# Patient Record
Sex: MALE | Race: WHITE | NOT HISPANIC OR LATINO | Employment: OTHER | URBAN - METROPOLITAN AREA
[De-identification: names, ages, dates, MRNs, and addresses within clinical notes are randomized per-mention and may not be internally consistent; named-entity substitution may affect disease eponyms.]

---

## 2017-01-11 ENCOUNTER — OFFICE VISIT (OUTPATIENT)
Dept: INTERNAL MEDICINE | Facility: IMAGING CENTER | Age: 77
End: 2017-01-11
Payer: MEDICARE

## 2017-01-11 ENCOUNTER — HOSPITAL ENCOUNTER (OUTPATIENT)
Facility: MEDICAL CENTER | Age: 77
End: 2017-01-11
Attending: INTERNAL MEDICINE
Payer: MEDICARE

## 2017-01-11 VITALS
RESPIRATION RATE: 12 BRPM | TEMPERATURE: 96.6 F | HEIGHT: 67 IN | WEIGHT: 168 LBS | DIASTOLIC BLOOD PRESSURE: 74 MMHG | SYSTOLIC BLOOD PRESSURE: 124 MMHG | OXYGEN SATURATION: 99 % | BODY MASS INDEX: 26.37 KG/M2 | HEART RATE: 63 BPM

## 2017-01-11 DIAGNOSIS — D69.6 THROMBOCYTOPENIA (HCC): ICD-10-CM

## 2017-01-11 DIAGNOSIS — N40.0 BENIGN NON-NODULAR PROSTATIC HYPERPLASIA WITHOUT LOWER URINARY TRACT SYMPTOMS: ICD-10-CM

## 2017-01-11 LAB
BASOPHILS # BLD AUTO: 0.03 K/UL (ref 0–0.12)
BASOPHILS NFR BLD AUTO: 0.4 % (ref 0–1.8)
EOSINOPHIL # BLD: 0.09 K/UL (ref 0–0.51)
EOSINOPHIL NFR BLD AUTO: 1.3 % (ref 0–6.9)
ERYTHROCYTE [DISTWIDTH] IN BLOOD BY AUTOMATED COUNT: 49.4 FL (ref 35.9–50)
HCT VFR BLD AUTO: 41.9 % (ref 42–52)
HGB BLD-MCNC: 14.3 G/DL (ref 14–18)
IMM GRANULOCYTES # BLD AUTO: 0.02 K/UL (ref 0–0.11)
IMM GRANULOCYTES NFR BLD AUTO: 0.3 % (ref 0–0.9)
LYMPHOCYTES # BLD: 2.45 K/UL (ref 1–4.8)
LYMPHOCYTES NFR BLD AUTO: 36.1 % (ref 22–41)
MCH RBC QN AUTO: 33.6 PG (ref 27–33)
MCHC RBC AUTO-ENTMCNC: 34.1 G/DL (ref 33.7–35.3)
MCV RBC AUTO: 98.4 FL (ref 81.4–97.8)
MONOCYTES # BLD: 0.59 K/UL (ref 0–0.85)
MONOCYTES NFR BLD AUTO: 8.7 % (ref 0–13.4)
NEUTROPHILS # BLD: 3.6 K/UL (ref 1.82–7.42)
NEUTROPHILS NFR BLD AUTO: 53.2 % (ref 44–72)
NRBC # BLD AUTO: 0 K/UL
NRBC BLD-RTO: 0 /100 WBC
PLATELET # BLD AUTO: 130 K/UL (ref 164–446)
PMV BLD AUTO: 12.8 FL (ref 9–12.9)
RBC # BLD AUTO: 4.26 M/UL (ref 4.7–6.1)
WBC # BLD AUTO: 6.8 K/UL (ref 4.8–10.8)

## 2017-01-11 PROCEDURE — 99213 OFFICE O/P EST LOW 20 MIN: CPT | Performed by: INTERNAL MEDICINE

## 2017-01-11 PROCEDURE — 85025 COMPLETE CBC W/AUTO DIFF WBC: CPT

## 2017-01-11 NOTE — MR AVS SNAPSHOT
"        Andre Martinez   2017 11:00 AM   Office Visit   MRN: [id number]    [facility]  [facility]   Dept Phone:  [phone]    Description:  Male : 1940   Provider:  Azar Cavazos M.D.           Reason for Visit     Follow-Up ear wax and low platelet      Allergies as of 2017     Allergen Noted Reactions    Avelox [Moxifloxacin Hcl In Nacl] 05/15/2013       Oxycodone 05/15/2013       Scopolamine 05/15/2013         You were diagnosed with     Thrombocytopenia (HCC)   [291202]       Benign non-nodular prostatic hyperplasia without lower urinary tract symptoms   [1450745]         Vital Signs     Blood Pressure Pulse Temperature Respirations Height Weight    124/74 mmHg 63 35.9 °C (96.6 °F) 12 1.689 m (5' 6.5\") 76.204 kg (168 lb)    Body Mass Index Oxygen Saturation Smoking Status             26.71 kg/m2 99% Never Smoker          Basic Information     Date Of Birth Sex Race Ethnicity Preferred Language    1940 Male White Non- English      Problem List              ICD-10-CM Priority Class Noted - Resolved    Coronary atherosclerosis of native coronary artery I25.10   5/15/2013 - Present    Meniscus tear S83.209A   Unknown - Present    Spinal stenosis, lumbar M48.06   Unknown - Present    Colon polyps K63.5   Unknown - Present    Kidney cysts N28.1   Unknown - Present    Pancreas cyst K86.2   Unknown - Present    BPH (benign prostatic hyperplasia) N40.0   Unknown - Present    Pure hypercholesterolemia E78.00   2016 - Present      Health Maintenance        Date Due Completion Dates    COLONOSCOPY 2017 (Done)    Override on 2012: Done    IMM DTaP/Tdap/Td Vaccine (2 - Td) 2024            Current Immunizations     13-VALENT PCV PREVNAR 2015    Hepatitis A Vaccine, Ped/Adol 1997, 1996    Hepatitis B Vaccine Non-Recombivax (Ped/Adol) 2000, 2000, 1999    Influenza Vaccine Adult HD 10/22/2016, 2015   " Meningococcal Conjugate Vaccine MCV4 (Menactra) 8/6/2014    Pneumococcal polysaccharide vaccine (PPSV-23) 1/17/2013, 7/25/2006    SHINGLES VACCINE 11/14/2005    Tdap Vaccine 8/6/2014    Typhoid Vaccine 6/2/2010    Yellow Fever Vaccine 8/6/2014      Below and/or attached are the medications your provider expects you to take. Review all of your home medications and newly ordered medications with your provider and/or pharmacist. Follow medication instructions as directed by your provider and/or pharmacist. Please keep your medication list with you and share with your provider. Update the information when medications are discontinued, doses are changed, or new medications (including over-the-counter products) are added; and carry medication information at all times in the event of emergency situations     Allergies:  AVELOX - (reactions not documented)     OXYCODONE - (reactions not documented)     SCOPOLAMINE - (reactions not documented)               Medications  Valid as of: January 11, 2017 -  2:11 PM    Generic Name Brand Name Tablet Size Instructions for use    Aspirin (Tablet Delayed Response) ECOTRIN 81 MG Take 81 mg by mouth every day.        Atorvastatin Calcium (Tab) LIPITOR 20 MG Take 1 Tab by mouth every bedtime.        Cefdinir (Cap) OMNICEF 300 MG Take 1 Cap by mouth 2 times a day.        Celecoxib (Cap) CELEBREX 200 MG Take 1 Cap by mouth every day.        Cholecalciferol (Tab) cholecalciferol 1000 UNIT Take 2 Tabs by mouth every day.        Coenzyme Q10 (Cap) coenzyme Q-10 30 MG Take 60 mg by mouth every day.        Cyanocobalamin   Take 1 Tab by mouth every day.        Dutasteride (Cap) AVODART 0.5 MG Take 1 Cap by mouth every day.        Dutasteride-Tamsulosin HCl (Cap) Dutasteride-Tamsulosin HCl 0.5-0.4 MG Take 1 Cap by mouth every day.        Finasteride (Tab) PROSCAR 5 MG Take 1 Tab by mouth every day.        Glucosa-Chondr-Na Chondr-MSM   Take 1 Tab by mouth every day.        Mometasone Furoate  (Suspension) NASONEX 50 MCG/ACT Spray 2 Sprays in nose every day.        Multiple Vitamin (Cap) Multiple Vitamin  Take 1 Cap by mouth every day.        Omega-3 Fatty Acids   Take 2 Caps by mouth every day.        Tamsulosin HCl (Cap) FLOMAX 0.4 MG Take 1 Cap by mouth ONE-HALF HOUR AFTER BREAKFAST.        Tamsulosin HCl (Cap) FLOMAX 0.4 MG Take 1 Cap by mouth ONE-HALF HOUR AFTER BREAKFAST.        .                 Medicines prescribed today were sent to:     [facility]ARMACY #[id number] - [place], [place] - [address]    [address] [place] [address] [place] [address]    Phone: [phone] Fax: [phone]    Open 24 Hours?: No    [facility] - [address] [facility], [place] - [address] AT [facility] AND [address] [facility] [address]    Phone: [phone] Fax: [phone]    Open 24 Hours?: Yes      Medication refill instructions:       If your prescription bottle indicates you have medication refills left, it is not necessary to call your provider’s office. Please contact your pharmacy and they will refill your medication.    If your prescription bottle indicates you do not have any refills left, you may request refills at any time through one of the following ways: The online ePantry system (except Urgent Care), by calling your provider’s office, or by asking your pharmacy to contact your provider’s office with a refill request. Medication refills are processed only during regular business hours and may not be available until the next business day. Your provider may request additional information or to have a follow-up visit with you prior to refilling your medication.   *Please Note: Medication refills are assigned a new Rx number when refilled electronically. Your pharmacy may indicate that no refills were authorized even though a new prescription for the same medication is available at the pharmacy. Please request the medicine by name with the pharmacy before contacting your provider for a  refill.        Your To Do List     Future Labs/Procedures Complete By Expires    CBC WITH DIFFERENTIAL  As directed 7/14/2017      Other Notes About Your Plan     Colonoscopy 5/8/2012 in [place] (Dr Domingo) repeat in 5 yrs Dexa 6/3/16 osteopenia PSA 11/15/16  1.42  [facility]            MyChart Access Code: Activation code not generated  Current MyChart Status: Active

## 2017-01-11 NOTE — PROGRESS NOTES
Chief Complaint   Patient presents with   • Follow-Up     ear wax and low platelet       HISTORY OF THE PRESENT ILLNESS: Patient is a 76 y.o. male. Patient comes in for reevaluation of his left ear. He was recently diagnosed with cerumen impaction by his ENT and [place]. He underwent removal. He still has a sensation of fullness in the ear. No hearing loss. No discharge.    Also discussed thrombocytopenia. This was seen on last laboratory. No unusual bruising, bleeding.    Lastly, we discussed his BPH. He is now on finasteride and tamsulosin. He has noticed some improvement.       Allergies: Avelox; Oxycodone; and Scopolamine    Current Outpatient Prescriptions Ordered in [facility]   Medication Sig Dispense Refill   • finasteride (PROSCAR) 5 MG Tab Take 1 Tab by mouth every day. 90 Tab 3   • tamsulosin (FLOMAX) 0.4 MG capsule Take 1 Cap by mouth ONE-HALF HOUR AFTER BREAKFAST. 90 Cap 3   • Dutasteride-Tamsulosin HCl (MARIANA) 0.5-0.4 MG Cap Take 1 Cap by mouth every day. 30 Cap 11   • cefdinir (OMNICEF) 300 MG Cap Take 1 Cap by mouth 2 times a day. 20 Cap 0   • dutasteride (AVODART) 0.5 MG capsule Take 1 Cap by mouth every day. 90 Cap 3   • atorvastatin (LIPITOR) 20 MG Tab Take 1 Tab by mouth every bedtime. 90 Tab 3   • mometasone (NASONEX) 50 MCG/ACT nasal spray Spray 2 Sprays in nose every day. 1 Inhaler 11   • tamsulosin (FLOMAX) 0.4 MG capsule Take 1 Cap by mouth ONE-HALF HOUR AFTER BREAKFAST. 90 Cap 3   • celecoxib (CELEBREX) 200 MG Cap Take 1 Cap by mouth every day. 30 Cap 1   • vitamin D (CHOLECALCIFEROL) 1000 UNIT TABS Take 2 Tabs by mouth every day. 30 Tab 6   • aspirin EC (ECOTRIN) 81 MG TBEC Take 81 mg by mouth every day.     • Multiple Vitamin CAPS Take 1 Cap by mouth every day.     • Omega-3 Fatty Acids (SALMON OIL PO) Take 2 Caps by mouth every day.     • Cyanocobalamin (VITAMIN B 12 PO) Take 1 Tab by mouth every day.     • coenzyme Q-10 30 MG capsule Take 60 mg by mouth every day.     • GLUCOSA-CHONDR-NA  "CHONDR-MSM PO Take 1 Tab by mouth every day.       No current [facility]-ordered facility-administered medications on file.       Past medical history, social history and family history were reviewed from chart today    Review of systems: Per HPI.All others negative.     Exam: Blood pressure 124/74, pulse 63, temperature 35.9 °C (96.6 °F), resp. rate 12, height 1.689 m (5' 6.5\"), weight 76.204 kg (168 lb), SpO2 99 %.  General: Well-nourished, well-developed. No change in appearance. No distress.  HEENT: Normocephalic. Ears, canals and tympanic membrane are normal. Pulmonary: Clear.. Normal effort.  Cardiovascular: Regular   Abdomen: Normal appearing. Soft, nontender, nondistended.   Neurologic: Cranial nerves II through XII are grossly intact, alert and oriented x3      Assessment/Plan  1. Thrombocytopenia (HCC)  CBC WITH DIFFERENTIAL   2. Benign non-nodular prostatic hyperplasia without lower urinary tract symptoms         Ear exam was unremarkable.  Repeat CBC to reassess Cecil osteopenia  BPH is stable. No change in treatment  "

## 2017-02-03 ENCOUNTER — HOSPITAL ENCOUNTER (OUTPATIENT)
Dept: LAB | Facility: MEDICAL CENTER | Age: 77
End: 2017-02-03
Attending: NURSE PRACTITIONER
Payer: MEDICARE

## 2017-02-03 DIAGNOSIS — N40.1 BENIGN NON-NODULAR PROSTATIC HYPERPLASIA WITH LOWER URINARY TRACT SYMPTOMS: ICD-10-CM

## 2017-02-03 LAB
BUN SERPL-MCNC: 23 MG/DL (ref 8–22)
CREAT SERPL-MCNC: 1.07 MG/DL (ref 0.5–1.4)

## 2017-02-03 PROCEDURE — 82565 ASSAY OF CREATININE: CPT

## 2017-02-03 PROCEDURE — 84520 ASSAY OF UREA NITROGEN: CPT

## 2017-02-03 PROCEDURE — 36415 COLL VENOUS BLD VENIPUNCTURE: CPT

## 2017-02-03 RX ORDER — FINASTERIDE 5 MG/1
5 TABLET, FILM COATED ORAL DAILY
Qty: 90 TAB | Refills: 3 | Status: SHIPPED | OUTPATIENT
Start: 2017-02-03 | End: 2017-11-25 | Stop reason: SDUPTHER

## 2017-02-07 ENCOUNTER — APPOINTMENT (OUTPATIENT)
Dept: RADIOLOGY | Facility: MEDICAL CENTER | Age: 77
End: 2017-02-07
Attending: NURSE PRACTITIONER
Payer: MEDICARE

## 2017-02-07 DIAGNOSIS — M79.671 RIGHT FOOT PAIN: ICD-10-CM

## 2017-02-07 DIAGNOSIS — D36.10 NEUROMA: ICD-10-CM

## 2017-02-07 PROCEDURE — A9579 GAD-BASE MR CONTRAST NOS,1ML: HCPCS | Performed by: NURSE PRACTITIONER

## 2017-02-07 PROCEDURE — 700117 HCHG RX CONTRAST REV CODE 255: Performed by: NURSE PRACTITIONER

## 2017-02-07 PROCEDURE — 73720 MRI LWR EXTREMITY W/O&W/DYE: CPT | Mod: RT

## 2017-02-07 RX ADMIN — GADODIAMIDE 17 ML: 287 INJECTION INTRAVENOUS at 16:17

## 2017-02-14 DIAGNOSIS — J01.00 ACUTE NON-RECURRENT MAXILLARY SINUSITIS: ICD-10-CM

## 2017-02-14 DIAGNOSIS — R19.7 DIARRHEA, UNSPECIFIED TYPE: ICD-10-CM

## 2017-02-14 RX ORDER — METRONIDAZOLE 500 MG/1
500 TABLET ORAL 3 TIMES DAILY
Qty: 30 TAB | Refills: 0 | Status: SHIPPED | OUTPATIENT
Start: 2017-02-14 | End: 2017-07-24

## 2017-02-14 RX ORDER — CEFDINIR 300 MG/1
300 CAPSULE ORAL 2 TIMES DAILY
Qty: 20 CAP | Refills: 0 | Status: SHIPPED | OUTPATIENT
Start: 2017-02-14 | End: 2017-04-25 | Stop reason: SDUPTHER

## 2017-03-06 DIAGNOSIS — J30.2 OTHER SEASONAL ALLERGIC RHINITIS: ICD-10-CM

## 2017-03-07 RX ORDER — MOMETASONE FUROATE 50 UG/1
2 SPRAY, METERED NASAL DAILY
Qty: 1 INHALER | Refills: 11 | Status: SHIPPED | OUTPATIENT
Start: 2017-03-07 | End: 2017-03-31

## 2017-03-31 DIAGNOSIS — Z91.09 ENVIRONMENTAL ALLERGIES: ICD-10-CM

## 2017-03-31 RX ORDER — FLUTICASONE PROPIONATE 50 MCG
1 SPRAY, SUSPENSION (ML) NASAL DAILY
Qty: 16 G | Refills: 3 | Status: SHIPPED | OUTPATIENT
Start: 2017-03-31 | End: 2019-04-17

## 2017-04-25 DIAGNOSIS — J01.00 ACUTE NON-RECURRENT MAXILLARY SINUSITIS: ICD-10-CM

## 2017-04-25 RX ORDER — CEFDINIR 300 MG/1
300 CAPSULE ORAL 2 TIMES DAILY
Qty: 20 CAP | Refills: 0 | Status: SHIPPED | OUTPATIENT
Start: 2017-04-25 | End: 2017-07-24

## 2017-04-26 DIAGNOSIS — E78.00 PURE HYPERCHOLESTEROLEMIA: ICD-10-CM

## 2017-04-26 RX ORDER — ATORVASTATIN CALCIUM 20 MG/1
20 TABLET, FILM COATED ORAL
Qty: 90 TAB | Refills: 3 | Status: SHIPPED | OUTPATIENT
Start: 2017-04-26 | End: 2018-10-25 | Stop reason: SDUPTHER

## 2017-05-09 DIAGNOSIS — N40.0 BENIGN NON-NODULAR PROSTATIC HYPERPLASIA WITHOUT LOWER URINARY TRACT SYMPTOMS: ICD-10-CM

## 2017-05-09 DIAGNOSIS — E78.00 PURE HYPERCHOLESTEROLEMIA: ICD-10-CM

## 2017-05-09 DIAGNOSIS — D69.6 THROMBOCYTOPENIA (HCC): ICD-10-CM

## 2017-05-30 RX ORDER — TAMSULOSIN HYDROCHLORIDE 0.4 MG/1
CAPSULE ORAL
Qty: 90 CAP | Refills: 3 | Status: SHIPPED | OUTPATIENT
Start: 2017-05-30 | End: 2018-12-06

## 2017-06-01 DIAGNOSIS — N40.1 BENIGN NON-NODULAR PROSTATIC HYPERPLASIA WITH LOWER URINARY TRACT SYMPTOMS: ICD-10-CM

## 2017-06-05 ENCOUNTER — OFFICE VISIT (OUTPATIENT)
Dept: CARDIOLOGY | Facility: MEDICAL CENTER | Age: 77
End: 2017-06-05
Payer: MEDICARE

## 2017-06-05 ENCOUNTER — NON-PROVIDER VISIT (OUTPATIENT)
Dept: CARDIOLOGY | Facility: MEDICAL CENTER | Age: 77
End: 2017-06-05
Payer: MEDICARE

## 2017-06-05 VITALS
BODY MASS INDEX: 22.9 KG/M2 | OXYGEN SATURATION: 95 % | HEART RATE: 66 BPM | HEIGHT: 70 IN | DIASTOLIC BLOOD PRESSURE: 60 MMHG | SYSTOLIC BLOOD PRESSURE: 108 MMHG | WEIGHT: 160 LBS

## 2017-06-05 DIAGNOSIS — R42 LIGHT HEADEDNESS: ICD-10-CM

## 2017-06-05 DIAGNOSIS — R42 POSTURAL DIZZINESS WITH PRESYNCOPE: ICD-10-CM

## 2017-06-05 DIAGNOSIS — I49.3 PVC (PREMATURE VENTRICULAR CONTRACTION): ICD-10-CM

## 2017-06-05 DIAGNOSIS — R55 POSTURAL DIZZINESS WITH PRESYNCOPE: ICD-10-CM

## 2017-06-05 DIAGNOSIS — I49.1 PREMATURE ATRIAL CONTRACTION: ICD-10-CM

## 2017-06-05 DIAGNOSIS — R55 PRE-SYNCOPE: ICD-10-CM

## 2017-06-05 DIAGNOSIS — R00.0 SINUS TACHYCARDIA: ICD-10-CM

## 2017-06-05 DIAGNOSIS — R00.1 SINUS BRADYCARDIA: ICD-10-CM

## 2017-06-05 PROCEDURE — 1101F PT FALLS ASSESS-DOCD LE1/YR: CPT | Performed by: INTERNAL MEDICINE

## 2017-06-05 PROCEDURE — 4040F PNEUMOC VAC/ADMIN/RCVD: CPT | Performed by: INTERNAL MEDICINE

## 2017-06-05 PROCEDURE — G8432 DEP SCR NOT DOC, RNG: HCPCS | Performed by: INTERNAL MEDICINE

## 2017-06-05 PROCEDURE — 99214 OFFICE O/P EST MOD 30 MIN: CPT | Performed by: INTERNAL MEDICINE

## 2017-06-05 PROCEDURE — 3017F COLORECTAL CA SCREEN DOC REV: CPT | Performed by: INTERNAL MEDICINE

## 2017-06-05 PROCEDURE — G8598 ASA/ANTIPLAT THER USED: HCPCS | Performed by: INTERNAL MEDICINE

## 2017-06-05 PROCEDURE — G8420 CALC BMI NORM PARAMETERS: HCPCS | Performed by: INTERNAL MEDICINE

## 2017-06-05 PROCEDURE — 1036F TOBACCO NON-USER: CPT | Performed by: INTERNAL MEDICINE

## 2017-06-05 RX ORDER — GUAIFENESIN 400 MG/1
TABLET ORAL
COMMUNITY
End: 2020-06-23

## 2017-06-05 ASSESSMENT — ENCOUNTER SYMPTOMS
SHORTNESS OF BREATH: 0
PND: 0
EYE DISCHARGE: 0
NERVOUS/ANXIOUS: 0
DEPRESSION: 0
FEVER: 0
NAUSEA: 0
HEADACHES: 0
CHILLS: 0
MYALGIAS: 0
BRUISES/BLEEDS EASILY: 0
BLURRED VISION: 0
COUGH: 0
DIZZINESS: 1
HEARTBURN: 0
PALPITATIONS: 0

## 2017-06-05 NOTE — MR AVS SNAPSHOT
"        Andre Martinez   2017 3:30 PM   Office Visit   MRN: [id number]    Department:  [facility]   Dept Phone:  [phone]    Description:  Male : 1940   Provider:  Joseph Calderon M.D.           Allergies as of 2017     Allergen Noted Reactions    Avelox [Moxifloxacin Hcl In Nacl] 05/15/2013       Oxycodone 05/15/2013       Scopolamine 05/15/2013         You were diagnosed with     Light headedness   [846872]       Pre-syncope   [132332]         Vital Signs     Blood Pressure Pulse Height Weight Body Mass Index Oxygen Saturation    108/60 mmHg 66 1.778 m (5' 10\") 72.576 kg (160 lb) 22.96 kg/m2 95%    Smoking Status                   Never Smoker            Basic Information     Date Of Birth Sex Race Ethnicity Preferred Language    1940 Male White Non- English      Your appointments     2017  9:30 AM   Established Patient with Azar Cavazos M.D.   [facility] (--)    [address] [place] [address]   [phone]           You will be receiving a confirmation call a few days before your appointment from our automated call confirmation system.              Problem List              ICD-10-CM Priority Class Noted - Resolved    Coronary atherosclerosis of native coronary artery I25.10   5/15/2013 - Present    Meniscus tear S83.209A   Unknown - Present    Spinal stenosis, lumbar M48.06   Unknown - Present    Colon polyps K63.5   Unknown - Present    Kidney cysts N28.1   Unknown - Present    Pancreas cyst K86.2   Unknown - Present    BPH (benign prostatic hyperplasia) N40.0   Unknown - Present    Pure hypercholesterolemia E78.00   2016 - Present      Health Maintenance        Date Due Completion Dates    COLONOSCOPY 3/21/2022 3/21/2017, 3/21/2017 (Done), 2012 (Done)    Override on 3/21/2017: Done    Override on 2012: Done    IMM DTaP/Tdap/Td Vaccine (2 - Td) 2024            Current Immunizations    " 13-VALENT PCV PREVNAR 2/4/2015    Hepatitis A Vaccine, Ped/Adol 2/28/1997, 7/26/1996    Hepatitis B Vaccine Non-Recombivax (Ped/Adol) 7/18/2000, 1/13/2000, 12/16/1999    Influenza Vaccine Adult HD 10/22/2016, 11/18/2015    Meningococcal Conjugate Vaccine MCV4 (Menactra) 8/6/2014    Pneumococcal polysaccharide vaccine (PPSV-23) 1/17/2013, 7/25/2006    SHINGLES VACCINE 11/14/2005    Tdap Vaccine 8/6/2014    Typhoid Vaccine 6/2/2010    Yellow Fever Vaccine 8/6/2014      Below and/or attached are the medications your provider expects you to take. Review all of your home medications and newly ordered medications with your provider and/or pharmacist. Follow medication instructions as directed by your provider and/or pharmacist. Please keep your medication list with you and share with your provider. Update the information when medications are discontinued, doses are changed, or new medications (including over-the-counter products) are added; and carry medication information at all times in the event of emergency situations     Allergies:  AVELOX - (reactions not documented)     OXYCODONE - (reactions not documented)     SCOPOLAMINE - (reactions not documented)               Medications  Valid as of: June 05, 2017 -  4:52 PM    Generic Name Brand Name Tablet Size Instructions for use    Aspirin (Tablet Delayed Response) ECOTRIN 81 MG Take 81 mg by mouth every day.        Atorvastatin Calcium (Tab) LIPITOR 20 MG Take 1 Tab by mouth every bedtime.        Calcium Carb-Cholecalciferol   Take  by mouth.        Cefdinir (Cap) OMNICEF 300 MG Take 1 Cap by mouth 2 times a day.        Celecoxib (Cap) CELEBREX 200 MG Take 1 Cap by mouth every day.        Cholecalciferol (Tab) cholecalciferol 1000 UNIT Take 2 Tabs by mouth every day.        Coenzyme Q10 (Cap) coenzyme Q-10 30 MG Take 60 mg by mouth every day.        Cyanocobalamin   Take 1 Tab by mouth every day.        Dutasteride (Cap) AVODART 0.5 MG Take 1 Cap by mouth every day.           Dutasteride-Tamsulosin HCl (Cap) Dutasteride-Tamsulosin HCl 0.5-0.4 MG Take 1 Cap by mouth every day.        Finasteride (Tab) PROSCAR 5 MG Take 1 Tab by mouth every day.        Fluticasone Propionate (Suspension) FLONASE 50 MCG/ACT Spray 1 Spray in nose every day.        Glucosa-Chondr-Na Chondr-MSM   Take 1 Tab by mouth every day.        GuaiFENesin (Tab) Guaifenesin 400 MG Take  by mouth.        MetroNIDAZOLE (Tab) FLAGYL 500 MG Take 1 Tab by mouth 3 times a day. Indication: Traveler's diarrhea        Multiple Vitamin (Cap) Multiple Vitamin  Take 1 Cap by mouth every day.        Omega-3 Fatty Acids   Take 2 Caps by mouth every day.        Tamsulosin HCl (Cap) FLOMAX 0.4 MG Take 1 Cap by mouth ONE-HALF HOUR AFTER BREAKFAST.        Tamsulosin HCl (Cap) FLOMAX 0.4 MG TAKE 1 CAP BY MOUTH ONE-HALF HOUR AFTER BREAKFAST.        .                 Medicines prescribed today were sent to:     [facility]/PHARMACY #[id number] - [place] - [address] [facility]    [address] [place] [address] [place] [address]    Phone: [phone] Fax: [phone]    Open 24 Hours?: No    [facility] [address] - [facility] [place] [address] [facility] On license of [facility] [address]    Phone: [phone] Fax: [phone]    Open 24 Hours?: Yes      Medication refill instructions:       If your prescription bottle indicates you have medication refills left, it is not necessary to call your provider’s office. Please contact your pharmacy and they will refill your medication.    If your prescription bottle indicates you do not have any refills left, you may request refills at any time through one of the following ways: The online [email or web address] system (except Urgent Care), by calling your provider’s office, or by asking your pharmacy to contact your provider’s office with a refill request. Medication refills are processed only during regular business hours and may not be available until the next business day. Your provider may  request additional information or to have a follow-up visit with you prior to refilling your medication.   *Please Note: Medication refills are assigned a new Rx number when refilled electronically. Your pharmacy may indicate that no refills were authorized even though a new prescription for the same medication is available at the pharmacy. Please request the medicine by name with the pharmacy before contacting your provider for a refill.        Other Notes About Your Plan     Colonoscopy 3/21/17in [place] (Dr Domingo) repeat in 5 yrs Dexa 6/3/16 osteopenia PSA 11/15/16  1.42  [facility]            MyChart Access Code: Activation code not generated  Current MyChart Status: Active

## 2017-06-05 NOTE — Clinical Note
[facility]   [address]   [address]  [place] [address]  Phone: [phone]  Fax: [phone]              Andre Martinez  1940    Encounter Date: 2017    Joseph Calderon M.D.          PROGRESS NOTE:  Subjective:   Andre Martinez is a 76 y.o. male who presents today concerned about an episode that occurred 3 weeks ago. He was sitting at a restaurant with his wife and loss consciousness for no more than 2 seconds. His wife was unaware of it. This is unlike previous episodes of lightheadedness. This episode concerns him.  There've been no further episodes before or thereafter.  EKG today is unchanged raising the question of an anterior scar and left axis deviation. This is unchanged from EKG of 2016.    I would did bilateral sequential carotid sinus massage with no changes in monitor or symptoms    Past Medical History   Diagnosis Date   • Meniscus tear    • Spinal stenosis, lumbar    • Colon polyps    • Kidney cysts    • Pancreas cyst    • BPH (benign prostatic hyperplasia)    • Coronary atherosclerosis of native coronary artery 5/15/2013   • HLD (hyperlipidemia) 5/15/2013     Past Surgical History   Procedure Laterality Date   • Knee reconstruction       left, partial    • Rotator cuff repair     • Sinusotomies       Dr. Guerrier, total of 4 surgeries   • Hemorrhoidectomy       Family History   Problem Relation Age of Onset   • Heart Disease Father       at age 38   • Psychiatry Mother      Alzheimers   • Hypertension Brother      History   Smoking status   • Never Smoker    Smokeless tobacco   • Not on file     Allergies   Allergen Reactions   • Avelox [Moxifloxacin Hcl In Nacl]    • Oxycodone    • Scopolamine      Outpatient Encounter Prescriptions as of 2017   Medication Sig Dispense Refill   • Calcium Carb-Cholecalciferol (CALCIUM 600 + D PO) Take  by mouth.     • Guaifenesin 400 MG Tab Take  by mouth.      • tamsulosin (FLOMAX) 0.4 MG capsule TAKE 1 CAP BY MOUTH ONE-HALF HOUR AFTER BREAKFAST. 90 Cap 3   • atorvastatin (LIPITOR) 20 MG Tab Take 1 Tab by mouth every bedtime. 90 Tab 3   • fluticasone (FLONASE) 50 MCG/ACT nasal spray Spray 1 Spray in nose every day. 16 g 3   • finasteride (PROSCAR) 5 MG Tab Take 1 Tab by mouth every day. 90 Tab 3   • tamsulosin (FLOMAX) 0.4 MG capsule Take 1 Cap by mouth ONE-HALF HOUR AFTER BREAKFAST. 90 Cap 3   • celecoxib (CELEBREX) 200 MG Cap Take 1 Cap by mouth every day. 30 Cap 1   • vitamin D (CHOLECALCIFEROL) 1000 UNIT TABS Take 2 Tabs by mouth every day. 30 Tab 6   • aspirin EC (ECOTRIN) 81 MG TBEC Take 81 mg by mouth every day.     • Multiple Vitamin CAPS Take 1 Cap by mouth every day.     • Omega-3 Fatty Acids (SALMON OIL PO) Take 2 Caps by mouth every day.     • Cyanocobalamin (VITAMIN B 12 PO) Take 1 Tab by mouth every day.     • coenzyme Q-10 30 MG capsule Take 60 mg by mouth every day.     • GLUCOSA-CHONDR-NA CHONDR-MSM PO Take 1 Tab by mouth every day.     • cefdinir (OMNICEF) 300 MG Cap Take 1 Cap by mouth 2 times a day. 20 Cap 0   • metronidazole (FLAGYL) 500 MG Tab Take 1 Tab by mouth 3 times a day. Indication: Traveler's diarrhea 30 Tab 0   • Dutasteride-Tamsulosin HCl (MARIANA) 0.5-0.4 MG Cap Take 1 Cap by mouth every day. 30 Cap 11   • dutasteride (AVODART) 0.5 MG capsule Take 1 Cap by mouth every day. 90 Cap 3     No facility-administered encounter medications on file as of 6/5/2017.     Review of Systems   Constitutional: Negative for fever, chills and malaise/fatigue.   Eyes: Negative for blurred vision and discharge.   Respiratory: Negative for cough and shortness of breath.    Cardiovascular: Negative for chest pain, palpitations, leg swelling and PND.   Gastrointestinal: Negative for heartburn and nausea.   Genitourinary: Negative for dysuria and urgency.   Musculoskeletal: Negative for myalgias.   Skin: Negative for itching and rash.   Neurological: Positive for  "dizziness. Negative for headaches.   Endo/Heme/Allergies: Negative for environmental allergies. Does not bruise/bleed easily.   Psychiatric/Behavioral: Negative for depression. The patient is not nervous/anxious.         Objective:   /60 mmHg  Pulse 66  Ht 1.778 m (5' 10\")  Wt 72.576 kg (160 lb)  BMI 22.96 kg/m2  SpO2 95%    Physical Exam   Constitutional: He is oriented to person, place, and time. He appears well-developed and well-nourished.   HENT:   Head: Normocephalic and atraumatic.   Eyes: Conjunctivae and EOM are normal. No scleral icterus.   Neck: Neck supple. No JVD present. No thyromegaly present.   Cardiovascular: Normal rate, regular rhythm and normal heart sounds.  Exam reveals no gallop and no friction rub.    No murmur heard.  Pulmonary/Chest: Effort normal and breath sounds normal. No respiratory distress. He has no wheezes. He has no rales. He exhibits no tenderness.   Abdominal: Soft. Bowel sounds are normal. He exhibits no distension and no mass. There is no tenderness.   Neurological: He is alert and oriented to person, place, and time. Coordination normal.   Skin: Skin is warm and dry. No rash noted. No pallor.   Psychiatric: He has a normal mood and affect. His behavior is normal. Judgment and thought content normal.       Assessment:     1. Light headedness  [facility] EPIPHANY EKG (Clinic Performed)   2. Pre-syncope  HOLTER MONITOR STUDY       Medical Decision Making:  Today's Assessment / Status / Plan:   Because of the single episode of presyncope 3 weeks ago and prior history of lightheadedness will schedule for 48 hour Holter monitor looking for evidence of sick sinus syndrome.        Azar Cavazos M.D.  [address] [facility]  [address]  [facility] [address]  VIA In Basket                   "

## 2017-06-05 NOTE — PROGRESS NOTES
Subjective:   Andre Martinez is a 76 y.o. male who presents today concerned about an episode that occurred 3 weeks ago. He was sitting at a restaurant with his wife and loss consciousness for no more than 2 seconds. His wife was unaware of it. This is unlike previous episodes of lightheadedness. This episode concerns him.  There've been no further episodes before or thereafter.  EKG today is unchanged raising the question of an anterior scar and left axis deviation. This is unchanged from EKG of 2016.    I would did bilateral sequential carotid sinus massage with no changes in monitor or symptoms    Past Medical History   Diagnosis Date   • Meniscus tear    • Spinal stenosis, lumbar    • Colon polyps    • Kidney cysts    • Pancreas cyst    • BPH (benign prostatic hyperplasia)    • Coronary atherosclerosis of native coronary artery 5/15/2013   • HLD (hyperlipidemia) 5/15/2013     Past Surgical History   Procedure Laterality Date   • Knee reconstruction       left, partial    • Rotator cuff repair     • Sinusotomies       Dr. Guerrier, total of 4 surgeries   • Hemorrhoidectomy       Family History   Problem Relation Age of Onset   • Heart Disease Father       at age 38   • Psychiatry Mother      Alzheimers   • Hypertension Brother      History   Smoking status   • Never Smoker    Smokeless tobacco   • Not on file     Allergies   Allergen Reactions   • Avelox [Moxifloxacin Hcl In Nacl]    • Oxycodone    • Scopolamine      Outpatient Encounter Prescriptions as of 2017   Medication Sig Dispense Refill   • Calcium Carb-Cholecalciferol (CALCIUM 600 + D PO) Take  by mouth.     • Guaifenesin 400 MG Tab Take  by mouth.     • tamsulosin (FLOMAX) 0.4 MG capsule TAKE 1 CAP BY MOUTH ONE-HALF HOUR AFTER BREAKFAST. 90 Cap 3   • atorvastatin (LIPITOR) 20 MG Tab Take 1 Tab by mouth every bedtime. 90 Tab 3   • fluticasone (FLONASE) 50 MCG/ACT nasal spray Spray 1 Spray in nose every day. 16 g 3   • finasteride  "(PROSCAR) 5 MG Tab Take 1 Tab by mouth every day. 90 Tab 3   • tamsulosin (FLOMAX) 0.4 MG capsule Take 1 Cap by mouth ONE-HALF HOUR AFTER BREAKFAST. 90 Cap 3   • celecoxib (CELEBREX) 200 MG Cap Take 1 Cap by mouth every day. 30 Cap 1   • vitamin D (CHOLECALCIFEROL) 1000 UNIT TABS Take 2 Tabs by mouth every day. 30 Tab 6   • aspirin EC (ECOTRIN) 81 MG TBEC Take 81 mg by mouth every day.     • Multiple Vitamin CAPS Take 1 Cap by mouth every day.     • Omega-3 Fatty Acids (SALMON OIL PO) Take 2 Caps by mouth every day.     • Cyanocobalamin (VITAMIN B 12 PO) Take 1 Tab by mouth every day.     • coenzyme Q-10 30 MG capsule Take 60 mg by mouth every day.     • GLUCOSA-CHONDR-NA CHONDR-MSM PO Take 1 Tab by mouth every day.     • cefdinir (OMNICEF) 300 MG Cap Take 1 Cap by mouth 2 times a day. 20 Cap 0   • metronidazole (FLAGYL) 500 MG Tab Take 1 Tab by mouth 3 times a day. Indication: Traveler's diarrhea 30 Tab 0   • Dutasteride-Tamsulosin HCl (MARIANA) 0.5-0.4 MG Cap Take 1 Cap by mouth every day. 30 Cap 11   • dutasteride (AVODART) 0.5 MG capsule Take 1 Cap by mouth every day. 90 Cap 3     No facility-administered encounter medications on file as of 6/5/2017.     Review of Systems   Constitutional: Negative for fever, chills and malaise/fatigue.   Eyes: Negative for blurred vision and discharge.   Respiratory: Negative for cough and shortness of breath.    Cardiovascular: Negative for chest pain, palpitations, leg swelling and PND.   Gastrointestinal: Negative for heartburn and nausea.   Genitourinary: Negative for dysuria and urgency.   Musculoskeletal: Negative for myalgias.   Skin: Negative for itching and rash.   Neurological: Positive for dizziness. Negative for headaches.   Endo/Heme/Allergies: Negative for environmental allergies. Does not bruise/bleed easily.   Psychiatric/Behavioral: Negative for depression. The patient is not nervous/anxious.         Objective:   /60 mmHg  Pulse 66  Ht 1.778 m (5' 10\")  " Wt 72.576 kg (160 lb)  BMI 22.96 kg/m2  SpO2 95%    Physical Exam   Constitutional: He is oriented to person, place, and time. He appears well-developed and well-nourished.   HENT:   Head: Normocephalic and atraumatic.   Eyes: Conjunctivae and EOM are normal. No scleral icterus.   Neck: Neck supple. No JVD present. No thyromegaly present.   Cardiovascular: Normal rate, regular rhythm and normal heart sounds.  Exam reveals no gallop and no friction rub.    No murmur heard.  Pulmonary/Chest: Effort normal and breath sounds normal. No respiratory distress. He has no wheezes. He has no rales. He exhibits no tenderness.   Abdominal: Soft. Bowel sounds are normal. He exhibits no distension and no mass. There is no tenderness.   Neurological: He is alert and oriented to person, place, and time. Coordination normal.   Skin: Skin is warm and dry. No rash noted. No pallor.   Psychiatric: He has a normal mood and affect. His behavior is normal. Judgment and thought content normal.       Assessment:     1. Light headedness  [facility] EPIPHANY EKG (Clinic Performed)   2. Pre-syncope  HOLTER MONITOR STUDY       Medical Decision Making:  Today's Assessment / Status / Plan:   Because of the single episode of presyncope 3 weeks ago and prior history of lightheadedness will schedule for 48 hour Holter monitor looking for evidence of sick sinus syndrome.

## 2017-06-14 DIAGNOSIS — R55 POSTURAL DIZZINESS WITH PRESYNCOPE: ICD-10-CM

## 2017-06-14 DIAGNOSIS — R42 POSTURAL DIZZINESS WITH PRESYNCOPE: ICD-10-CM

## 2017-06-16 ENCOUNTER — TELEPHONE (OUTPATIENT)
Dept: CARDIOLOGY | Facility: MEDICAL CENTER | Age: 77
End: 2017-06-16

## 2017-06-16 NOTE — TELEPHONE ENCOUNTER
----- Message from Natalia Fagan sent at 6/16/2017  3:11 PM PDT -----  Regarding: Pt waiting for call back  Contact: [phone]  NUSRAT/Cheri    Pt states he has been waiting for call back since yesterday about his Holter Monitor results? He would please like a call back today, can be reached at [phone]

## 2017-06-19 LAB — EKG IMPRESSION: NORMAL

## 2017-06-19 PROCEDURE — 93224 XTRNL ECG REC UP TO 48 HRS: CPT | Performed by: INTERNAL MEDICINE

## 2017-07-21 ENCOUNTER — NON-PROVIDER VISIT (OUTPATIENT)
Dept: INTERNAL MEDICINE | Facility: IMAGING CENTER | Age: 77
End: 2017-07-21
Payer: MEDICARE

## 2017-07-21 ENCOUNTER — HOSPITAL ENCOUNTER (OUTPATIENT)
Facility: MEDICAL CENTER | Age: 77
End: 2017-07-21
Attending: INTERNAL MEDICINE
Payer: MEDICARE

## 2017-07-21 DIAGNOSIS — I25.10 ATHEROSCLEROSIS OF NATIVE CORONARY ARTERY OF NATIVE HEART WITHOUT ANGINA PECTORIS: ICD-10-CM

## 2017-07-21 DIAGNOSIS — E78.00 PURE HYPERCHOLESTEROLEMIA: ICD-10-CM

## 2017-07-21 DIAGNOSIS — N40.0 BENIGN NON-NODULAR PROSTATIC HYPERPLASIA WITHOUT LOWER URINARY TRACT SYMPTOMS: ICD-10-CM

## 2017-07-21 DIAGNOSIS — D69.6 THROMBOCYTOPENIA (HCC): ICD-10-CM

## 2017-07-21 LAB
ALBUMIN SERPL BCP-MCNC: 4.1 G/DL (ref 3.2–4.9)
ALBUMIN/GLOB SERPL: 1.5 G/DL
ALP SERPL-CCNC: 73 U/L (ref 30–99)
ALT SERPL-CCNC: 40 U/L (ref 2–50)
ANION GAP SERPL CALC-SCNC: 6 MMOL/L (ref 0–11.9)
AST SERPL-CCNC: 30 U/L (ref 12–45)
BASOPHILS # BLD AUTO: 0.7 % (ref 0–1.8)
BASOPHILS # BLD: 0.05 K/UL (ref 0–0.12)
BILIRUB SERPL-MCNC: 1 MG/DL (ref 0.1–1.5)
BUN SERPL-MCNC: 22 MG/DL (ref 8–22)
CALCIUM SERPL-MCNC: 10.1 MG/DL (ref 8.5–10.5)
CHLORIDE SERPL-SCNC: 103 MMOL/L (ref 96–112)
CHOLEST SERPL-MCNC: 160 MG/DL (ref 100–199)
CO2 SERPL-SCNC: 28 MMOL/L (ref 20–33)
CREAT SERPL-MCNC: 0.95 MG/DL (ref 0.5–1.4)
EOSINOPHIL # BLD AUTO: 0.17 K/UL (ref 0–0.51)
EOSINOPHIL NFR BLD: 2.5 % (ref 0–6.9)
ERYTHROCYTE [DISTWIDTH] IN BLOOD BY AUTOMATED COUNT: 49.4 FL (ref 35.9–50)
GFR SERPL CREATININE-BSD FRML MDRD: >60 ML/MIN/1.73 M 2
GLOBULIN SER CALC-MCNC: 2.7 G/DL (ref 1.9–3.5)
GLUCOSE SERPL-MCNC: 78 MG/DL (ref 65–99)
HCT VFR BLD AUTO: 43.9 % (ref 42–52)
HDLC SERPL-MCNC: 75 MG/DL
HGB BLD-MCNC: 15 G/DL (ref 14–18)
IMM GRANULOCYTES # BLD AUTO: 0.01 K/UL (ref 0–0.11)
IMM GRANULOCYTES NFR BLD AUTO: 0.1 % (ref 0–0.9)
LDLC SERPL CALC-MCNC: 74 MG/DL
LYMPHOCYTES # BLD AUTO: 3.01 K/UL (ref 1–4.8)
LYMPHOCYTES NFR BLD: 44.4 % (ref 22–41)
MCH RBC QN AUTO: 33.4 PG (ref 27–33)
MCHC RBC AUTO-ENTMCNC: 34.2 G/DL (ref 33.7–35.3)
MCV RBC AUTO: 97.8 FL (ref 81.4–97.8)
MONOCYTES # BLD AUTO: 0.49 K/UL (ref 0–0.85)
MONOCYTES NFR BLD AUTO: 7.2 % (ref 0–13.4)
NEUTROPHILS # BLD AUTO: 3.05 K/UL (ref 1.82–7.42)
NEUTROPHILS NFR BLD: 45.1 % (ref 44–72)
NRBC # BLD AUTO: 0 K/UL
NRBC BLD AUTO-RTO: 0 /100 WBC
PLATELET # BLD AUTO: 148 K/UL (ref 164–446)
PMV BLD AUTO: 12.5 FL (ref 9–12.9)
POTASSIUM SERPL-SCNC: 4.2 MMOL/L (ref 3.6–5.5)
PROT SERPL-MCNC: 6.8 G/DL (ref 6–8.2)
PSA SERPL-MCNC: 0.61 NG/ML (ref 0–4)
RBC # BLD AUTO: 4.49 M/UL (ref 4.7–6.1)
SODIUM SERPL-SCNC: 137 MMOL/L (ref 135–145)
TRIGL SERPL-MCNC: 53 MG/DL (ref 0–149)
WBC # BLD AUTO: 6.8 K/UL (ref 4.8–10.8)

## 2017-07-21 PROCEDURE — 36415 COLL VENOUS BLD VENIPUNCTURE: CPT | Performed by: INTERNAL MEDICINE

## 2017-07-21 PROCEDURE — 80061 LIPID PANEL: CPT

## 2017-07-21 PROCEDURE — 85025 COMPLETE CBC W/AUTO DIFF WBC: CPT

## 2017-07-21 PROCEDURE — 84153 ASSAY OF PSA TOTAL: CPT

## 2017-07-21 PROCEDURE — 80053 COMPREHEN METABOLIC PANEL: CPT

## 2017-07-24 ENCOUNTER — OFFICE VISIT (OUTPATIENT)
Dept: INTERNAL MEDICINE | Facility: IMAGING CENTER | Age: 77
End: 2017-07-24
Payer: MEDICARE

## 2017-07-24 VITALS
WEIGHT: 159 LBS | SYSTOLIC BLOOD PRESSURE: 112 MMHG | BODY MASS INDEX: 22.76 KG/M2 | HEART RATE: 67 BPM | TEMPERATURE: 97.2 F | RESPIRATION RATE: 12 BRPM | DIASTOLIC BLOOD PRESSURE: 70 MMHG | HEIGHT: 70 IN | OXYGEN SATURATION: 96 %

## 2017-07-24 DIAGNOSIS — S96.911A ANKLE STRAIN, RIGHT, INITIAL ENCOUNTER: ICD-10-CM

## 2017-07-24 DIAGNOSIS — I25.10 ATHEROSCLEROSIS OF NATIVE CORONARY ARTERY OF NATIVE HEART WITHOUT ANGINA PECTORIS: ICD-10-CM

## 2017-07-24 DIAGNOSIS — D69.6 THROMBOCYTOPENIA (HCC): ICD-10-CM

## 2017-07-24 DIAGNOSIS — R42 DIZZINESS: ICD-10-CM

## 2017-07-24 DIAGNOSIS — N40.0 BENIGN NON-NODULAR PROSTATIC HYPERPLASIA WITHOUT LOWER URINARY TRACT SYMPTOMS: ICD-10-CM

## 2017-07-24 DIAGNOSIS — E78.00 PURE HYPERCHOLESTEROLEMIA: ICD-10-CM

## 2017-07-24 DIAGNOSIS — R40.4 ALTERED LEVEL OF CONSCIOUSNESS: ICD-10-CM

## 2017-07-24 PROCEDURE — 99214 OFFICE O/P EST MOD 30 MIN: CPT | Performed by: INTERNAL MEDICINE

## 2017-07-24 NOTE — PROGRESS NOTES
Chief Complaint   Patient presents with   • Follow-Up     chronic issues       HISTORY OF THE PRESENT ILLNESS: Patient is a 77 y.o. male. Patient comes in for routine follow-up and review of recent laboratory.    1. Benign non-nodular prostatic hyperplasia without lower urinary tract symptoms  Patient has history of BPH. He is currently on finasteride and tamsulosin. He is to experience moderate nocturnal symptoms. He is pursuing additional intervention. He would like to avoid TURP because of potential side effects.    2. Atherosclerosis of native coronary artery of native heart without angina pectoris  Coronary disease is stable. No chest pain, angina or equivalent. He was recently doing extensive hiking in [place]. No cardiac symptoms. He remains on statin, aspirin, fish oil and coenzyme Q10    3. Altered level of consciousness  *Patient recent episode of feeling disconnected. He is uncertain if there was an episode of syncope he does not remember the specifics but was seen by cardiology. He had a Holter monitor which showed ectopy but no significant arrhythmia. He has a history of bradycardia but this is typically nocturnal.    4. Dizziness  As above    5. Thrombocytopenia (CMS-HCC)  Stable/improved. No unusual bruising, bleeding or other. His hemoglobin is normal. His white blood cell count is normal.         Allergies: Avelox; Oxycodone; and Scopolamine    Current Outpatient [facility]   Medication Sig Dispense Refill   • tamsulosin (FLOMAX) 0.4 MG capsule TAKE 1 CAP BY MOUTH ONE-HALF HOUR AFTER BREAKFAST. 90 Cap 3   • atorvastatin (LIPITOR) 20 MG Tab Take 1 Tab by mouth every bedtime. 90 Tab 3   • fluticasone (FLONASE) 50 MCG/ACT nasal spray Spray 1 Spray in nose every day. 16 g 3   • finasteride (PROSCAR) 5 MG Tab Take 1 Tab by mouth every day. 90 Tab 3   • celecoxib (CELEBREX) 200 MG Cap Take 1 Cap by mouth every day. 30 Cap 1   • vitamin D (CHOLECALCIFEROL) 1000 UNIT TABS Take 2 Tabs by  "mouth every day. 30 Tab 6   • aspirin EC (ECOTRIN) 81 MG TBEC Take 81 mg by mouth every day.     • Multiple Vitamin CAPS Take 1 Cap by mouth every day.     • Omega-3 Fatty Acids (SALMON OIL PO) Take 2 Caps by mouth every day.     • Cyanocobalamin (VITAMIN B 12 PO) Take 1 Tab by mouth every day.     • coenzyme Q-10 30 MG capsule Take 60 mg by mouth every day.     • GLUCOSA-CHONDR-NA CHONDR-MSM PO Take 1 Tab by mouth every day.     • Calcium Carb-Cholecalciferol (CALCIUM 600 + D PO) Take  by mouth.     • Guaifenesin 400 MG Tab Take  by mouth.       No current Epic-ordered facility-administered medications on file.       Past medical history, social history and family history were reviewed from chart today    Review of systems: Per HPI. No headache, fever, chills, dyspnea, dyspepsia. Positive for right ankle pain. Pain is on the medial aspect. Symptoms while he was hiking in [place] recently. Symptoms were acutely worse recently why standing. He is currently wearing a brace. All others negative.     Exam: Blood pressure 112/70, pulse 67, temperature 36.2 °C (97.2 °F), resp. rate 12, height 1.778 m (5' 10\"), weight 72.122 kg (159 lb), SpO2 96 %.  General: Well-appearing. Well-developed. No signs of distress.  HEENT: Grossly normal. Oral cavity is pink and moist.  Neck: Supple without JVD or bruit.  Pulmonary: Clear with good breath sounds. Normal effort.  Cardiovascular: Regular. Carotid and radial pulses are intact.  Abdomen: Soft, nontender, nondistended. Spleen and liver are not enlarged.  Neurologic: Cranial nerves II through XII are grossly normal, alert and oriented x3  MSK: The ankles are normal in appearance. He is wearing a brace on the right ankle. It is nontender to palpate.    Assessment/Plan  1. Benign non-nodular prostatic hyperplasia without lower urinary tract symptoms     2. Atherosclerosis of native coronary artery of native heart without angina pectoris     3. Altered level of consciousness     4. " Dizziness     5. Thrombocytopenia (CMS-HCC)     6. Ankle strain, right, initial encounter     7. Pure hypercholesterolemia           Overall patient is in good health.    His primary complaint is the ongoing BPH and side effects. I discussed with him that I had a patient who recently had a procedure in [place] [facility] ([place] remember specifically) who was happy with the results. I will look into the urologist who performed the procedure and see if he would like to pursue a consult there. He is planning to see a urologist at [facility] in the next few days.    Cardiac issues are stable. No change in treatment.    His altered level of consciousness/dizziness issue is unclear. Possibly vagal? Possibly orthostatic from tamsulosin? No recurrence of symptoms since May. Recent Holter discussed above.    Hyperlipidemia is stable on statin.    We discussed his ankle issues. I recommend icing and that he continue with brace as well as activity as tolerated. Follow-up is symptoms persist.

## 2017-07-24 NOTE — MR AVS SNAPSHOT
"        Andre Martinez   2017 9:30 AM   Office Visit   MRN: [id number]    [facility]   Dept Phone:  [phone]    Description:  Male : 1940   Provider:  Azar Cavazos M.D.           Reason for Visit     Follow-Up chronic issues      Allergies as of 2017     Allergen Noted Reactions    Avelox [Moxifloxacin Hcl In Nacl] 05/15/2013       Oxycodone 05/15/2013       Scopolamine 05/15/2013         You were diagnosed with     Benign non-nodular prostatic hyperplasia without lower urinary tract symptoms   [2962503]       Atherosclerosis of native coronary artery of native heart without angina pectoris   [1390647]       Altered level of consciousness   [407638]       Dizziness   [395929]       Thrombocytopenia (CMS-HCC)   [803546]       Ankle strain, right, initial encounter   [649376]       Pure hypercholesterolemia   [272.0.ICD-9-CM]         Vital Signs     Blood Pressure Pulse Temperature Respirations Height Weight    112/70 mmHg 67 36.2 °C (97.2 °F) 12 1.778 m (5' 10\") 72.122 kg (159 lb)    Body Mass Index Oxygen Saturation Smoking Status             22.81 kg/m2 96% Never Smoker          Basic Information     Date Of Birth Sex Race Ethnicity Preferred Language    1940 Male White Non- English      Your appointments     Aug 01, 2017  1:15 PM   MR ABDOMEN WITHOUT with 76 [facility] MRI 2   [facility] - MRI - [address]    [address]  [place] [address] [phone]            Aug 01, 2017  2:15 PM   MR ADBOMEN WITH/WO with 76 [facility] MRI 2   [facility] - MRI - [address]    [address]  [place] [address]   [phone]              Problem List              ICD-10-CM Priority Class Noted - Resolved    Coronary atherosclerosis of native coronary artery I25.10   5/15/2013 - Present    Meniscus tear S83.209A   Unknown - Present    Spinal stenosis, lumbar M48.06   Unknown - Present    Colon polyps K63.5   Unknown - Present   " Kidney cysts N28.1   Unknown - Present    Pancreas cyst K86.2   Unknown - Present    BPH (benign prostatic hyperplasia) N40.0   Unknown - Present    Pure hypercholesterolemia E78.00   8/23/2016 - Present    Postural dizziness with presyncope R42, R55   6/14/2017 - Present    Thrombocytopenia (CMS-HCC) D69.6   7/24/2017 - Present      Health Maintenance        Date Due Completion Dates    IMM INFLUENZA (1) 9/1/2017 10/22/2016, 11/18/2015    COLONOSCOPY 3/21/2022 3/21/2017, 3/21/2017 (Done), 5/8/2012 (Done)    Override on 3/21/2017: Done    Override on 5/8/2012: Done    IMM DTaP/Tdap/Td Vaccine (2 - Td) 8/6/2024 8/6/2014            Current Immunizations     13-VALENT PCV PREVNAR 2/4/2015    Hepatitis A Vaccine, Ped/Adol 2/28/1997, 7/26/1996    Hepatitis B Vaccine Non-Recombivax (Ped/Adol) 7/18/2000, 1/13/2000, 12/16/1999    Influenza Vaccine Adult HD 10/22/2016, 11/18/2015    Meningococcal Conjugate Vaccine MCV4 (Menactra) 8/6/2014    Pneumococcal polysaccharide vaccine (PPSV-23) 1/17/2013, 7/25/2006    SHINGLES VACCINE 11/14/2005    Tdap Vaccine 8/6/2014    Typhoid Vaccine 6/2/2010    Yellow Fever Vaccine 8/6/2014      Below and/or attached are the medications your provider expects you to take. Review all of your home medications and newly ordered medications with your provider and/or pharmacist. Follow medication instructions as directed by your provider and/or pharmacist. Please keep your medication list with you and share with your provider. Update the information when medications are discontinued, doses are changed, or new medications (including over-the-counter products) are added; and carry medication information at all times in the event of emergency situations     Allergies:  AVELOX - (reactions not documented)     OXYCODONE - (reactions not documented)     SCOPOLAMINE - (reactions not documented)               Medications  Valid as of: July 24, 2017 - 12:08 PM    Generic Name Brand Name Tablet Size  Instructions for use    Aspirin (Tablet Delayed Response) ECOTRIN 81 MG Take 81 mg by mouth every day.        Atorvastatin Calcium (Tab) LIPITOR 20 MG Take 1 Tab by mouth every bedtime.        Calcium Carb-Cholecalciferol   Take  by mouth.        Celecoxib (Cap) CELEBREX 200 MG Take 1 Cap by mouth every day.        Cholecalciferol (Tab) cholecalciferol 1000 UNIT Take 2 Tabs by mouth every day.        Coenzyme Q10 (Cap) coenzyme Q-10 30 MG Take 60 mg by mouth every day.        Cyanocobalamin   Take 1 Tab by mouth every day.        Finasteride (Tab) PROSCAR 5 MG Take 1 Tab by mouth every day.        Fluticasone Propionate (Suspension) FLONASE 50 MCG/ACT Spray 1 Spray in nose every day.        Glucosa-Chondr-Na Chondr-MSM   Take 1 Tab by mouth every day.        GuaiFENesin (Tab) Guaifenesin 400 MG Take  by mouth.        Multiple Vitamin (Cap) Multiple Vitamin  Take 1 Cap by mouth every day.        Omega-3 Fatty Acids   Take 2 Caps by mouth every day.        Tamsulosin HCl (Cap) FLOMAX 0.4 MG TAKE 1 CAP BY MOUTH ONE-HALF HOUR AFTER BREAKFAST.        .                 Medicines prescribed today were sent to:     [facility][id number] - [place], [place] - [address]    [address] [place] [address] [place] [address]    Phone: [phone] Fax: [phone]    Open 24 Hours?: No    [facility] [place] [address] [place], [place] - [address] [facility] [address]     [address] [facility] [address]    Phone: [phone] Fax: [phone]    Open 24 Hours?: Yes      Medication refill instructions:       If your prescription bottle indicates you have medication refills left, it is not necessary to call your provider’s office. Please contact your pharmacy and they will refill your medication.    If your prescription bottle indicates you do not have any refills left, you may request refills at any time through one of the following ways: The online [employer] system (except Urgent Care), by calling your provider’s office, or  by asking your pharmacy to contact your provider’s office with a refill request. Medication refills are processed only during regular business hours and may not be available until the next business day. Your provider may request additional information or to have a follow-up visit with you prior to refilling your medication.   *Please Note: Medication refills are assigned a new Rx number when refilled electronically. Your pharmacy may indicate that no refills were authorized even though a new prescription for the same medication is available at the pharmacy. Please request the medicine by name with the pharmacy before contacting your provider for a refill.        Other Notes About Your Plan     Colonoscopy 3/21/17in [place] (Dr Domingo) repeat in 5 yrs Dexa 6/3/16 osteopenia PSA 11/15/16  1.42  [facility]            MyChart Access Code: Activation code not generated  Current MyChart Status: Active

## 2017-08-01 ENCOUNTER — HOSPITAL ENCOUNTER (OUTPATIENT)
Dept: RADIOLOGY | Facility: MEDICAL CENTER | Age: 77
End: 2017-08-01
Attending: INTERNAL MEDICINE
Payer: MEDICARE

## 2017-08-01 DIAGNOSIS — K86.89 PANCREATIC MASS: ICD-10-CM

## 2017-08-01 PROCEDURE — 700117 HCHG RX CONTRAST REV CODE 255: Performed by: INTERNAL MEDICINE

## 2017-08-01 PROCEDURE — A9579 GAD-BASE MR CONTRAST NOS,1ML: HCPCS | Performed by: INTERNAL MEDICINE

## 2017-08-01 PROCEDURE — 74181 MRI ABDOMEN W/O CONTRAST: CPT

## 2017-08-01 PROCEDURE — 74183 MRI ABD W/O CNTR FLWD CNTR: CPT

## 2017-08-01 RX ADMIN — GADODIAMIDE 15 ML: 287 INJECTION INTRAVENOUS at 15:22

## 2017-08-29 ENCOUNTER — OFFICE VISIT (OUTPATIENT)
Dept: INTERNAL MEDICINE | Facility: IMAGING CENTER | Age: 77
End: 2017-08-29
Payer: MEDICARE

## 2017-08-29 VITALS
TEMPERATURE: 97.6 F | HEIGHT: 70 IN | DIASTOLIC BLOOD PRESSURE: 68 MMHG | HEART RATE: 66 BPM | RESPIRATION RATE: 16 BRPM | OXYGEN SATURATION: 97 % | BODY MASS INDEX: 23.19 KG/M2 | WEIGHT: 162 LBS | SYSTOLIC BLOOD PRESSURE: 126 MMHG

## 2017-08-29 DIAGNOSIS — N41.0 ACUTE PROSTATITIS: ICD-10-CM

## 2017-08-29 LAB
APPEARANCE UR: CLEAR
BILIRUB UR STRIP-MCNC: NORMAL MG/DL
COLOR UR AUTO: YELLOW
GLUCOSE UR STRIP.AUTO-MCNC: NORMAL MG/DL
KETONES UR STRIP.AUTO-MCNC: NORMAL MG/DL
LEUKOCYTE ESTERASE UR QL STRIP.AUTO: NORMAL
NITRITE UR QL STRIP.AUTO: NORMAL
PH UR STRIP.AUTO: 7.5 [PH] (ref 5–8)
PROT UR QL STRIP: NORMAL MG/DL
RBC UR QL AUTO: NORMAL
SP GR UR STRIP.AUTO: 1.01
UROBILINOGEN UR STRIP-MCNC: NORMAL MG/DL

## 2017-08-29 PROCEDURE — 99213 OFFICE O/P EST LOW 20 MIN: CPT | Performed by: INTERNAL MEDICINE

## 2017-08-29 PROCEDURE — 81002 URINALYSIS NONAUTO W/O SCOPE: CPT | Performed by: INTERNAL MEDICINE

## 2017-08-30 NOTE — PROGRESS NOTES
Chief Complaint   Patient presents with   • Other     pain in the prostate area       HISTORY OF THE PRESENT ILLNESS: Patient is a 77 y.o. male. Patient comes in with complaint of “pain in the prostate. Symptoms for three weeks. He has a history BPH is currently on finasteride and tamsulosin. He recently increase the tamsulosin from once daily to twice-daily and recommendations from urology at the [facility]. He was also recommended he start Myrbetriq but he has not done so.    The pain began approximately one week after the change in medication. He is noticed a significant decrease in semen with intercourse. The pain began after correction. It became more constant. Symptoms improved after he reports a large expel of  semen. Pain returned within a few days. Pain is again constant. It is moderate. No dysuria or frequency or urgency. His urine analysis in the office today was unremarkable.           Allergies: Avelox [moxifloxacin hcl in nacl]; Oxycodone; and Scopolamine    Current Outpatient Prescriptions Ordered in [facility]   Medication Sig Dispense Refill   • Calcium Carb-Cholecalciferol (CALCIUM 600 + D PO) Take  by mouth.     • Guaifenesin 400 MG Tab Take  by mouth.     • tamsulosin (FLOMAX) 0.4 MG capsule TAKE 1 CAP BY MOUTH ONE-HALF HOUR AFTER BREAKFAST. 90 Cap 3   • atorvastatin (LIPITOR) 20 MG Tab Take 1 Tab by mouth every bedtime. 90 Tab 3   • fluticasone (FLONASE) 50 MCG/ACT nasal spray Spray 1 Spray in nose every day. 16 g 3   • finasteride (PROSCAR) 5 MG Tab Take 1 Tab by mouth every day. 90 Tab 3   • celecoxib (CELEBREX) 200 MG Cap Take 1 Cap by mouth every day. 30 Cap 1   • vitamin D (CHOLECALCIFEROL) 1000 UNIT TABS Take 2 Tabs by mouth every day. 30 Tab 6   • aspirin EC (ECOTRIN) 81 MG TBEC Take 81 mg by mouth every day.     • Multiple Vitamin CAPS Take 1 Cap by mouth every day.     • Omega-3 Fatty Acids (SALMON OIL PO) Take 2 Caps by mouth every day.     • Cyanocobalamin (VITAMIN B 12 PO) Take 1 Tab  "by mouth every day.     • coenzyme Q-10 30 MG capsule Take 60 mg by mouth every day.     • GLUCOSA-CHONDR-NA CHONDR-MSM PO Take 1 Tab by mouth every day.       No current [facility]-ordered facility-administered medications on file.        Past medical history, social history and family history were reviewed from chart today    Review of systems: Per HPI.No fever, chills, headache, chest pain, dyspnea or dyspepsia. All others negative.     Exam: Blood pressure 126/68, pulse 66, temperature 36.4 °C (97.6 °F), resp. rate 16, height 1.778 m (5' 10\"), weight 73.5 kg (162 lb), SpO2 97 %.  General: Well-appearing. Well-developed. No signs of distress.  HEENT: Grossly normal. Oral cavity is pink and moist.  Neck: Supple without JVD or bruit.  Pulmonary: Clear with good breath sounds. Normal effort.  Cardiovascular: Regular. Carotid and radial pulses are intact.  Abdomen: Soft, nontender, nondistended. Spleen and liver are not enlarged.  Neurologic: Cranial nerves II through XII are grossly normal, alert and oriented x3  Genitourinary penis, scrotum and testicles are grossly unremarkable. The prostate is enlarged but is nontender.    Assessment/Plan  1. Acute prostatitis  POCT Urinalysis       The cause of his symptoms sound most consistent with prostatitis.  His exam was unremarkable of the penis, testicles and scrotum. His prostate exam revealed a mildly enlarged gland that was nontender    Recommended conservative treatment including ibuprofen 600 mg three times daily.  I think is reasonable to hold off antibiotics at this time.  Continue tamsulosin once daily and finasteride. Continue to hold second dose of tamsulosin on Myrbetriq.  If he is not improve within the next 48-72 hours then consider adding back from. Cipro is not an option because of allergy to Avelox.  "

## 2017-09-28 ENCOUNTER — APPOINTMENT (RX ONLY)
Dept: URBAN - METROPOLITAN AREA CLINIC 4 | Facility: CLINIC | Age: 77
Setting detail: DERMATOLOGY
End: 2017-09-28

## 2017-09-28 ENCOUNTER — OFFICE VISIT (OUTPATIENT)
Dept: CARDIOLOGY | Facility: MEDICAL CENTER | Age: 77
End: 2017-09-28
Payer: MEDICARE

## 2017-09-28 VITALS
WEIGHT: 164 LBS | HEART RATE: 62 BPM | OXYGEN SATURATION: 96 % | BODY MASS INDEX: 23.48 KG/M2 | SYSTOLIC BLOOD PRESSURE: 114 MMHG | DIASTOLIC BLOOD PRESSURE: 60 MMHG | HEIGHT: 70 IN

## 2017-09-28 DIAGNOSIS — D18.0 HEMANGIOMA: ICD-10-CM

## 2017-09-28 DIAGNOSIS — R42 EPISODIC LIGHTHEADEDNESS: ICD-10-CM

## 2017-09-28 DIAGNOSIS — L81.4 OTHER MELANIN HYPERPIGMENTATION: ICD-10-CM

## 2017-09-28 DIAGNOSIS — L82.1 OTHER SEBORRHEIC KERATOSIS: ICD-10-CM

## 2017-09-28 DIAGNOSIS — M79.602 LEFT ARM PAIN: ICD-10-CM

## 2017-09-28 PROBLEM — D18.01 HEMANGIOMA OF SKIN AND SUBCUTANEOUS TISSUE: Status: ACTIVE | Noted: 2017-09-28

## 2017-09-28 PROCEDURE — 99214 OFFICE O/P EST MOD 30 MIN: CPT | Performed by: INTERNAL MEDICINE

## 2017-09-28 PROCEDURE — ? COUNSELING

## 2017-09-28 PROCEDURE — 99213 OFFICE O/P EST LOW 20 MIN: CPT

## 2017-09-28 ASSESSMENT — LOCATION ZONE DERM
LOCATION ZONE: ARM
LOCATION ZONE: LEG
LOCATION ZONE: NOSE
LOCATION ZONE: FACE
LOCATION ZONE: TRUNK

## 2017-09-28 ASSESSMENT — LOCATION DETAILED DESCRIPTION DERM
LOCATION DETAILED: RIGHT PROXIMAL DORSAL FOREARM
LOCATION DETAILED: RIGHT ELBOW
LOCATION DETAILED: RIGHT ANTERIOR DISTAL UPPER ARM
LOCATION DETAILED: LEFT MEDIAL UPPER BACK
LOCATION DETAILED: LEFT DISTAL POSTERIOR UPPER ARM
LOCATION DETAILED: RIGHT POPLITEAL SKIN
LOCATION DETAILED: RIGHT MID-UPPER BACK
LOCATION DETAILED: LEFT PROXIMAL LATERAL POSTERIOR UPPER ARM
LOCATION DETAILED: RIGHT PROXIMAL POSTERIOR THIGH
LOCATION DETAILED: LEFT ANTERIOR PROXIMAL UPPER ARM
LOCATION DETAILED: NASAL INFRATIP
LOCATION DETAILED: LEFT DISTAL POSTERIOR THIGH
LOCATION DETAILED: LEFT SUPERIOR CENTRAL MALAR CHEEK
LOCATION DETAILED: EPIGASTRIC SKIN

## 2017-09-28 ASSESSMENT — ENCOUNTER SYMPTOMS
PALPITATIONS: 0
MYALGIAS: 0
BLURRED VISION: 0
BRUISES/BLEEDS EASILY: 0
DEPRESSION: 0
SHORTNESS OF BREATH: 0
NERVOUS/ANXIOUS: 0
HEADACHES: 0
NAUSEA: 0
CHILLS: 0
EYE DISCHARGE: 0
DIZZINESS: 1
COUGH: 0
HEARTBURN: 0
PND: 0
FEVER: 0

## 2017-09-28 ASSESSMENT — LOCATION SIMPLE DESCRIPTION DERM
LOCATION SIMPLE: LEFT UPPER ARM
LOCATION SIMPLE: RIGHT UPPER ARM
LOCATION SIMPLE: RIGHT ELBOW
LOCATION SIMPLE: RIGHT POPLITEAL SKIN
LOCATION SIMPLE: LEFT CHEEK
LOCATION SIMPLE: RIGHT POSTERIOR THIGH
LOCATION SIMPLE: LEFT UPPER BACK
LOCATION SIMPLE: ABDOMEN
LOCATION SIMPLE: RIGHT UPPER BACK
LOCATION SIMPLE: RIGHT FOREARM
LOCATION SIMPLE: LEFT POSTERIOR THIGH
LOCATION SIMPLE: NOSE

## 2017-09-28 NOTE — LETTER
[facility]   [address]   [address]  [place] [address]  Phone: [phone]  Fax: [phone]              Andre Martinez  1940    Encounter Date: 2017    Joseph Calderon M.D.          PROGRESS NOTE:  Subjective:   Andre Martinez is a 77 y.o. male who presents todayConcerned about 2 symptoms.    While [place] he developed several seconds of significant lightheadedness somewhat similar to episode that occurred 3 months ago.    He's also concerned about a superficial left arm discomfort that goes from left shoulder to left wrist when he 1st starts exercising which then disappears. No radiation anywhere else.  No history of his cervical disease.    His last stress echo was over 4 years ago.  48-hour monitor several months ago demonstrated only some PVCs and an 8 beat run of PSVT which was asymptomatic  Past Medical History:   Diagnosis Date   • Thrombocytopenia (CMS-HCC) 2017   • Pure hypercholesterolemia 2016   • Coronary atherosclerosis of native coronary artery 5/15/2013   • HLD (hyperlipidemia) 5/15/2013   • BPH (benign prostatic hyperplasia)    • Colon polyps    • Kidney cysts    • Meniscus tear    • Pancreas cyst    • Spinal stenosis, lumbar      Past Surgical History:   Procedure Laterality Date   • ROTATOR CUFF REPAIR     • HEMORRHOIDECTOMY     • KNEE RECONSTRUCTION      left, partial    • SINUSOTOMIES      Dr. Guerrier, total of 4 surgeries     Family History   Problem Relation Age of Onset   • Heart Disease Father       at age 38   • Psychiatry Mother      Alzheimers   • Hypertension Brother      History   Smoking Status   • Never Smoker   Smokeless Tobacco   • Not on file     Allergies   Allergen Reactions   • Avelox [Moxifloxacin Hcl In Nacl]    • Oxycodone    • Scopolamine      Outpatient Encounter Prescriptions as of 2017   Medication Sig Dispense  Refill   • Mirabegron ER (MYRBETRIQ) 25 MG TABLET SR 24 HR Take  by mouth.     • Calcium Carb-Cholecalciferol (CALCIUM 600 + D PO) Take  by mouth.     • Guaifenesin 400 MG Tab Take  by mouth.     • tamsulosin (FLOMAX) 0.4 MG capsule TAKE 1 CAP BY MOUTH ONE-HALF HOUR AFTER BREAKFAST. 90 Cap 3   • atorvastatin (LIPITOR) 20 MG Tab Take 1 Tab by mouth every bedtime. 90 Tab 3   • fluticasone (FLONASE) 50 MCG/ACT nasal spray Spray 1 Spray in nose every day. 16 g 3   • finasteride (PROSCAR) 5 MG Tab Take 1 Tab by mouth every day. 90 Tab 3   • celecoxib (CELEBREX) 200 MG Cap Take 1 Cap by mouth every day. 30 Cap 1   • vitamin D (CHOLECALCIFEROL) 1000 UNIT TABS Take 2 Tabs by mouth every day. 30 Tab 6   • aspirin EC (ECOTRIN) 81 MG TBEC Take 81 mg by mouth every day.     • Multiple Vitamin CAPS Take 1 Cap by mouth every day.     • Omega-3 Fatty Acids (SALMON OIL PO) Take 2 Caps by mouth every day.     • Cyanocobalamin (VITAMIN B 12 PO) Take 1 Tab by mouth every day.     • coenzyme Q-10 30 MG capsule Take 60 mg by mouth every day.     • GLUCOSA-CHONDR-NA CHONDR-MSM PO Take 1 Tab by mouth every day.       No facility-administered encounter medications on file as of 9/28/2017.      Review of Systems   Constitutional: Negative for chills, fever and malaise/fatigue.   Eyes: Negative for blurred vision and discharge.   Respiratory: Negative for cough and shortness of breath.    Cardiovascular: Negative for chest pain, palpitations, leg swelling and PND.   Gastrointestinal: Negative for heartburn and nausea.   Genitourinary: Negative for dysuria and urgency.   Musculoskeletal: Negative for myalgias.   Skin: Negative for itching and rash.   Neurological: Positive for dizziness. Negative for headaches.   Endo/Heme/Allergies: Negative for environmental allergies. Does not bruise/bleed easily.   Psychiatric/Behavioral: Negative for depression. The patient is not nervous/anxious.         Objective:   /60   Pulse 62   Ht 1.778 m  "(5' 10\")   Wt 74.4 kg (164 lb)   SpO2 96%   BMI 23.53 kg/m²      Physical Exam   Constitutional: He is oriented to person, place, and time. He appears well-developed and well-nourished.   HENT:   Head: Normocephalic and atraumatic.   Eyes: Conjunctivae and EOM are normal. No scleral icterus.   Neck: Neck supple. No JVD present. No thyromegaly present.   Cardiovascular: Normal rate, regular rhythm and normal heart sounds.  Exam reveals no gallop and no friction rub.    No murmur heard.  Pulmonary/Chest: Effort normal and breath sounds normal. No respiratory distress. He has no wheezes. He has no rales. He exhibits no tenderness.   Abdominal: Soft. Bowel sounds are normal. He exhibits no distension and no mass. There is no tenderness.   Neurological: He is alert and oriented to person, place, and time. Coordination normal.   Skin: Skin is warm and dry. No rash noted. No pallor.   Psychiatric: He has a normal mood and affect. His behavior is normal. Judgment and thought content normal.       Assessment:     1. Left arm pain  ZIO PATCH MONITOR   2. Episodic lightheadedness  ZIO PATCH MONITOR       Medical Decision Making:  Today's Assessment / Status / Plan:   For the onset of left arm pain early in exertion, scheduled for stress echo  Further repeated episodes of brief lightheadedness, scheduled event recorder  Follow-up thereafter      Azar Cavazos M.D.  [address]  [place] [address]  VIA In Basket                 "

## 2017-09-28 NOTE — PROGRESS NOTES
Subjective:   Andre Martinez is a 77 y.o. male who presents todayConcerned about 2 symptoms.    While he was hiking [place] the mountains a few weeks ago he developed several seconds of significant lightheadedness somewhat similar to episode that occurred 3 months ago.    He's also concerned about a superficial left arm discomfort that goes from left shoulder to left wrist when he 1st starts exercising which then disappears. No radiation anywhere else.  No history of his cervical disease.    His last stress echo was over 4 years ago.  48-hour monitor several months ago demonstrated only some PVCs and an 8 beat run of PSVT which was asymptomatic  Past Medical History:   Diagnosis Date   • Thrombocytopenia (CMS-HCC) 2017   • Pure hypercholesterolemia 2016   • Coronary atherosclerosis of native coronary artery 5/15/2013   • HLD (hyperlipidemia) 5/15/2013   • BPH (benign prostatic hyperplasia)    • Colon polyps    • Kidney cysts    • Meniscus tear    • Pancreas cyst    • Spinal stenosis, lumbar      Past Surgical History:   Procedure Laterality Date   • ROTATOR CUFF REPAIR     • HEMORRHOIDECTOMY     • KNEE RECONSTRUCTION      left, partial    • SINUSOTOMIES      Dr. Guerrier, total of 4 surgeries     Family History   Problem Relation Age of Onset   • Heart Disease Father       at age 38   • Psychiatry Mother      Alzheimers   • Hypertension Brother      History   Smoking Status   • Never Smoker   Smokeless Tobacco   • Not on file     Allergies   Allergen Reactions   • Avelox [Moxifloxacin Hcl In Nacl]    • Oxycodone    • Scopolamine      Outpatient Encounter Prescriptions as of 2017   Medication Sig Dispense Refill   • Mirabegron ER (MYRBETRIQ) 25 MG TABLET SR 24 HR Take  by mouth.     • Calcium Carb-Cholecalciferol (CALCIUM 600 + D PO) Take  by mouth.     • Guaifenesin 400 MG Tab Take  by mouth.     • tamsulosin (FLOMAX) 0.4 MG capsule TAKE 1 CAP BY MOUTH ONE-HALF HOUR AFTER BREAKFAST. 90 Cap 3   •  "atorvastatin (LIPITOR) 20 MG Tab Take 1 Tab by mouth every bedtime. 90 Tab 3   • fluticasone (FLONASE) 50 MCG/ACT nasal spray Spray 1 Spray in nose every day. 16 g 3   • finasteride (PROSCAR) 5 MG Tab Take 1 Tab by mouth every day. 90 Tab 3   • celecoxib (CELEBREX) 200 MG Cap Take 1 Cap by mouth every day. 30 Cap 1   • vitamin D (CHOLECALCIFEROL) 1000 UNIT TABS Take 2 Tabs by mouth every day. 30 Tab 6   • aspirin EC (ECOTRIN) 81 MG TBEC Take 81 mg by mouth every day.     • Multiple Vitamin CAPS Take 1 Cap by mouth every day.     • Omega-3 Fatty Acids (SALMON OIL PO) Take 2 Caps by mouth every day.     • Cyanocobalamin (VITAMIN B 12 PO) Take 1 Tab by mouth every day.     • coenzyme Q-10 30 MG capsule Take 60 mg by mouth every day.     • GLUCOSA-CHONDR-NA CHONDR-MSM PO Take 1 Tab by mouth every day.       No facility-administered encounter medications on file as of 9/28/2017.      Review of Systems   Constitutional: Negative for chills, fever and malaise/fatigue.   Eyes: Negative for blurred vision and discharge.   Respiratory: Negative for cough and shortness of breath.    Cardiovascular: Negative for chest pain, palpitations, leg swelling and PND.   Gastrointestinal: Negative for heartburn and nausea.   Genitourinary: Negative for dysuria and urgency.   Musculoskeletal: Negative for myalgias.   Skin: Negative for itching and rash.   Neurological: Positive for dizziness. Negative for headaches.   Endo/Heme/Allergies: Negative for environmental allergies. Does not bruise/bleed easily.   Psychiatric/Behavioral: Negative for depression. The patient is not nervous/anxious.         Objective:   /60   Pulse 62   Ht 1.778 m (5' 10\")   Wt 74.4 kg (164 lb)   SpO2 96%   BMI 23.53 kg/m²     Physical Exam   Constitutional: He is oriented to person, place, and time. He appears well-developed and well-nourished.   HENT:   Head: Normocephalic and atraumatic.   Eyes: Conjunctivae and EOM are normal. No scleral icterus. "   Neck: Neck supple. No JVD present. No thyromegaly present.   Cardiovascular: Normal rate, regular rhythm and normal heart sounds.  Exam reveals no gallop and no friction rub.    No murmur heard.  Pulmonary/Chest: Effort normal and breath sounds normal. No respiratory distress. He has no wheezes. He has no rales. He exhibits no tenderness.   Abdominal: Soft. Bowel sounds are normal. He exhibits no distension and no mass. There is no tenderness.   Neurological: He is alert and oriented to person, place, and time. Coordination normal.   Skin: Skin is warm and dry. No rash noted. No pallor.   Psychiatric: He has a normal mood and affect. His behavior is normal. Judgment and thought content normal.       Assessment:     1. Left arm pain  ZIO PATCH MONITOR   2. Episodic lightheadedness  ZIO PATCH MONITOR       Medical Decision Making:  Today's Assessment / Status / Plan:   For the onset of left arm pain early in exertion, scheduled for stress echo  Further repeated episodes of brief lightheadedness, scheduled event recorder  Follow-up thereafter

## 2017-09-29 DIAGNOSIS — N40.0 BENIGN NON-NODULAR PROSTATIC HYPERPLASIA WITHOUT LOWER URINARY TRACT SYMPTOMS: ICD-10-CM

## 2017-10-10 ENCOUNTER — DOCUMENTATION (OUTPATIENT)
Dept: CARDIOLOGY | Facility: MEDICAL CENTER | Age: 77
End: 2017-10-10

## 2017-10-10 ENCOUNTER — NON-PROVIDER VISIT (OUTPATIENT)
Dept: CARDIOLOGY | Facility: MEDICAL CENTER | Age: 77
End: 2017-10-10
Payer: MEDICARE

## 2017-10-10 DIAGNOSIS — R42 EPISODIC LIGHTHEADEDNESS: ICD-10-CM

## 2017-10-10 DIAGNOSIS — M79.602 LEFT ARM PAIN: ICD-10-CM

## 2017-10-10 DIAGNOSIS — R42 DIZZINESS AND GIDDINESS: ICD-10-CM

## 2017-10-10 DIAGNOSIS — I47.10 SVT (SUPRAVENTRICULAR TACHYCARDIA): ICD-10-CM

## 2017-10-11 ENCOUNTER — NON-PROVIDER VISIT (OUTPATIENT)
Dept: OCCUPATIONAL MEDICINE | Facility: CLINIC | Age: 77
End: 2017-10-11

## 2017-10-11 VITALS
DIASTOLIC BLOOD PRESSURE: 56 MMHG | OXYGEN SATURATION: 97 % | WEIGHT: 167.6 LBS | SYSTOLIC BLOOD PRESSURE: 104 MMHG | TEMPERATURE: 98.3 F | BODY MASS INDEX: 24.05 KG/M2 | HEART RATE: 64 BPM

## 2017-10-11 DIAGNOSIS — Z71.84 TRAVEL ADVICE ENCOUNTER: ICD-10-CM

## 2017-10-11 PROCEDURE — 90707 MMR VACCINE SC: CPT | Performed by: PREVENTIVE MEDICINE

## 2017-10-11 PROCEDURE — 90471 IMMUNIZATION ADMIN: CPT | Performed by: PREVENTIVE MEDICINE

## 2017-10-11 NOTE — PROGRESS NOTES
Travel dates:11/8/17-12/10/17  Countries to be visited: [place], [place]  Reason for travel: teaching english, and touring    Rural travel: y  High altitude travel: n    Accommodations:  Hotel:y   Hostel:  Camping:  Cruise:y  With family:  Other:    Vaccines in past 30 days? No  Sick today? No  Allergies: Floroquinolones    The screening intake form was reviewed with the traveler. Health risks associated with their travel plans have been reviewed and discussed with the traveler. The traveler has been provided with vaccine information statements for the vaccines that are recommended and given the opportunity to discuss risks and benefits of vaccination and or medications. The traveler has received education on the travel itinerary provided and on the following topics.    Personal safety precautions: Yes  Food and water precautions: Yes  Management of traveler's diarrhea: Yes  Mosquito/insect bite prevention:Yes  Animal bites/Rabies prevention: No  High altitude precautions: No      RN comments:     MMR vaccine administered, per standing orders.    Malaria prophylaxis recommended. Patient aware of risks and benefits but declines medication.  Pt states he will see his PCP for Rx.   Travelers diarrhea self tx recommended. Patient aware of risks and benefits but declines medication.           Physician consultation required: no

## 2017-10-16 DIAGNOSIS — M25.50 ARTHRALGIA, UNSPECIFIED JOINT: ICD-10-CM

## 2017-10-16 DIAGNOSIS — Z71.84 TRAVEL ADVICE ENCOUNTER: ICD-10-CM

## 2017-10-16 RX ORDER — ATOVAQUONE AND PROGUANIL HYDROCHLORIDE 250; 100 MG/1; MG/1
TABLET, FILM COATED ORAL
Qty: 42 TAB | Refills: 0 | Status: SHIPPED | OUTPATIENT
Start: 2017-10-16 | End: 2017-12-14

## 2017-10-16 RX ORDER — TOBRAMYCIN 3 MG/ML
1 SOLUTION/ DROPS OPHTHALMIC EVERY 4 HOURS
Qty: 1 BOTTLE | Refills: 1 | Status: SHIPPED | OUTPATIENT
Start: 2017-10-16 | End: 2018-10-25

## 2017-10-16 RX ORDER — CELECOXIB 200 MG/1
200 CAPSULE ORAL DAILY
Qty: 30 CAP | Refills: 1 | Status: SHIPPED | OUTPATIENT
Start: 2017-10-16 | End: 2017-12-14

## 2017-10-16 RX ORDER — AZITHROMYCIN 250 MG/1
TABLET, FILM COATED ORAL
Qty: 6 TAB | Refills: 0 | Status: SHIPPED | OUTPATIENT
Start: 2017-10-16 | End: 2017-12-14

## 2017-10-24 ENCOUNTER — HOSPITAL ENCOUNTER (OUTPATIENT)
Dept: CARDIOLOGY | Facility: MEDICAL CENTER | Age: 77
End: 2017-10-24
Attending: INTERNAL MEDICINE
Payer: MEDICARE

## 2017-10-24 LAB — LV EJECT FRACT  99904: 65

## 2017-10-24 PROCEDURE — 93350 STRESS TTE ONLY: CPT | Mod: 26 | Performed by: INTERNAL MEDICINE

## 2017-10-24 PROCEDURE — 93017 CV STRESS TEST TRACING ONLY: CPT

## 2017-10-24 PROCEDURE — 93018 CV STRESS TEST I&R ONLY: CPT | Performed by: INTERNAL MEDICINE

## 2017-10-24 PROCEDURE — 93350 STRESS TTE ONLY: CPT

## 2017-10-31 ENCOUNTER — TELEPHONE (OUTPATIENT)
Dept: CARDIOLOGY | Facility: MEDICAL CENTER | Age: 77
End: 2017-10-31

## 2017-10-31 ENCOUNTER — OFFICE VISIT (OUTPATIENT)
Dept: CARDIOLOGY | Facility: MEDICAL CENTER | Age: 77
End: 2017-10-31
Payer: MEDICARE

## 2017-10-31 VITALS
WEIGHT: 167 LBS | HEART RATE: 56 BPM | SYSTOLIC BLOOD PRESSURE: 120 MMHG | DIASTOLIC BLOOD PRESSURE: 68 MMHG | BODY MASS INDEX: 23.91 KG/M2 | OXYGEN SATURATION: 95 % | HEIGHT: 70 IN

## 2017-10-31 DIAGNOSIS — I47.10 PAROXYSMAL SVT (SUPRAVENTRICULAR TACHYCARDIA): ICD-10-CM

## 2017-10-31 PROCEDURE — 0296T PR EXT ECG > 48HR TO 21 DAY RCRD W/CONECT INTL RCRD: CPT | Performed by: INTERNAL MEDICINE

## 2017-10-31 PROCEDURE — 99213 OFFICE O/P EST LOW 20 MIN: CPT | Performed by: INTERNAL MEDICINE

## 2017-10-31 PROCEDURE — 0298T PR EXT ECG > 48HR TO 21 DAY REVIEW AND INTERPRETATN: CPT | Performed by: INTERNAL MEDICINE

## 2017-10-31 ASSESSMENT — ENCOUNTER SYMPTOMS
BRUISES/BLEEDS EASILY: 0
DEPRESSION: 0
SHORTNESS OF BREATH: 0
CHILLS: 0
NAUSEA: 0
EYE DISCHARGE: 0
DIZZINESS: 0
HEADACHES: 0
FEVER: 0
PND: 0
COUGH: 0
MYALGIAS: 0
NERVOUS/ANXIOUS: 0
HEARTBURN: 0
PALPITATIONS: 0
BLURRED VISION: 0

## 2017-10-31 NOTE — PROGRESS NOTES
Subjective:   Andre Martinez is a 77 y.o. male who presents todayIn follow-up for episodes of lightheadedness.   No further episodes.  Stress echo was normal.  Event recorder demonstrated 6-11 runs of SVT all of which were asymptomatic.  I run of a wide QRS complex tachycardia while asleep he could be either VT or SVT with aberrancy.  Past Medical History:   Diagnosis Date   • BPH (benign prostatic hyperplasia)    • Colon polyps    • Coronary atherosclerosis of native coronary artery 5/15/2013   • HLD (hyperlipidemia) 5/15/2013   • Kidney cysts    • Meniscus tear    • Pancreas cyst    • Pure hypercholesterolemia 2016   • Spinal stenosis, lumbar    • Thrombocytopenia (CMS-HCC) 2017     Past Surgical History:   Procedure Laterality Date   • ROTATOR CUFF REPAIR     • HEMORRHOIDECTOMY     • KNEE RECONSTRUCTION      left, partial    • SINUSOTOMIES      Dr. Guerrier, total of 4 surgeries     Family History   Problem Relation Age of Onset   • Heart Disease Father       at age 38   • Psychiatry Mother      Alzheimers   • Hypertension Brother      History   Smoking Status   • Never Smoker   Smokeless Tobacco   • Never Used     Allergies   Allergen Reactions   • Avelox [Moxifloxacin Hcl In Nacl]    • Oxycodone    • Scopolamine      Outpatient Encounter Prescriptions as of 10/31/2017   Medication Sig Dispense Refill   • atovaquone-proguanil (MALARONE) 250-100 MG per tablet Take one tab daily with food. Start 2 days prior to travel and take for 5 days after leaving malaria area. 42 Tab 0   • azithromycin (ZITHROMAX) 250 MG Tab Take 2 tabs on day one and 1 tabs on days 2-5 6 Tab 0   • celecoxib (CELEBREX) 200 MG Cap Take 1 Cap by mouth every day. 30 Cap 1   • tobramycin (TOBREX) 0.3 % Solution ophthalmic solution Place 1 Drop in both eyes every 4 hours. 1 Bottle 1   • Mirabegron ER (MYRBETRIQ) 25 MG TABLET SR 24 HR Take 50 mg by mouth every day. 1 Tab 1   • Calcium Carb-Cholecalciferol (CALCIUM 600 + D PO)  "Take  by mouth.     • Guaifenesin 400 MG Tab Take  by mouth.     • tamsulosin (FLOMAX) 0.4 MG capsule TAKE 1 CAP BY MOUTH ONE-HALF HOUR AFTER BREAKFAST. 90 Cap 3   • atorvastatin (LIPITOR) 20 MG Tab Take 1 Tab by mouth every bedtime. 90 Tab 3   • fluticasone (FLONASE) 50 MCG/ACT nasal spray Spray 1 Spray in nose every day. 16 g 3   • finasteride (PROSCAR) 5 MG Tab Take 1 Tab by mouth every day. 90 Tab 3   • celecoxib (CELEBREX) 200 MG Cap Take 1 Cap by mouth every day. 30 Cap 1   • vitamin D (CHOLECALCIFEROL) 1000 UNIT TABS Take 2 Tabs by mouth every day. 30 Tab 6   • aspirin EC (ECOTRIN) 81 MG TBEC Take 81 mg by mouth every day.     • Multiple Vitamin CAPS Take 1 Cap by mouth every day.     • Omega-3 Fatty Acids (SALMON OIL PO) Take 2 Caps by mouth every day.     • Cyanocobalamin (VITAMIN B 12 PO) Take 1 Tab by mouth every day.     • coenzyme Q-10 30 MG capsule Take 60 mg by mouth every day.     • GLUCOSA-CHONDR-NA CHONDR-MSM PO Take 1 Tab by mouth every day.       No facility-administered encounter medications on file as of 10/31/2017.      Review of Systems   Constitutional: Negative for chills, fever and malaise/fatigue.   Eyes: Negative for blurred vision and discharge.   Respiratory: Negative for cough and shortness of breath.    Cardiovascular: Negative for chest pain, palpitations, leg swelling and PND.   Gastrointestinal: Negative for heartburn and nausea.   Genitourinary: Negative for dysuria and urgency.   Musculoskeletal: Negative for myalgias.   Skin: Negative for itching and rash.   Neurological: Negative for dizziness and headaches.   Endo/Heme/Allergies: Negative for environmental allergies. Does not bruise/bleed easily.   Psychiatric/Behavioral: Negative for depression. The patient is not nervous/anxious.         Objective:   /68   Pulse (!) 56   Ht 1.778 m (5' 10\")   Wt 75.8 kg (167 lb)   SpO2 95%   BMI 23.96 kg/m²     Physical Exam   Constitutional: He is oriented to person, place, and " time. He appears well-developed and well-nourished.   HENT:   Head: Normocephalic and atraumatic.   Eyes: Conjunctivae and EOM are normal. No scleral icterus.   Neck: Neck supple. No JVD present. No thyromegaly present.   Cardiovascular: Normal rate, regular rhythm and normal heart sounds.  Exam reveals no gallop and no friction rub.    No murmur heard.  Pulmonary/Chest: Effort normal and breath sounds normal. No respiratory distress. He has no wheezes. He has no rales. He exhibits no tenderness.   Abdominal: Soft. Bowel sounds are normal. He exhibits no distension and no mass. There is no tenderness.   Neurological: He is alert and oriented to person, place, and time. Coordination normal.   Skin: Skin is warm and dry. No rash noted. No pallor.   Psychiatric: He has a normal mood and affect. His behavior is normal. Judgment and thought content normal.       Assessment:     1. Paroxysmal SVT (supraventricular tachycardia) (CMS-[facility])         Medical Decision Making:  Today's Assessment / Status / Plan:   Recurrent SVT rather short-lived appears to be asymptomatic.  A single run of possible VT or SVT with aberrancy while asleep.  We will ask for a electrophysiology opinion

## 2017-10-31 NOTE — LETTER
[facility]   [address]   [address]  [place] [address]  Phone: [phone]  Fax: [phone]              Andre Martinez  1940    Encounter Date: 10/31/2017    Joseph Calderon M.D.          PROGRESS NOTE:  Subjective:   Andre Martinez is a 77 y.o. male who presents todayIn follow-up for episodes of lightheadedness.   No further episodes.  Stress echo was normal.  Event recorder demonstrated 6-11 runs of SVT all of which were asymptomatic.  I run of a wide QRS complex tachycardia while asleep he could be either VT or SVT with aberrancy.  Past Medical History:   Diagnosis Date   • BPH (benign prostatic hyperplasia)    • Colon polyps    • Coronary atherosclerosis of native coronary artery 5/15/2013   • HLD (hyperlipidemia) 5/15/2013   • Kidney cysts    • Meniscus tear    • Pancreas cyst    • Pure hypercholesterolemia 2016   • Spinal stenosis, lumbar    • Thrombocytopenia (CMS-HCC) 2017     Past Surgical History:   Procedure Laterality Date   • ROTATOR CUFF REPAIR     • HEMORRHOIDECTOMY     • KNEE RECONSTRUCTION      left, partial    • SINUSOTOMIES      Dr. Guerrier, total of 4 surgeries     Family History   Problem Relation Age of Onset   • Heart Disease Father       at age 38   • Psychiatry Mother      Alzheimers   • Hypertension Brother      History   Smoking Status   • Never Smoker   Smokeless Tobacco   • Never Used     Allergies   Allergen Reactions   • Avelox [Moxifloxacin Hcl In Nacl]    • Oxycodone    • Scopolamine      Outpatient Encounter Prescriptions as of 10/31/2017   Medication Sig Dispense Refill   • atovaquone-proguanil (MALARONE) 250-100 MG per tablet Take one tab daily with food. Start 2 days prior to travel and take for 5 days after leaving malaria area. 42 Tab 0   • azithromycin (ZITHROMAX) 250 MG Tab Take 2 tabs on day one and 1 tabs on days 2-5 6 Tab 0   • celecoxib (CELEBREX) 200 MG Cap Take  1 Cap by mouth every day. 30 Cap 1   • tobramycin (TOBREX) 0.3 % Solution ophthalmic solution Place 1 Drop in both eyes every 4 hours. 1 Bottle 1   • Mirabegron ER (MYRBETRIQ) 25 MG TABLET SR 24 HR Take 50 mg by mouth every day. 1 Tab 1   • Calcium Carb-Cholecalciferol (CALCIUM 600 + D PO) Take  by mouth.     • Guaifenesin 400 MG Tab Take  by mouth.     • tamsulosin (FLOMAX) 0.4 MG capsule TAKE 1 CAP BY MOUTH ONE-HALF HOUR AFTER BREAKFAST. 90 Cap 3   • atorvastatin (LIPITOR) 20 MG Tab Take 1 Tab by mouth every bedtime. 90 Tab 3   • fluticasone (FLONASE) 50 MCG/ACT nasal spray Spray 1 Spray in nose every day. 16 g 3   • finasteride (PROSCAR) 5 MG Tab Take 1 Tab by mouth every day. 90 Tab 3   • celecoxib (CELEBREX) 200 MG Cap Take 1 Cap by mouth every day. 30 Cap 1   • vitamin D (CHOLECALCIFEROL) 1000 UNIT TABS Take 2 Tabs by mouth every day. 30 Tab 6   • aspirin EC (ECOTRIN) 81 MG TBEC Take 81 mg by mouth every day.     • Multiple Vitamin CAPS Take 1 Cap by mouth every day.     • Omega-3 Fatty Acids (SALMON OIL PO) Take 2 Caps by mouth every day.     • Cyanocobalamin (VITAMIN B 12 PO) Take 1 Tab by mouth every day.     • coenzyme Q-10 30 MG capsule Take 60 mg by mouth every day.     • GLUCOSA-CHONDR-NA CHONDR-MSM PO Take 1 Tab by mouth every day.       No facility-administered encounter medications on file as of 10/31/2017.      Review of Systems   Constitutional: Negative for chills, fever and malaise/fatigue.   Eyes: Negative for blurred vision and discharge.   Respiratory: Negative for cough and shortness of breath.    Cardiovascular: Negative for chest pain, palpitations, leg swelling and PND.   Gastrointestinal: Negative for heartburn and nausea.   Genitourinary: Negative for dysuria and urgency.   Musculoskeletal: Negative for myalgias.   Skin: Negative for itching and rash.   Neurological: Negative for dizziness and headaches.   Endo/Heme/Allergies: Negative for environmental allergies. Does not bruise/bleed  "easily.   Psychiatric/Behavioral: Negative for depression. The patient is not nervous/anxious.         Objective:   /68   Pulse (!) 56   Ht 1.778 m (5' 10\")   Wt 75.8 kg (167 lb)   SpO2 95%   BMI 23.96 kg/m²      Physical Exam   Constitutional: He is oriented to person, place, and time. He appears well-developed and well-nourished.   HENT:   Head: Normocephalic and atraumatic.   Eyes: Conjunctivae and EOM are normal. No scleral icterus.   Neck: Neck supple. No JVD present. No thyromegaly present.   Cardiovascular: Normal rate, regular rhythm and normal heart sounds.  Exam reveals no gallop and no friction rub.    No murmur heard.  Pulmonary/Chest: Effort normal and breath sounds normal. No respiratory distress. He has no wheezes. He has no rales. He exhibits no tenderness.   Abdominal: Soft. Bowel sounds are normal. He exhibits no distension and no mass. There is no tenderness.   Neurological: He is alert and oriented to person, place, and time. Coordination normal.   Skin: Skin is warm and dry. No rash noted. No pallor.   Psychiatric: He has a normal mood and affect. His behavior is normal. Judgment and thought content normal.       Assessment:     1. Paroxysmal SVT (supraventricular tachycardia) (CMS-HCC)         Medical Decision Making:  Today's Assessment / Status / Plan:   Recurrent SVT rather short-lived appears to be asymptomatic.  A single run of possible VT or SVT with aberrancy while asleep.  We will ask for a electrophysiology opinion      Azar Cavazos M.D.  [address] [facility]  [address]  [place] [address]  VIA In Basket                 "

## 2017-10-31 NOTE — TELEPHONE ENCOUNTER
----- Message from Joseph Calderon M.D. sent at 10/30/2017  1:40 PM PDT -----  Stress echo entirely normal

## 2017-11-25 DIAGNOSIS — N40.1 BENIGN NON-NODULAR PROSTATIC HYPERPLASIA WITH LOWER URINARY TRACT SYMPTOMS: ICD-10-CM

## 2017-11-27 RX ORDER — FINASTERIDE 5 MG/1
TABLET, FILM COATED ORAL
Qty: 90 TAB | Refills: 3 | Status: SHIPPED | OUTPATIENT
Start: 2017-11-27 | End: 2018-11-26 | Stop reason: SDUPTHER

## 2017-12-11 ENCOUNTER — HOSPITAL ENCOUNTER (OUTPATIENT)
Facility: MEDICAL CENTER | Age: 77
End: 2017-12-11
Attending: INTERNAL MEDICINE
Payer: MEDICARE

## 2017-12-11 ENCOUNTER — NON-PROVIDER VISIT (OUTPATIENT)
Dept: INTERNAL MEDICINE | Facility: IMAGING CENTER | Age: 77
End: 2017-12-11
Payer: MEDICARE

## 2017-12-11 DIAGNOSIS — R39.12 BENIGN PROSTATIC HYPERPLASIA WITH WEAK URINARY STREAM: ICD-10-CM

## 2017-12-11 DIAGNOSIS — N40.1 BENIGN PROSTATIC HYPERPLASIA WITH WEAK URINARY STREAM: ICD-10-CM

## 2017-12-11 DIAGNOSIS — N40.0 BENIGN PROSTATIC HYPERPLASIA WITHOUT LOWER URINARY TRACT SYMPTOMS: ICD-10-CM

## 2017-12-11 DIAGNOSIS — R53.83 FATIGUE, UNSPECIFIED TYPE: ICD-10-CM

## 2017-12-11 DIAGNOSIS — E78.00 PURE HYPERCHOLESTEROLEMIA: ICD-10-CM

## 2017-12-11 DIAGNOSIS — D69.6 THROMBOCYTOPENIA (HCC): ICD-10-CM

## 2017-12-11 DIAGNOSIS — I25.10 ATHEROSCLEROSIS OF NATIVE CORONARY ARTERY OF NATIVE HEART WITHOUT ANGINA PECTORIS: ICD-10-CM

## 2017-12-11 PROCEDURE — 84443 ASSAY THYROID STIM HORMONE: CPT

## 2017-12-11 PROCEDURE — 85025 COMPLETE CBC W/AUTO DIFF WBC: CPT

## 2017-12-11 PROCEDURE — 81003 URINALYSIS AUTO W/O SCOPE: CPT

## 2017-12-11 PROCEDURE — 80061 LIPID PANEL: CPT

## 2017-12-11 PROCEDURE — 80053 COMPREHEN METABOLIC PANEL: CPT

## 2017-12-11 PROCEDURE — 84153 ASSAY OF PSA TOTAL: CPT

## 2017-12-12 LAB
ALBUMIN SERPL BCP-MCNC: 4.1 G/DL (ref 3.2–4.9)
ALBUMIN/GLOB SERPL: 1.5 G/DL
ALP SERPL-CCNC: 67 U/L (ref 30–99)
ALT SERPL-CCNC: 19 U/L (ref 2–50)
ANION GAP SERPL CALC-SCNC: 3 MMOL/L (ref 0–11.9)
APPEARANCE UR: CLEAR
AST SERPL-CCNC: 27 U/L (ref 12–45)
BASOPHILS # BLD AUTO: 0.4 % (ref 0–1.8)
BASOPHILS # BLD: 0.03 K/UL (ref 0–0.12)
BILIRUB SERPL-MCNC: 0.9 MG/DL (ref 0.1–1.5)
BILIRUB UR QL STRIP.AUTO: NEGATIVE
BUN SERPL-MCNC: 20 MG/DL (ref 8–22)
CALCIUM SERPL-MCNC: 10 MG/DL (ref 8.5–10.5)
CHLORIDE SERPL-SCNC: 103 MMOL/L (ref 96–112)
CHOLEST SERPL-MCNC: 149 MG/DL (ref 100–199)
CO2 SERPL-SCNC: 30 MMOL/L (ref 20–33)
COLOR UR: YELLOW
CREAT SERPL-MCNC: 0.95 MG/DL (ref 0.5–1.4)
CULTURE IF INDICATED INDCX: NO UA CULTURE
EOSINOPHIL # BLD AUTO: 0.25 K/UL (ref 0–0.51)
EOSINOPHIL NFR BLD: 3.4 % (ref 0–6.9)
ERYTHROCYTE [DISTWIDTH] IN BLOOD BY AUTOMATED COUNT: 46.1 FL (ref 35.9–50)
GFR SERPL CREATININE-BSD FRML MDRD: >60 ML/MIN/1.73 M 2
GLOBULIN SER CALC-MCNC: 2.7 G/DL (ref 1.9–3.5)
GLUCOSE SERPL-MCNC: 78 MG/DL (ref 65–99)
GLUCOSE UR STRIP.AUTO-MCNC: NEGATIVE MG/DL
HCT VFR BLD AUTO: 41.3 % (ref 42–52)
HDLC SERPL-MCNC: 71 MG/DL
HGB BLD-MCNC: 13.9 G/DL (ref 14–18)
IMM GRANULOCYTES # BLD AUTO: 0.03 K/UL (ref 0–0.11)
IMM GRANULOCYTES NFR BLD AUTO: 0.4 % (ref 0–0.9)
KETONES UR STRIP.AUTO-MCNC: NEGATIVE MG/DL
LDLC SERPL CALC-MCNC: 67 MG/DL
LEUKOCYTE ESTERASE UR QL STRIP.AUTO: NEGATIVE
LYMPHOCYTES # BLD AUTO: 3.18 K/UL (ref 1–4.8)
LYMPHOCYTES NFR BLD: 42.7 % (ref 22–41)
MCH RBC QN AUTO: 32.6 PG (ref 27–33)
MCHC RBC AUTO-ENTMCNC: 33.7 G/DL (ref 33.7–35.3)
MCV RBC AUTO: 96.9 FL (ref 81.4–97.8)
MICRO URNS: NORMAL
MONOCYTES # BLD AUTO: 0.59 K/UL (ref 0–0.85)
MONOCYTES NFR BLD AUTO: 7.9 % (ref 0–13.4)
NEUTROPHILS # BLD AUTO: 3.36 K/UL (ref 1.82–7.42)
NEUTROPHILS NFR BLD: 45.2 % (ref 44–72)
NITRITE UR QL STRIP.AUTO: NEGATIVE
NRBC # BLD AUTO: 0 K/UL
NRBC BLD AUTO-RTO: 0 /100 WBC
PH UR STRIP.AUTO: 6.5 [PH]
PLATELET # BLD AUTO: 97 K/UL (ref 164–446)
PMV BLD AUTO: 13 FL (ref 9–12.9)
POTASSIUM SERPL-SCNC: 4 MMOL/L (ref 3.6–5.5)
PROT SERPL-MCNC: 6.8 G/DL (ref 6–8.2)
PROT UR QL STRIP: NEGATIVE MG/DL
PSA SERPL-MCNC: 0.78 NG/ML (ref 0–4)
RBC # BLD AUTO: 4.26 M/UL (ref 4.7–6.1)
RBC UR QL AUTO: NEGATIVE
SODIUM SERPL-SCNC: 136 MMOL/L (ref 135–145)
SP GR UR STRIP.AUTO: 1.01
TRIGL SERPL-MCNC: 53 MG/DL (ref 0–149)
TSH SERPL DL<=0.005 MIU/L-ACNC: 3.46 UIU/ML (ref 0.3–3.7)
UROBILINOGEN UR STRIP.AUTO-MCNC: 1 MG/DL
WBC # BLD AUTO: 7.4 K/UL (ref 4.8–10.8)

## 2017-12-13 ENCOUNTER — NON-PROVIDER VISIT (OUTPATIENT)
Dept: OCCUPATIONAL MEDICINE | Facility: CLINIC | Age: 77
End: 2017-12-13

## 2017-12-13 DIAGNOSIS — Z23 ENCOUNTER FOR IMMUNIZATION: ICD-10-CM

## 2017-12-13 PROCEDURE — 90707 MMR VACCINE SC: CPT | Performed by: PREVENTIVE MEDICINE

## 2017-12-13 PROCEDURE — 90471 IMMUNIZATION ADMIN: CPT | Performed by: PREVENTIVE MEDICINE

## 2017-12-13 NOTE — PROGRESS NOTES
"Pt was seen for vaccines only.    The patient completed a \"Screening checklist for contraindications to vaccines for adults\" form before receiving vaccinations.    1. Are you sick today?  no  2. Do you have allergies to medications, food, a vaccine component, or latex?  no  3. Have you ever had a serious reaction after receiving a vaccination?  no  4. Do you have a long-term health problem with heart disease, lung disease, asthma, kidney disease, metabolic disease (e.g., diabetes), anemia, or other blood disorder?  no  5. Do you have cancer, leukemia, HIV/AIDS, or any other immune system problem?  no  6. In the past 3 months, have you taken medications that affect your immune system,  such as prednisone, other steroids, or anticancer drugs; drugs for the treatment  of rheumatoid arthritis, Crohn’s disease, or psoriasis; or have you had radiation treatments?   no  7. Have you had a seizure or a brain or other nervous system problem?  no  8. During the past year, have you received a transfusion of blood or blood products, or been given immune (gamma) globulin or an antiviral drug? no  9. For women: Are you pregnant or is there a chance you could become pregnant during the next month? no  10. Have you received any vaccinations in the past 4 weeks? No              MMR vaccine administered.VIS given to pt.      Physician consultation not needed today.            "

## 2017-12-14 ENCOUNTER — OFFICE VISIT (OUTPATIENT)
Dept: CARDIOLOGY | Facility: MEDICAL CENTER | Age: 77
End: 2017-12-14
Payer: MEDICARE

## 2017-12-14 ENCOUNTER — OFFICE VISIT (OUTPATIENT)
Dept: INTERNAL MEDICINE | Facility: IMAGING CENTER | Age: 77
End: 2017-12-14
Payer: MEDICARE

## 2017-12-14 VITALS
SYSTOLIC BLOOD PRESSURE: 120 MMHG | DIASTOLIC BLOOD PRESSURE: 80 MMHG | OXYGEN SATURATION: 97 % | HEART RATE: 68 BPM | HEIGHT: 70 IN | BODY MASS INDEX: 23.19 KG/M2 | WEIGHT: 162 LBS

## 2017-12-14 VITALS
WEIGHT: 164 LBS | BODY MASS INDEX: 23.48 KG/M2 | HEIGHT: 70 IN | TEMPERATURE: 97.2 F | RESPIRATION RATE: 12 BRPM | HEART RATE: 64 BPM | SYSTOLIC BLOOD PRESSURE: 118 MMHG | DIASTOLIC BLOOD PRESSURE: 70 MMHG | OXYGEN SATURATION: 98 %

## 2017-12-14 DIAGNOSIS — R55 POSTURAL DIZZINESS WITH PRESYNCOPE: ICD-10-CM

## 2017-12-14 DIAGNOSIS — K86.2 PANCREAS CYST: ICD-10-CM

## 2017-12-14 DIAGNOSIS — D69.6 THROMBOCYTOPENIA (HCC): ICD-10-CM

## 2017-12-14 DIAGNOSIS — I25.10 ATHEROSCLEROSIS OF NATIVE CORONARY ARTERY OF NATIVE HEART WITHOUT ANGINA PECTORIS: ICD-10-CM

## 2017-12-14 DIAGNOSIS — K63.5 POLYP OF COLON, UNSPECIFIED PART OF COLON, UNSPECIFIED TYPE: ICD-10-CM

## 2017-12-14 DIAGNOSIS — Z00.00 MEDICARE ANNUAL WELLNESS VISIT, SUBSEQUENT: ICD-10-CM

## 2017-12-14 DIAGNOSIS — N40.0 BENIGN PROSTATIC HYPERPLASIA WITHOUT LOWER URINARY TRACT SYMPTOMS: ICD-10-CM

## 2017-12-14 DIAGNOSIS — R42 POSTURAL DIZZINESS WITH PRESYNCOPE: ICD-10-CM

## 2017-12-14 DIAGNOSIS — I47.10 SVT (SUPRAVENTRICULAR TACHYCARDIA): ICD-10-CM

## 2017-12-14 PROCEDURE — 99204 OFFICE O/P NEW MOD 45 MIN: CPT | Performed by: INTERNAL MEDICINE

## 2017-12-14 PROCEDURE — G0439 PPPS, SUBSEQ VISIT: HCPCS | Performed by: INTERNAL MEDICINE

## 2017-12-14 PROCEDURE — 93000 ELECTROCARDIOGRAM COMPLETE: CPT | Performed by: INTERNAL MEDICINE

## 2017-12-14 ASSESSMENT — PATIENT HEALTH QUESTIONNAIRE - PHQ9: CLINICAL INTERPRETATION OF PHQ2 SCORE: 0

## 2017-12-14 NOTE — PROGRESS NOTES
77 y.o. male presents for the followin. Medicare annual wellness visit, subsequent  Patient has been in good health. He continues to travel extensively. He recently returned from [place] in [place]. No depression. No cognitive issues. No increased risk of fall. He is up-to-date on immunizations and screening tests.    2. Thrombocytopenia (CMS-[facility])  Platelet count is lower. Most recent is 97 however this was after being sick and treated with antibiotics. No bruising, bleeding or other issues.    Assessment/plan:  Repeat CBC in one month. Hematology consultation if indicated.    3. Polyp of colon, unspecified part of colon, unspecified type  Post colonoscopy in 2017. 2 sessile polyps in the transverse distal colon and proximal descending colon. They were 6 and 7 mm respectively. There was a 4 mm polyp found in the proximal ascending colon. They were all hyperplastic.    Assessment/plan:  5 year surveillance.    4. Pancreas cyst  Imaging in 2017 showed the cyst was decreasing in size.    Assessment/plan:  Stable/improved. No further workup.    5. Benign prostatic hyperplasia without lower urinary tract symptoms  Moderate symptoms. He still has nocturia every 2-3 hours. Minimal daytime symptoms. He is now on tamsulosin, finasteride and Myrbetriq. He was seen at [facility]. His most recent PSA is slightly higher but overall stable at 0.78.    Assessment/plan:  Moderate urinary symptoms. His exam is mostly unremarkable with gland that is only mildly enlarged. He had complete workup at [facility]. They recommended one year of therapy and if he does not improve then consider surgical versus nonsurgical intervention. I recommended that he consider [place] for a second opinion. He previously been given the name of a urologist there.    6. Atherosclerosis of native coronary artery of native heart without angina pectoris  Stable. History of high cardiac calcium test in the past with Dr. Almonte. He  remains on statin and aspirin. No chest pain, angina or equivalent. No exercise-induced symptoms.    Assessment/plan:  Stable. No sign of significant disease or symptoms. Continue current treatment.      Annual Wellness Visit/Health Risk Assessment:    Past medical:  Past Medical History:   Diagnosis Date   • BPH (benign prostatic hyperplasia)    • Colon polyps    • Coronary atherosclerosis of native coronary artery 5/15/2013   • HLD (hyperlipidemia) 5/15/2013   • Kidney cysts    • Meniscus tear    • Pancreas cyst    • Pure hypercholesterolemia 2016   • Spinal stenosis, lumbar    • Thrombocytopenia (CMS-HCC) 2017       Past surgical:  Past Surgical History:   Procedure Laterality Date   • ROTATOR CUFF REPAIR     • HEMORRHOIDECTOMY     • KNEE RECONSTRUCTION      left, partial    • SINUSOTOMIES      Dr. Guerrier, total of 4 surgeries       Family history: relating to possible risk factors for your patient  Family History   Problem Relation Age of Onset   • Heart Disease Father       at age 38   • Psychiatry Mother      Alzheimers   • Hypertension Brother    • Stroke Brother    • Seizures Brother        Current Providers (including home care/DME’s):   Colonoscopy 3/21/17 [place] (Dr Domingo) repeat in 5 yrs Dexa 6/3/16 osteopenia PSA 17  0.78  [facility]       Patient Care Team:  Azar Cavazos M.D. as PCP - General (Internal Medicine)      Medications:   Current Outpatient Prescriptions Ordered in [facility]   Medication Sig Dispense Refill   • finasteride (PROSCAR) 5 MG Tab TAKE 1 TABLET BY MOUTH EVERY DAY 90 Tab 3   • Mirabegron ER (MYRBETRIQ) 25 MG TABLET SR 24 HR Take 50 mg by mouth every day. 1 Tab 1   • Calcium Carb-Cholecalciferol (CALCIUM 600 + D PO) Take  by mouth.     • Guaifenesin 400 MG Tab Take  by mouth.     • tamsulosin (FLOMAX) 0.4 MG capsule TAKE 1 CAP BY MOUTH ONE-HALF HOUR AFTER BREAKFAST. 90 Cap 3   • atorvastatin (LIPITOR) 20 MG Tab Take 1 Tab by mouth  every bedtime. 90 Tab 3   • fluticasone (FLONASE) 50 MCG/ACT nasal spray Spray 1 Spray in nose every day. 16 g 3   • vitamin D (CHOLECALCIFEROL) 1000 UNIT TABS Take 2 Tabs by mouth every day. 30 Tab 6   • aspirin EC (ECOTRIN) 81 MG TBEC Take 81 mg by mouth every day.     • Multiple Vitamin CAPS Take 1 Cap by mouth every day.     • Omega-3 Fatty Acids (SALMON OIL PO) Take 2 Caps by mouth every day.     • Cyanocobalamin (VITAMIN B 12 PO) Take 1 Tab by mouth every day.     • coenzyme Q-10 30 MG capsule Take 60 mg by mouth every day.     • GLUCOSA-CHONDR-NA CHONDR-MSM PO Take 1 Tab by mouth every day.     • tobramycin (TOBREX) 0.3 % Solution ophthalmic solution Place 1 Drop in both eyes every 4 hours. 1 Bottle 1   • celecoxib (CELEBREX) 200 MG Cap Take 1 Cap by mouth every day. 30 Cap 1     No current Epic-ordered facility-administered medications on file.        Supplements (calcium/vitamins): if not lisited in medications    Depression Screening    Little interest or pleasure in doing things?  0 - not at all  Feeling down, depressed , or hopeless? 0 - not at all  Patient Health Questionnaire Score: 0     If depressive symptoms identified deferred to follow up visit unless specifically addressed in assessment and plan.    Interpretation of PHQ-9 Total Score   Score Severity   1-4 No Depression   5-9 Mild Depression   10-14 Moderate Depression   15-19 Moderately Severe Depression   20-27 Severe Depression    Screening for Cognitive Impairment    Three Minute Recall (apple, watch, jarvis)  2/3    Draw clock face with all 12 numbers set to the hand to show 10 minutes past 11 o'clock  1    Cognitive concerns identified deferred for follow up unless specifically addressed in assessment and plan.    Fall Risk Assessment    Has the patient had two or more falls in the last year or any fall with injury in the last year?  No    Safety Assessment    Throw rugs on floor.  No  Handrails on all stairs.  No  Good lighting in all  hallways.  Yes  Difficulty hearing.  No  Patient counseled about all safety risks that were identified.    Functional Assessment ADLs    Are there any barriers preventing you from cooking for yourself or meeting nutritional needs?  No.    Are there any barriers preventing you from driving safely or obtaining transportation?  No.    Are there any barriers preventing you from using a telephone or calling for help?  No.    Are there any barriers preventing you from shopping?  No.    Are there any barriers preventing you from taking care of your own finances?  No.    Are there any barriers preventing you from managing your medications?  No.    Are currently engaging any exercise or physical activity?  Yes.  [facility] Summary                Annual Wellness Visit Overdue 11/23/2017      Done 11/22/2016 Visit Dx: Medicare annual wellness visit, subsequent     Patient has more history with this topic...    COLONOSCOPY Next Due 3/21/2022      Done 3/21/2017      Patient has more history with this topic...    IMM DTaP/Tdap/Td Vaccine Next Due 8/6/2024      Done 8/6/2014 Imm Admin: Tdap Vaccine          Patient Care Team:  Azar Cavazos M.D. as PCP - General (Internal Medicine)      Risk Factors:  Depression screening: using standardized screening tools: No depression  Depression Screening    Little interest or pleasure in doing things?  0 - not at all  Feeling down, depressed , or hopeless? 0 - not at all  Patient Health Questionnaire Score: 0    If depressive symptoms identified deferred to follow up visit unless specifically addressed in assesment and plan.      Interpretation of PHQ-9 Total Score   Score Severity   1-4 Minimal Depression   5-9 Mild Depression   10-14 Moderate Depression   15-19 Moderately Severe Depression   20-27 Severe Depression        Ability to perform ADL’s: Able to perform all activities of daily living.  Hearing impairment: No hearing impairment.  Fall risks: No increased risk  "of falls.  Safety Management: Normal safety precautions including seatbelts and smoke detectors.  Cognitive function: using standardized screening tools: Normal cognitive function.  Urinary and bowel function. No incontinence, nocturia or frequency. Regular bowels. No diarrhea or rectal bleeding/melena.      Written screening schedule 5-10 years out:  Colonoscopy 3/21/17 [place] (Dr Domingo) repeat in 5 yrs Dexa 6/3/16 osteopenia PSA 12/11/17  0.78  [facility]       Vital measurements:  Blood pressure 118/70, pulse 64, temperature 36.2 °C (97.2 °F), resp. rate 12, height 1.772 m (5' 9.75\"), weight 74.4 kg (164 lb), SpO2 98 %.  Body mass index is 23.7 kg/m². (Goal BM I>18 <25)      Exam: *Note disease specific examination*  General: Appears younger than stated age.  No distress.  Normal appearing.  HEENT: Pupils are equal.  Conjunctiva is normal.  Head is normal appearing.  Ears, canals and tympanic membranes are normal.  Oral cavity is pink and moist without lesion.  Neck: Supple without JVD or bruit.  Thyroid is not enlarged.  Pulmonary: Clear with good breath sounds.  Cardiovascular regular rate and rhythm.  No murmur auscultated.  Carotid, radial and pedal pulses are intact.  Abdomen: Soft, nontender, nondistended.  Normal bowel sounds.  Organs are not enlarged.  Neurologic: Cranial nerves intact.  Strength and sensation are normal.  Normal patellar reflex.  Skin: Diffuse seborrheic keratosis and nevi  Lymph: No cervical, supraclavicular, axillary, abdominal or inguinal adenopathy noted.  Genitourinary: Penis, scrotum and testicles are unremarkable. Rectal tone is normal.  Prostate is 1+ enlarged, soft and without nodule.        Assessment/Plan: *Note for HRA please use the highest specificity diagnosis codes to describe patient’s chronic conditions*    1. Medicare annual wellness visit, subsequent  Patient has been in good health. He continues to travel extensively. He recently returned " from [place] in [facility]. No depression. No cognitive issues. No increased risk of fall. He is up-to-date on immunizations and screening tests.    2. Thrombocytopenia (CMS-HCC)  Platelet count is lower. Most recent is 97 however this was after being sick and treated with antibiotics. No bruising, bleeding or other issues.    Assessment/plan:  Repeat CBC in one month. Hematology consultation if indicated.    3. Polyp of colon, unspecified part of colon, unspecified type  Post colonoscopy in March 2017. 2 sessile polyps in the transverse distal colon and proximal descending colon. They were 6 and 7 mm respectively. There was a 4 mm polyp found in the proximal ascending colon. They were all hyperplastic.    Assessment/plan:  5 year surveillance.    4. Pancreas cyst  Imaging in August 2017 showed the cyst was decreasing in size.    Assessment/plan:  Stable/improved. No further workup.    5. Benign prostatic hyperplasia without lower urinary tract symptoms  Moderate symptoms. He still has nocturia every 2-3 hours. Minimal daytime symptoms. He is now on tamsulosin, finasteride and Myrbetriq. He was seen at [facility]. His most recent PSA is slightly higher but overall stable at 0.78.    Assessment/plan:  Moderate urinary symptoms. His exam is mostly unremarkable with gland that is only mildly enlarged. He had complete workup at [facility]. They recommended one year of therapy and if he does not improve then consider surgical versus nonsurgical intervention. I recommended that he consider [place] for a second opinion. He previously been given the name of a urologist there.    6. Atherosclerosis of native coronary artery of native heart without angina pectoris  Stable. History of high cardiac calcium test in the past with Dr. Almonte. He remains on statin and aspirin. No chest pain, angina or equivalent. No exercise-induced symptoms.    Assessment/plan:  Stable. No sign of significant disease or symptoms. Continue  current treatment.    Tx options for risk factors:    Referrals out: as appropriate   Weight loss: Body mass index is 23.7 kg/m².    Physical activity: Good physical activity level.   Smoking cessation: Nonsmoker   Fall prevention: No increased his fall   Nutrition: Good overall nutrition. Balanced lean meat, fruits, vegetables and complex carbohydrates.      Annual Wellness Visit/HRA  (initial, one time only)                                                   (subsequent)

## 2017-12-15 NOTE — PROGRESS NOTES
"Arrhythmia Clinic Note (New patient)     DOS: 12/14/2017    Referring physician: Joseph Calderon    Chief complaint/Reason for consult: abnormal zio    HPI:  Patient is a 78 yo gentleman with history of CAD, HL, BPH who presents for abnormal ambulatory monitoring. Holter showed a few runs of atrial and ventricular ectopy which was similar on zio. He underwent monitoring for \"blanking out episodes\" which he feels like the world stands still he says. He denied any syncope but says if they lasted longer he may lose consciousness. He has not had these episodes in > 4 months he says. He did not have any episodes during ambulatory monitoring.    ROS (+ highlighted in red):  Constitutional: Fevers/chills/fatigue/weightloss  HEENT: Blurry vision/eye pain/sore throat/hearing loss  Respiratory: Shortness of breath/cough  Cardiovascular: Chest pain/palpitations/edema/orthopnea/syncope  GI: Nausea/vomitting/diarrhea  MSK: Arthralgias/myagias/muscle weakness  Skin: Rash/sores  Neurological: Numbness/tremors/vertigo  Endocrine: Excessive thirst/polyuria/cold intolerance/heat intolerance  Psych: Depression/anxiety    Past Medical History:   Diagnosis Date   • BPH (benign prostatic hyperplasia)    • Colon polyps    • Coronary atherosclerosis of native coronary artery 5/15/2013   • HLD (hyperlipidemia) 5/15/2013   • Kidney cysts    • Meniscus tear    • Pancreas cyst    • Pure hypercholesterolemia 8/23/2016   • Spinal stenosis, lumbar    • Thrombocytopenia (CMS-HCC) 7/24/2017       Past Surgical History:   Procedure Laterality Date   • ROTATOR CUFF REPAIR  2010   • HEMORRHOIDECTOMY     • KNEE RECONSTRUCTION      left, partial    • SINUSOTOMIES      Dr. Guerrier, total of 4 surgeries       Social History     Social History   • Marital status:      Spouse name: N/A   • Number of children: N/A   • Years of education: N/A     Occupational History   • Not on file.     Social History Main Topics   • Smoking status: Never Smoker   • " Smokeless tobacco: Never Used   • Alcohol use Yes      Comment: Social    • Drug use: Unknown   • Sexual activity: Not on file     Other Topics Concern   • Not on file     Social History Narrative   • No narrative on file       Family History   Problem Relation Age of Onset   • Heart Disease Father       at age 38   • Psychiatry Mother      Alzheimers   • Hypertension Brother    • Stroke Brother    • Seizures Brother        Allergies   Allergen Reactions   • Avelox [Moxifloxacin Hcl In Nacl]    • Oxycodone    • Scopolamine        Current Outpatient Prescriptions   Medication Sig Dispense Refill   • finasteride (PROSCAR) 5 MG Tab TAKE 1 TABLET BY MOUTH EVERY DAY 90 Tab 3   • tobramycin (TOBREX) 0.3 % Solution ophthalmic solution Place 1 Drop in both eyes every 4 hours. 1 Bottle 1   • Mirabegron ER (MYRBETRIQ) 25 MG TABLET SR 24 HR Take 50 mg by mouth every day. 1 Tab 1   • Calcium Carb-Cholecalciferol (CALCIUM 600 + D PO) Take  by mouth.     • Guaifenesin 400 MG Tab Take  by mouth.     • tamsulosin (FLOMAX) 0.4 MG capsule TAKE 1 CAP BY MOUTH ONE-HALF HOUR AFTER BREAKFAST. 90 Cap 3   • atorvastatin (LIPITOR) 20 MG Tab Take 1 Tab by mouth every bedtime. 90 Tab 3   • fluticasone (FLONASE) 50 MCG/ACT nasal spray Spray 1 Spray in nose every day. 16 g 3   • celecoxib (CELEBREX) 200 MG Cap Take 1 Cap by mouth every day. 30 Cap 1   • vitamin D (CHOLECALCIFEROL) 1000 UNIT TABS Take 2 Tabs by mouth every day. 30 Tab 6   • aspirin EC (ECOTRIN) 81 MG TBEC Take 81 mg by mouth every day.     • Multiple Vitamin CAPS Take 1 Cap by mouth every day.     • Omega-3 Fatty Acids (SALMON OIL PO) Take 2 Caps by mouth every day.     • Cyanocobalamin (VITAMIN B 12 PO) Take 1 Tab by mouth every day.     • coenzyme Q-10 30 MG capsule Take 60 mg by mouth every day.     • GLUCOSA-CHONDR-NA CHONDR-MSM PO Take 1 Tab by mouth every day.       No current facility-administered medications for this visit.        Physical Exam:  Vitals:    17  "1525   BP: 120/80   Pulse: 68   SpO2: 97%   Weight: 73.5 kg (162 lb)   Height: 1.772 m (5' 9.76\")     General appearance: NAD, conversant   Eyes: anicteric sclerae, moist conjunctivae; no lid-lag; PERRLA  HENT: Atraumatic; oropharynx clear with moist mucous membranes and no mucosal ulcerations; normal hard and soft palate  Neck: Trachea midline; FROM, supple, no thyromegaly or lymphadenopathy  Lungs: CTA, with normal respiratory effort and no intercostal retractions  CV: RRR, no MRGs, no JVD   Abdomen: Soft, non-tender; no masses or HSM  Extremities: No peripheral edema or extremity lymphadenopathy  Skin: Normal temperature, turgor and texture; no rash, ulcers or subcutaneous nodules  Psych: Appropriate affect, alert and oriented to person, place and time    Data:  Labs reviewed    Prior echo/stress results reviewed:  Normal stress echo    EKG interpreted by me:  NSR    Impression/Plan:  1. SVT (supraventricular tachycardia) (CMS-[facility])  EKG   2. Postural dizziness with presyncope     3. Atherosclerosis of native coronary artery of native heart without angina pectoris       -Holter and zio reviewed  -Occasional ectopy with small run of NSVT that was asymptomatic, little longer runs of SVT with aberrancy  -I see nothing here concerning and I agree with Dr. Calderon   -That being said he did not experience his syncopal symptoms during the time he was being monitored  -I offered him longer term monitoring in the form of loop recorder if he is interested  -Otherwise I reassured him and he can f/u PRN    Alisha Richardson MD    "

## 2017-12-16 LAB — EKG IMPRESSION: NORMAL

## 2017-12-22 ENCOUNTER — TELEPHONE (OUTPATIENT)
Dept: CARDIOLOGY | Facility: MEDICAL CENTER | Age: 77
End: 2017-12-22

## 2017-12-22 NOTE — TELEPHONE ENCOUNTER
----- Message from Natalia Fagan sent at 12/22/2017  8:46 AM PST -----  Regarding: Discuss test results  Contact: [phone]  DIONE/Mary Guzman is calling to discuss results of Zio Patch. He can be reached at [phone].

## 2017-12-22 NOTE — TELEPHONE ENCOUNTER
"Pt. Actually called because he was concerned about comments from EKG done 12-14-17 \"Conside anteroseptal infarct\" \"Myocardial infarct finding now present\".   Reassurance give to pt. That these comments are computer-generated.  To Dr. Richardson to review on Tues. When office reopens.  "

## 2018-01-10 DIAGNOSIS — N40.1 BENIGN PROSTATIC HYPERPLASIA WITH URINARY FREQUENCY: ICD-10-CM

## 2018-01-10 DIAGNOSIS — R35.0 BENIGN PROSTATIC HYPERPLASIA WITH URINARY FREQUENCY: ICD-10-CM

## 2018-01-19 ENCOUNTER — HOSPITAL ENCOUNTER (OUTPATIENT)
Facility: MEDICAL CENTER | Age: 78
End: 2018-01-19
Attending: INTERNAL MEDICINE
Payer: MEDICARE

## 2018-01-19 ENCOUNTER — NON-PROVIDER VISIT (OUTPATIENT)
Dept: INTERNAL MEDICINE | Facility: IMAGING CENTER | Age: 78
End: 2018-01-19
Payer: MEDICARE

## 2018-01-19 DIAGNOSIS — D69.6 THROMBOCYTOPENIA (HCC): ICD-10-CM

## 2018-01-19 LAB
BASOPHILS # BLD AUTO: 0.4 % (ref 0–1.8)
BASOPHILS # BLD: 0.03 K/UL (ref 0–0.12)
EOSINOPHIL # BLD AUTO: 0.13 K/UL (ref 0–0.51)
EOSINOPHIL NFR BLD: 1.9 % (ref 0–6.9)
ERYTHROCYTE [DISTWIDTH] IN BLOOD BY AUTOMATED COUNT: 53.1 FL (ref 35.9–50)
HCT VFR BLD AUTO: 38.2 % (ref 42–52)
HGB BLD-MCNC: 13 G/DL (ref 14–18)
IMM GRANULOCYTES # BLD AUTO: 0.01 K/UL (ref 0–0.11)
IMM GRANULOCYTES NFR BLD AUTO: 0.1 % (ref 0–0.9)
LYMPHOCYTES # BLD AUTO: 3.3 K/UL (ref 1–4.8)
LYMPHOCYTES NFR BLD: 47.8 % (ref 22–41)
MCH RBC QN AUTO: 33.6 PG (ref 27–33)
MCHC RBC AUTO-ENTMCNC: 34 G/DL (ref 33.7–35.3)
MCV RBC AUTO: 98.7 FL (ref 81.4–97.8)
MONOCYTES # BLD AUTO: 0.65 K/UL (ref 0–0.85)
MONOCYTES NFR BLD AUTO: 9.4 % (ref 0–13.4)
NEUTROPHILS # BLD AUTO: 2.78 K/UL (ref 1.82–7.42)
NEUTROPHILS NFR BLD: 40.4 % (ref 44–72)
NRBC # BLD AUTO: 0 K/UL
NRBC BLD-RTO: 0 /100 WBC
PLATELET # BLD AUTO: 105 K/UL (ref 164–446)
PMV BLD AUTO: 13.1 FL (ref 9–12.9)
RBC # BLD AUTO: 3.87 M/UL (ref 4.7–6.1)
WBC # BLD AUTO: 6.9 K/UL (ref 4.8–10.8)

## 2018-01-19 PROCEDURE — 85025 COMPLETE CBC W/AUTO DIFF WBC: CPT

## 2018-02-21 ENCOUNTER — NON-PROVIDER VISIT (OUTPATIENT)
Dept: INTERNAL MEDICINE | Facility: IMAGING CENTER | Age: 78
End: 2018-02-21
Payer: MEDICARE

## 2018-02-21 ENCOUNTER — HOSPITAL ENCOUNTER (OUTPATIENT)
Facility: MEDICAL CENTER | Age: 78
End: 2018-02-21
Attending: INTERNAL MEDICINE
Payer: MEDICARE

## 2018-02-21 DIAGNOSIS — D69.6 THROMBOCYTOPENIA (HCC): ICD-10-CM

## 2018-02-21 LAB
BASOPHILS # BLD AUTO: 0.8 % (ref 0–1.8)
BASOPHILS # BLD: 0.07 K/UL (ref 0–0.12)
EOSINOPHIL # BLD AUTO: 0.39 K/UL (ref 0–0.51)
EOSINOPHIL NFR BLD: 4.4 % (ref 0–6.9)
ERYTHROCYTE [DISTWIDTH] IN BLOOD BY AUTOMATED COUNT: 51.8 FL (ref 35.9–50)
HCT VFR BLD AUTO: 44.7 % (ref 42–52)
HGB BLD-MCNC: 15.2 G/DL (ref 14–18)
IMM GRANULOCYTES # BLD AUTO: 0.03 K/UL (ref 0–0.11)
IMM GRANULOCYTES NFR BLD AUTO: 0.3 % (ref 0–0.9)
LYMPHOCYTES # BLD AUTO: 4.3 K/UL (ref 1–4.8)
LYMPHOCYTES NFR BLD: 48.2 % (ref 22–41)
MCH RBC QN AUTO: 33.4 PG (ref 27–33)
MCHC RBC AUTO-ENTMCNC: 34 G/DL (ref 33.7–35.3)
MCV RBC AUTO: 98.2 FL (ref 81.4–97.8)
MONOCYTES # BLD AUTO: 0.55 K/UL (ref 0–0.85)
MONOCYTES NFR BLD AUTO: 6.2 % (ref 0–13.4)
NEUTROPHILS # BLD AUTO: 3.58 K/UL (ref 1.82–7.42)
NEUTROPHILS NFR BLD: 40.1 % (ref 44–72)
NRBC # BLD AUTO: 0 K/UL
NRBC BLD-RTO: 0 /100 WBC
PLATELET # BLD AUTO: 116 K/UL (ref 164–446)
PMV BLD AUTO: 12.4 FL (ref 9–12.9)
RBC # BLD AUTO: 4.55 M/UL (ref 4.7–6.1)
WBC # BLD AUTO: 8.9 K/UL (ref 4.8–10.8)

## 2018-02-21 PROCEDURE — 85025 COMPLETE CBC W/AUTO DIFF WBC: CPT

## 2018-03-12 ENCOUNTER — HOSPITAL ENCOUNTER (OUTPATIENT)
Dept: LAB | Facility: MEDICAL CENTER | Age: 78
End: 2018-03-12
Attending: PHYSICIAN ASSISTANT
Payer: MEDICARE

## 2018-03-12 LAB
BUN SERPL-MCNC: 24 MG/DL (ref 8–22)
CREAT SERPL-MCNC: 0.91 MG/DL (ref 0.5–1.4)

## 2018-03-12 PROCEDURE — 82565 ASSAY OF CREATININE: CPT

## 2018-03-12 PROCEDURE — 36415 COLL VENOUS BLD VENIPUNCTURE: CPT

## 2018-03-12 PROCEDURE — 84520 ASSAY OF UREA NITROGEN: CPT

## 2018-03-14 ENCOUNTER — HOSPITAL ENCOUNTER (OUTPATIENT)
Dept: RADIOLOGY | Facility: MEDICAL CENTER | Age: 78
End: 2018-03-14
Attending: PHYSICIAN ASSISTANT
Payer: MEDICARE

## 2018-03-14 DIAGNOSIS — S14.159A: ICD-10-CM

## 2018-03-14 PROCEDURE — A9579 GAD-BASE MR CONTRAST NOS,1ML: HCPCS | Performed by: PHYSICIAN ASSISTANT

## 2018-03-14 PROCEDURE — 700117 HCHG RX CONTRAST REV CODE 255: Performed by: PHYSICIAN ASSISTANT

## 2018-03-14 PROCEDURE — 72156 MRI NECK SPINE W/O & W/DYE: CPT

## 2018-03-14 RX ADMIN — GADODIAMIDE 15 ML: 287 INJECTION INTRAVENOUS at 18:08

## 2018-03-16 DIAGNOSIS — M54.2 NECK PAIN: ICD-10-CM

## 2018-04-02 ENCOUNTER — HOSPITAL ENCOUNTER (OUTPATIENT)
Facility: MEDICAL CENTER | Age: 78
End: 2018-04-02
Attending: INTERNAL MEDICINE
Payer: MEDICARE

## 2018-04-02 ENCOUNTER — NON-PROVIDER VISIT (OUTPATIENT)
Dept: INTERNAL MEDICINE | Facility: IMAGING CENTER | Age: 78
End: 2018-04-02
Payer: MEDICARE

## 2018-04-02 DIAGNOSIS — D69.6 THROMBOCYTOPENIA (HCC): ICD-10-CM

## 2018-04-02 LAB
BASOPHILS # BLD AUTO: 0.6 % (ref 0–1.8)
BASOPHILS # BLD: 0.06 K/UL (ref 0–0.12)
EOSINOPHIL # BLD AUTO: 0.22 K/UL (ref 0–0.51)
EOSINOPHIL NFR BLD: 2.3 % (ref 0–6.9)
ERYTHROCYTE [DISTWIDTH] IN BLOOD BY AUTOMATED COUNT: 46.8 FL (ref 35.9–50)
HCT VFR BLD AUTO: 46.3 % (ref 42–52)
HGB BLD-MCNC: 15.6 G/DL (ref 14–18)
IMM GRANULOCYTES # BLD AUTO: 0.03 K/UL (ref 0–0.11)
IMM GRANULOCYTES NFR BLD AUTO: 0.3 % (ref 0–0.9)
LYMPHOCYTES # BLD AUTO: 3.74 K/UL (ref 1–4.8)
LYMPHOCYTES NFR BLD: 38.6 % (ref 22–41)
MCH RBC QN AUTO: 33.2 PG (ref 27–33)
MCHC RBC AUTO-ENTMCNC: 33.7 G/DL (ref 33.7–35.3)
MCV RBC AUTO: 98.5 FL (ref 81.4–97.8)
MONOCYTES # BLD AUTO: 0.73 K/UL (ref 0–0.85)
MONOCYTES NFR BLD AUTO: 7.5 % (ref 0–13.4)
NEUTROPHILS # BLD AUTO: 4.92 K/UL (ref 1.82–7.42)
NEUTROPHILS NFR BLD: 50.7 % (ref 44–72)
NRBC # BLD AUTO: 0 K/UL
NRBC BLD-RTO: 0 /100 WBC
PLATELET # BLD AUTO: 119 K/UL (ref 164–446)
PMV BLD AUTO: 12.3 FL (ref 9–12.9)
RBC # BLD AUTO: 4.7 M/UL (ref 4.7–6.1)
WBC # BLD AUTO: 9.7 K/UL (ref 4.8–10.8)

## 2018-04-02 PROCEDURE — 85025 COMPLETE CBC W/AUTO DIFF WBC: CPT

## 2018-04-04 ENCOUNTER — APPOINTMENT (RX ONLY)
Dept: URBAN - METROPOLITAN AREA CLINIC 4 | Facility: CLINIC | Age: 78
Setting detail: DERMATOLOGY
End: 2018-04-04

## 2018-04-04 DIAGNOSIS — D18.0 HEMANGIOMA: ICD-10-CM

## 2018-04-04 DIAGNOSIS — L82.1 OTHER SEBORRHEIC KERATOSIS: ICD-10-CM

## 2018-04-04 DIAGNOSIS — L57.0 ACTINIC KERATOSIS: ICD-10-CM

## 2018-04-04 DIAGNOSIS — L44.8 OTHER SPECIFIED PAPULOSQUAMOUS DISORDERS: ICD-10-CM

## 2018-04-04 DIAGNOSIS — L81.4 OTHER MELANIN HYPERPIGMENTATION: ICD-10-CM

## 2018-04-04 PROBLEM — D18.01 HEMANGIOMA OF SKIN AND SUBCUTANEOUS TISSUE: Status: ACTIVE | Noted: 2018-04-04

## 2018-04-04 PROCEDURE — ? LIQUID NITROGEN

## 2018-04-04 PROCEDURE — ? COUNSELING

## 2018-04-04 PROCEDURE — 99213 OFFICE O/P EST LOW 20 MIN: CPT | Mod: 25

## 2018-04-04 PROCEDURE — 17000 DESTRUCT PREMALG LESION: CPT | Mod: 59

## 2018-04-04 ASSESSMENT — LOCATION ZONE DERM
LOCATION ZONE: TRUNK
LOCATION ZONE: FACE

## 2018-04-04 ASSESSMENT — LOCATION DETAILED DESCRIPTION DERM
LOCATION DETAILED: RIGHT MEDIAL FOREHEAD
LOCATION DETAILED: INFERIOR THORACIC SPINE
LOCATION DETAILED: LEFT MEDIAL SUPERIOR CHEST
LOCATION DETAILED: RIGHT SUPERIOR FOREHEAD
LOCATION DETAILED: LEFT MEDIAL UPPER BACK
LOCATION DETAILED: LEFT SUPERIOR LATERAL UPPER BACK

## 2018-04-04 ASSESSMENT — LOCATION SIMPLE DESCRIPTION DERM
LOCATION SIMPLE: CHEST
LOCATION SIMPLE: UPPER BACK
LOCATION SIMPLE: RIGHT FOREHEAD
LOCATION SIMPLE: LEFT UPPER BACK

## 2018-04-04 NOTE — PROCEDURE: LIQUID NITROGEN
Consent: The patient's consent was obtained including but not limited to risks of crusting, scabbing, blistering, scarring, darker or lighter pigmentary change, recurrence, incomplete removal and infection.
Include Z78.9 (Other Specified Conditions Influencing Health Status) As An Associated Diagnosis?: No
Medical Necessity Clause: This procedure was medically necessary because the lesions that were treated were:
Medical Necessity Information: It is in your best interest to select a reason for this procedure from the list below. All of these items fulfill various CMS LCD requirements except the new and changing color options.
Detail Level: Detailed
Post-Care Instructions: I reviewed with the patient in detail post-care instructions. Patient is to wear sunprotection, and avoid picking at any of the treated lesions. Pt may apply Vaseline to crusted or scabbing areas.
Number Of Freeze-Thaw Cycles: 1 freeze-thaw cycle
Duration Of Freeze Thaw-Cycle (Seconds): 5

## 2018-05-31 DIAGNOSIS — Z71.84 TRAVEL ADVICE ENCOUNTER: ICD-10-CM

## 2018-05-31 RX ORDER — AZITHROMYCIN 250 MG/1
TABLET, FILM COATED ORAL
Qty: 6 TAB | Refills: 0 | Status: SHIPPED | OUTPATIENT
Start: 2018-05-31 | End: 2018-09-01 | Stop reason: SDUPTHER

## 2018-06-21 ENCOUNTER — NON-PROVIDER VISIT (OUTPATIENT)
Dept: INTERNAL MEDICINE | Facility: IMAGING CENTER | Age: 78
End: 2018-06-21
Payer: MEDICARE

## 2018-06-21 NOTE — NON-PROVIDER
Patient states that cystoscopy is scheduled now for Nov 2018.  No change in symptoms.  No UA needed at this time.

## 2018-06-25 ENCOUNTER — NON-PROVIDER VISIT (OUTPATIENT)
Dept: INTERNAL MEDICINE | Facility: IMAGING CENTER | Age: 78
End: 2018-06-25
Payer: MEDICARE

## 2018-06-25 DIAGNOSIS — R31.0 GROSS HEMATURIA: ICD-10-CM

## 2018-06-25 LAB
APPEARANCE UR: CLEAR
BILIRUB UR STRIP-MCNC: NEGATIVE MG/DL
COLOR UR AUTO: YELLOW
GLUCOSE UR STRIP.AUTO-MCNC: NEGATIVE MG/DL
KETONES UR STRIP.AUTO-MCNC: NEGATIVE MG/DL
LEUKOCYTE ESTERASE UR QL STRIP.AUTO: NEGATIVE
NITRITE UR QL STRIP.AUTO: NEGATIVE
PH UR STRIP.AUTO: 6.5 [PH] (ref 5–8)
PROT UR QL STRIP: NEGATIVE MG/DL
RBC UR QL AUTO: NEGATIVE
SP GR UR STRIP.AUTO: 1.01
UROBILINOGEN UR STRIP-MCNC: 0.2 MG/DL

## 2018-06-25 PROCEDURE — 81002 URINALYSIS NONAUTO W/O SCOPE: CPT | Performed by: INTERNAL MEDICINE

## 2018-07-25 DIAGNOSIS — R19.7 DIARRHEA, UNSPECIFIED TYPE: ICD-10-CM

## 2018-07-25 RX ORDER — METRONIDAZOLE 500 MG/1
500 TABLET ORAL 3 TIMES DAILY
Qty: 30 TAB | Refills: 0 | Status: SHIPPED | OUTPATIENT
Start: 2018-07-25 | End: 2018-10-25

## 2018-07-30 ENCOUNTER — NON-PROVIDER VISIT (OUTPATIENT)
Dept: INTERNAL MEDICINE | Facility: IMAGING CENTER | Age: 78
End: 2018-07-30
Payer: MEDICARE

## 2018-07-30 ENCOUNTER — HOSPITAL ENCOUNTER (OUTPATIENT)
Facility: MEDICAL CENTER | Age: 78
End: 2018-07-30
Attending: INTERNAL MEDICINE
Payer: MEDICARE

## 2018-07-30 DIAGNOSIS — N40.1 BENIGN PROSTATIC HYPERPLASIA WITH URINARY FREQUENCY: ICD-10-CM

## 2018-07-30 DIAGNOSIS — R36.1 HEMATOSPERMIA: ICD-10-CM

## 2018-07-30 DIAGNOSIS — R35.0 BENIGN PROSTATIC HYPERPLASIA WITH URINARY FREQUENCY: ICD-10-CM

## 2018-07-30 LAB — PSA SERPL-MCNC: 0.7 NG/ML (ref 0–4)

## 2018-07-30 PROCEDURE — 84153 ASSAY OF PSA TOTAL: CPT

## 2018-07-31 ENCOUNTER — NON-PROVIDER VISIT (OUTPATIENT)
Dept: INTERNAL MEDICINE | Facility: IMAGING CENTER | Age: 78
End: 2018-07-31
Payer: MEDICARE

## 2018-07-31 ENCOUNTER — HOSPITAL ENCOUNTER (OUTPATIENT)
Facility: MEDICAL CENTER | Age: 78
End: 2018-07-31
Attending: INTERNAL MEDICINE
Payer: MEDICARE

## 2018-07-31 DIAGNOSIS — R36.1 HEMATOSPERMIA: ICD-10-CM

## 2018-07-31 PROCEDURE — 87086 URINE CULTURE/COLONY COUNT: CPT

## 2018-08-01 ENCOUNTER — NON-PROVIDER VISIT (OUTPATIENT)
Dept: OCCUPATIONAL MEDICINE | Facility: CLINIC | Age: 78
End: 2018-08-01

## 2018-08-02 LAB
BACTERIA UR CULT: NORMAL
SIGNIFICANT IND 70042: NORMAL
SITE SITE: NORMAL
SOURCE SOURCE: NORMAL

## 2018-08-02 NOTE — PROGRESS NOTES
Travel dates: 8/2/18-2/24/18  Countries to be visited: [facility]   Reason for travel: pleasure    Rural travel: yes  High altitude travel: yes    Accommodations:  Hotel: yes   Hostel:  Camping: yes  Cruise:  With family:  Other:    Vaccines in past 30 days? Yes, Justina, 7/31/18  Sick today? No  Allergies: Avelox    The screening intake form was reviewed with the traveler. Health risks associated with their travel plans have been reviewed and discussed with the traveler. The traveler has been provided with vaccine information statements for the vaccines that are recommended and given the opportunity to discuss risks and benefits of vaccination and or medications. The traveler has received education on the travel itinerary provided and on the following topics.    Personal safety precautions: Yes  Food and water precautions: Yes  Management of traveler's diarrhea: Yes  Mosquito/insect bite prevention:Yes  Animal bites/Rabies prevention: No  High altitude precautions: No      RN comments:     Consult only. Pt up to date on all travel vaccines         Physician consultation required: no

## 2018-08-17 PROBLEM — Z00.00 ENCOUNTER FOR PREVENTIVE HEALTH EXAMINATION: Status: ACTIVE | Noted: 2018-08-17

## 2018-08-31 DIAGNOSIS — Z71.84 TRAVEL ADVICE ENCOUNTER: ICD-10-CM

## 2018-09-01 RX ORDER — AZITHROMYCIN 250 MG/1
TABLET, FILM COATED ORAL
Qty: 6 TAB | Refills: 0 | Status: SHIPPED | OUTPATIENT
Start: 2018-09-01 | End: 2018-10-25

## 2018-09-03 DIAGNOSIS — Z71.84 TRAVEL ADVICE ENCOUNTER: ICD-10-CM

## 2018-09-03 RX ORDER — DOXYCYCLINE HYCLATE 100 MG
100 TABLET ORAL 2 TIMES DAILY
Qty: 20 TAB | Refills: 0 | Status: SHIPPED | OUTPATIENT
Start: 2018-09-03 | End: 2018-10-25

## 2018-09-10 ENCOUNTER — APPOINTMENT (OUTPATIENT)
Dept: ORTHOPEDIC SURGERY | Facility: CLINIC | Age: 78
End: 2018-09-10

## 2018-10-05 DIAGNOSIS — I25.10 ATHEROSCLEROSIS OF NATIVE CORONARY ARTERY OF NATIVE HEART WITHOUT ANGINA PECTORIS: ICD-10-CM

## 2018-10-05 DIAGNOSIS — N40.0 BENIGN PROSTATIC HYPERPLASIA WITHOUT LOWER URINARY TRACT SYMPTOMS: ICD-10-CM

## 2018-10-05 DIAGNOSIS — D69.6 THROMBOCYTOPENIA (HCC): ICD-10-CM

## 2018-10-05 DIAGNOSIS — E78.00 PURE HYPERCHOLESTEROLEMIA: ICD-10-CM

## 2018-10-19 ENCOUNTER — HOSPITAL ENCOUNTER (OUTPATIENT)
Facility: MEDICAL CENTER | Age: 78
End: 2018-10-19
Attending: INTERNAL MEDICINE
Payer: MEDICARE

## 2018-10-19 ENCOUNTER — NON-PROVIDER VISIT (OUTPATIENT)
Dept: INTERNAL MEDICINE | Facility: IMAGING CENTER | Age: 78
End: 2018-10-19
Payer: MEDICARE

## 2018-10-19 DIAGNOSIS — E78.00 PURE HYPERCHOLESTEROLEMIA: ICD-10-CM

## 2018-10-19 DIAGNOSIS — N40.0 BENIGN PROSTATIC HYPERPLASIA WITHOUT LOWER URINARY TRACT SYMPTOMS: ICD-10-CM

## 2018-10-19 DIAGNOSIS — I25.10 ATHEROSCLEROSIS OF NATIVE CORONARY ARTERY OF NATIVE HEART WITHOUT ANGINA PECTORIS: ICD-10-CM

## 2018-10-19 DIAGNOSIS — D69.6 THROMBOCYTOPENIA (HCC): ICD-10-CM

## 2018-10-19 LAB
ALBUMIN SERPL BCP-MCNC: 4.3 G/DL (ref 3.2–4.9)
ALBUMIN/GLOB SERPL: 1.3 G/DL
ALP SERPL-CCNC: 75 U/L (ref 30–99)
ALT SERPL-CCNC: 29 U/L (ref 2–50)
ANION GAP SERPL CALC-SCNC: 5 MMOL/L (ref 0–11.9)
AST SERPL-CCNC: 35 U/L (ref 12–45)
BASOPHILS # BLD AUTO: 0.4 % (ref 0–1.8)
BASOPHILS # BLD: 0.03 K/UL (ref 0–0.12)
BILIRUB SERPL-MCNC: 1 MG/DL (ref 0.1–1.5)
BUN SERPL-MCNC: 25 MG/DL (ref 8–22)
CALCIUM SERPL-MCNC: 10.3 MG/DL (ref 8.5–10.5)
CHLORIDE SERPL-SCNC: 104 MMOL/L (ref 96–112)
CO2 SERPL-SCNC: 31 MMOL/L (ref 20–33)
CREAT SERPL-MCNC: 1.11 MG/DL (ref 0.5–1.4)
CRP SERPL HS-MCNC: 1.1 MG/L (ref 0–7.5)
EOSINOPHIL # BLD AUTO: 0.15 K/UL (ref 0–0.51)
EOSINOPHIL NFR BLD: 1.8 % (ref 0–6.9)
ERYTHROCYTE [DISTWIDTH] IN BLOOD BY AUTOMATED COUNT: 50.5 FL (ref 35.9–50)
GLOBULIN SER CALC-MCNC: 3.3 G/DL (ref 1.9–3.5)
GLUCOSE SERPL-MCNC: 85 MG/DL (ref 65–99)
HCT VFR BLD AUTO: 44.2 % (ref 42–52)
HGB BLD-MCNC: 14.7 G/DL (ref 14–18)
IMM GRANULOCYTES # BLD AUTO: 0.01 K/UL (ref 0–0.11)
IMM GRANULOCYTES NFR BLD AUTO: 0.1 % (ref 0–0.9)
LYMPHOCYTES # BLD AUTO: 3.77 K/UL (ref 1–4.8)
LYMPHOCYTES NFR BLD: 45.6 % (ref 22–41)
MCH RBC QN AUTO: 33.3 PG (ref 27–33)
MCHC RBC AUTO-ENTMCNC: 33.3 G/DL (ref 33.7–35.3)
MCV RBC AUTO: 100 FL (ref 81.4–97.8)
MONOCYTES # BLD AUTO: 0.65 K/UL (ref 0–0.85)
MONOCYTES NFR BLD AUTO: 7.9 % (ref 0–13.4)
NEUTROPHILS # BLD AUTO: 3.65 K/UL (ref 1.82–7.42)
NEUTROPHILS NFR BLD: 44.2 % (ref 44–72)
NRBC # BLD AUTO: 0 K/UL
NRBC BLD-RTO: 0 /100 WBC
PLATELET # BLD AUTO: 116 K/UL (ref 164–446)
PMV BLD AUTO: 13.2 FL (ref 9–12.9)
POTASSIUM SERPL-SCNC: 4.8 MMOL/L (ref 3.6–5.5)
PROT SERPL-MCNC: 7.6 G/DL (ref 6–8.2)
PSA SERPL-MCNC: 0.67 NG/ML (ref 0–4)
RBC # BLD AUTO: 4.42 M/UL (ref 4.7–6.1)
SODIUM SERPL-SCNC: 140 MMOL/L (ref 135–145)
WBC # BLD AUTO: 8.3 K/UL (ref 4.8–10.8)

## 2018-10-19 PROCEDURE — 80061 LIPID PANEL: CPT | Mod: XU

## 2018-10-19 PROCEDURE — 86141 C-REACTIVE PROTEIN HS: CPT

## 2018-10-19 PROCEDURE — 80053 COMPREHEN METABOLIC PANEL: CPT

## 2018-10-19 PROCEDURE — 83704 LIPOPROTEIN BLD QUAN PART: CPT

## 2018-10-19 PROCEDURE — 84153 ASSAY OF PSA TOTAL: CPT

## 2018-10-19 PROCEDURE — 85025 COMPLETE CBC W/AUTO DIFF WBC: CPT

## 2018-10-22 LAB
CHOLEST SERPL-MCNC: 155 MG/DL
HDL PARTICAL NO Q4363: 31.4 UMOL/L
HDL SIZE Q4361: 10.7 NM
HDLC SERPL-MCNC: 88 MG/DL (ref 40–59)
HLD.LARGE SERPL-SCNC: >17 UMOL/L
L VLDL PART NO Q4357: <1.5 NMOL/L
LDL SERPL QN: 21.2 NM
LDL SERPL-SCNC: 591 NMOL/L
LDL SMALL SERPL-SCNC: ABNORMAL NMOL/L
LDLC SERPL CALC-MCNC: 59 MG/DL
PATHOLOGY STUDY: ABNORMAL
TRIGL SERPL-MCNC: 40 MG/DL (ref 30–149)
VLDL SIZE Q4362: 49 NM

## 2018-10-23 ENCOUNTER — HOSPITAL ENCOUNTER (OUTPATIENT)
Facility: MEDICAL CENTER | Age: 78
End: 2018-10-23
Attending: INTERNAL MEDICINE
Payer: MEDICARE

## 2018-10-23 ENCOUNTER — OFFICE VISIT (OUTPATIENT)
Dept: INTERNAL MEDICINE | Facility: IMAGING CENTER | Age: 78
End: 2018-10-23
Payer: MEDICARE

## 2018-10-23 ENCOUNTER — APPOINTMENT (RX ONLY)
Dept: URBAN - METROPOLITAN AREA CLINIC 4 | Facility: CLINIC | Age: 78
Setting detail: DERMATOLOGY
End: 2018-10-23

## 2018-10-23 VITALS
OXYGEN SATURATION: 98 % | DIASTOLIC BLOOD PRESSURE: 68 MMHG | WEIGHT: 161 LBS | HEART RATE: 72 BPM | SYSTOLIC BLOOD PRESSURE: 128 MMHG | BODY MASS INDEX: 23.05 KG/M2 | RESPIRATION RATE: 12 BRPM | HEIGHT: 70 IN | TEMPERATURE: 97.4 F

## 2018-10-23 DIAGNOSIS — N40.1 BENIGN PROSTATIC HYPERPLASIA WITH NOCTURIA: ICD-10-CM

## 2018-10-23 DIAGNOSIS — L57.0 ACTINIC KERATOSIS: ICD-10-CM

## 2018-10-23 DIAGNOSIS — E78.00 PURE HYPERCHOLESTEROLEMIA: ICD-10-CM

## 2018-10-23 DIAGNOSIS — N28.1 KIDNEY CYSTS: ICD-10-CM

## 2018-10-23 DIAGNOSIS — L82.1 OTHER SEBORRHEIC KERATOSIS: ICD-10-CM

## 2018-10-23 DIAGNOSIS — K63.5 POLYP OF COLON, UNSPECIFIED PART OF COLON, UNSPECIFIED TYPE: ICD-10-CM

## 2018-10-23 DIAGNOSIS — M85.852 OSTEOPENIA OF NECK OF LEFT FEMUR: ICD-10-CM

## 2018-10-23 DIAGNOSIS — G89.29 CHRONIC PAIN OF LEFT ANKLE: ICD-10-CM

## 2018-10-23 DIAGNOSIS — Z00.00 MEDICARE ANNUAL WELLNESS VISIT, SUBSEQUENT: ICD-10-CM

## 2018-10-23 DIAGNOSIS — K86.2 PANCREAS CYST: ICD-10-CM

## 2018-10-23 DIAGNOSIS — M25.572 CHRONIC PAIN OF LEFT ANKLE: ICD-10-CM

## 2018-10-23 DIAGNOSIS — I47.10 SVT (SUPRAVENTRICULAR TACHYCARDIA): ICD-10-CM

## 2018-10-23 DIAGNOSIS — L81.4 OTHER MELANIN HYPERPIGMENTATION: ICD-10-CM

## 2018-10-23 DIAGNOSIS — R35.1 BENIGN PROSTATIC HYPERPLASIA WITH NOCTURIA: ICD-10-CM

## 2018-10-23 DIAGNOSIS — M48.061 SPINAL STENOSIS OF LUMBAR REGION, UNSPECIFIED WHETHER NEUROGENIC CLAUDICATION PRESENT: ICD-10-CM

## 2018-10-23 DIAGNOSIS — M25.9 DISEASE OF BONE AND JOINT: ICD-10-CM

## 2018-10-23 DIAGNOSIS — D69.6 THROMBOCYTOPENIA (HCC): ICD-10-CM

## 2018-10-23 DIAGNOSIS — D18.0 HEMANGIOMA: ICD-10-CM

## 2018-10-23 DIAGNOSIS — M89.9 DISEASE OF BONE AND JOINT: ICD-10-CM

## 2018-10-23 DIAGNOSIS — I25.10 ATHEROSCLEROSIS OF NATIVE CORONARY ARTERY OF NATIVE HEART WITHOUT ANGINA PECTORIS: ICD-10-CM

## 2018-10-23 PROBLEM — D18.01 HEMANGIOMA OF SKIN AND SUBCUTANEOUS TISSUE: Status: ACTIVE | Noted: 2018-10-23

## 2018-10-23 PROBLEM — D48.5 NEOPLASM OF UNCERTAIN BEHAVIOR OF SKIN: Status: ACTIVE | Noted: 2018-10-23

## 2018-10-23 LAB
APPEARANCE UR: CLEAR
BILIRUB UR QL STRIP.AUTO: NEGATIVE
COLOR UR: YELLOW
GLUCOSE UR STRIP.AUTO-MCNC: NEGATIVE MG/DL
KETONES UR STRIP.AUTO-MCNC: NEGATIVE MG/DL
LEUKOCYTE ESTERASE UR QL STRIP.AUTO: NEGATIVE
MICRO URNS: NORMAL
NITRITE UR QL STRIP.AUTO: NEGATIVE
PH UR STRIP.AUTO: 6.5 [PH]
PROT UR QL STRIP: NEGATIVE MG/DL
RBC UR QL AUTO: NEGATIVE
SP GR UR STRIP.AUTO: 1.02
UROBILINOGEN UR STRIP.AUTO-MCNC: 0.2 MG/DL

## 2018-10-23 PROCEDURE — 17000 DESTRUCT PREMALG LESION: CPT

## 2018-10-23 PROCEDURE — 87086 URINE CULTURE/COLONY COUNT: CPT

## 2018-10-23 PROCEDURE — 81003 URINALYSIS AUTO W/O SCOPE: CPT

## 2018-10-23 PROCEDURE — ? ADDITIONAL NOTES

## 2018-10-23 PROCEDURE — ? LIQUID NITROGEN

## 2018-10-23 PROCEDURE — 99213 OFFICE O/P EST LOW 20 MIN: CPT | Mod: 25

## 2018-10-23 PROCEDURE — 99214 OFFICE O/P EST MOD 30 MIN: CPT | Performed by: INTERNAL MEDICINE

## 2018-10-23 ASSESSMENT — ENCOUNTER SYMPTOMS: GENERAL WELL-BEING: EXCELLENT

## 2018-10-23 ASSESSMENT — PATIENT HEALTH QUESTIONNAIRE - PHQ9: CLINICAL INTERPRETATION OF PHQ2 SCORE: 0

## 2018-10-23 ASSESSMENT — LOCATION DETAILED DESCRIPTION DERM
LOCATION DETAILED: MID-OCCIPITAL SCALP
LOCATION DETAILED: LEFT SUPERIOR MEDIAL MIDBACK
LOCATION DETAILED: RIGHT MID-UPPER BACK
LOCATION DETAILED: RIGHT SUPERIOR UPPER BACK

## 2018-10-23 ASSESSMENT — LOCATION ZONE DERM
LOCATION ZONE: SCALP
LOCATION ZONE: TRUNK

## 2018-10-23 ASSESSMENT — LOCATION SIMPLE DESCRIPTION DERM
LOCATION SIMPLE: POSTERIOR SCALP
LOCATION SIMPLE: LEFT LOWER BACK
LOCATION SIMPLE: RIGHT UPPER BACK

## 2018-10-23 ASSESSMENT — ACTIVITIES OF DAILY LIVING (ADL): BATHING_REQUIRES_ASSISTANCE: 0

## 2018-10-23 NOTE — PROGRESS NOTES
78 y.o. male presents for the following:    Andre Martinez  Patient comes in for annual health risk assessment.  He considers his health good.  He is active.  He exercises 4 days a week.  His diet is good.  No tobacco and minimal alcohol use.  He travels extensively with his wife.  No cognitive issues.  No balance issues.  No depression.    Eye exam  4/18  He will bring in advanced directive    1. Benign prostatic hyperplasia with nocturia  Patient has ongoing follow-up at [facility].  He is scheduled for greenlight laser therapy.  He continues to complain of nocturia.  Symptoms can occur up to hourly.  His most recent PSA was unchanged at 0.67.      2. Polyp of colon, unspecified part of colon, unspecified type  Up-to-date on colonoscopy.  His last was inUp-to-date on colonoscopy.  His last was in 2017. March he is scheduled for 5-year repeat.  He has the colonoscopy performed by a physician in [facility].    3. Atherosclerosis of native coronary artery of native heart without angina pectoris  Stable.  No chest pain, angina or equivalent.  He remains on aspirin, Lipitor, fish oil and co-Q10.    4. Kidney cysts  Simple cyst seen on previous imaging. 3/16    Impression     There are bilateral low-density nonenhancing renal lesions which have CT density measurements suggestive of cysts.    Impression on the bladder base which may be related to enlargement of the prostate gland.    Less than 1 cm hypodensity at the junction of the body and tail of the pancreas is not significantly changed. The pancreatic duct is visible.    Calcification in the distribution of the distal rectum, of uncertain significance.    Atherosclerotic vascular disease.     5. Pancreas cyst  Stable as above.    6. Pure hypercholesterolemia  Cholesterol is at goal on lipid therapy.  Tolerating medication without side effect.    7. Spinal stenosis of lumbar region, unspecified whether neurogenic claudication present  Recent flare.  He has resumed  home exercises.  If symptoms persist he is going to ask for referral back to [facility].     8. Thrombocytopenia (HCC)  Overall stable since .      9 SVT  Asymptomatic for over 6 months.  Dr. Debra coombs for episodes of “blanking out”. Previously saw Dr. Almonte. SVT with aberrancy seen on [facility]. He was offered loop recorder.     10. Osteopenia   DEXA  osteopenia in left femur.     Colonoscopy 3/21/17 [place] (Dr Domingo) repeat in 5 yrs Dexa 6/3/16 osteopenia PSA 17  0.78  [facility]     Laboratory:  CBC-hemoglobin 14.7, hematocrit 44.2, chronically elevated MCV at 100. Platelet count 116,000.  Comprehensive metabolic-creatinine 1.11, typically .9. BUN 25. CRP 1.1.  Lipids-total cholesterol 155, triglycerides 40, HDL 88, LDL 59.  PSA-0.67, unchanged.    Review of systems:  Chronic left ankle pain after strain last year.      Annual Wellness Visit/Health Risk Assessment:    Past medical:  Past Medical History:   Diagnosis Date   • BPH (benign prostatic hyperplasia)    • Colon polyps    • Coronary atherosclerosis of native coronary artery 5/15/2013   • HLD (hyperlipidemia) 5/15/2013   • Kidney cysts    • Meniscus tear    • Pancreas cyst    • Pure hypercholesterolemia 2016   • Spinal stenosis, lumbar    • SVT (supraventricular tachycardia) (HCC) 10/23/2018   • Thrombocytopenia (HCC) 2017       Past surgical:  Past Surgical History:   Procedure Laterality Date   • ROTATOR CUFF REPAIR     • HEMORRHOIDECTOMY     • KNEE RECONSTRUCTION      left, partial    • SINUSOTOMIES      Dr. Guerrier, total of 4 surgeries       Family history: relating to possible risk factors for your patient  Family History   Problem Relation Age of Onset   • Heart Disease Father          at age 38   • Psychiatry Mother         Alzheimers   • Hypertension Brother    • Stroke Brother    • Seizures Brother        Current Providers (including home care/DME’s):   Colonoscopy 3/21/17 [facility]  [place] (Dr Domingo) repeat in 5 yrs Dexa 6/3/16 osteopenia PSA 12/11/17  0.78  [facility]       Patient Care Team:  Azar Cavazos M.D. as PCP - General (Internal Medicine)      Medications:   Current Outpatient Prescriptions Ordered in [facility]   Medication Sig Dispense Refill   • Mirabegron ER 50 MG TABLET SR 24 HR Take 50 mg by mouth every day. 90 Tab 3   • doxycycline (VIBRAMYCIN) 100 MG Tab Take 1 Tab by mouth 2 times a day. 20 Tab 0   • azithromycin (ZITHROMAX) 250 MG Tab Take 2 tabs on day one and 1 tabs on days 2-5 6 Tab 0   • metroNIDAZOLE (FLAGYL) 500 MG Tab Take 1 Tab by mouth 3 times a day. Indication: Traveler's diarrhea 30 Tab 0   • finasteride (PROSCAR) 5 MG Tab TAKE 1 TABLET BY MOUTH EVERY DAY 90 Tab 3   • tobramycin (TOBREX) 0.3 % Solution ophthalmic solution Place 1 Drop in both eyes every 4 hours. 1 Bottle 1   • Calcium Carb-Cholecalciferol (CALCIUM 600 + D PO) Take  by mouth.     • Guaifenesin 400 MG Tab Take  by mouth.     • tamsulosin (FLOMAX) 0.4 MG capsule TAKE 1 CAP BY MOUTH ONE-HALF HOUR AFTER BREAKFAST. 90 Cap 3   • atorvastatin (LIPITOR) 20 MG Tab Take 1 Tab by mouth every bedtime. 90 Tab 3   • fluticasone (FLONASE) 50 MCG/ACT nasal spray Spray 1 Spray in nose every day. 16 g 3   • celecoxib (CELEBREX) 200 MG Cap Take 1 Cap by mouth every day. 30 Cap 1   • vitamin D (CHOLECALCIFEROL) 1000 UNIT TABS Take 2 Tabs by mouth every day. 30 Tab 6   • aspirin EC (ECOTRIN) 81 MG TBEC Take 81 mg by mouth every day.     • Multiple Vitamin CAPS Take 1 Cap by mouth every day.     • Omega-3 Fatty Acids (SALMON OIL PO) Take 2 Caps by mouth every day.     • Cyanocobalamin (VITAMIN B 12 PO) Take 1 Tab by mouth every day.     • coenzyme Q-10 30 MG capsule Take 60 mg by mouth every day.     • GLUCOSA-CHONDR-NA CHONDR-MSM PO Take 1 Tab by mouth every day.       No current [facility]-ordered facility-administered medications on file.        Supplements (calcium/vitamins): if not lisited in  medications    Chief Complaint   Patient presents with   • Annual Exam         HPI:  Andre Martinez is a 78 y.o. here for Medicare Annual Wellness Visit     Patient Active Problem List    Diagnosis Date Noted   • SVT (supraventricular tachycardia) ([facility]) 10/23/2018   • Thrombocytopenia (HCC) 07/24/2017   • Postural dizziness with presyncope 06/14/2017   • Pure hypercholesterolemia 08/23/2016   • Meniscus tear    • Spinal stenosis, lumbar    • Colon polyps    • Kidney cysts    • Pancreas cyst    • BPH (benign prostatic hyperplasia)    • Coronary atherosclerosis of native coronary artery 05/15/2013       Current Outpatient Prescriptions   Medication Sig Dispense Refill   • Mirabegron ER 50 MG TABLET SR 24 HR Take 50 mg by mouth every day. 90 Tab 3   • doxycycline (VIBRAMYCIN) 100 MG Tab Take 1 Tab by mouth 2 times a day. 20 Tab 0   • azithromycin (ZITHROMAX) 250 MG Tab Take 2 tabs on day one and 1 tabs on days 2-5 6 Tab 0   • metroNIDAZOLE (FLAGYL) 500 MG Tab Take 1 Tab by mouth 3 times a day. Indication: Traveler's diarrhea 30 Tab 0   • finasteride (PROSCAR) 5 MG Tab TAKE 1 TABLET BY MOUTH EVERY DAY 90 Tab 3   • tobramycin (TOBREX) 0.3 % Solution ophthalmic solution Place 1 Drop in both eyes every 4 hours. 1 Bottle 1   • Calcium Carb-Cholecalciferol (CALCIUM 600 + D PO) Take  by mouth.     • Guaifenesin 400 MG Tab Take  by mouth.     • tamsulosin (FLOMAX) 0.4 MG capsule TAKE 1 CAP BY MOUTH ONE-HALF HOUR AFTER BREAKFAST. 90 Cap 3   • atorvastatin (LIPITOR) 20 MG Tab Take 1 Tab by mouth every bedtime. 90 Tab 3   • fluticasone (FLONASE) 50 MCG/ACT nasal spray Spray 1 Spray in nose every day. 16 g 3   • celecoxib (CELEBREX) 200 MG Cap Take 1 Cap by mouth every day. 30 Cap 1   • vitamin D (CHOLECALCIFEROL) 1000 UNIT TABS Take 2 Tabs by mouth every day. 30 Tab 6   • aspirin EC (ECOTRIN) 81 MG TBEC Take 81 mg by mouth every day.     • Multiple Vitamin CAPS Take 1 Cap by mouth every day.     • Omega-3 Fatty Acids (SALMON  OIL PO) Take 2 Caps by mouth every day.     • Cyanocobalamin (VITAMIN B 12 PO) Take 1 Tab by mouth every day.     • coenzyme Q-10 30 MG capsule Take 60 mg by mouth every day.     • GLUCOSA-CHONDR-NA CHONDR-MSM PO Take 1 Tab by mouth every day.       No current facility-administered medications for this visit.             Current supplements as per medication list.       Allergies: Avelox [moxifloxacin hcl in nacl]; Cefdinir; Oxycodone; and Scopolamine    Current social contact/activities:  Social with friends and family.   He  reports that he has never smoked. He has never used smokeless tobacco. He reports that he drinks alcohol. He reports that he does not use drugs.  Counseling given: No        DPA/Advanced Directive: He will bring in a copy of his DURABLE POWER OF .        ROS:    Gait: Uses :  None  Ostomy: No  Other tubes: no   Amputations: no   Chronic oxygen use: no   Last eye exam: April 2018  : Denies any urinary leakage during the last 6 months incontinence.       Screening:  Colonoscopy 3/21/17 [place] (Dr Domingo) repeat in 5 yrs Dexa 6/3/16 osteopenia PSA 12/11/17  0.78  [facility]       Depression Screening    Little interest or pleasure in doing things?     Feeling down, depressed , or hopeless?    Trouble falling or staying asleep, or sleeping too much?     Feeling tired or having little energy?     Poor appetite or overeating?     Feeling bad about yourself - or that you are a failure or have let yourself or your family down?    Trouble concentrating on things, such as reading the newspaper or watching television?    Moving or speaking so slowly that other people could have noticed.  Or the opposite - being so fidgety or restless that you have been moving around a lot more than usual?     Thoughts that you would be better off dead, or of hurting yourself?     Patient Health Questionnaire Score:      If depressive symptoms identified deferred to follow up visit  unless specifically addressed in assessment and plan.    Interpretation of PHQ-9 Total Score   Score Severity   1-4 No Depression   5-9 Mild Depression   10-14 Moderate Depression   15-19 Moderately Severe Depression   20-27 Severe Depression      Screening for Cognitive Impairment    Three Minute Recall (leader, season, table)  /3    Marcelo clock face with all 12 numbers and set the hands to show 10 past 11.       Cognitive concerns identified deferred for follow up unless specifically addressed in assessment and plan.    Fall Risk Assessment    Has the patient had two or more falls in the last year or any fall with injury in the last year?       Safety Assessment    Throw rugs on floor.     Handrails on all stairs.     Good lighting in all hallways.     Difficulty hearing.     Patient counseled about all safety risks that were identified.    Functional Assessment ADLs    Are there any barriers preventing you from cooking for yourself or meeting nutritional needs?   .    Are there any barriers preventing you from driving safely or obtaining transportation?   .    Are there any barriers preventing you from using a telephone or calling for help?   .    Are there any barriers preventing you from shopping?   .    Are there any barriers preventing you from taking care of your own finances?   .    Are there any barriers preventing you from managing your medications?   .    Are there any barriers preventing you from showering, bathing or dressing yourself?  .    Are you currently engaging in any exercise or physical activity?   .     What is your perception of your health?   .      Health Maintenance Summary                Annual Wellness Visit Next Due 12/15/2018      Done 12/14/2017 Visit Dx: Medicare annual wellness visit, subsequent     Patient has more history with this topic...    COLONOSCOPY Next Due 3/21/2022      Done 3/21/2017      Patient has more history with this topic...    IMM DTaP/Tdap/Td Vaccine Next Due  "2024      Done 2014 Imm Admin: Tdap Vaccine          Patient Care Team:  Azar Cavazos M.D. as PCP - General (Internal Medicine)      Social History   Substance Use Topics   • Smoking status: Never Smoker   • Smokeless tobacco: Never Used   • Alcohol use Yes      Comment: Social      Family History   Problem Relation Age of Onset   • Heart Disease Father          at age 38   • Psychiatry Mother         Alzheimers   • Hypertension Brother    • Stroke Brother    • Seizures Brother      He  has a past medical history of BPH (benign prostatic hyperplasia); Colon polyps; Coronary atherosclerosis of native coronary artery (5/15/2013); HLD (hyperlipidemia) (5/15/2013); Kidney cysts; Meniscus tear; Pancreas cyst; Pure hypercholesterolemia (2016); Spinal stenosis, lumbar; SVT (supraventricular tachycardia) (HCC) (10/23/2018); and Thrombocytopenia (HCC) (2017).   Past Surgical History:   Procedure Laterality Date   • ROTATOR CUFF REPAIR     • HEMORRHOIDECTOMY     • KNEE RECONSTRUCTION      left, partial    • SINUSOTOMIES      Dr. Guerrier, total of 4 surgeries       Exam:     Blood pressure 128/68, pulse 72, temperature 36.3 °C (97.4 °F), temperature source Temporal, resp. rate 12, height 1.772 m (5' 9.75\"), weight 73 kg (161 lb), SpO2 98 %. Body mass index is 23.27 kg/m².    Hearing good.    Dentition good  Alert, oriented in no acute distress.  Eye contact is good, speech goal directed, affect calm  General: Mildly overweight.  No distress.  Normal appearing.  HEENT: Pupils are equal.  Conjunctiva is normal.  Head is normal appearing.  Ears, canals and tympanic membranes are normal.  Oral cavity is pink and moist without lesion.  Neck: Supple without JVD or bruit.  Thyroid is not enlarged.  Pulmonary: Clear with good breath sounds.  Cardiovascular regular rate and rhythm.  No murmur auscultated.  Carotid, radial and pedal pulses are intact.  Abdomen: Soft, nontender, nondistended.  Normal bowel " sounds.  Organs are not enlarged.  Neurologic: Cranial nerves intact.  Strength and sensation are normal.  Normal patellar reflex.  Skin: No obvious lesions  Lymph: No cervical, supraclavicular, axillary, abdominal or inguinal adenopathy noted.  Genitourinary: Penis, scrotum and testicles are unremarkable.  No hernia.  Rectal tone is normal.  Stool is heme-negative.  Prostate is 1+ enlarged, soft and without nodule.    Assessment and Plan. The following treatment and monitoring plan is recommended:    1. Medicare annual wellness visit, subsequent     2. Benign prostatic hyperplasia with nocturia     3. Polyp of colon, unspecified part of colon, unspecified type     4. Atherosclerosis of native coronary artery of native heart without angina pectoris     5. Kidney cysts     6. Pancreas cyst     7. Pure hypercholesterolemia     8. Spinal stenosis of lumbar region, unspecified whether neurogenic claudication present     9. Thrombocytopenia (HCC)     10. Chronic pain of left ankle  REFERRAL TO ORTHOPEDICS   11. Osteopenia of neck of left femur  DS-BONE DENSITY STUDY (DEXA)   12. Disease of bone and joint  DS-BONE DENSITY STUDY (DEXA)   13. SVT (supraventricular tachycardia) (HCC)         Patient is in good health.  Follow-up at [facility] for prostate issues.  Up-to-date on screening and immunizations.  No change in medication.  Continue to follow CBC.  We discussed referral to hematology.  Defer to orthopedics for left ankle pain.  Repeat bone density    Services suggested: No services required at this time  Health Care Screening: Age-appropriate preventive services Medicare covers discussed today and ordered if indicated.  Referrals offered: Community-based lifestyle interventions to reduce health risks and promote self-management and wellness, fall prevention, nutrition, physical activity, tobacco-use cessation, weight loss, and mental health services as per orders if indicated.    Discussion today about general wellness  and lifestyle habits:    · Prevent falls and reduce trip hazards; Cautioned about securing or removing rugs.  · Have a working fire alarm and carbon monoxide detector;   · Engage in regular physical activity and social activities       Follow-up: 6 months

## 2018-10-23 NOTE — PROCEDURE: ADDITIONAL NOTES
Additional Notes: Lesion has only been bleeding for about 2 weeks and may just be irritated due to combing hair and showering. Will freeze today if doesn’t resolve pt is to return sooner than 6 mos.
Detail Level: Detailed

## 2018-10-25 ENCOUNTER — OFFICE VISIT (OUTPATIENT)
Dept: CARDIOLOGY | Facility: MEDICAL CENTER | Age: 78
End: 2018-10-25
Payer: MEDICARE

## 2018-10-25 VITALS
DIASTOLIC BLOOD PRESSURE: 80 MMHG | HEART RATE: 60 BPM | OXYGEN SATURATION: 97 % | BODY MASS INDEX: 23.19 KG/M2 | SYSTOLIC BLOOD PRESSURE: 110 MMHG | HEIGHT: 70 IN | WEIGHT: 162 LBS

## 2018-10-25 DIAGNOSIS — E78.00 PURE HYPERCHOLESTEROLEMIA: ICD-10-CM

## 2018-10-25 DIAGNOSIS — E78.5 DYSLIPIDEMIA: Primary | ICD-10-CM

## 2018-10-25 DIAGNOSIS — I47.10 SVT (SUPRAVENTRICULAR TACHYCARDIA): ICD-10-CM

## 2018-10-25 DIAGNOSIS — I25.10 ATHEROSCLEROSIS OF NATIVE CORONARY ARTERY OF NATIVE HEART WITHOUT ANGINA PECTORIS: ICD-10-CM

## 2018-10-25 PROCEDURE — 99214 OFFICE O/P EST MOD 30 MIN: CPT | Performed by: INTERNAL MEDICINE

## 2018-10-25 RX ORDER — ATORVASTATIN CALCIUM 20 MG
TABLET ORAL
Qty: 90 TAB | Refills: 1 | Status: SHIPPED | OUTPATIENT
Start: 2018-10-25 | End: 2019-02-27

## 2018-10-25 RX ORDER — BIOTIN 1 MG
TABLET ORAL
COMMUNITY
End: 2020-06-23

## 2018-10-25 RX ORDER — FOLIC ACID 1 MG/1
1 TABLET ORAL DAILY
COMMUNITY
End: 2020-06-23

## 2018-10-25 RX ORDER — THIAMINE MONONITRATE (VIT B1) 100 MG
100 TABLET ORAL DAILY
COMMUNITY
End: 2020-06-23

## 2018-10-25 RX ORDER — SULFAMETHOXAZOLE AND TRIMETHOPRIM 800; 160 MG/1; MG/1
1 TABLET ORAL 2 TIMES DAILY
Qty: 10 TAB | Refills: 0 | Status: SHIPPED | OUTPATIENT
Start: 2018-10-25 | End: 2018-12-06

## 2018-10-25 RX ORDER — SULFAMETHOXAZOLE AND TRIMETHOPRIM 800; 160 MG/1; MG/1
1 TABLET ORAL 2 TIMES DAILY
Qty: 10 TAB | Refills: 0 | Status: SHIPPED | OUTPATIENT
Start: 2018-10-25 | End: 2018-10-25

## 2018-10-25 NOTE — LETTER
"      Name:          Andre Martinez   YOB: 1940  Date:     10/25/2018      Azar Cavazos M.D.  [address]  [place] [facility] [address]     Jose Holman MD  [address]  [place] [address]  Phone: [phone]  Back Line: [phone]  Fax: [phone]  E-mail: [email or web address]   Dear Dr. Cavazos,    We had the pleasure of seeing your patient, Andre Martinez, in Cardiology Clinic at [facility] today.    As you know, he is a 78-year-old man with a history of presumed coronary atherosclerosis related to heavily calcified coronary arteries in the past though with negative serial stress echocardiograms, and asymptomatic short runs of SVT and wide-complex tachycardia.    I discussed with him today the growing body of literature around an anatomy based strategy to evaluate for obstructive atherosclerosis recommending a repeat coronary calcium scan.  I discussed with him that for sure his calcium score will be elevated though he has had serial previous stress echocardiograms that did not demonstrate obstructive coronary disease.    He denies any palpitations today associated with his short runs of supraventricular tachycardia and describes what he calls \"pauses\" that I rather suspect could be some short-lived neurological event though I do not think they represent arrhythmias at all.    His lipids are generally well controlled with sub-fractionation not really revealing.  The stringently with which we control his cholesterol again to me would depend on the results of his CT coronary calcium scan.    Return in about 3 months (around 1/25/2019).    Thank you for the referral and please do not hesitate to contact me at any time. My contact information is listed above.    This note was dictated using Dragon speech recognition software.     A full note including my physical examination and a full list of rectified medications is available in our medical record, and can be faxed as " well.    Jose Holman MD  Cardiologist  [facility]

## 2018-10-26 LAB
BACTERIA UR CULT: NORMAL
SIGNIFICANT IND 70042: NORMAL
SITE SITE: NORMAL
SOURCE SOURCE: NORMAL

## 2018-10-26 NOTE — PROGRESS NOTES
"Chief Complaint   Patient presents with   • Coronary Artery Disease     Previous patient       Subjective:   Andre Martinez is a 78 -year-old man with a history of presumed coronary atherosclerosis related to heavily calcified coronary arteries in the past though with negative serial stress echocardiograms, and asymptomatic short runs of SVT and wide-complex tachycardia.    He has no real cardiovascular complaints that he, but tells me about what he describes as \"pauses.\"  He goes on with a fairly vague description to detail episodes where he feels his body stop.  These are for perhaps seconds at a time and occur with hiking, walking, and driving.  He tells me about one episode when he was the  and his wife was a passenger.  He did not swerve off the road or have any other driving issues after what he describes as a pause.    He does tell me about a coronary calcium scan that he had perhaps 15-16 years ago that he feels had an Agatston score around 400 though he is unclear about the exact details of that.    He continues to perform at least moderately vigorous physical activity with no new dyspnea nor chest discomfort.    Past Medical History:   Diagnosis Date   • BPH (benign prostatic hyperplasia)    • Colon polyps    • Coronary atherosclerosis of native coronary artery 5/15/2013   • HLD (hyperlipidemia) 5/15/2013   • Kidney cysts    • Meniscus tear    • Pancreas cyst    • Pure hypercholesterolemia 2016   • Spinal stenosis, lumbar    • SVT (supraventricular tachycardia) (HCC) 10/23/2018   • Thrombocytopenia (HCC) 2017     Past Surgical History:   Procedure Laterality Date   • ROTATOR CUFF REPAIR     • HEMORRHOIDECTOMY     • KNEE RECONSTRUCTION      left, partial    • SINUSOTOMIES      Dr. Guerrier, total of 4 surgeries     Family History   Problem Relation Age of Onset   • Heart Disease Father          at age 38   • Psychiatry Mother         Alzheimers   • Hypertension Brother    • Stroke " Brother    • Seizures Brother      Social History     Social History   • Marital status:      Spouse name: N/A   • Number of children: N/A   • Years of education: N/A     Occupational History   • Not on file.     Social History Main Topics   • Smoking status: Never Smoker   • Smokeless tobacco: Never Used   • Alcohol use Yes      Comment: Social    • Drug use: No   • Sexual activity: Not on file     Other Topics Concern   • Not on file     Social History Narrative   • No narrative on file     Allergies   Allergen Reactions   • Avelox [Moxifloxacin Hcl In Nacl] Rash     rash   • Cefdinir Rash     Rash chest, under arm and scalp   • Oxycodone    • Scopolamine      Outpatient Encounter Prescriptions as of 10/25/2018   Medication Sig Dispense Refill   • LIPITOR 20 MG Tab TAKE 1 TABLET BY MOUTH AT BEDTIME 90 Tab 1   • folic acid (FOLVITE) 1 MG Tab Take 1 mg by mouth every day.     • Riboflavin 100 MG Cap Take  by mouth.     • CHONDROITIN SULFATE A PO Take 625 mg by mouth every day.     • VITAMIN B COMPLEX-C PO Take  by mouth.     • Multiple Vitamins-Minerals (CENTRUM SILVER PO) Take  by mouth.     • thiamine (THIAMINE) 100 MG Tab Take 100 mg by mouth every day.     • Biotin 1000 MCG Tab Take  by mouth.     • Mirabegron ER 50 MG TABLET SR 24 HR Take 50 mg by mouth every day. 90 Tab 3   • finasteride (PROSCAR) 5 MG Tab TAKE 1 TABLET BY MOUTH EVERY DAY 90 Tab 3   • Guaifenesin 400 MG Tab Take  by mouth.     • tamsulosin (FLOMAX) 0.4 MG capsule TAKE 1 CAP BY MOUTH ONE-HALF HOUR AFTER BREAKFAST. 90 Cap 3   • fluticasone (FLONASE) 50 MCG/ACT nasal spray Spray 1 Spray in nose every day. 16 g 3   • celecoxib (CELEBREX) 200 MG Cap Take 1 Cap by mouth every day. 30 Cap 1   • vitamin D (CHOLECALCIFEROL) 1000 UNIT TABS Take 2 Tabs by mouth every day. 30 Tab 6   • aspirin EC (ECOTRIN) 81 MG TBEC Take 81 mg by mouth every day.     • Multiple Vitamin CAPS Take 1 Cap by mouth every day.     • Omega-3 Fatty Acids (SALMON OIL PO)  "Take 2 Caps by mouth every day.     • Cyanocobalamin (VITAMIN B 12 PO) Take 1 Tab by mouth every day.     • coenzyme Q-10 30 MG capsule Take 60 mg by mouth every day.     • GLUCOSA-CHONDR-NA CHONDR-MSM PO Take 1 Tab by mouth every day.     • [DISCONTINUED] sulfamethoxazole-trimethoprim (BACTRIM DS) 800-160 MG tablet Take 1 Tab by mouth 2 times a day. (Patient not taking: Reported on 10/25/2018) 10 Tab 0   • [DISCONTINUED] Omega-3 Fatty Acids (SALMON OIL-1000 PO) Take  by mouth.     • [DISCONTINUED] doxycycline (VIBRAMYCIN) 100 MG Tab Take 1 Tab by mouth 2 times a day. 20 Tab 0   • [DISCONTINUED] azithromycin (ZITHROMAX) 250 MG Tab Take 2 tabs on day one and 1 tabs on days 2-5 6 Tab 0   • [DISCONTINUED] metroNIDAZOLE (FLAGYL) 500 MG Tab Take 1 Tab by mouth 3 times a day. Indication: Traveler's diarrhea 30 Tab 0   • [DISCONTINUED] tobramycin (TOBREX) 0.3 % Solution ophthalmic solution Place 1 Drop in both eyes every 4 hours. (Patient not taking: Reported on 10/25/2018) 1 Bottle 1   • [DISCONTINUED] Calcium Carb-Cholecalciferol (CALCIUM 600 + D PO) Take  by mouth.     • [DISCONTINUED] atorvastatin (LIPITOR) 20 MG Tab Take 1 Tab by mouth every bedtime. 90 Tab 3     No facility-administered encounter medications on file as of 10/25/2018.      Review of Systems   Genitourinary: Positive for frequency and urgency.   All other systems reviewed and are negative.       Objective:   /80 (BP Location: Left arm, Patient Position: Sitting, BP Cuff Size: Adult)   Pulse 60   Ht 1.778 m (5' 10\")   Wt 73.5 kg (162 lb)   SpO2 97%   BMI 23.24 kg/m²     Physical Exam   Constitutional: He is oriented to person, place, and time. He appears well-developed and well-nourished. No distress.   Pleasant, thin appearing, elderly man in no distress   Eyes: Pupils are equal, round, and reactive to light. EOM are normal.   Neck: No JVD present.   Cardiovascular: Normal rate and regular rhythm.  Exam reveals no gallop and no friction rub. "    No murmur heard.  No carotid bruits   Pulmonary/Chest: Effort normal and breath sounds normal. No respiratory distress. He has no wheezes. He has no rales.   Abdominal: Soft. Bowel sounds are normal. He exhibits no distension.   Musculoskeletal: He exhibits no edema (No significant lower extremity edema bilaterally).   Neurological: He is alert and oriented to person, place, and time.   Skin: Skin is warm and dry. No rash noted. He is not diaphoretic. No erythema. No pallor.   Psychiatric: He has a normal mood and affect. Judgment and thought content normal.   Vitals reviewed.    Lab Results   Component Value Date/Time    WBC 8.3 10/19/2018 08:30 AM    RBC 4.42 (L) 10/19/2018 08:30 AM    HEMOGLOBIN 14.7 10/19/2018 08:30 AM    HEMATOCRIT 44.2 10/19/2018 08:30 AM    .0 (H) 10/19/2018 08:30 AM    MCH 33.3 (H) 10/19/2018 08:30 AM    MCHC 33.3 (L) 10/19/2018 08:30 AM    MPV 13.2 (H) 10/19/2018 08:30 AM        Lab Results   Component Value Date/Time    SODIUM 140 10/19/2018 08:30 AM    POTASSIUM 4.8 10/19/2018 08:30 AM    CHLORIDE 104 10/19/2018 08:30 AM    CO2 31 10/19/2018 08:30 AM    GLUCOSE 85 10/19/2018 08:30 AM    BUN 25 (H) 10/19/2018 08:30 AM    CREATININE 1.11 10/19/2018 08:30 AM        Lab Results   Component Value Date/Time    ASTSGOT 35 10/19/2018 08:30 AM    ALTSGPT 29 10/19/2018 08:30 AM        Lab Results   Component Value Date/Time    CHOLSTRLTOT 155 10/19/2018 08:30 AM    CHOLSTRLTOT 149 12/11/2017 08:00 AM    LDL 67 12/11/2017 08:00 AM    HDL 88 (H) 10/19/2018 08:30 AM    HDL 71 12/11/2017 08:00 AM    TRIGLYCERIDE 40 10/19/2018 08:30 AM    TRIGLYCERIDE 53 12/11/2017 08:00 AM         Results for orders placed or performed in visit on 12/14/17   EKG   Result Value Ref Range    Report       [facility] [place] [facility]    Test Date:  2017-12-14  Pt Name:    LONNY LE                  Department: [facility]  MRN:        [id number]                      Room:  Gender:     M                             Technician: JV  :        [id number]                   Requested By:LUIS ENRIQUE LEE  Order #:    [id number]                    Reading MD: Bismark Paulino MD    Measurements  Intervals                                Axis  Rate:       60                           P:          42  TX:         156                          QRS:        59  QRSD:       76                           T:          7  QT:         460  QTc:        460    Interpretive Statements  SINUS BRADYCARDIA  CONSIDER ANTEROSEPTAL INFARCT  Compared to ECG 2016 14:55:35  Myocardial infarct finding now present  Sinus rhythm no longer present  Left-axis deviation no longer present    Electronically Signed On 2017 19:25:44 PST by Bismark Paulino MD     Results for orders placed or performed in visit on 16   [facility] EKG (Clinic Performed)   Result Value Ref Range    Report       [facility]    Test Date:  2016  Pt Name:    LONNY LE                  Department: [facility]  MRN:        [id number]                      Room:  Gender:     M                            Technician: VLP  :        [id number]                   Requested By:YORDAN CALDERON  Order #:    [id number]                    Reading MD: Yordan Calderon MD    Measurements  Intervals                                Axis  Rate:       70                           P:          68  TX:         152                          QRS:        -32  QRSD:       76                           T:          5  QT:         408  QTc:        441    Interpretive Statements  SINUS RHYTHM  LEFT AXIS DEVIATION  LATE PRECORDIAL R/S TRANSITION  No previous ECG available for comparison    Electronically Signed On 2016 15:07:18 PDT by Yordan Calderon MD       Exercise stress echocardiogram, 10/24/2017: Patient did 11 minutes 59 seconds corresponding with 12.8 metabolic equivalents on the Kael protocol achieving 92% of his age-predicted maximum heart rate with normal systolic  "augmentation regionally, negative for ischemia.  He also had a stress echocardiogram in 2013 that also was negative for ischemia.    Assessment:     1. Dyslipidemia  CT-CARDIAC SCORING   2. Atherosclerosis of native coronary artery of native heart without angina pectoris     3. SVT (supraventricular tachycardia) ([facility])         Medical Decision Making:  Today's Assessment / Status / Plan:     I discussed with him today the growing body of literature around an anatomy based strategy to evaluate for obstructive atherosclerosis recommending a repeat coronary calcium scan.  I discussed with him that for sure his calcium score will be elevated though he has had serial previous stress echocardiograms that did not demonstrate obstructive coronary disease.    He denies any palpitations today associated with his short runs of supraventricular tachycardia and describes what he calls \"pauses\" that I rather suspect could be some short-lived neurological event though I do not think they represent arrhythmias at all.    His lipids are generally well controlled with sub-fractionation not really revealing.  The stringently with which we control his cholesterol again to me would depend on the results of his CT coronary calcium scan.    Jose Holman MD  Cardiologist, [facility] [place]     Return in about 3 months (around 1/25/2019).    "

## 2018-10-31 ENCOUNTER — HOSPITAL ENCOUNTER (OUTPATIENT)
Dept: RADIOLOGY | Facility: MEDICAL CENTER | Age: 78
End: 2018-10-31
Attending: INTERNAL MEDICINE
Payer: MEDICARE

## 2018-10-31 DIAGNOSIS — M85.852 OSTEOPENIA OF NECK OF LEFT FEMUR: ICD-10-CM

## 2018-10-31 DIAGNOSIS — M89.9 DISEASE OF BONE AND JOINT: ICD-10-CM

## 2018-10-31 DIAGNOSIS — M25.9 DISEASE OF BONE AND JOINT: ICD-10-CM

## 2018-10-31 PROCEDURE — 77080 DXA BONE DENSITY AXIAL: CPT

## 2018-11-16 ENCOUNTER — HOSPITAL ENCOUNTER (OUTPATIENT)
Dept: RADIOLOGY | Facility: MEDICAL CENTER | Age: 78
End: 2018-11-16
Attending: INTERNAL MEDICINE
Payer: COMMERCIAL

## 2018-11-16 DIAGNOSIS — E78.5 DYSLIPIDEMIA: ICD-10-CM

## 2018-11-16 PROCEDURE — 4410556 CT-CARDIAC SCORING

## 2018-11-19 ENCOUNTER — TELEPHONE (OUTPATIENT)
Dept: CARDIOLOGY | Facility: MEDICAL CENTER | Age: 78
End: 2018-11-19

## 2018-11-19 DIAGNOSIS — I25.10 ATHEROSCLEROSIS OF NATIVE CORONARY ARTERY OF NATIVE HEART WITHOUT ANGINA PECTORIS: ICD-10-CM

## 2018-11-19 DIAGNOSIS — R93.1 HIGH CORONARY ARTERY CALCIUM SCORE: ICD-10-CM

## 2018-11-19 NOTE — TELEPHONE ENCOUNTER
Andre Holman M.D. 2 days ago         Dr Holman,     In view of the results of my CT test, should my appointment be moved up from January 21, 2019?     Andre        To Dr. Holman: Calcium score:  3091.5    Additional testing/ recommendations?     --------------------------------------------------------------------  11/20: Dr. Holman would like pt to proceed with a cardiac PET scan     - cardiac PET ordered    Called pt to discuss and no answer. Left a voicemail that a [employer] message would be sent with MD's recommendations.

## 2018-11-26 DIAGNOSIS — N40.1 BENIGN NON-NODULAR PROSTATIC HYPERPLASIA WITH LOWER URINARY TRACT SYMPTOMS: ICD-10-CM

## 2018-11-26 RX ORDER — FINASTERIDE 5 MG/1
TABLET, FILM COATED ORAL
Qty: 90 TAB | Refills: 3 | Status: SHIPPED | OUTPATIENT
Start: 2018-11-26 | End: 2018-12-06

## 2018-11-27 ENCOUNTER — TELEPHONE (OUTPATIENT)
Dept: CARDIOLOGY | Facility: MEDICAL CENTER | Age: 78
End: 2018-11-27

## 2018-11-27 NOTE — TELEPHONE ENCOUNTER
Andre Holman M.D. 1 hour ago (2:30 PM)         Dr. Holman,   I searched my old test records, which you may also have in your files, and found two CT Tests that were done in 2000 and 2003. I will leave them at your office today. They appear to be consistent with the results of the current CT scan in rating me as at serious risk.   What is your opinion of my obtaining a Stress Echo  test before our January 21, 2019 appointment to make our visit more fruitful. If the results of  the Stress Echo dictate further testing, then I would agree to have a Petscan.   If I had agreed to do a Petscan many years ago as was then suggested, it would have undoubtedly been repeated many times over with a a cumulative high radiation dosage.   Thank you.   Andre Holman M.D. 1 hour ago (2:30 PM)         Dr. Holman,   I searched my old test records, which you may also have in your files, and found two CT Tests that were done in 2000 and 2003. I will leave them at your office today. They appear to be consistent with the results of the current CT scan in rating me as at serious risk.   What is your opinion of my obtaining a Stress Echo  test before our January 21, 2019 appointment to make our visit more fruitful. If the results of  the Stress Echo dictate further testing, then I would agree to have a Petscan.   If I had agreed to do a Petscan many years ago as was then suggested, it would have undoubtedly been repeated many times over with a a cumulative high radiation dosage.   Thank you.   Andre             Discussed w/ Dr Holman, per Dr Holman, please schedule a sooner appt w/ him, ok to overbook.     [employer] message sent to pt

## 2018-12-06 ENCOUNTER — OFFICE VISIT (OUTPATIENT)
Dept: CARDIOLOGY | Facility: MEDICAL CENTER | Age: 78
End: 2018-12-06
Payer: MEDICARE

## 2018-12-06 VITALS
DIASTOLIC BLOOD PRESSURE: 80 MMHG | HEIGHT: 70 IN | BODY MASS INDEX: 23.47 KG/M2 | HEART RATE: 62 BPM | SYSTOLIC BLOOD PRESSURE: 140 MMHG | WEIGHT: 163.91 LBS | OXYGEN SATURATION: 97 %

## 2018-12-06 DIAGNOSIS — R93.1 AGATSTON CAC SCORE, >400: Primary | ICD-10-CM

## 2018-12-06 DIAGNOSIS — E78.5 DYSLIPIDEMIA: ICD-10-CM

## 2018-12-06 DIAGNOSIS — I25.10 ATHEROSCLEROSIS OF NATIVE CORONARY ARTERY OF NATIVE HEART WITHOUT ANGINA PECTORIS: ICD-10-CM

## 2018-12-06 PROCEDURE — 99214 OFFICE O/P EST MOD 30 MIN: CPT | Performed by: INTERNAL MEDICINE

## 2018-12-06 ASSESSMENT — ENCOUNTER SYMPTOMS: CARDIOVASCULAR NEGATIVE: 1

## 2018-12-06 NOTE — PROGRESS NOTES
Chief Complaint   Patient presents with   • Results     Follow up       Subjective:   Andre Martinez is a 78 -year-old man with a history of presumed coronary atherosclerosis related to heavily calcified coronary arteries in the past though with negative serial stress echocardiograms, and asymptomatic short runs of SVT and wide-complex tachycardia.    He is doing well today, and has really no cardiovascular complaints whatsoever including no chest discomfort with exercising still on the treadmill in his gym achieving heart rates around 130 235 bpm during such sessions.  He has no new dyspnea either.  He has no cardiovascular complaints nor side effects with his medical regimen.    He comes in to review the results of his fairly concerning coronary calcium scan as below.  I ordered a PET myocardial perfusion imaging and he naturally and reasonably wish to discuss that testing and also further testing.    Past Medical History:   Diagnosis Date   • BPH (benign prostatic hyperplasia)    • Colon polyps    • Coronary atherosclerosis of native coronary artery 5/15/2013   • HLD (hyperlipidemia) 5/15/2013   • Kidney cysts    • Meniscus tear    • Pancreas cyst    • Pure hypercholesterolemia 2016   • Spinal stenosis, lumbar    • SVT (supraventricular tachycardia) ([facility]) 10/23/2018   • Thrombocytopenia ([facility]) 2017     Past Surgical History:   Procedure Laterality Date   • ROTATOR CUFF REPAIR     • HEMORRHOIDECTOMY     • KNEE RECONSTRUCTION      left, partial    • SINUSOTOMIES      Dr. Guerrier, total of 4 surgeries     Family History   Problem Relation Age of Onset   • Heart Disease Father          at age 38   • Psychiatry Mother         Alzheimers   • Hypertension Brother    • Stroke Brother    • Seizures Brother      Social History     Social History   • Marital status:      Spouse name: N/A   • Number of children: N/A   • Years of education: N/A     Occupational History   • Not on file.     Social  History Main Topics   • Smoking status: Never Smoker   • Smokeless tobacco: Never Used   • Alcohol use Yes      Comment: Social    • Drug use: No   • Sexual activity: Not on file     Other Topics Concern   • Not on file     Social History Narrative   • No narrative on file     Allergies   Allergen Reactions   • Avelox [Moxifloxacin Hcl In Nacl] Rash     rash   • Cefdinir Rash     Rash chest, under arm and scalp   • Oxycodone    • Scopolamine      Outpatient Encounter Prescriptions as of 12/6/2018   Medication Sig Dispense Refill   • LIPITOR 20 MG Tab TAKE 1 TABLET BY MOUTH AT BEDTIME 90 Tab 1   • folic acid (FOLVITE) 1 MG Tab Take 1 mg by mouth every day.     • Riboflavin 100 MG Cap Take  by mouth.     • CHONDROITIN SULFATE A PO Take 625 mg by mouth every day.     • VITAMIN B COMPLEX-C PO Take  by mouth.     • Multiple Vitamins-Minerals (CENTRUM SILVER PO) Take  by mouth.     • thiamine (THIAMINE) 100 MG Tab Take 100 mg by mouth every day.     • Biotin 1000 MCG Tab Take  by mouth.     • Mirabegron ER 50 MG TABLET SR 24 HR Take 50 mg by mouth every day. 90 Tab 3   • Guaifenesin 400 MG Tab Take  by mouth.     • fluticasone (FLONASE) 50 MCG/ACT nasal spray Spray 1 Spray in nose every day. 16 g 3   • vitamin D (CHOLECALCIFEROL) 1000 UNIT TABS Take 2 Tabs by mouth every day. 30 Tab 6   • aspirin EC (ECOTRIN) 81 MG TBEC Take 81 mg by mouth every day.     • Multiple Vitamin CAPS Take 1 Cap by mouth every day.     • Omega-3 Fatty Acids (SALMON OIL PO) Take 2 Caps by mouth every day.     • Cyanocobalamin (VITAMIN B 12 PO) Take 1 Tab by mouth every day.     • coenzyme Q-10 30 MG capsule Take 60 mg by mouth every day.     • GLUCOSA-CHONDR-NA CHONDR-MSM PO Take 1 Tab by mouth every day.     • [DISCONTINUED] finasteride (PROSCAR) 5 MG Tab TAKE 1 TABLET BY MOUTH EVERY DAY (Patient not taking: Reported on 12/6/2018) 90 Tab 3   • [DISCONTINUED] sulfamethoxazole-trimethoprim (BACTRIM DS) 800-160 MG tablet Take 1 Tab by mouth 2 times  "a day. (Patient not taking: Reported on 12/6/2018) 10 Tab 0   • [DISCONTINUED] tamsulosin (FLOMAX) 0.4 MG capsule TAKE 1 CAP BY MOUTH ONE-HALF HOUR AFTER BREAKFAST. (Patient not taking: Reported on 12/6/2018) 90 Cap 3   • celecoxib (CELEBREX) 200 MG Cap Take 1 Cap by mouth every day. 30 Cap 1     No facility-administered encounter medications on file as of 12/6/2018.      Review of Systems   Cardiovascular: Negative.    Genitourinary: Positive for frequency and urgency.   All other systems reviewed and are negative.       Objective:   /80 (BP Location: Left arm, Patient Position: Sitting, BP Cuff Size: Adult)   Pulse 62   Ht 1.778 m (5' 10\")   Wt 74.3 kg (163 lb 14.6 oz)   SpO2 97%   BMI 23.52 kg/m²     Physical Exam   Constitutional: He is oriented to person, place, and time. He appears well-developed and well-nourished. No distress.   Pleasant, thin appearing, elderly man in no distress.  Physical examination is unchanged except as specified from a previous on 10/25/2018.   Eyes: Pupils are equal, round, and reactive to light. EOM are normal.   Neck: No JVD present.   Cardiovascular: Normal rate and regular rhythm.  Exam reveals no gallop and no friction rub.    No murmur heard.  No carotid bruits   Pulmonary/Chest: Effort normal and breath sounds normal. No respiratory distress. He has no wheezes. He has no rales.   Abdominal: Soft. Bowel sounds are normal. He exhibits no distension.   Musculoskeletal: He exhibits no edema (No significant lower extremity edema bilaterally).   Neurological: He is alert and oriented to person, place, and time.   Skin: Skin is warm and dry. No rash noted. He is not diaphoretic. No erythema. No pallor.   Psychiatric: He has a normal mood and affect. Judgment and thought content normal.   Vitals reviewed.    Lab Results   Component Value Date/Time    WBC 8.3 10/19/2018 08:30 AM    RBC 4.42 (L) 10/19/2018 08:30 AM    HEMOGLOBIN 14.7 10/19/2018 08:30 AM    HEMATOCRIT 44.2 " 10/19/2018 08:30 AM    .0 (H) 10/19/2018 08:30 AM    MCH 33.3 (H) 10/19/2018 08:30 AM    MCHC 33.3 (L) 10/19/2018 08:30 AM    MPV 13.2 (H) 10/19/2018 08:30 AM        Lab Results   Component Value Date/Time    SODIUM 140 10/19/2018 08:30 AM    POTASSIUM 4.8 10/19/2018 08:30 AM    CHLORIDE 104 10/19/2018 08:30 AM    CO2 31 10/19/2018 08:30 AM    GLUCOSE 85 10/19/2018 08:30 AM    BUN 25 (H) 10/19/2018 08:30 AM    CREATININE 1.11 10/19/2018 08:30 AM        Lab Results   Component Value Date/Time    ASTSGOT 35 10/19/2018 08:30 AM    ALTSGPT 29 10/19/2018 08:30 AM        Lab Results   Component Value Date/Time    CHOLSTRLTOT 155 10/19/2018 08:30 AM    CHOLSTRLTOT 149 2017 08:00 AM    LDL 67 2017 08:00 AM    HDL 88 (H) 10/19/2018 08:30 AM    HDL 71 2017 08:00 AM    TRIGLYCERIDE 40 10/19/2018 08:30 AM    TRIGLYCERIDE 53 2017 08:00 AM         Results for orders placed or performed in visit on 17   EKG   Result Value Ref Range    Report       [facility]    Test Date:  2017  Pt Name:    LONNY LE                  Department: [facility]  MRN:        [id number]                      Room:  Gender:                                 Technician: DURGA  :        1940                   Requested By:LUIS ENRIQUE LEE  Order #:    [id number]                    Reading MD: Bismark Paulino MD    Measurements  Intervals                                Axis  Rate:       60                           P:          42  KS:         156                          QRS:        59  QRSD:       76                           T:          7  QT:         460  QTc:        460    Interpretive Statements  SINUS BRADYCARDIA  CONSIDER ANTEROSEPTAL INFARCT  Compared to ECG 2016 14:55:35  Myocardial infarct finding now present  Sinus rhythm no longer present  Left-axis deviation no longer present    Electronically Signed On 2017 19:25:44 PST by Bismark Paulino MD     Results for orders placed or  "performed in visit on 16   [facility] EKG (Clinic Performed)   Result Value Ref Range    Report       [facility]    Test Date:  2016  Pt Name:    LONNY LE                  Department: [facility]  MRN:        [id number]                      Room:  Gender:     M                            Technician: ALEJANDRO  :        1940                   Requested By:YORDAN CALDERON  Order #:    [id number]                    Reading MD: Yordan Calderon MD    Measurements  Intervals                                Axis  Rate:       70                           P:          68  VT:         152                          QRS:        -32  QRSD:       76                           T:          5  QT:         408  QTc:        441    Interpretive Statements  SINUS RHYTHM  LEFT AXIS DEVIATION  LATE PRECORDIAL R/S TRANSITION  No previous ECG available for comparison    Electronically Signed On 2016 15:07:18 PDT by Yordan Calderon MD       Exercise stress echocardiogram, 10/24/2017: Patient did 11 minutes 59 seconds corresponding with 12.8 metabolic equivalents on the Kael protocol achieving 92% of his age-predicted maximum heart rate with normal systolic augmentation regionally, negative for ischemia.  He also had a stress echocardiogram in  that also was negative for ischemia.    CT coronary calcium scan, 2018:  \"Coronary calcification:  LMA - 0.0  LCX - 165.1  LAD - 833.2  RCA - 2093.2  PDA - 0.0  Calcium score:  3091.5\"    Assessment:     1. Agatston CAC score, >400  LIPID PANEL   2. Atherosclerosis of native coronary artery of native heart without angina pectoris     3. Dyslipidemia         Medical Decision Making:  Today's Assessment / Status / Plan:     He has no cardiovascular complaints whatsoever today, and still good control of his blood pressure and what seemed to have been reasonable control of his cholesterol.  He comes in to review the results of his fairly concerning coronary " calcium scan with a total calcium score of 3092 mostly in the RCA distribution but with still quite a bit of disease in LAD distribution.  I had recommended PET myocardial perfusion imaging and he was concerned about radiation and had other questions about the nature of this finding.  He did also provide to me some previous coronary calcium scores from around 2003 documenting about 600 or so on his score at that time.  He accepted at this time my recommendation for PET myocardial perfusion imaging.  I discussed with him that should he have a moderate or larger anterior defect I would definitely recommend angiography.  I discussed the risk, benefits, and alternatives to cardiac catheterization.  He accepted those should he need that test.  For now, I have not otherwise changed his cardiovascular regimen though I did order repeat lipid panel to be drawn prior to our next follow-up in January.    Jose Holman MD  Cardiologist, [facility] [place]     Return in 7 weeks (on 1/21/2019).

## 2018-12-06 NOTE — LETTER
Name:          Andre Martinez   YOB: 1940  Date:     12/06/2018      Azar Cavazos M.D.  [address]  [facility] [address]     Jose Holman MD  [address]  [place] [address]  Phone: [phone]  Back Line: [phone]  Fax: [phone]  E-mail: [email or web address]   Dear Dr. Cavazos,    We had the pleasure of seeing your patient, Andre Martinez, in Cardiology Clinic at [facility] today.    As you know, he is a 78-year-old man with a history of presumed coronary atherosclerosis related to heavily calcified coronary arteries in the past though with negative serial stress echocardiograms, and asymptomatic short runs of SVT and wide-complex tachycardia.    He has no cardiovascular complaints whatsoever today, and still good control of his blood pressure and what seemed to have been reasonable control of his cholesterol.  He comes in to review the results of his fairly concerning coronary calcium scan with a total calcium score of 3092 mostly in the RCA distribution but with still quite a bit of disease in LAD distribution.  I had recommended PET myocardial perfusion imaging and he was concerned about radiation and had other questions about the nature of this finding.  He did also provide to me some previous coronary calcium scores from around 2003 documenting about 600 or so on his score at that time.  He accepted at this time my recommendation for PET myocardial perfusion imaging.  I discussed with him that should he have a moderate or larger anterior defect I would definitely recommend angiography.  I discussed the risk, benefits, and alternatives to cardiac catheterization.  He accepted those should he need that test.  For now, I have not otherwise changed his cardiovascular regimen though I did order repeat lipid panel to be drawn prior to our next follow-up in January.    Return in 7 weeks (on 1/21/2019).    Thank you for the referral and please do not  hesitate to contact me at any time. My contact information is listed above.    This note was dictated using Dragon speech recognition software.     A full note including my physical examination and a full list of rectified medications is available in our medical record, and can be faxed as well.    Jose Holman MD  Cardiologist  [facility]

## 2018-12-28 ENCOUNTER — HOSPITAL ENCOUNTER (OUTPATIENT)
Dept: RADIOLOGY | Facility: MEDICAL CENTER | Age: 78
End: 2018-12-28
Attending: INTERNAL MEDICINE
Payer: MEDICARE

## 2018-12-28 ENCOUNTER — TELEPHONE (OUTPATIENT)
Dept: CARDIOLOGY | Facility: MEDICAL CENTER | Age: 78
End: 2018-12-28

## 2018-12-28 ENCOUNTER — HOSPITAL ENCOUNTER (EMERGENCY)
Facility: MEDICAL CENTER | Age: 78
End: 2018-12-28
Attending: EMERGENCY MEDICINE
Payer: MEDICARE

## 2018-12-28 ENCOUNTER — APPOINTMENT (OUTPATIENT)
Dept: RADIOLOGY | Facility: MEDICAL CENTER | Age: 78
End: 2018-12-28
Attending: EMERGENCY MEDICINE
Payer: MEDICARE

## 2018-12-28 VITALS
TEMPERATURE: 97.4 F | HEART RATE: 75 BPM | RESPIRATION RATE: 16 BRPM | SYSTOLIC BLOOD PRESSURE: 131 MMHG | WEIGHT: 165.34 LBS | BODY MASS INDEX: 23.67 KG/M2 | HEIGHT: 70 IN | OXYGEN SATURATION: 97 % | DIASTOLIC BLOOD PRESSURE: 96 MMHG

## 2018-12-28 DIAGNOSIS — I25.10 ATHEROSCLEROSIS OF NATIVE CORONARY ARTERY OF NATIVE HEART WITHOUT ANGINA PECTORIS: ICD-10-CM

## 2018-12-28 DIAGNOSIS — R93.1 HIGH CORONARY ARTERY CALCIUM SCORE: ICD-10-CM

## 2018-12-28 DIAGNOSIS — I25.118 CORONARY ARTERY DISEASE OF NATIVE ARTERY OF NATIVE HEART WITH STABLE ANGINA PECTORIS (HCC): ICD-10-CM

## 2018-12-28 DIAGNOSIS — I20.89 ANGINAL EQUIVALENT: ICD-10-CM

## 2018-12-28 LAB
ALBUMIN SERPL BCP-MCNC: 4.3 G/DL (ref 3.2–4.9)
ALBUMIN/GLOB SERPL: 1.5 G/DL
ALP SERPL-CCNC: 89 U/L (ref 30–99)
ALT SERPL-CCNC: 36 U/L (ref 2–50)
ANION GAP SERPL CALC-SCNC: 6 MMOL/L (ref 0–11.9)
APTT PPP: 29.8 SEC (ref 24.7–36)
AST SERPL-CCNC: 42 U/L (ref 12–45)
BASOPHILS # BLD AUTO: 0.5 % (ref 0–1.8)
BASOPHILS # BLD: 0.05 K/UL (ref 0–0.12)
BILIRUB SERPL-MCNC: 0.8 MG/DL (ref 0.1–1.5)
BNP SERPL-MCNC: 102 PG/ML (ref 0–100)
BUN SERPL-MCNC: 23 MG/DL (ref 8–22)
CALCIUM SERPL-MCNC: 10.1 MG/DL (ref 8.5–10.5)
CHLORIDE SERPL-SCNC: 103 MMOL/L (ref 96–112)
CO2 SERPL-SCNC: 29 MMOL/L (ref 20–33)
CREAT SERPL-MCNC: 1.16 MG/DL (ref 0.5–1.4)
EOSINOPHIL # BLD AUTO: 0.13 K/UL (ref 0–0.51)
EOSINOPHIL NFR BLD: 1.4 % (ref 0–6.9)
ERYTHROCYTE [DISTWIDTH] IN BLOOD BY AUTOMATED COUNT: 47.2 FL (ref 35.9–50)
GLOBULIN SER CALC-MCNC: 2.8 G/DL (ref 1.9–3.5)
GLUCOSE SERPL-MCNC: 101 MG/DL (ref 65–99)
HCT VFR BLD AUTO: 46.7 % (ref 42–52)
HGB BLD-MCNC: 15.9 G/DL (ref 14–18)
IMM GRANULOCYTES # BLD AUTO: 0.02 K/UL (ref 0–0.11)
IMM GRANULOCYTES NFR BLD AUTO: 0.2 % (ref 0–0.9)
INR PPP: 1.13 (ref 0.87–1.13)
LIPASE SERPL-CCNC: 31 U/L (ref 11–82)
LYMPHOCYTES # BLD AUTO: 3.25 K/UL (ref 1–4.8)
LYMPHOCYTES NFR BLD: 35 % (ref 22–41)
MCH RBC QN AUTO: 33.3 PG (ref 27–33)
MCHC RBC AUTO-ENTMCNC: 34 G/DL (ref 33.7–35.3)
MCV RBC AUTO: 97.7 FL (ref 81.4–97.8)
MONOCYTES # BLD AUTO: 0.63 K/UL (ref 0–0.85)
MONOCYTES NFR BLD AUTO: 6.8 % (ref 0–13.4)
NEUTROPHILS # BLD AUTO: 5.21 K/UL (ref 1.82–7.42)
NEUTROPHILS NFR BLD: 56.1 % (ref 44–72)
NRBC # BLD AUTO: 0 K/UL
NRBC BLD-RTO: 0 /100 WBC
PLATELET # BLD AUTO: 127 K/UL (ref 164–446)
PMV BLD AUTO: 12.2 FL (ref 9–12.9)
POTASSIUM SERPL-SCNC: 4.7 MMOL/L (ref 3.6–5.5)
PROT SERPL-MCNC: 7.1 G/DL (ref 6–8.2)
PROTHROMBIN TIME: 14.6 SEC (ref 12–14.6)
RBC # BLD AUTO: 4.78 M/UL (ref 4.7–6.1)
SODIUM SERPL-SCNC: 138 MMOL/L (ref 135–145)
TROPONIN I SERPL-MCNC: 0.07 NG/ML (ref 0–0.04)
WBC # BLD AUTO: 9.3 K/UL (ref 4.8–10.8)

## 2018-12-28 PROCEDURE — 93018 CV STRESS TEST I&R ONLY: CPT | Performed by: INTERNAL MEDICINE

## 2018-12-28 PROCEDURE — 78492 MYOCRD IMG PET MLT RST&STRS: CPT | Mod: 26 | Performed by: INTERNAL MEDICINE

## 2018-12-28 PROCEDURE — 83690 ASSAY OF LIPASE: CPT

## 2018-12-28 PROCEDURE — 84484 ASSAY OF TROPONIN QUANT: CPT

## 2018-12-28 PROCEDURE — 99284 EMERGENCY DEPT VISIT MOD MDM: CPT

## 2018-12-28 PROCEDURE — 80053 COMPREHEN METABOLIC PANEL: CPT

## 2018-12-28 PROCEDURE — 85025 COMPLETE CBC W/AUTO DIFF WBC: CPT

## 2018-12-28 PROCEDURE — 85610 PROTHROMBIN TIME: CPT

## 2018-12-28 PROCEDURE — 83880 ASSAY OF NATRIURETIC PEPTIDE: CPT

## 2018-12-28 PROCEDURE — 93005 ELECTROCARDIOGRAM TRACING: CPT | Performed by: EMERGENCY MEDICINE

## 2018-12-28 PROCEDURE — 700102 HCHG RX REV CODE 250 W/ 637 OVERRIDE(OP): Performed by: INTERNAL MEDICINE

## 2018-12-28 PROCEDURE — 93005 ELECTROCARDIOGRAM TRACING: CPT

## 2018-12-28 PROCEDURE — 36415 COLL VENOUS BLD VENIPUNCTURE: CPT

## 2018-12-28 PROCEDURE — 85730 THROMBOPLASTIN TIME PARTIAL: CPT

## 2018-12-28 PROCEDURE — A9270 NON-COVERED ITEM OR SERVICE: HCPCS | Performed by: INTERNAL MEDICINE

## 2018-12-28 PROCEDURE — 71045 X-RAY EXAM CHEST 1 VIEW: CPT

## 2018-12-28 RX ADMIN — ANHYDROUS CITRIC ACID, SODIUM BICARBONATE, AND ASPIRIN 325 MG: 1000; 1985; 500 GRANULE, EFFERVESCENT ORAL at 21:52

## 2018-12-28 RX ADMIN — METOPROLOL TARTRATE 12.5 MG: 25 TABLET, FILM COATED ORAL at 22:59

## 2018-12-28 ASSESSMENT — PAIN SCALES - GENERAL: PAINLEVEL_OUTOF10: 5

## 2018-12-29 LAB — EKG IMPRESSION: NORMAL

## 2018-12-29 NOTE — DISCHARGE INSTRUCTIONS
Do not exercise until approved by your cardiologist.  Return to the emergency department for any chest pain, chest pressure, palpitations, high heart rate.

## 2018-12-29 NOTE — ED PROVIDER NOTES
"CHIEF COMPLAINT  Chief Complaint   Patient presents with   • Headache     Pt had a pet scan this am, and developed a headache while exercising.  Pt states headache was severe, but is now a 4/10   • Tachycardia     Pt has a HR of 141, denies cp, but states a feeling of fullness in the chest and an inability to get a full breath.       HPI  Andre Martinez is a 78 y.o. male with Hx coronary artery disease and high cardiovascular risk who presents with complaints of chest discomfort.    He had a nuclear medicine cardiac PET scan this morning and then went and worked out on the treadmill.  During his workout, he sneezed and immediately his heart rate increased to the 170s and he felt \"wrong\".  He drove home but when he got there he still felt \"off\", and felt a fullness in his chest when he took a deep breath.  He called his cardiologist office but did not hear back right away so he came to the ED.    His symptoms of chest fullness and feeling \"wrong\" have subsided.  His heart rate is back down to normal range.      REVIEW OF SYSTEMS  Pertinent positives include: Tachycardia, chest fullness, headache.  Pertinent negatives include: Lightheadedness, dizziness, nausea, vomiting, diarrhea, constipation.   All other systems are negative.     PAST MEDICAL HISTORY  Past Medical History:   Diagnosis Date   • BPH (benign prostatic hyperplasia)    • Colon polyps    • Coronary atherosclerosis of native coronary artery 5/15/2013   • HLD (hyperlipidemia) 5/15/2013   • Kidney cysts    • Meniscus tear    • Pancreas cyst    • Pure hypercholesterolemia 2016   • Spinal stenosis, lumbar    • SVT (supraventricular tachycardia) ([facility]) 10/23/2018   • Thrombocytopenia ([facility]) 2017       FAMILY HISTORY  Family History   Problem Relation Age of Onset   • Heart Disease Father          at age 38   • Psychiatry Mother         Alzheimers   • Hypertension Brother    • Stroke Brother    • Seizures Brother        SOCIAL HISTORY  Social " "History   Substance Use Topics   • Smoking status: Never Smoker   • Smokeless tobacco: Never Used   • Alcohol use Yes      Comment: Social      History   Drug Use No       SURGICAL HISTORY  Past Surgical History:   Procedure Laterality Date   • ROTATOR CUFF REPAIR  2010   • HEMORRHOIDECTOMY     • KNEE RECONSTRUCTION      left, partial    • SINUSOTOMIES      Dr. Guerrier, total of 4 surgeries       CURRENT MEDICATIONS  Home Medications     Reviewed by Myrtle Morales M.D. (Resident) on 12/29/18 at 0156  Med List Status: <None>   Medication Last Dose Status   aspirin EC (ECOTRIN) 81 MG TBEC  Active   Biotin 1000 MCG Tab  Active   celecoxib (CELEBREX) 200 MG Cap  Active   CHONDROITIN SULFATE A PO  Active   coenzyme Q-10 30 MG capsule  Active   Cyanocobalamin (VITAMIN B 12 PO)  Active   fluticasone (FLONASE) 50 MCG/ACT nasal spray  Active   folic acid (FOLVITE) 1 MG Tab  Active   GLUCOSA-CHONDR-NA CHONDR-MSM PO  Active   Guaifenesin 400 MG Tab  Active   LIPITOR 20 MG Tab  Active   metoprolol (LOPRESSOR) 25 MG Tab  Active   Mirabegron ER 50 MG TABLET SR 24 HR  Active   Multiple Vitamin CAPS  Active   Multiple Vitamins-Minerals (CENTRUM SILVER PO)  Active   Omega-3 Fatty Acids (SALMON OIL PO)  Active   Riboflavin 100 MG Cap  Active   thiamine (THIAMINE) 100 MG Tab  Active   VITAMIN B COMPLEX-C PO  Active   vitamin D (CHOLECALCIFEROL) 1000 UNIT TABS  Active                ALLERGIES  Allergies   Allergen Reactions   • Avelox [Moxifloxacin Hcl In Nacl] Rash     rash   • Cefdinir Rash     Rash chest, under arm and scalp   • Oxycodone    • Scopolamine        PHYSICAL EXAM (2,8)  VITAL SIGNS: /96   Pulse 75   Temp 36.3 °C (97.4 °F) (Temporal)   Resp 16   Ht 1.778 m (5' 10\")   Wt 75 kg (165 lb 5.5 oz)   SpO2 97%   BMI 23.72 kg/m²  Reviewed and wnl.  Constitutional: Anxious appearing man, appears younger than stated age, in NAD.  HENT: Normocephalic, atraumatic.  Eyes: PERRLA, EOMI, no scleral " icterus.  Cardiovascular: RRR without M/R/G.  Peripheral pulses intact.  Respiratory: CTAB.  Gastrointestinal: Soft, nontender, no peritoneal signs.  Skin: Warm, dry.    Genitourinary: Deferred.  Neurologic: Alert and oriented x4, no focal deficit.  Psychiatric: Anxious affect.  Mood appropriate for situation.    DIFFERENTIAL DIAGNOSIS:  Known coronary artery disease and high cardiovascular risk suggest high likelihood of his chest discomfort representing an anginal equivalent.  History is consistent with idiopathic tachyarrhythmia that has now resolved, but which represented a cardiac stressor leading to angina.  Less likely to be ACS or PE, as symptoms have now resolved and patient has no extremity edema.    EKG  EKG with junctional tachycardia, rate 138, nonspecific ST changes, prolonged QTc at 497.    RADIOLOGY/PROCEDURES  DX-CHEST-LIMITED (1 VIEW)   Final Result         No acute cardiopulmonary abnormalities are identified.          LABORATORY: Reviewed as below.  Results for orders placed or performed during the hospital encounter of 12/28/18   Troponin   Result Value Ref Range    Troponin I 0.07 (H) 0.00 - 0.04 ng/mL   Btype Natriuretic Peptide   Result Value Ref Range    B Natriuretic Peptide 102 (H) 0 - 100 pg/mL   CBC with Differential   Result Value Ref Range    WBC 9.3 4.8 - 10.8 K/uL    RBC 4.78 4.70 - 6.10 M/uL    Hemoglobin 15.9 14.0 - 18.0 g/dL    Hematocrit 46.7 42.0 - 52.0 %    MCV 97.7 81.4 - 97.8 fL    MCH 33.3 (H) 27.0 - 33.0 pg    MCHC 34.0 33.7 - 35.3 g/dL    RDW 47.2 35.9 - 50.0 fL    Platelet Count 127 (L) 164 - 446 K/uL    MPV 12.2 9.0 - 12.9 fL    Neutrophils-Polys 56.10 44.00 - 72.00 %    Lymphocytes 35.00 22.00 - 41.00 %    Monocytes 6.80 0.00 - 13.40 %    Eosinophils 1.40 0.00 - 6.90 %    Basophils 0.50 0.00 - 1.80 %    Immature Granulocytes 0.20 0.00 - 0.90 %    Nucleated RBC 0.00 /100 WBC    Neutrophils (Absolute) 5.21 1.82 - 7.42 K/uL    Lymphs (Absolute) 3.25 1.00 - 4.80 K/uL     Monos (Absolute) 0.63 0.00 - 0.85 K/uL    Eos (Absolute) 0.13 0.00 - 0.51 K/uL    Baso (Absolute) 0.05 0.00 - 0.12 K/uL    Immature Granulocytes (abs) 0.02 0.00 - 0.11 K/uL    NRBC (Absolute) 0.00 K/uL   Complete Metabolic Panel (CMP)   Result Value Ref Range    Sodium 138 135 - 145 mmol/L    Potassium 4.7 3.6 - 5.5 mmol/L    Chloride 103 96 - 112 mmol/L    Co2 29 20 - 33 mmol/L    Anion Gap 6.0 0.0 - 11.9    Glucose 101 (H) 65 - 99 mg/dL    Bun 23 (H) 8 - 22 mg/dL    Creatinine 1.16 0.50 - 1.40 mg/dL    Calcium 10.1 8.5 - 10.5 mg/dL    AST(SGOT) 42 12 - 45 U/L    ALT(SGPT) 36 2 - 50 U/L    Alkaline Phosphatase 89 30 - 99 U/L    Total Bilirubin 0.8 0.1 - 1.5 mg/dL    Albumin 4.3 3.2 - 4.9 g/dL    Total Protein 7.1 6.0 - 8.2 g/dL    Globulin 2.8 1.9 - 3.5 g/dL    A-G Ratio 1.5 g/dL   Prothrombin Time   Result Value Ref Range    PT 14.6 12.0 - 14.6 sec    INR 1.13 0.87 - 1.13   APTT   Result Value Ref Range    APTT 29.8 24.7 - 36.0 sec   Lipase   Result Value Ref Range    Lipase 31 11 - 82 U/L   ESTIMATED GFR   Result Value Ref Range    GFR If African American >60 >60 mL/min/1.73 m 2    GFR If Non African American >60 >60 mL/min/1.73 m 2   EKG   Result Value Ref Range    Report       [facility] Emergency Dept.    Test Date:  2018  Pt Name:    LONNY LE                  Department: ER  MRN:        [id number]                      Room:  Gender:     Male                         Technician: [id number]  :        1940                   Requested By:ER TRIAGE PROTOCOL  Order #:    [id number]                    Reading MD:    Measurements  Intervals                                Axis  Rate:       138                          P:  IN:                                      QRS:        -70  QRSD:       88                           T:          34  QT:         328  QTc:        497    Interpretive Statements  JUNCTIONAL TACHYCARDIA  LEFT ANTERIOR FASCICULAR BLOCK  PROBABLE ANTEROSEPTAL INFARCT,  OLD  BORDERLINE PROLONGED QT INTERVAL  Compared to ECG 12/14/2017 15:32:23  Junctional tachycardia now present  Left anterior fascicular block now present  Sinus bradycardia no longer present  Myocardial infarct finding still present         INTERVENTIONS:  Medications   aspirin EC (ECOTRIN) tablet 325 mg (325 mg Oral Given 12/28/18 8032)   metoprolol (LOPRESSOR) tablet 12.5 mg (12.5 mg Oral Given 12/28/18 8142)     Response: well tolerated.    COURSE & MEDICAL DECISION MAKING  9:03 PM Patient examined at bedside.  Chest pressure has resolved and HR returned to normal.      10:14 PM Discussed case with Dr Peter, cardiology.  In the setting of known ischemic coronary artery disease with resolution of symptoms and without need for emergent intervention, she recommended low dose metoprolol for prevention of tachyarrhythmia and discharge home with close cardiology follow-up.    PLAN:  Discharge to home with low dose metoprolol and close cardiology follow up.    CONDITION: Stable.    FINAL IMPRESSION  1. Coronary artery disease of native artery of native heart with stable angina pectoris (HCC) Acute   2. Anginal equivalent (HCC) Acute         Electronically signed by: Myrtle Morales, 12/28/2018 11:02 PM

## 2018-12-29 NOTE — ED TRIAGE NOTES
"Andre Martinez  78 y.o. male  Chief Complaint   Patient presents with   • Headache     Pt had a pet scan this am, and developed a headache while exercising.  Pt states headache was severe, but is now a 4/10   • Tachycardia     Pt has a HR of 141, denies cp, but states a feeling of fullness in the chest and an inability to get a full breath.       Pt amb to triage with steady gait for above complaint. EKG done in triage.    Pt is alert and oriented, speaking in full sentences, follows commands and responds appropriately to questions. NAD. Resp are even and unlabored.  Pt placed in lobby. Pt educated on triage process. Pt encouraged to alert staff for any changes.  /96   Pulse (!) 144   Temp 36.3 °C (97.4 °F) (Temporal)   Resp 15   Ht 1.778 m (5' 10\")   Wt 75 kg (165 lb 5.5 oz)   SpO2 99%   BMI 23.72 kg/m²     "

## 2018-12-29 NOTE — PROCEDURES
AGE:  78.    GENDER:  Male.   HEIGHT:  70 inches.    WEIGHT:  163 pounds.    INDICATIONS:  Abnormal functional study.    PROCEDURE:  The patient reviewed and signed the acknowledgement for testing   form.  The patient was in a fasting state and was properly prepared for   testing.  An intravenous line was inserted and a flush of normal saline   followed to insure line patency.    A transmission scan was acquired for attenuation correction using the internal   Germanium sources.  The patient was then administered 20.0 mCi of   Rubidium-82.  Approximately 90 seconds after the infusion, resting imagine   were obtained with ECG-gating.  Following the resting series, the patient   administered 42 mg of dipyridamole over four minutes.  The blood pressure,   heart rate and ECG were monitored and recorded.  After the dipyridamole   infusion was completed, another transmission scan for attenuation correction   was obtained.  The patient was then administered 20.0 mCi of Rubidium-82.    Approximately 90 seconds after the infusion, Peak stress images were obtained   with ECG-gating.    CLINICAL RESPONSE:  Resting blood pressure was 115/72 with a heart rate of 55.    Immediately post-dipyridamole infusion the blood pressure was 77/53 with a   heart rate of 82.  After a recovery period the blood pressure was 110/59 with   a heart rate of 75.    The patient experienced nausea symptoms during testing.    No aminophylline was administered following the scan.    ELECTROCARDIOGRAPHIC FINDINGS:  At baseline, the rhythm is sinus with   borderline anterior Q-waves and poor R-wave progression.  There is left axis   deviation and nonspecific inferior ST depression also at baseline.  With   chemical stress, no additional ischemic ECG changes are noted.  There are 2   polymorphic PVCs in recovery.    SCINTOGRAPHIC FINDINGS:  The QC data for the scan was reviewed and within   acceptable limits.  There is normal perfusion of the left  ventricle at rest   and with stress with no defect whatsoever.  TID index is 1.07, also normal.    GATED WALL MOTION FINDINGS:  The left ventricular ejection fraction is   measured at rest at 36% and after stress at 47%.  There is borderline dilation   by volume of the left ventricle.  Wall motion is globally mildly hypokinetic   with no focal wall motion abnormality.    CONCLUSIONS AND IMPRESSIONS:  Mild left ventricular systolic dysfunction and   borderline dilation.  Normal myocardial perfusion at rest and post-stress.       ____________________________________     MD BRENT VELOZ / NTS    DD:  12/28/2018 17:47:13  DT:  12/28/2018 18:08:38    D#:  [id number]  Job#:  [id number]

## 2018-12-29 NOTE — TELEPHONE ENCOUNTER
Please To Dr. Jose Holman's Nurse.    Patient called into the service on Friday night, 12/28/2018, page stated that patient had a scan today and after exercising did not feel well, was noted to have adenosine PET today so not clear patient was exercising at home. I attempted to call the patient back two times but he did not answer.  I asked him to recall the service if he continued to feel poorly.    JOE Acosta

## 2018-12-29 NOTE — ED NOTES
"Pt to room via w/c, agree with triage notes. Per Pt he was on the stair master working out, HR at 116 which was normal, sneezed and Hr \"shot up to 189, ever since then has not felt normal. Feels full in his chest.\" Pt had PET scan today and has had HA since then. Denies N/V.  "

## 2018-12-31 ENCOUNTER — TELEPHONE (OUTPATIENT)
Dept: CARDIOLOGY | Facility: MEDICAL CENTER | Age: 78
End: 2018-12-31

## 2018-12-31 DIAGNOSIS — I10 ESSENTIAL HYPERTENSION: ICD-10-CM

## 2018-12-31 NOTE — TELEPHONE ENCOUNTER
Benita Martinez R.N.   Phone Number: [phone]             Li     Pt calling again to report b/p is now at 179/94, h/r 62.  Pt is asking you to call him asap [phone]       Discussed w/ Dr Bennett (ADD) above information and pt's concerns, per CR, please have pt restart Metoprolol 12.5mg PO BID as Rx'd by Dr Peter in ER, if HR <55 and is symptomatic, he may just need to take Metoprolol PRN and needs a different meds for BP.     Called pt, discussed above recommendations by CR, pt agreed and verbalizes understanding, he will take his Metoprolol and will call us back if he has further concerns

## 2018-12-31 NOTE — TELEPHONE ENCOUNTER
Karma Peter M.D.  Li Martinez R.N.             No, it can wait I think.  If the pt has recurrence of symptoms he can come back to the ED and we will see him there to discuss cath.   Tx,   LS    Previous Messages      ----- Message -----   From: Li Martinez R.N.   Sent: 12/31/2018   8:36 AM   To: Karma Peter M.D.   Subject: FW: RUBY SANDERS                                             ----- Message -----   From: Li Martinez R.N.   Sent: 12/31/2018   8:35 AM   To: Lili Ferrari R.N.   Subject: RE: RUBY SANDERS                                         Hi Dr Peter---     Dr Jose Holman is out of the office today and should be back Wed 1/2/18.     Do you have any other recommendations until then?     Thank You!     ----- Message -----   From: Lili Ferrari R.N.   Sent: 12/31/2018   8:24 AM   To: Li Martinez R.N.   Subject: FW: RUBY SANDERS                                         To STANISLAW Jefferson   ----- Message -----   From: Karma Peter M.D.   Sent: 12/28/2018  10:20 PM   To: Kala Downey R.N., Jose Holman M.D.   Subject: RUBY Garcia,   Mr. Martinez had his PET stress test today which you interpreted.  I did note possible mild dilation.  He had a HA that got better then decided to get on the treadmill today.  He has a fast heart rate with some chest tightness, came to the ED, had an atrial tachycardia.  Back to sinus and chest pain free. Trop was 0.07.     I discussed with ED provider to start him on metop tart 12.5 mg BID and send home.  Instructed pt not to exercise until he hears from you. Obviously he has 3 vessel disease but up until today has been functional, exercising without limitations until the junctional tachycardia.  I decided to send him out on BB and let you decide if you want a cath and proceed to PCI vs CABG.     He will call or return to the ED if symptoms return or worsen.     I will be here on Monday if you have any questions.      Tx. LULU TREJO to Cheri to pass on to Dr. Holman's RN in case he is not available immediately on Monday, she can page me for further directions.           Called pt, pt reports he is feeling ok, his HR is down to 58-59bpm so he stopped taking Metoprolol that Dr Peter Rx'd, currently BP is up to -160s and his HR mid 60s, I've advised him to try to take 1/2 tab (12.5mg) Metoprolol instead to see if it will help, advise pt to cont to monitor BP and HR and to call us back if there is any concerns, discussed ED precautions as well, informed pt will discussed it w/ Dr Holman when he gets back in the office on Wed and will let him know of his recommendations. Pt agreed and verbalizes understanding     To Dr Holman

## 2019-01-02 RX ORDER — CLONIDINE HYDROCHLORIDE 0.1 MG/1
0.1 TABLET ORAL 2 TIMES DAILY PRN
Qty: 60 TAB | Refills: 6 | Status: SHIPPED | OUTPATIENT
Start: 2019-01-02 | End: 2019-04-17

## 2019-01-02 NOTE — TELEPHONE ENCOUNTER
Andre Holman M.D. 19 hours ago (2:08 PM)         Dr Holman,   JACOBO was hospitalized on 12/18 evening from about 7 pm to 11 pm. The sequence of events.   PET Scan performed about 8:30 am. Result was severe headache and high discomfort. Advised to drink coffee after test to combat discomfort. I am not a coffee drinker and drank about 10-12 ounces of coke. I never drink any caffeine and do not eat chocolate.   Returned home and ate breakfast and continued with normal activities. Felt normal   About 4 pm walked three times around [facility]. Then went to [employer] and used [employer]. First 16 minutes no problem with low but increasing equipment speed. Heart rate about 115 bpm. At 16 minutes heart rate jumped to 179-184.     See next message for remainder.             ;         Andre Holman M.D. 18 hours ago (2:21 PM)         Part 2   Stopped, returned home with rapid heart rate, difficulty breathing- left lung side. Took 12.5 g Metoprolol at hospital and 12.5 mg 12/29 morning. Heart rate in morning- 59, blood pressure 179/94. Late day blood pressure about 157/88, heart rate 68. Stopped Metoprolol until 12/31 morning when nurse advised resumption.   1/1/19 2:00 pm blood pressure 185/99- heart rate 64.   [facility] holmium laser enucleation surgery- November 13. Reduced urinary flow resulted in a repeat cystoscopy procedure about December 9 that identified urethra strictures that were moved to prevent blockage. Condition now returned- reduced sleep, decreased urine flow and need for recommended surgery to remove. Stressful for me.   Need Wednesday appointment.         Guillermo Coon, Bandar Ass't  Li Martinez R.N.   Phone Number: [phone]             DIONI Leonard pt was seen in the ER 12/28/18 and his bp has been rising since. He stated it is currently 169/118 and would like a call back as soon as possible.     Thanks,   Guillermo        Discussed w/   [facility] Cardiac PET result and events after the Cardiac PET as well as continued elevated BP. Per Dr Holman, please Rx'd Clonidine 0.1mg PO BID PRN SBP >180, otherwise will address tomorrow during pt's appt with him.     Called pt, s/w pt as well as wife (Reina), discussed Dr Holman recommendations, pt agreed and verbalizes understanding, they will keep appt tomorrow 1/3/19    New Rx for Clonidine 0.1mg PO BID sent to [facility]

## 2019-01-03 ENCOUNTER — OFFICE VISIT (OUTPATIENT)
Dept: CARDIOLOGY | Facility: MEDICAL CENTER | Age: 79
End: 2019-01-03
Payer: MEDICARE

## 2019-01-03 ENCOUNTER — NON-PROVIDER VISIT (OUTPATIENT)
Dept: CARDIOLOGY | Facility: MEDICAL CENTER | Age: 79
End: 2019-01-03
Payer: MEDICARE

## 2019-01-03 VITALS — DIASTOLIC BLOOD PRESSURE: 64 MMHG | SYSTOLIC BLOOD PRESSURE: 126 MMHG

## 2019-01-03 VITALS
SYSTOLIC BLOOD PRESSURE: 132 MMHG | WEIGHT: 160.94 LBS | HEART RATE: 62 BPM | HEIGHT: 70 IN | OXYGEN SATURATION: 99 % | BODY MASS INDEX: 23.04 KG/M2 | DIASTOLIC BLOOD PRESSURE: 78 MMHG

## 2019-01-03 DIAGNOSIS — I47.10 SVT (SUPRAVENTRICULAR TACHYCARDIA): ICD-10-CM

## 2019-01-03 DIAGNOSIS — I25.118 ATHEROSCLEROSIS OF NATIVE CORONARY ARTERY OF NATIVE HEART WITH STABLE ANGINA PECTORIS (HCC): ICD-10-CM

## 2019-01-03 DIAGNOSIS — I50.22 CHRONIC SYSTOLIC CONGESTIVE HEART FAILURE (HCC): Primary | ICD-10-CM

## 2019-01-03 DIAGNOSIS — I47.19 AVNRT (AV NODAL RE-ENTRY TACHYCARDIA): ICD-10-CM

## 2019-01-03 DIAGNOSIS — R93.1 AGATSTON CAC SCORE, >400: ICD-10-CM

## 2019-01-03 PROCEDURE — 99214 OFFICE O/P EST MOD 30 MIN: CPT | Performed by: INTERNAL MEDICINE

## 2019-01-03 RX ORDER — METOPROLOL SUCCINATE 25 MG/1
25 TABLET, EXTENDED RELEASE ORAL
Qty: 90 TAB | Refills: 3 | Status: SHIPPED | OUTPATIENT
Start: 2019-01-03 | End: 2019-04-17

## 2019-01-03 RX ORDER — LISINOPRIL 5 MG/1
5 TABLET ORAL DAILY
Qty: 90 TAB | Refills: 3 | Status: SHIPPED | OUTPATIENT
Start: 2019-01-03 | End: 2019-04-17

## 2019-01-03 NOTE — LETTER
Name:          Andre Martinez   YOB: 1940  Date:     01/03/2019      Azar Cavazos M.D.  [address] [facility] [address]  [facility] [address]     Jose Holman MD  [address], [address]  [place] [address]  Phone: [phone]  Back Line: [phone]  Fax: [phone]  E-mail: [email or web address]   Dear Dr. Cavazos,    We had the pleasure of seeing your patient, Andre Martinez, in Cardiology Clinic at [facility] today.    As you know, he is a 78-year-old man with a history of presumed coronary atherosclerosis related to heavily calcified coronary arteries in the past though with negative serial stress echocardiograms, and asymptomatic short runs of SVT and wide-complex tachycardia.    I discussed with him today that my impression of his heavily calcified coronary arteries and now new systolic dysfunction in conjunction with his chest discomfort though atypical for angina and his new exercise symptoms including exercise-induced supraventricular tachycardia taken in whole make me quite concerned that he could have multivessel coronary artery disease or at least significant obstructive coronary disease despite lack of territorial ischemia on his recent PET myocardial perfusion imaging.  Strongly recommended cardiac catheterization reviewing the risks, benefits, and alternatives with him.    He was specifically concerned about his possible surgery for urethral stricture scheduled at the [facility] later this month.  I discussed with him that I do very much think he should have his systolic dysfunction and probable obstructive coronary disease investigated before proceeding with surgery.  I will send a copy of this note to his urologist at the [facility].    Return in about 3 months (around 4/3/2019).    Thank you for the referral and please do not hesitate to contact me at any time. My contact information is listed above.    This note was dictated using Dragon speech recognition  software.     A full note including my physical examination and a full list of rectified medications is available in our medical record, and can be faxed as well.    Jose Holman MD  Cardiologist  [facility]    cc: Sanchez Fonseca MD

## 2019-01-04 PROBLEM — I47.19 AVNRT (AV NODAL RE-ENTRY TACHYCARDIA): Status: ACTIVE | Noted: 2019-01-04

## 2019-01-04 ASSESSMENT — ENCOUNTER SYMPTOMS: PALPITATIONS: 1

## 2019-01-04 NOTE — PROGRESS NOTES
"Chief Complaint   Patient presents with   • Supraventricular Tachycardia (SVT)       Subjective:   Andre Martinez is a 78 -year-old man with a history of presumed coronary atherosclerosis related to heavily calcified coronary arteries in the past though with negative serial stress echocardiograms, and asymptomatic short runs of SVT and wide-complex tachycardia.    He comes in today in the aftermath of a stress test, and for ventricular tachycardia.  He tells me about having gone to the gym and got on the treadmill after his nuclear stress test.  He developed palpitations, and gone to the ER having been shown to have SVT consistent with AVNRT.  He was given oral metoprolol, and converted to normal rhythm.  He goes on to describe having been at the gym on the [employer] for about 16-17 minutes when his heart rates sustained approximately 170 bpm even after having stopped exercise.    He tells me today about a left-sided chest discomfort that is not reliably exertional, but new since our last visit only a couple of months ago.  He describes it as a sense that he \"cannot take a deep breath.\"  It is left-sided, lasting for minutes, and he can identify no specific palliating or provoking factors on questioning.    He is also concerned about his bladder surgery for bladder outlet obstruction at the [facility].    He comes in with his wife, and they both have several questions about their care.  In the aftermath of his visit, he asked for all of his records.    Past Medical History:   Diagnosis Date   • BPH (benign prostatic hyperplasia)    • Colon polyps    • Coronary atherosclerosis of native coronary artery 5/15/2013   • HLD (hyperlipidemia) 5/15/2013   • Kidney cysts    • Meniscus tear    • Pancreas cyst    • Pure hypercholesterolemia 8/23/2016   • Spinal stenosis, lumbar    • SVT (supraventricular tachycardia) (HCC) 10/23/2018   • Thrombocytopenia (HCC) 7/24/2017     Past Surgical History:   Procedure Laterality " Date   • ROTATOR CUFF REPAIR     • HEMORRHOIDECTOMY     • KNEE RECONSTRUCTION      left, partial    • SINUSOTOMIES      Dr. Guerrier, total of 4 surgeries     Family History   Problem Relation Age of Onset   • Heart Disease Father          at age 38   • Psychiatry Mother         Alzheimers   • Hypertension Brother    • Stroke Brother    • Seizures Brother      Social History     Social History   • Marital status:      Spouse name: N/A   • Number of children: N/A   • Years of education: N/A     Occupational History   • Not on file.     Social History Main Topics   • Smoking status: Never Smoker   • Smokeless tobacco: Never Used   • Alcohol use Yes      Comment: Social    • Drug use: No   • Sexual activity: Not on file     Other Topics Concern   • Not on file     Social History Narrative   • No narrative on file     Allergies   Allergen Reactions   • Avelox [Moxifloxacin Hcl In Nacl] Rash     rash   • Cefdinir Rash     Rash chest, under arm and scalp   • Oxycodone    • Scopolamine      Outpatient Encounter Prescriptions as of 1/3/2019   Medication Sig Dispense Refill   • metoprolol SR (TOPROL XL) 25 MG TABLET SR 24 HR Take 1 Tab by mouth every bedtime. 90 Tab 3   • lisinopril (PRINIVIL) 5 MG Tab Take 1 Tab by mouth every day. 90 Tab 3   • LIPITOR 20 MG Tab TAKE 1 TABLET BY MOUTH AT BEDTIME 90 Tab 1   • folic acid (FOLVITE) 1 MG Tab Take 1 mg by mouth every day.     • Riboflavin 100 MG Cap Take  by mouth.     • CHONDROITIN SULFATE A PO Take 625 mg by mouth every day.     • VITAMIN B COMPLEX-C PO Take  by mouth.     • Multiple Vitamins-Minerals (CENTRUM SILVER PO) Take  by mouth.     • thiamine (THIAMINE) 100 MG Tab Take 100 mg by mouth every day.     • Biotin 1000 MCG Tab Take  by mouth.     • Guaifenesin 400 MG Tab Take  by mouth.     • fluticasone (FLONASE) 50 MCG/ACT nasal spray Spray 1 Spray in nose every day. 16 g 3   • celecoxib (CELEBREX) 200 MG Cap Take 1 Cap by mouth every day. 30 Cap 1   •  "vitamin D (CHOLECALCIFEROL) 1000 UNIT TABS Take 2 Tabs by mouth every day. 30 Tab 6   • aspirin EC (ECOTRIN) 81 MG TBEC Take 81 mg by mouth every day.     • Multiple Vitamin CAPS Take 1 Cap by mouth every day.     • Omega-3 Fatty Acids (SALMON OIL PO) Take 2 Caps by mouth every day.     • Cyanocobalamin (VITAMIN B 12 PO) Take 1 Tab by mouth every day.     • coenzyme Q-10 30 MG capsule Take 60 mg by mouth every day.     • GLUCOSA-CHONDR-NA CHONDR-MSM PO Take 1 Tab by mouth every day.     • cloNIDine (CATAPRES) 0.1 MG Tab Take 1 Tab by mouth 2 times a day as needed. SBP >180 (Patient not taking: Reported on 1/3/2019) 60 Tab 6   • [DISCONTINUED] metoprolol (LOPRESSOR) 25 MG Tab Take 0.5 Tabs by mouth 2 times a day. 60 Tab 0   • Mirabegron ER 50 MG TABLET SR 24 HR Take 50 mg by mouth every day. (Patient not taking: Reported on 1/3/2019) 90 Tab 3     No facility-administered encounter medications on file as of 1/3/2019.      Review of Systems   Cardiovascular: Positive for chest pain and palpitations.   Genitourinary: Positive for frequency and urgency.   All other systems reviewed and are negative.       Objective:   /78 (BP Location: Left arm, Patient Position: Sitting, BP Cuff Size: Adult)   Pulse 62   Ht 1.778 m (5' 10\")   Wt 73 kg (160 lb 15 oz)   SpO2 99%   BMI 23.09 kg/m²     Physical Exam   Constitutional: He is oriented to person, place, and time. He appears well-developed and well-nourished. No distress.   Pleasant, thin appearing, elderly man in no distress.  Physical examination is unchanged except as specified from a previous on 12/6/2018.   Eyes: Pupils are equal, round, and reactive to light. EOM are normal.   Neck: No JVD present.   Cardiovascular: Normal rate and regular rhythm.  Exam reveals no gallop and no friction rub.    No murmur heard.  No carotid bruits   Pulmonary/Chest: Effort normal and breath sounds normal. No respiratory distress. He has no wheezes. He has no rales.   Abdominal: " Soft. Bowel sounds are normal. He exhibits no distension.   Musculoskeletal: He exhibits no edema (No significant lower extremity edema bilaterally).   Neurological: He is alert and oriented to person, place, and time.   Skin: Skin is warm and dry. No rash noted. He is not diaphoretic. No erythema. No pallor.   Psychiatric: He has a normal mood and affect. Judgment and thought content normal.   Vitals reviewed.    Lab Results   Component Value Date/Time    WBC 9.3 2018 08:22 PM    RBC 4.78 2018 08:22 PM    HEMOGLOBIN 15.9 2018 08:22 PM    HEMATOCRIT 46.7 2018 08:22 PM    MCV 97.7 2018 08:22 PM    MCH 33.3 (H) 2018 08:22 PM    MCHC 34.0 2018 08:22 PM    MPV 12.2 2018 08:22 PM        Lab Results   Component Value Date/Time    SODIUM 138 2018 08:22 PM    POTASSIUM 4.7 2018 08:22 PM    CHLORIDE 103 2018 08:22 PM    CO2 29 2018 08:22 PM    GLUCOSE 101 (H) 2018 08:22 PM    BUN 23 (H) 2018 08:22 PM    CREATININE 1.16 2018 08:22 PM        Lab Results   Component Value Date/Time    ASTSGOT 42 2018 08:22 PM    ALTSGPT 36 2018 08:22 PM        Lab Results   Component Value Date/Time    CHOLSTRLTOT 155 10/19/2018 08:30 AM    CHOLSTRLTOT 149 2017 08:00 AM    LDL 67 2017 08:00 AM    HDL 88 (H) 10/19/2018 08:30 AM    HDL 71 2017 08:00 AM    TRIGLYCERIDE 40 10/19/2018 08:30 AM    TRIGLYCERIDE 53 2017 08:00 AM         Results for orders placed or performed in visit on 17   EKG   Result Value Ref Range    Report       [facility]    Test Date:  2017  Pt Name:    LONNY LE                  Department: [facility]  MRN:        [id number]                      Room:  Gender:     M                            Technician: DURGA  :        1940                   Requested By:LUIS ENRIQUE LEE  Order #:    [id number]                    Reading MD: Bismark Paulino MD    Measurements  Intervals         "                        Axis  Rate:       60                           P:          42  VA:         156                          QRS:        59  QRSD:       76                           T:          7  QT:         460  QTc:        460    Interpretive Statements  SINUS BRADYCARDIA  CONSIDER ANTEROSEPTAL INFARCT  Compared to ECG 2016 14:55:35  Myocardial infarct finding now present  Sinus rhythm no longer present  Left-axis deviation no longer present    Electronically Signed On 2017 19:25:44 PST by Bismark Paulino MD     Results for orders placed or performed in visit on 16   [facility] EKG (Clinic Performed)   Result Value Ref Range    Report       [facility] Cardiology [facility]    Test Date:  2016  Pt Name:    LONNY LE                  Department: [facility]  MRN:        [id number]                      Room:  Gender:     M                            Technician: ALEJANDRO  :        1940                   Requested By:YORDAN DOMÍNGUEZ  Order #:    [id number]                    Reading MD: Yordan Domínguez MD    Measurements  Intervals                                Axis  Rate:       70                           P:          68  VA:         152                          QRS:        -32  QRSD:       76                           T:          5  QT:         408  QTc:        441    Interpretive Statements  SINUS RHYTHM  LEFT AXIS DEVIATION  LATE PRECORDIAL R/S TRANSITION  No previous ECG available for comparison    Electronically Signed On 2016 15:07:18 PDT by Yordan Domínguez MD       Exercise stress echocardiogram, 10/24/2017: Patient did 11 minutes 59 seconds corresponding with 12.8 metabolic equivalents on the Kael protocol achieving 92% of his age-predicted maximum heart rate with normal systolic augmentation regionally, negative for ischemia.  He also had a stress echocardiogram in  that also was negative for ischemia.    CT coronary calcium scan, 2018:  \"Coronary " "calcification:  LMA - 0.0  LCX - 165.1  LAD - 833.2  RCA - 2093.2  PDA - 0.0  Calcium score:  3091.5\"    PET myocardial perfusion imaging, 12/20/2018, images and report reviewed, my interpretation: Demonstrates systolic dysfunction with a resting ejection fraction of 36% and no territorial ischemia nor transient ischemic dilation.  Left ventricle was borderline dilated.    EKG, 12/20/2018, tracings and report reviewed, mitral rotation: Documents supraventricular tachycardia with a fairly pronounced pseudo-R prime in V1 consistent with AVNRT    Assessment:     1. Chronic systolic congestive heart failure (HCC)  metoprolol SR (TOPROL XL) 25 MG TABLET SR 24 HR    lisinopril (PRINIVIL) 5 MG Tab   2. SVT (supraventricular tachycardia) (HCC)     3. Atherosclerosis of native coronary artery of native heart with stable angina pectoris (HCC)     4. Agatston CAC score, >400         Medical Decision Making:  Today's Assessment / Status / Plan:     I discussed with him today that my impression of his heavily calcified coronary arteries and now new systolic dysfunction in conjunction with his chest discomfort though atypical for angina and his new exercise symptoms including exercise-induced supraventricular tachycardia taken in whole make me quite concerned that he could have multivessel coronary artery disease or at least significant obstructive coronary disease despite lack of territorial ischemia on his recent PET myocardial perfusion imaging.  Strongly recommended cardiac catheterization reviewing the risks, benefits, and alternatives with him.    He was specifically concerned about his possible surgery for urethral stricture scheduled at the [facility] later this month.  I discussed with him that I do very much think he should have his systolic dysfunction and probable obstructive coronary disease investigated before proceeding with surgery.  I will send a copy of this note to his urologist at the [facility].    Jose " MD [facility]  Cardiologist, [facility] [place]     Return in about 3 months (around 4/3/2019).    I spent 45 minutes with Mr. Andre Martinez, over fifty percent was spent counseling the patient on their condition, best management practices, reviewing test results, risks and benefits of treatment and coordinating care.

## 2019-01-07 ENCOUNTER — TELEPHONE (OUTPATIENT)
Dept: CARDIOLOGY | Facility: MEDICAL CENTER | Age: 79
End: 2019-01-07

## 2019-01-07 ENCOUNTER — HOSPITAL ENCOUNTER (OUTPATIENT)
Facility: MEDICAL CENTER | Age: 79
End: 2019-01-07
Attending: INTERNAL MEDICINE | Admitting: INTERNAL MEDICINE
Payer: MEDICARE

## 2019-01-07 NOTE — TELEPHONE ENCOUNTER
Patient is scheduled on 1-10-19 for a C w/poss with Dr. Valle at [facility]. No meds to stop and patient to check in at 6:00am for a 7:30 procedure. H&P was done on 1-3-19 by Dr. JENN Holman. Pre admit is scheduled on 1-8-19 at 9:15.

## 2019-01-08 ENCOUNTER — TELEPHONE (OUTPATIENT)
Dept: CARDIOLOGY | Facility: MEDICAL CENTER | Age: 79
End: 2019-01-08

## 2019-01-08 NOTE — LETTER
January 8, 2019        Andre Martinez  [address]  [place] [address]        To whom it may concern:    This is to certify that Mr Andre Martinez is a patient of mine and is being followed closely in our clinic, he was recommended to undergo procedure called Cardiac Catheterization scheduled on 1/10/2019 which can't be delayed resulting to cancellation of his airplane ticket.     If you have any questions or concerns, please don't hesitate to call our office, [phone].     Thank You for your kind consideration.        Sincerely,        __________________________  Jose Holman M.D.  Cardiologist  [facility]

## 2019-01-08 NOTE — TELEPHONE ENCOUNTER
PAR handed a note from pt requesting for a letter from Dr Holman stating he needs to cancel his airplane ticket due to the Cardiac Cath scheduled on 1/10/18.     Pt notified through [employer] that letter is ready    Copy of request to scanning

## 2019-01-08 NOTE — TELEPHONE ENCOUNTER
Andre Holman M.D. 10 hours ago (10:32 PM)         Hello,   As I said in my previous message, I am in serious need of a urethra scar tissue removal resulting from a prior urological surgery. If stents were inserted and blood thinners were required, the urological surgery could be delayed for as long as I took blood thinners. That would be unacceptable.   Could you clear me for the urological surgery which would be under general anesthesia before an angiogram is done?   Is it reasonable to perform an angiogram without insertion of a stent(s)?   Could the PET Scan have provided a false positive test result? My echo cardiogram done about 15 months ago showed a much higher ejection fraction than the Scan.   My  Zio patch monitoring done about 15 months ago also showed no problem per Dr Richardson.           Discussed w/ Dr Holman, per Dr Holman, please inform pt as we discussed during his office visit, I will talk to the interventionalist-Dr Valle and please have his Urologist-Dr David Alanis contact me as well but it is important for us to do the angiogram and see the anatomy of his heart.     Pt notified through [employer]    Called [facility] P#[phone], s/w Snehal, she will relay message to both attending-Dr Fonseca and Dr Alanis.

## 2019-01-09 ENCOUNTER — APPOINTMENT (OUTPATIENT)
Dept: ADMISSIONS | Facility: MEDICAL CENTER | Age: 79
End: 2019-01-09
Payer: MEDICARE

## 2019-01-09 NOTE — TELEPHONE ENCOUNTER
Andre Martinez   You 22 minutes ago (3:58 PM)         Main Jefferson,   What a mess!   I thank you for all your help. I am very sorry but I am cancelling the Thursday angiogram. There will be no need for Dr Holman to speak with the [place] urologist. Because of the need to coordinate my urology and cardio procedures, I will see a [place] cardiologist first. I will bring all my cardio test results to [facility]  for a  [place] cardio visit. He will then be able to better coordinate with their urology department. This is what was recommended to me.   I have the utmost confidence in Dr Holman and his opinion which I am sure will be seconded at [place].   Thank you.   Andre           Discussed w/ Dr Holman, per Dr Holman, please inform pt that we could do a diagnostic Angiogram on Thurs and he can have his second opinion over at [facility].     Called pt, discussed above information, pt reports he will think about it and will give us an answer tomorrow morning if he wants to proceed or not.     Dr Holman notified

## 2019-01-09 NOTE — TELEPHONE ENCOUNTER
Pt came by the office this morning, s/w pt, he said he was decided and wanted to cancel the Angiogram, he already have an appointment set up over at [facility].     Dr Holman notified. Per Dr Holman, please reschedule appt w/ him until May or as needed.    To Norm, please cancel pt's Angiogram.

## 2019-01-19 ENCOUNTER — HOSPITAL ENCOUNTER (EMERGENCY)
Facility: MEDICAL CENTER | Age: 79
End: 2019-01-19
Payer: MEDICARE

## 2019-01-19 VITALS
BODY MASS INDEX: 22.87 KG/M2 | OXYGEN SATURATION: 97 % | SYSTOLIC BLOOD PRESSURE: 133 MMHG | WEIGHT: 159.39 LBS | TEMPERATURE: 98.2 F | RESPIRATION RATE: 16 BRPM | DIASTOLIC BLOOD PRESSURE: 68 MMHG | HEART RATE: 63 BPM

## 2019-01-19 PROCEDURE — 302449 STATCHG TRIAGE ONLY (STATISTIC)

## 2019-01-19 NOTE — ED TRIAGE NOTES
Ambulates to triage  Chief Complaint   Patient presents with   • Urinary Catheter Problem     Ambulates to triage, pt had a catheter placed on Tuesday and is supposed to be in for 1 week.  At home this morning pt noticed the catheter wasn't draining and the urine was coming out of his urethra, they marked the bag at home to where his urine was.  Upon arrival to [facility] pt feels like whatever was obstructing the flow has passed, pt has more urine in the bag, and his bladder feels empty.  Pt said he wants to wait in the lobby for about 30 minutes to make sure everything is still working and then will most likely go home and not see an ERP.

## 2019-01-19 NOTE — ED NOTES
"Pt stated \"We are just going to leave\", Pt was advised of AMA procedure and was also advised if issue return to come back to the ER.  "

## 2019-01-24 DIAGNOSIS — M25.512 ACUTE PAIN OF LEFT SHOULDER: ICD-10-CM

## 2019-02-27 ENCOUNTER — TELEPHONE (OUTPATIENT)
Dept: CARDIOLOGY | Facility: MEDICAL CENTER | Age: 79
End: 2019-02-27

## 2019-02-27 ENCOUNTER — OFFICE VISIT (OUTPATIENT)
Dept: INTERNAL MEDICINE | Facility: IMAGING CENTER | Age: 79
End: 2019-02-27
Payer: MEDICARE

## 2019-02-27 VITALS
HEART RATE: 62 BPM | DIASTOLIC BLOOD PRESSURE: 70 MMHG | WEIGHT: 165 LBS | SYSTOLIC BLOOD PRESSURE: 112 MMHG | BODY MASS INDEX: 23.62 KG/M2 | RESPIRATION RATE: 12 BRPM | HEIGHT: 70 IN | TEMPERATURE: 97.7 F | OXYGEN SATURATION: 99 %

## 2019-02-27 DIAGNOSIS — R36.1 HEMATOSPERMIA: ICD-10-CM

## 2019-02-27 DIAGNOSIS — I42.9 CARDIOMYOPATHY, UNSPECIFIED TYPE (HCC): ICD-10-CM

## 2019-02-27 DIAGNOSIS — M25.512 CHRONIC LEFT SHOULDER PAIN: ICD-10-CM

## 2019-02-27 DIAGNOSIS — E78.00 PURE HYPERCHOLESTEROLEMIA: ICD-10-CM

## 2019-02-27 DIAGNOSIS — G89.29 CHRONIC LEFT SHOULDER PAIN: ICD-10-CM

## 2019-02-27 PROCEDURE — 99214 OFFICE O/P EST MOD 30 MIN: CPT | Performed by: INTERNAL MEDICINE

## 2019-02-27 RX ORDER — ATORVASTATIN CALCIUM 40 MG/1
40 TABLET, FILM COATED ORAL
Qty: 30 TAB | COMMUNITY
Start: 2019-02-27 | End: 2020-03-06 | Stop reason: SDUPTHER

## 2019-02-27 NOTE — TELEPHONE ENCOUNTER
Andre Holman M.D. 4 days ago          MESSAGE 1 of 2     Meño Holman,   On January 10, 2019 I had an echo cardiogram performed at at [facility] that showed a moderately increased left ventricular wall thickness of 14 versus a normal 9-13 as well as other wall thickenings. Because my PET Scan showed no blockage and the echo cardiogram showed a left ventricular ejection fraction of 54%, Dr Schreiber at [facility] cleared me for my urology procedure, which was successful.     On February 22, I had a cardiac amyloidosis scan and blood and urine tests. 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 On Monday I will be given the details of the tests.      Message 1 of 2         Andre Holman M.D. 4 days ago         Message 2 of 2   The progression of the disease results in the thickening of the left ventricular wall. It would be very helpful to me to know if any of my prior [facility] heart tests measured my left ventricular wall thickness, or if those [facility] tests could again be reviewed to calculate the thickness.     Could you or someone else at [facility] be of help?   Thank you.   Andre             Discussed w/ Dr Holman, per Dr Holman, please inform pt we could do CT Angiogram, we could order or his [facility] MD could order it for him, he could have his [facility] MD call him to discussed as well.     Pt notified through [employer]

## 2019-02-28 NOTE — PROGRESS NOTES
Chief complaint: Follow-up on recent greenlight laser procedure for prostate.  Cardiac workup showing possible amyloidosis and blood in ejaculate.    HISTORY OF THE PRESENT ILLNESS: Patient is a 78 y.o. male.     Complex patient who comes in after evaluation at [facility] in [facility].  He was seen for follow-up on his recent greenlight procedure for BPH.  He reports that this has significantly improved.  He has noticed ongoing, possibly worse, blood in his ejaculate.  This predates the procedure.  Prior to the procedure he had a workup by cardiology.  This was done because of elevated calcium score and abnormal EKG.  The stress test done was negative but he had an echocardiogram that showed left ventricular thickening.  He had a subsequent scan of the heart which was concerning for possible amyloidosis.    He also complains of left shoulder pain.  He has seen local physical therapy without improvement.  Pain is achy and constant.  Pain is worse with abduction.  No weakness.  No neurologic changes.      Allergies: Avelox [moxifloxacin hcl in nacl]; Cefdinir; Oxycodone; and Scopolamine    Current Outpatient Prescriptions Ordered in [facility]   Medication Sig Dispense Refill   • atorvastatin (LIPITOR) 40 MG Tab Take 1 Tab by mouth every bedtime. 30 Tab    • metoprolol SR (TOPROL XL) 25 MG TABLET SR 24 HR Take 1 Tab by mouth every bedtime. 90 Tab 3   • lisinopril (PRINIVIL) 5 MG Tab Take 1 Tab by mouth every day. 90 Tab 3   • cloNIDine (CATAPRES) 0.1 MG Tab Take 1 Tab by mouth 2 times a day as needed. SBP >180 (Patient not taking: Reported on 1/3/2019) 60 Tab 6   • folic acid (FOLVITE) 1 MG Tab Take 1 mg by mouth every day.     • Riboflavin 100 MG Cap Take  by mouth.     • CHONDROITIN SULFATE A PO Take 625 mg by mouth every day.     • VITAMIN B COMPLEX-C PO Take  by mouth.     • Multiple Vitamins-Minerals (CENTRUM SILVER PO) Take  by mouth.     • thiamine (THIAMINE) 100 MG Tab Take 100 mg by mouth every day.     • Biotin  "1000 MCG Tab Take  by mouth.     • Mirabegron ER 50 MG TABLET SR 24 HR Take 50 mg by mouth every day. (Patient not taking: Reported on 1/3/2019) 90 Tab 3   • Guaifenesin 400 MG Tab Take  by mouth.     • fluticasone (FLONASE) 50 MCG/ACT nasal spray Spray 1 Spray in nose every day. 16 g 3   • celecoxib (CELEBREX) 200 MG Cap Take 1 Cap by mouth every day. 30 Cap 1   • vitamin D (CHOLECALCIFEROL) 1000 UNIT TABS Take 2 Tabs by mouth every day. 30 Tab 6   • aspirin EC (ECOTRIN) 81 MG TBEC Take 81 mg by mouth every day.     • Multiple Vitamin CAPS Take 1 Cap by mouth every day.     • Omega-3 Fatty Acids (SALMON OIL PO) Take 2 Caps by mouth every day.     • Cyanocobalamin (VITAMIN B 12 PO) Take 1 Tab by mouth every day.     • coenzyme Q-10 30 MG capsule Take 60 mg by mouth every day.     • GLUCOSA-CHONDR-NA CHONDR-MSM PO Take 1 Tab by mouth every day.       No current [facility]-ordered facility-administered medications on file.        Past medical history, social history and family history were reviewed from chart today    Review of systems: Per HPI.    Denies headache, chest pain, fever, chills, diarrhea, constipation, abdominal pain, palpitations, depression   All others negative.     Exam: Blood pressure 112/70, pulse 62, temperature 36.5 °C (97.7 °F), temperature source Temporal, resp. rate 12, height 1.778 m (5' 10\"), weight 74.8 kg (165 lb), SpO2 99 %.  General: Well-appearing. Well-developed. No signs of distress.  HEENT: Grossly normal. Oral cavity is pink and moist.  Neck: Supple without JVD or bruit.  Pulmonary: Clear with good breath sounds. Normal effort.  Cardiovascular: Regular. Carotid and radial pulses are intact.  Abdomen: Soft, nontender, nondistended. Spleen and liver are not enlarged.  Neurologic: Cranial nerves II through XII are grossly normal, alert and oriented x3      Diagnosis:  1. Cardiomyopathy, unspecified type (HCC)     2. Chronic left shoulder pain     3. Hematospermia         Assessment:  Left " ventricular hypertrophy with preserved ejection fraction.  Possible infiltrative disease including amyloidosis.  Hematospermia.  Symptoms worse after recent greenlight laser procedure on the prostate for BPH.  Left shoulder pain.  Symptoms are becoming chronic and have not responded to conservative treatment.    Plan:  Encouraged him to proceed with biopsy of the heart for definitive diagnosis.  Overnight oximetry to rule out sleep apnea.  Encouraged follow-up at [place] for the hematospermia.  Discussed that this is probably normal and may improve with time after his procedure.  X-ray and MRI of left shoulder.

## 2019-03-03 ENCOUNTER — HOSPITAL ENCOUNTER (OUTPATIENT)
Dept: RADIOLOGY | Facility: MEDICAL CENTER | Age: 79
End: 2019-03-03
Attending: INTERNAL MEDICINE
Payer: MEDICARE

## 2019-03-03 DIAGNOSIS — G89.29 CHRONIC LEFT SHOULDER PAIN: ICD-10-CM

## 2019-03-03 DIAGNOSIS — M25.512 CHRONIC LEFT SHOULDER PAIN: ICD-10-CM

## 2019-03-03 PROCEDURE — 73030 X-RAY EXAM OF SHOULDER: CPT | Mod: LT

## 2019-03-03 PROCEDURE — 73221 MRI JOINT UPR EXTREM W/O DYE: CPT | Mod: LT

## 2019-03-05 ENCOUNTER — TELEPHONE (OUTPATIENT)
Dept: CARDIOLOGY | Facility: MEDICAL CENTER | Age: 79
End: 2019-03-05

## 2019-03-05 NOTE — TELEPHONE ENCOUNTER
Andre Martinez   You 6 days ago         PUNEET Jefferson,   My question may not have been clear. Please ask Dr Holman if any of my prior [facility] heart tests over the last several years reveal the thickness of the left ventricular wall. If they do, how would I be able to obtain the thicknesses. This will help me to understand the progress of my cardiac amyloidosis.   Thank you.   Andre           Discussed w/ Dr Holman, per Dr Holman, please inform pt Stress Echo done Oct 2017 only showed mild thickening on his heart muscles. His [facility] doctor could order a [employer]et[employer] Pyrophosphate scan if they wish to do so.     Pt notified through [employer]

## 2019-03-11 DIAGNOSIS — R40.0 DAYTIME SOMNOLENCE: ICD-10-CM

## 2019-03-11 DIAGNOSIS — R06.83 SNORING: ICD-10-CM

## 2019-03-19 ENCOUNTER — TELEPHONE (OUTPATIENT)
Dept: CARDIOLOGY | Facility: MEDICAL CENTER | Age: 79
End: 2019-03-19

## 2019-03-19 NOTE — TELEPHONE ENCOUNTER
Telephone encounter    I called the patient to clarify his diagnosis and treatment plan given his follow-up at the [facility] and his supraventricular tachycardia after Persantine administration with his PET myocardial perfusion imaging scan.    The conversation was quite amicable, and he does wish to continue cardiology follow-up in our area despite the fact that he will be seeking treatment for his cardiac amyloidosis through the [facility] in [place], [place].    In light of my upcoming move to the [facility], his wish is to follow-up with Dr. Dejon Raman.    Ideal follow-up would be around mid April.    Jose Holman MD  Cardiologist, [facility]

## 2019-03-19 NOTE — TELEPHONE ENCOUNTER
Attempted to call pt to schedule appt w/ Dr Raman, no answer, left vm to call back     [employer] message sent to pt

## 2019-03-20 NOTE — TELEPHONE ENCOUNTER
Andre Martinez   You 11 hours ago (8:00 PM)         Main [place],     Thank you. April 17 at 12:40 is fine. Where is Dr Raman located?     Andre         Scheduled pt w/ Dr Raman on 4/17/19 at 1240pm, office location confirmed w/ pt.

## 2019-03-28 ENCOUNTER — TELEPHONE (OUTPATIENT)
Dept: CARDIOLOGY | Facility: CLINIC | Age: 79
End: 2019-03-28

## 2019-03-28 NOTE — TELEPHONE ENCOUNTER
Called patient but he was about to get onto a flight so would prefer to talk tomorrow about his concerns of his PET scan.    I offered to call him tomorrow in follow-up which he is agreeable to.    It is my pleasure to participate in the care of Mr. Martinez.  Please do not hesitate to contact me with questions or concerns.    [facility] Heart MD PhD [facility]  Cardiologist [facility]    Please note that this dictation was created using voice recognition software. I have worked with consultants from the vendor as well as technical experts from [facility] to optimize the interface. I have made every reasonable attempt to correct obvious errors, but I expect that there are errors of grammar and possibly content I did not discover before finalizing the note.

## 2019-03-29 NOTE — TELEPHONE ENCOUNTER
I called the patient about his letters in response to his concerns about developing SVT after his PET stress test with dipyridamole vasodilator challenge.    I explained the rationale why we use dipyridamole    I explained that he had a history of SVT and that dipyridamole certainly can lead to arrhythmias.    Most importantly, related to his specific concern about the long half-life of dipyridamole,  I assured him that   Patient's will be better educated on the half life of dipyridamole so that they understand better to avoid strenuous physical activity perhaps in the first 24-48 hours after the stress test.      He was very appreciative of the call had all of his questions and concerns addressed and seems very satisfied with our conversation as a resolution for his concerns.    It is my pleasure to participate in the care of Mr. Martinez.  Please do not hesitate to contact me with questions or concerns.    Bismark Heart MD PhD [facility]  Cardiologist [facility]    Please note that this dictation was created using voice recognition software. I have worked with consultants from the vendor as well as technical experts from [facility] to optimize the interface. I have made every reasonable attempt to correct obvious errors, but I expect that there are errors of grammar and possibly content I did not discover before finalizing the note.

## 2019-04-10 DIAGNOSIS — G47.34 NOCTURNAL HYPOXEMIA: ICD-10-CM

## 2019-04-17 ENCOUNTER — OFFICE VISIT (OUTPATIENT)
Dept: CARDIOLOGY | Facility: MEDICAL CENTER | Age: 79
End: 2019-04-17
Payer: MEDICARE

## 2019-04-17 VITALS
HEIGHT: 70 IN | OXYGEN SATURATION: 98 % | BODY MASS INDEX: 23.62 KG/M2 | HEART RATE: 76 BPM | SYSTOLIC BLOOD PRESSURE: 138 MMHG | WEIGHT: 165 LBS | DIASTOLIC BLOOD PRESSURE: 80 MMHG

## 2019-04-17 DIAGNOSIS — E78.00 PURE HYPERCHOLESTEROLEMIA: ICD-10-CM

## 2019-04-17 DIAGNOSIS — I47.19 AVNRT (AV NODAL RE-ENTRY TACHYCARDIA): ICD-10-CM

## 2019-04-17 DIAGNOSIS — I47.10 SVT (SUPRAVENTRICULAR TACHYCARDIA): ICD-10-CM

## 2019-04-17 DIAGNOSIS — R93.1 AGATSTON CAC SCORE, >400: ICD-10-CM

## 2019-04-17 PROCEDURE — 99214 OFFICE O/P EST MOD 30 MIN: CPT | Performed by: INTERNAL MEDICINE

## 2019-04-17 NOTE — PROGRESS NOTES
"    Cardiology Follow-up Consultation Note    Date of note:    4/17/2019    Primary Care Provider: Azar Cavazos M.D.  Referring Provider: Jose Holman M.D.     Patient Name: Andre Martinez   YOB: 1940  MRN:              [id number]    Chief Complaint: Cardiac Amyloidosis.     History of Present Illness: Andre Martinez is a 78 -year-old man with a history of asymptomatic TTR amyloid with cardiac involvement, severely elevated coronary calcium score with negative PET scan, and asymptomatic short runs of SVT and wide-complex tachycardia who presents for follow-up.     Last seen by Dr. Jose Holman on 1/3/2019.    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 Seen by Dr. Pierson at [place] in [place], and diagnosed with TTR amyloid with fat pad biopsy confirmation.   I have included her summary below:  \"The patient is a delightful 78-year-old who was diagnosed with transthyretin amyloidosis based on a positive PYP scan done at [facility] in January of 2019. He has mildly abnormal serum free light chains, currently kappa 2.83, lambda 1.35 mg/dL with a ratio of 2.10, but there was no monoclonal protein in the serum or in the urine. Of note, he had laser enucleation of the prostate performed 11/13/2018 at [facility], and we will request that that tissue be reviewed for amyloid. He also had a fat aspirate done yesterday. Subsequent to our visit, fat aspirate results returned and are positive for amyloid.     He came to attention due to a preoperative evaluation for prostate surgery. He had a CT calcium score done due to a family history of possible coronary artery disease (sudden death in his father at age 38) in 2013 with a score in the 600s; he was asymptomatic at that time. October 2017, had a stress echocardiogram and underwent almost 12 minutes without any evidence of ischemia. It " was noted that he had increased left ventricular wall thicknesses at that time. A repeat coronary calcium score November 28 showed left main 0, , left circumflex 165, right coronary artery 2093 with a total score of 3091. His cardiologist at that time recommended a PET perfusion study which was done 12/28/2018. The ejection fraction at rest was 30% and increased to 47% (however, his echocardiogram clearly shows normal resting ejection fraction). Perfusion was normal. He did have an episode that night while he was exercising where he developed chest discomfort and rapid palpitations. Emergency room evaluation showed junctional tachycardia which resolved spontaneously. The troponin was slightly elevated with a troponin I of 0.07 ng/L (normal less than 0.04). It was felt that this episode of tachycardia may have been related to the earlier stress test.     He saw Dr. Schreiber in January 2019 for preoperative evaluation prior to surgical intervention for urethral stricture. A transthoracic echocardiogram was performed primarily to reassess the left ventricular ejection fraction which was felt to possibly have been incorrect on the PET scan. The echocardiogram was interpreted as consistent with possible cardiac amyloidosis, left ventricular size was normal, ejection fraction calculated at 54%, but to my review is in the range of 60% to 65%. There are no regional wall motion abnormalities. Moderately increased left ventricular wall thicknesses, the basal septum and posterior wall measured 16/14 mm. The left ventricular mass index was elevated at 158 g/m2. Strain was abnormal with an apical sparing pattern and an overall value of -14%, mildly thickened aortic valve with mild regurgitation, mild dilatation of the sinuses of Valsalva (37 mm). The diastolic filling pattern suggested elevated filling pressure. Left atrial volume was moderately enlarged at 42 mL/m2. The right atrium was described as severely enlarged, to my  "review I would consider it to be moderately enlarged. Right ventricular size is normal with definite increase in right ventricular wall thickness. Right ventricular systolic function appeared normal, although tissue Doppler was slightly low. The inferior vena cava was of normal size with normal inspiratory collapse. The stroke volume index was 35 mL/m2. Cardiac index was reduced at 1.81, but the heart rate was 52 beats per minute.     He had a PYP scan performed on 2019. I reviewed the images and agree with the report. The H/CL ratio which is 1.8, consistent with TTR amyloid. The SPECT images were not available to me, but gave a myocardial score of 2 which is consistent with moderate uptake.     He has never had any significant symptoms. He exercises 1-2 hours per day with aerobic activity with no limiting dyspnea. No orthopnea, paroxysmal nocturnal dyspnea, no edema. No syncope. No palpitations other than the event noted after his PET stress test. No chest pain. As mentioned, his father  suddenly at age 38 of a presumed cardiac event. There is no known family history otherwise of unexplained heart failure or of peripheral neuropathy.     He has had 2 trigger finger releases on the left within the past 10 years. No known carpal tunnel syndrome and no history of spinal stenosis. He does have a history of multiple orthopedic issues as outlined below. This includes the unexpected finding of a right biceps tendon rupture at the time of rotator cuff surgery several years ago.   \"      In terms of SVT, no further palpitations or fast heart beats.     Continues to exercise aerobically a couple hours a day without symptoms.     In terms of hypertension, not checking BP at home.         Review of Systems   Allergic/Immunologic: Positive for environmental allergies.     All other systems reviewed and discussed using a comprehensive questionnaire and are negative.       Past Medical History:   Diagnosis Date   • " Amyloidosis (HCC)     TTR, type pending.    • BPH (benign prostatic hyperplasia)     s/p laser encleation of the prostate   • Cardiac amyloidosis (HCC)    • Colon polyps    • Coronary atherosclerosis of native coronary artery 05/15/2013    Coronary calcium score in the 3000s, negative PET scan and no symptoms so treated medically.    • Dyslipidemia 08/23/2016   • H/O urethral stricture     s/p surgical repair 1/15/2019, [place]   • Kidney cysts    • Meniscus tear    • Pancreas cyst    • Sinusitis, chronic    • Spinal stenosis, lumbar    • SVT (supraventricular tachycardia) ([facility]) 10/23/2018   • Thrombocytopenia (HCC) 7/24/2017       Past Surgical History:   Procedure Laterality Date   • KNEE RECONSTRUCTION  2012    left, partial. multiple previous surgeries.    • ROTATOR CUFF REPAIR  2010    c/b right biceps tendon rupture, presumably repaired.    • ANKLE FUSION Left    • HEMORRHOIDECTOMY     • SINUSOTOMIES      Dr. Guerrier, total of 4 surgeries   • TRIGGER FINGER RELEASE      x 2         Current Outpatient Prescriptions   Medication Sig Dispense Refill   • atorvastatin (LIPITOR) 40 MG Tab Take 1 Tab by mouth every bedtime. 30 Tab    • folic acid (FOLVITE) 1 MG Tab Take 1 mg by mouth every day.     • CHONDROITIN SULFATE A PO Take 625 mg by mouth every day.     • VITAMIN B COMPLEX-C PO Take  by mouth.     • Multiple Vitamins-Minerals (CENTRUM SILVER PO) Take  by mouth.     • thiamine (THIAMINE) 100 MG Tab Take 100 mg by mouth every day.     • Biotin 1000 MCG Tab Take  by mouth.     • vitamin D (CHOLECALCIFEROL) 1000 UNIT TABS Take 2 Tabs by mouth every day. 30 Tab 6   • aspirin EC (ECOTRIN) 81 MG TBEC Take 81 mg by mouth every day.     • Omega-3 Fatty Acids (SALMON OIL PO) Take 2 Caps by mouth every day.     • Cyanocobalamin (VITAMIN B 12 PO) Take 1 Tab by mouth every day.     • coenzyme Q-10 30 MG capsule Take 60 mg by mouth every day.     • GLUCOSA-CHONDR-NA CHONDR-MSM PO Take 1 Tab by mouth every day.     • Mirabegron  "ER 50 MG TABLET SR 24 HR Take 50 mg by mouth every day. (Patient not taking: Reported on 1/3/2019) 90 Tab 3   • Guaifenesin 400 MG Tab Take  by mouth.     • celecoxib (CELEBREX) 200 MG Cap Take 1 Cap by mouth every day. 30 Cap 1     No current facility-administered medications for this visit.          Allergies   Allergen Reactions   • Avelox [Moxifloxacin Hcl In Nacl] Rash     rash   • Oxycodone    • Scopolamine    • Cefdinir Rash     Rash chest, under arm and scalp  Rash chest, under arm and scalp  Rash chest, under arm and scalp         Family History   Problem Relation Age of Onset   • Heart Attack Father 38        doctor in 1946 diagnosed him with MI at home,  before going to the hospital   • Psychiatry Mother         Alzheimers   • Hypertension Brother    • Stroke Brother    • Seizures Brother          Social History     Social History   • Marital status:      Spouse name: N/A   • Number of children: N/A   • Years of education: N/A     Occupational History   • Not on file.     Social History Main Topics   • Smoking status: Never Smoker   • Smokeless tobacco: Never Used   • Alcohol use Yes      Comment: Social    • Drug use: No   • Sexual activity: Not on file     Other Topics Concern   • Not on file     Social History Narrative    Retired from real estate          Physical Exam:  Ambulatory Vitals  /80 (BP Location: Left arm, Patient Position: Sitting, BP Cuff Size: Adult)   Pulse 76   Ht 1.778 m (5' 10\")   Wt 74.8 kg (165 lb)   SpO2 98%    Oxygen Therapy:  Pulse Oximetry: 98 %  BP Readings from Last 4 Encounters:   19 138/80   19 112/70   19 126/64   19 132/78       Weight/BMI: Body mass index is 23.68 kg/m².  Wt Readings from Last 4 Encounters:   19 74.8 kg (165 lb)   19 74.8 kg (165 lb)   19 73 kg (160 lb 15 oz)   18 75 kg (165 lb 5.5 oz)       General: No apparent distress  Eyes: nl conjunctiva  ENT: OP clear, normal external appearance " of nose and ears  Neck: JVP 4-5 cm H2O, no carotid bruits  Lungs: normal respiratory effort, CTAB  Heart: RRR, + S4, no murmurs, no rubs, trace edema bilateral lower extremities. No LV/RV heave on cardiac palpatation. Sustained PMI. 2+ bilateral radial pulses.  2+ bilateral dp pulses.   Abdomen: soft, non tender, non distended, no masses, normal bowel sounds.  No HSM.  Extremities/MSK: no clubbing, no cyanosis  Neurological: No focal sensory deficits  Psychiatric: Appropriate affect, A/O x 3, intact judgement and insight  Skin: Warm extremities    Lab Data Review:  Lab Results   Component Value Date/Time    CHOLSTRLTOT 155 10/19/2018 08:30 AM    CHOLSTRLTOT 149 12/11/2017 08:00 AM    LDL 67 12/11/2017 08:00 AM    HDL 88 (H) 10/19/2018 08:30 AM    HDL 71 12/11/2017 08:00 AM    TRIGLYCERIDE 40 10/19/2018 08:30 AM    TRIGLYCERIDE 53 12/11/2017 08:00 AM       Lab Results   Component Value Date/Time    SODIUM 138 12/28/2018 08:22 PM    POTASSIUM 4.7 12/28/2018 08:22 PM    CHLORIDE 103 12/28/2018 08:22 PM    CO2 29 12/28/2018 08:22 PM    GLUCOSE 101 (H) 12/28/2018 08:22 PM    BUN 23 (H) 12/28/2018 08:22 PM    CREATININE 1.16 12/28/2018 08:22 PM     Lab Results   Component Value Date/Time    ALKPHOSPHAT 89 12/28/2018 08:22 PM    ASTSGOT 42 12/28/2018 08:22 PM    ALTSGPT 36 12/28/2018 08:22 PM    TBILIRUBIN 0.8 12/28/2018 08:22 PM      Lab Results   Component Value Date/Time    WBC 9.3 12/28/2018 08:22 PM     No components found for: HBGA1C  No components found for: TROPONIN  No components found for: BNP      Cardiac Imaging and Procedures Review:    Exercise stress echocardiogram, 10/24/2017: Patient did 11 minutes 59 seconds corresponding with 12.8 metabolic equivalents on the Kael protocol achieving 92% of his age-predicted maximum heart rate with normal systolic augmentation regionally, negative for ischemia.  He also had a stress echocardiogram in 2013 that also was negative for ischemia.     CT coronary calcium scan,  "11/16/2018:  \"Coronary calcification:  LMA - 0.0  LCX - 165.1  LAD - 833.2  RCA - 2093.2  PDA - 0.0  Calcium score:  3091.5\"     PET myocardial perfusion imaging, 12/20/2018, images and report reviewed, my interpretation: Demonstrates systolic dysfunction with a resting ejection fraction of 36% and no territorial ischemia nor transient ischemic dilation.  Left ventricle was borderline dilated.     EKG, 12/20/2018, tracings and report reviewed, mitral rotation: Documents supraventricular tachycardia with a fairly pronounced pseudo-R prime in V1 consistent with AVNRT       Radiology test Review:  CXR: 12/2018  No pulmonary infiltrates or consolidations are noted.  No pleural effusion. No pneumothorax.  Normal cardiopericardial silhouette.      Medical Decision Making:  # TTR Amyloid with Cardiac involvement - asymptomatic with NYHA Class I symptoms. Mildly abnormal free light chains, but likely TTR. Biopsy results pending to determine WT status.  Dr. Mock of hematology has been consulted and if he does not have AL amyloid by biopsy (fat pad and prostate biopsies pending evaluation) then no further hematologic work-up indicated.  I do not see any evidence of volume overload on my exam today, and thus will hold off on diuretics for now given lack of symptoms.  If blood pressure remains elevated, will start spironolactone for mild diuretic effect  -f/u biopsy results, TTR DNA sequence.  -per Dr. Pierson's note, would possibly be a candidate for tafamidis trial, however will need to find a site still enrolling.  I will discuss with Dr. Herrera from [place].  I will also discuss with him and possibly Dr. Pierson whether they would instead recommend just initiating Diflunisal at this time for treatment. Per her note, will start at a dose of 250mg PO daily with torsemide 10mg PO daily and potassium 10mEq PO daily as well.  He would then need BMP every week for 1 month and then monthly for the next 5 months.     # Paola " CAC score, >400  Asymptomatic with excellent exercise tolerance and multiple negative stress tests. Normal LVEF based on last echo performed at [facility].   -continue aspirin 81mg PO daily  -lipitor 40mg PO Daily  -discussed ED precautions    #  AVNRT (AV billy re-entry tachycardia) (HCC)  No evidence of atrial fibrillation/flutter at this time although certainly high risk.  He will let me know if any symptoms develop, and he may be a candidate for empiric anticoagulation given his high CVA risk.     # Pure hypercholesterolemia  Continue lipitor.        Return in about 3 months (around 7/17/2019).      Jaime Raman MD, [facility]  [facility], [address]  [address] [address]  Phone: [phone]  Fax: [phone]

## 2019-04-18 ENCOUNTER — TELEPHONE (OUTPATIENT)
Dept: CARDIOLOGY | Facility: MEDICAL CENTER | Age: 79
End: 2019-04-18

## 2019-04-18 ENCOUNTER — NON-PROVIDER VISIT (OUTPATIENT)
Dept: CARDIOLOGY | Facility: MEDICAL CENTER | Age: 79
End: 2019-04-18
Payer: MEDICARE

## 2019-04-18 VITALS — DIASTOLIC BLOOD PRESSURE: 72 MMHG | HEART RATE: 64 BPM | SYSTOLIC BLOOD PRESSURE: 142 MMHG

## 2019-04-18 NOTE — TELEPHONE ENCOUNTER
Lili Frazier, Med Ass't  Li Martinez, GERMAINE.             Blood pressure check with comparison of home cuff per Dr. Raman.  See Non-Provider Note and Vitals tab.   Thank you   Lili TREJO to Dr Raman

## 2019-04-18 NOTE — NON-PROVIDER
Blood pressure check per Dr. Raman.  Home cuff and our cuff are very comparable. 142/74 home cuff, 65 pulse, 142/72 our cuff, pulse 64.  See Vitals Tab.

## 2019-04-18 NOTE — Clinical Note
Blood pressure check with comparison of home cuff per Dr. Raman.  See Non-Provider Note and Vitals tab. Thank you Lili

## 2019-04-22 ENCOUNTER — TELEPHONE (OUTPATIENT)
Dept: CARDIOLOGY | Facility: MEDICAL CENTER | Age: 79
End: 2019-04-22

## 2019-04-22 ENCOUNTER — OFFICE VISIT (OUTPATIENT)
Dept: INTERNAL MEDICINE | Facility: IMAGING CENTER | Age: 79
End: 2019-04-22
Payer: MEDICARE

## 2019-04-22 ENCOUNTER — PATIENT MESSAGE (OUTPATIENT)
Dept: INTERNAL MEDICINE | Facility: IMAGING CENTER | Age: 79
End: 2019-04-22

## 2019-04-22 VITALS
RESPIRATION RATE: 12 BRPM | TEMPERATURE: 98.6 F | OXYGEN SATURATION: 95 % | WEIGHT: 164 LBS | HEART RATE: 70 BPM | SYSTOLIC BLOOD PRESSURE: 122 MMHG | HEIGHT: 70 IN | DIASTOLIC BLOOD PRESSURE: 70 MMHG | BODY MASS INDEX: 23.48 KG/M2

## 2019-04-22 DIAGNOSIS — E85.4 AMYLOID HEART MUSCLE DISEASE (HCC): ICD-10-CM

## 2019-04-22 DIAGNOSIS — I43 AMYLOID HEART MUSCLE DISEASE (HCC): ICD-10-CM

## 2019-04-22 DIAGNOSIS — J32.4 PANSINUSITIS, UNSPECIFIED CHRONICITY: ICD-10-CM

## 2019-04-22 PROCEDURE — 99214 OFFICE O/P EST MOD 30 MIN: CPT | Performed by: INTERNAL MEDICINE

## 2019-04-22 RX ORDER — DOXYCYCLINE HYCLATE 100 MG
100 TABLET ORAL 2 TIMES DAILY
Qty: 20 TAB | Refills: 0 | Status: SHIPPED | OUTPATIENT
Start: 2019-04-22 | End: 2019-08-14

## 2019-04-22 RX ORDER — DIFLUNISAL 500 MG/1
250 TABLET, FILM COATED ORAL 2 TIMES DAILY
Qty: 90 TAB | Refills: 3 | Status: SHIPPED | OUTPATIENT
Start: 2019-04-22 | End: 2019-08-14

## 2019-04-22 RX ORDER — TORSEMIDE 10 MG/1
10 TABLET ORAL DAILY
Qty: 30 TAB | Refills: 3 | Status: SHIPPED | OUTPATIENT
Start: 2019-04-22 | End: 2019-05-21 | Stop reason: SDUPTHER

## 2019-04-22 NOTE — PROGRESS NOTES
Chief Complaint   Patient presents with   • Heart Problem   • Sinus Problem       HISTORY OF THE PRESENT ILLNESS: Patient is a 78 y.o. male.     Patient comes in complaining of sinus infection.  He recently developed sinus pain and pressure in the frontal maxillary sinuses.  He also had thick, colored discharge.  He took azithromycin with improvement in his symptoms however symptoms quickly returned.  No fever or chills.  He has experienced a mild headache.  No dyspnea or cough.    We also spent time discussing his recent diagnosis of cardiac amyloidosis.  He was diagnosed at the [facility].  His cardiologist there has recommended that he start diflunisal 250 mg twice daily.  He is also a candidate for a newer medication Tafamidis but they are awaiting FDA approval.  She sent a letter with the patient requesting that he be started on the fluconazole with close follow-up of the renal function.    Allergies: Avelox [moxifloxacin hcl in nacl]; Oxycodone; Scopolamine; and Cefdinir    Current Outpatient Prescriptions Ordered in [facility]   Medication Sig Dispense Refill   • Diflunisal 500 MG Tab Take 0.5 Tabs by mouth 2 Times a Day. 90 Tab 3   • torsemide (DEMADEX) 10 MG tablet Take 1 Tab by mouth every day. 30 Tab 3   • doxycycline (VIBRAMYCIN) 100 MG Tab Take 1 Tab by mouth 2 times a day. 20 Tab 0   • atorvastatin (LIPITOR) 40 MG Tab Take 1 Tab by mouth every bedtime. 30 Tab    • folic acid (FOLVITE) 1 MG Tab Take 1 mg by mouth every day.     • CHONDROITIN SULFATE A PO Take 625 mg by mouth every day.     • VITAMIN B COMPLEX-C PO Take  by mouth.     • Multiple Vitamins-Minerals (CENTRUM SILVER PO) Take  by mouth.     • thiamine (THIAMINE) 100 MG Tab Take 100 mg by mouth every day.     • Biotin 1000 MCG Tab Take  by mouth.     • Mirabegron ER 50 MG TABLET SR 24 HR Take 50 mg by mouth every day. (Patient not taking: Reported on 1/3/2019) 90 Tab 3   • Guaifenesin 400 MG Tab Take  by mouth.     • vitamin D  "(CHOLECALCIFEROL) 1000 UNIT TABS Take 2 Tabs by mouth every day. 30 Tab 6   • aspirin EC (ECOTRIN) 81 MG TBEC Take 81 mg by mouth every day.     • Omega-3 Fatty Acids (SALMON OIL PO) Take 2 Caps by mouth every day.     • Cyanocobalamin (VITAMIN B 12 PO) Take 1 Tab by mouth every day.     • coenzyme Q-10 30 MG capsule Take 60 mg by mouth every day.     • GLUCOSA-CHONDR-NA CHONDR-MSM PO Take 1 Tab by mouth every day.       No current [facility]-ordered facility-administered medications on file.        Past medical history, social history and family history were reviewed from chart today    Review of systems: Per HPI.    Denies headache, chest pain, fever, chills, diarrhea, constipation, abdominal pain, palpitations, depression   All others negative.     Exam: /70 (BP Location: Left arm, Patient Position: Sitting, BP Cuff Size: Adult)   Pulse 70   Temp 37 °C (98.6 °F) (Temporal)   Resp 12   Ht 1.778 m (5' 10\")   Wt 74.4 kg (164 lb)   SpO2 95%   General: Well-appearing. Well-developed. No signs of distress.  HEENT: Nasal cavities are edematous with yellow discharge. Posterior oral cavity is injected. Ears, canals and tympanic membranes are grossly normal and without sign of infection.  Neck: Supple without JVD or bruit.  Pulmonary: Clear with good breath sounds. Normal effort.  Cardiovascular: Regular. Carotid and radial pulses are intact.  Abdomen: Soft, nontender, nondistended. Spleen and liver are not enlarged.  Neurologic: Cranial nerves II through XII are grossly normal, alert and oriented x3      Diagnosis:  1. Amyloid heart muscle disease (HCC)  Diflunisal 500 MG Tab    torsemide (DEMADEX) 10 MG tablet    Basic Metabolic Panel   2. Pansinusitis, unspecified chronicity  doxycycline (VIBRAMYCIN) 100 MG Tab       Discussed his amyloid issues.  I answered his questions the best my ability but discussed that I am not an expert regarding amyloidosis.  I think it is reasonable to start the anti-inflammatory " treatment with low-dose diuretic recommended by Dr. Pierson at the [facility].  She is recommended close renal follow-up.  His last creatinine was 1.1.  We will start weekly monitoring for 1 month then monthly for a total of 6 months.  I also discussed with the patient that the torsemide could cause hypokalemia but we will assess for this with the lab and start potassium if indicated.    He has symptoms consistent with acute sinusitis.  Recommended doxycycline.  He is currently on nasal steroid and I encouraged him to continue with the Nasacort.

## 2019-04-22 NOTE — TELEPHONE ENCOUNTER
Andre Raman M.D. 14 hours ago (11:29 PM)         Meño Raman,     I just received a message from Dr. Pierson, who recommends starting Diflunisal immediately and that Tafamadis could be approved in July which leaves very litte time to try to find an opening on an ongoing trial.   Could you prescribe the Diflunisal? I understand that there are risks to the kidneys and/or liver from the Diflunisal, which will require weekly lab tests and then monthly. Would you have the information for, and be able to prescribe, these lab tests?   [place] leave [place]. Is there a possibility of starting Diflunisal before my departure?     Andre Powell        Discussed w/ Dr Jose Holman-ADD, per Dr Holman, he does not feel comfortable prescribing this medication and would advice pt to get the prescription from clinical trial MD or Dr Pierson.     Pt notified through [employer] TREJO to Dr Raman

## 2019-04-23 ENCOUNTER — TELEPHONE (OUTPATIENT)
Dept: CARDIOLOGY | Facility: MEDICAL CENTER | Age: 79
End: 2019-04-23

## 2019-04-23 ENCOUNTER — APPOINTMENT (RX ONLY)
Dept: URBAN - METROPOLITAN AREA CLINIC 4 | Facility: CLINIC | Age: 79
Setting detail: DERMATOLOGY
End: 2019-04-23

## 2019-04-23 DIAGNOSIS — E85.4 CARDIAC AMYLOIDOSIS (HCC): ICD-10-CM

## 2019-04-23 DIAGNOSIS — L82.1 OTHER SEBORRHEIC KERATOSIS: ICD-10-CM

## 2019-04-23 DIAGNOSIS — L81.4 OTHER MELANIN HYPERPIGMENTATION: ICD-10-CM

## 2019-04-23 DIAGNOSIS — I43 CARDIAC AMYLOIDOSIS (HCC): ICD-10-CM

## 2019-04-23 DIAGNOSIS — D18.0 HEMANGIOMA: ICD-10-CM

## 2019-04-23 PROBLEM — D18.01 HEMANGIOMA OF SKIN AND SUBCUTANEOUS TISSUE: Status: ACTIVE | Noted: 2019-04-23

## 2019-04-23 PROCEDURE — 99213 OFFICE O/P EST LOW 20 MIN: CPT

## 2019-04-23 ASSESSMENT — LOCATION DETAILED DESCRIPTION DERM
LOCATION DETAILED: LEFT BUTTOCK
LOCATION DETAILED: RIGHT MID-UPPER BACK
LOCATION DETAILED: LEFT SUPERIOR MEDIAL MIDBACK
LOCATION DETAILED: RIGHT SUPERIOR FOREHEAD
LOCATION DETAILED: RIGHT SUPERIOR UPPER BACK
LOCATION DETAILED: LEFT LATERAL TEMPLE

## 2019-04-23 ASSESSMENT — LOCATION ZONE DERM
LOCATION ZONE: FACE
LOCATION ZONE: TRUNK

## 2019-04-23 ASSESSMENT — LOCATION SIMPLE DESCRIPTION DERM
LOCATION SIMPLE: RIGHT FOREHEAD
LOCATION SIMPLE: RIGHT UPPER BACK
LOCATION SIMPLE: LEFT BUTTOCK
LOCATION SIMPLE: LEFT LOWER BACK
LOCATION SIMPLE: LEFT TEMPLE

## 2019-04-23 NOTE — TELEPHONE ENCOUNTER
Called Mr. Martinez and leg a message.  I think it's best for Dr. Pierson to prescribe diflunisal at this time and I am happy to monitor for symptoms here and monitor lab tests.  She would be the one to be able to answer his specific questions.  If he would like to have a amyloid cardiologist within driving distance, I did suggest Dr. Cayetano Herrera at [place].  I asked him to call in and let Li know if he would like a referral to him.  I will try to call again next week.

## 2019-04-23 NOTE — PROCEDURE: REASSURANCE
Hide Additional Notes?: No
Detail Level: Generalized
Include Location In Plan?: Yes
Detail Level: Zone
Additional Note: Pt denies LN2 today.
Detail Level: Detailed

## 2019-04-23 NOTE — TELEPHONE ENCOUNTER
----- Message from Li Martinez R.N. sent at 4/22/2019  1:32 PM PDT -----  Regarding: FW: Prescription Question  Contact: [phone]  FY.     I've discussed this with Dr Jose Sidhu and he recommends for pt to get the prescription from Dr Pierson.     Thank You!      ----- Message -----  From: Yani Gunn, Med Ass't  Sent: 4/22/2019  10:43 AM  To: Li Martinez R.N.  Subject: FW: Prescription Question                            ----- Message -----  From: Andre Martinez  Sent: 4/21/2019  11:29 PM  To: Sterling Gillespie/Brandon  Subject: Prescription Question                            Hello Dr. Raman,    I just received a message from Dr. Pierson, who recommends starting Diflunisal immediately and that Tafamadis could be approved in July which leaves very litte time to try to find an opening on an ongoing trial.  Could you prescribe the Diflunisal? I understand that there are risks to the kidneys and/or liver from the Diflunisal, which will require weekly lab tests and then monthly. Would you have the information for, and be able to prescribe, these lab tests?   [place] leave for a [place]. Is there a possibility of starting Diflunisal before my departure?    Andre Powell

## 2019-04-24 NOTE — TELEPHONE ENCOUNTER
Andre Martinez   You 14 hours ago (5:32 PM)         Meño Jefferson,   I just received a voice mail from Dr Raman. I believe he said he is on paternity leave. Please congratulate him.   He  said that he was familiar with a doctor at the [facility] to whom you could get me referral. Would you please be so kind as to obtain that referral.   Thank you.   Andre        Urgent Referral to [place] placed.     Pt notified through [employer]

## 2019-04-29 ENCOUNTER — NON-PROVIDER VISIT (OUTPATIENT)
Dept: INTERNAL MEDICINE | Facility: IMAGING CENTER | Age: 79
End: 2019-04-29
Payer: MEDICARE

## 2019-04-29 ENCOUNTER — HOSPITAL ENCOUNTER (OUTPATIENT)
Facility: MEDICAL CENTER | Age: 79
End: 2019-04-29
Attending: INTERNAL MEDICINE
Payer: MEDICARE

## 2019-04-29 DIAGNOSIS — E85.4 AMYLOID HEART MUSCLE DISEASE (HCC): ICD-10-CM

## 2019-04-29 DIAGNOSIS — I43 AMYLOID HEART MUSCLE DISEASE (HCC): ICD-10-CM

## 2019-04-29 LAB
ANION GAP SERPL CALC-SCNC: 5 MMOL/L (ref 0–11.9)
BUN SERPL-MCNC: 29 MG/DL (ref 8–22)
CALCIUM SERPL-MCNC: 10.3 MG/DL (ref 8.5–10.5)
CHLORIDE SERPL-SCNC: 101 MMOL/L (ref 96–112)
CO2 SERPL-SCNC: 34 MMOL/L (ref 20–33)
CREAT SERPL-MCNC: 1.09 MG/DL (ref 0.5–1.4)
GLUCOSE SERPL-MCNC: 101 MG/DL (ref 65–99)
POTASSIUM SERPL-SCNC: 4.1 MMOL/L (ref 3.6–5.5)
SODIUM SERPL-SCNC: 140 MMOL/L (ref 135–145)

## 2019-04-29 PROCEDURE — 80048 BASIC METABOLIC PNL TOTAL CA: CPT

## 2019-05-17 DIAGNOSIS — I43 CARDIAC AMYLOIDOSIS (HCC): ICD-10-CM

## 2019-05-17 DIAGNOSIS — E78.00 PURE HYPERCHOLESTEROLEMIA: ICD-10-CM

## 2019-05-17 DIAGNOSIS — E85.4 CARDIAC AMYLOIDOSIS (HCC): ICD-10-CM

## 2019-05-17 DIAGNOSIS — R35.1 BENIGN PROSTATIC HYPERPLASIA WITH NOCTURIA: ICD-10-CM

## 2019-05-17 DIAGNOSIS — D69.6 THROMBOCYTOPENIA (HCC): ICD-10-CM

## 2019-05-17 DIAGNOSIS — N40.1 BENIGN PROSTATIC HYPERPLASIA WITH NOCTURIA: ICD-10-CM

## 2019-05-17 DIAGNOSIS — E78.5 DYSLIPIDEMIA: ICD-10-CM

## 2019-05-20 ENCOUNTER — NON-PROVIDER VISIT (OUTPATIENT)
Dept: INTERNAL MEDICINE | Facility: IMAGING CENTER | Age: 79
End: 2019-05-20
Payer: MEDICARE

## 2019-05-21 ENCOUNTER — OFFICE VISIT (OUTPATIENT)
Dept: INTERNAL MEDICINE | Facility: IMAGING CENTER | Age: 79
End: 2019-05-21
Payer: MEDICARE

## 2019-05-21 ENCOUNTER — HOSPITAL ENCOUNTER (OUTPATIENT)
Facility: MEDICAL CENTER | Age: 79
End: 2019-05-21
Attending: INTERNAL MEDICINE
Payer: MEDICARE

## 2019-05-21 ENCOUNTER — NON-PROVIDER VISIT (OUTPATIENT)
Dept: INTERNAL MEDICINE | Facility: IMAGING CENTER | Age: 79
End: 2019-05-21
Payer: MEDICARE

## 2019-05-21 VITALS
TEMPERATURE: 98.8 F | RESPIRATION RATE: 12 BRPM | SYSTOLIC BLOOD PRESSURE: 128 MMHG | OXYGEN SATURATION: 97 % | HEIGHT: 70 IN | HEART RATE: 63 BPM | BODY MASS INDEX: 22.76 KG/M2 | DIASTOLIC BLOOD PRESSURE: 80 MMHG | WEIGHT: 159 LBS

## 2019-05-21 DIAGNOSIS — J32.9 CHRONIC SINUSITIS, UNSPECIFIED LOCATION: ICD-10-CM

## 2019-05-21 DIAGNOSIS — E85.4 AMYLOID HEART MUSCLE DISEASE (HCC): ICD-10-CM

## 2019-05-21 DIAGNOSIS — H61.23 BILATERAL IMPACTED CERUMEN: ICD-10-CM

## 2019-05-21 DIAGNOSIS — E78.00 PURE HYPERCHOLESTEROLEMIA: ICD-10-CM

## 2019-05-21 DIAGNOSIS — I43 CARDIAC AMYLOIDOSIS (HCC): ICD-10-CM

## 2019-05-21 DIAGNOSIS — N40.1 BENIGN PROSTATIC HYPERPLASIA WITH NOCTURIA: ICD-10-CM

## 2019-05-21 DIAGNOSIS — R35.1 BENIGN PROSTATIC HYPERPLASIA WITH NOCTURIA: ICD-10-CM

## 2019-05-21 DIAGNOSIS — I43 AMYLOID HEART MUSCLE DISEASE (HCC): ICD-10-CM

## 2019-05-21 DIAGNOSIS — H93.8X9 SENSATION OF FULLNESS IN EAR, UNSPECIFIED LATERALITY: ICD-10-CM

## 2019-05-21 DIAGNOSIS — N40.0 BENIGN PROSTATIC HYPERPLASIA WITHOUT LOWER URINARY TRACT SYMPTOMS: ICD-10-CM

## 2019-05-21 DIAGNOSIS — E85.4 CARDIAC AMYLOIDOSIS (HCC): ICD-10-CM

## 2019-05-21 DIAGNOSIS — H91.93 DECREASED HEARING OF BOTH EARS: ICD-10-CM

## 2019-05-21 LAB
ALBUMIN SERPL BCP-MCNC: 4.1 G/DL (ref 3.2–4.9)
ALBUMIN/GLOB SERPL: 1.6 G/DL
ALP SERPL-CCNC: 88 U/L (ref 30–99)
ALT SERPL-CCNC: 50 U/L (ref 2–50)
ANION GAP SERPL CALC-SCNC: 10 MMOL/L (ref 0–11.9)
APPEARANCE UR: CLEAR
AST SERPL-CCNC: 39 U/L (ref 12–45)
BASOPHILS # BLD AUTO: 0.5 % (ref 0–1.8)
BASOPHILS # BLD: 0.04 K/UL (ref 0–0.12)
BILIRUB SERPL-MCNC: 1 MG/DL (ref 0.1–1.5)
BILIRUB UR QL STRIP.AUTO: NEGATIVE
BNP SERPL-MCNC: 111 PG/ML (ref 0–100)
BUN SERPL-MCNC: 29 MG/DL (ref 8–22)
CALCIUM SERPL-MCNC: 9.9 MG/DL (ref 8.5–10.5)
CHLORIDE SERPL-SCNC: 102 MMOL/L (ref 96–112)
CHOLEST SERPL-MCNC: 137 MG/DL (ref 100–199)
CO2 SERPL-SCNC: 28 MMOL/L (ref 20–33)
COLOR UR: YELLOW
CREAT SERPL-MCNC: 1.22 MG/DL (ref 0.5–1.4)
EOSINOPHIL # BLD AUTO: 0.08 K/UL (ref 0–0.51)
EOSINOPHIL NFR BLD: 0.9 % (ref 0–6.9)
ERYTHROCYTE [DISTWIDTH] IN BLOOD BY AUTOMATED COUNT: 53.4 FL (ref 35.9–50)
GLOBULIN SER CALC-MCNC: 2.5 G/DL (ref 1.9–3.5)
GLUCOSE SERPL-MCNC: 76 MG/DL (ref 65–99)
GLUCOSE UR STRIP.AUTO-MCNC: NEGATIVE MG/DL
HCT VFR BLD AUTO: 45.1 % (ref 42–52)
HDLC SERPL-MCNC: 83 MG/DL
HGB BLD-MCNC: 15.1 G/DL (ref 14–18)
IMM GRANULOCYTES # BLD AUTO: 0.02 K/UL (ref 0–0.11)
IMM GRANULOCYTES NFR BLD AUTO: 0.2 % (ref 0–0.9)
KETONES UR STRIP.AUTO-MCNC: NEGATIVE MG/DL
LDLC SERPL CALC-MCNC: 46 MG/DL
LEUKOCYTE ESTERASE UR QL STRIP.AUTO: NEGATIVE
LYMPHOCYTES # BLD AUTO: 3.52 K/UL (ref 1–4.8)
LYMPHOCYTES NFR BLD: 40.5 % (ref 22–41)
MCH RBC QN AUTO: 34.3 PG (ref 27–33)
MCHC RBC AUTO-ENTMCNC: 33.5 G/DL (ref 33.7–35.3)
MCV RBC AUTO: 102.5 FL (ref 81.4–97.8)
MICRO URNS: NORMAL
MONOCYTES # BLD AUTO: 0.6 K/UL (ref 0–0.85)
MONOCYTES NFR BLD AUTO: 6.9 % (ref 0–13.4)
NEUTROPHILS # BLD AUTO: 4.44 K/UL (ref 1.82–7.42)
NEUTROPHILS NFR BLD: 51 % (ref 44–72)
NITRITE UR QL STRIP.AUTO: NEGATIVE
NRBC # BLD AUTO: 0 K/UL
NRBC BLD-RTO: 0 /100 WBC
PH UR STRIP.AUTO: 7.5 [PH]
PLATELET # BLD AUTO: 112 K/UL (ref 164–446)
PMV BLD AUTO: 13.1 FL (ref 9–12.9)
POTASSIUM SERPL-SCNC: 4.6 MMOL/L (ref 3.6–5.5)
PROT SERPL-MCNC: 6.6 G/DL (ref 6–8.2)
PROT UR QL STRIP: NEGATIVE MG/DL
RBC # BLD AUTO: 4.4 M/UL (ref 4.7–6.1)
RBC UR QL AUTO: NEGATIVE
SODIUM SERPL-SCNC: 140 MMOL/L (ref 135–145)
SP GR UR STRIP.AUTO: 1.02
TRIGL SERPL-MCNC: 38 MG/DL (ref 0–149)
UROBILINOGEN UR STRIP.AUTO-MCNC: 0.2 MG/DL
WBC # BLD AUTO: 8.7 K/UL (ref 4.8–10.8)

## 2019-05-21 PROCEDURE — 80061 LIPID PANEL: CPT

## 2019-05-21 PROCEDURE — 69210 REMOVE IMPACTED EAR WAX UNI: CPT | Performed by: INTERNAL MEDICINE

## 2019-05-21 PROCEDURE — 99214 OFFICE O/P EST MOD 30 MIN: CPT | Mod: 25 | Performed by: INTERNAL MEDICINE

## 2019-05-21 PROCEDURE — 80053 COMPREHEN METABOLIC PANEL: CPT

## 2019-05-21 PROCEDURE — 83880 ASSAY OF NATRIURETIC PEPTIDE: CPT

## 2019-05-21 PROCEDURE — 81003 URINALYSIS AUTO W/O SCOPE: CPT

## 2019-05-21 PROCEDURE — 85025 COMPLETE CBC W/AUTO DIFF WBC: CPT

## 2019-05-21 RX ORDER — TORSEMIDE 10 MG/1
10 TABLET ORAL DAILY
Qty: 90 TAB | Refills: 3 | Status: SHIPPED | OUTPATIENT
Start: 2019-05-21 | End: 2019-08-14

## 2019-05-21 NOTE — PROGRESS NOTES
Chief Complaint   Patient presents with   • Ear Fullness       HISTORY OF THE PRESENT ILLNESS: Patient is a 78 y.o. male.     Patient comes in with bilateral fullness in the ear.  Symptoms are worse on the left.  He has noticed decreased hearing.  Symptoms are similar to previous cerumen impaction.    He also complains of ongoing sinus congestion.  He has completed a course of azithromycin and doxycycline.  He complains of pressure in both the frontal and maxillary sinuses.    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  No dyspnea, peripheral edema, chest pain or other.  He is now on diflunisal and furosemide.  He has lost 5 pounds since starting the furosemide.  No obvious side effects from the Diflucan all.      Allergies: Avelox [moxifloxacin hcl in nacl]; Oxycodone; Scopolamine; and Cefdinir    Current Outpatient Prescriptions Ordered in [facility]   Medication Sig Dispense Refill   • torsemide (DEMADEX) 10 MG tablet Take 1 Tab by mouth every day. 90 Tab 3   • Diflunisal 500 MG Tab Take 0.5 Tabs by mouth 2 Times a Day. 90 Tab 3   • doxycycline (VIBRAMYCIN) 100 MG Tab Take 1 Tab by mouth 2 times a day. 20 Tab 0   • atorvastatin (LIPITOR) 40 MG Tab Take 1 Tab by mouth every bedtime. 30 Tab    • folic acid (FOLVITE) 1 MG Tab Take 1 mg by mouth every day.     • CHONDROITIN SULFATE A PO Take 625 mg by mouth every day.     • VITAMIN B COMPLEX-C PO Take  by mouth.     • Multiple Vitamins-Minerals (CENTRUM SILVER PO) Take  by mouth.     • thiamine (THIAMINE) 100 MG Tab Take 100 mg by mouth every day.     • Biotin 1000 MCG Tab Take  by mouth.     • Mirabegron ER 50 MG TABLET SR 24 HR Take 50 mg by mouth every day. (Patient not taking: Reported on 1/3/2019) 90 Tab 3   • Guaifenesin 400 MG Tab Take  by mouth.     • vitamin D (CHOLECALCIFEROL) 1000 UNIT TABS Take 2 Tabs by mouth every day. 30 Tab 6   • aspirin EC (ECOTRIN) 81 MG TBEC Take 81 mg by mouth every day.     • Omega-3 Fatty Acids (SALMON OIL PO) Take 2 Caps by mouth  "every day.     • Cyanocobalamin (VITAMIN B 12 PO) Take 1 Tab by mouth every day.     • coenzyme Q-10 30 MG capsule Take 60 mg by mouth every day.     • GLUCOSA-CHONDR-NA CHONDR-MSM PO Take 1 Tab by mouth every day.       No current [facility]-ordered facility-administered medications on file.        Past medical history, social history and family history were reviewed from chart today    Review of systems: Per HPI.    Denies headache, chest pain, fever, chills, diarrhea, constipation, abdominal pain, palpitations, depression   All others negative.     Exam: /80 (BP Location: Left arm, Patient Position: Sitting, BP Cuff Size: Adult)   Pulse 63   Temp 37.1 °C (98.8 °F) (Temporal)   Resp 12   Ht 1.778 m (5' 10\")   Wt 72.1 kg (159 lb)   SpO2 97%   General: Well-appearing. Well-developed. No signs of distress.  HEENT: Grossly normal. Oral cavity is pink and moist.  Bilateral canals were obstructed with brown cerumen.  After removal there was oozing in the right canal from the dry skin and wax adhering to the canal.  The tympanic membrane was normal.  The left was normal appearance with normal tympanic membrane.  Neck: Supple without JVD or bruit.  Pulmonary: Clear with good breath sounds. Normal effort.  Cardiovascular: Regular. Carotid and radial pulses are intact.  Abdomen: Soft, nontender, nondistended. Spleen and liver are not enlarged.  Neurologic: Cranial nerves II through XII are grossly normal, alert and oriented x3      Diagnosis:  1. Amyloid heart muscle disease (HCC)  torsemide (DEMADEX) 10 MG tablet   2. Sensation of fullness in ear, unspecified laterality     3. Decreased hearing of both ears     4. Bilateral impacted cerumen     5. Chronic sinusitis, unspecified location         Plan:  Bilateral cerumen extraction:    It was discussed with the patient that they have a cerumen impaction. The risks and benefits of removal were discussed. Specifically we discussed the benefits of reduced risk of " infection, improved visualization of the landmarks and to improve discomfort due to  impaction. Risk were discussed and included but not limited to infection, bleeding, pain and possibly tympanic membrane perforation. A large cerumen plug was removed from the each canal. The ears were lavaged with water and forceps were used to remove the plug. No complications from some impaction removal. Post cerumen impaction care was discussed. Encouraged regular cleaning but to avoid deep penetration with foreign objects such as a Q-tip.    Discussed steroids as next step for sinus congestion.  If he does not respond consider imaging of the sinuses.    Continue current treatment for cardiac issues discussed above..  Patient plans to switch to newer medication.  [employer] is providing initial samples.  This is guided by cardiology at [place].  We have completed the paperwork for the samples Carole

## 2019-05-30 ENCOUNTER — SLEEP CENTER VISIT (OUTPATIENT)
Dept: SLEEP MEDICINE | Facility: MEDICAL CENTER | Age: 79
End: 2019-05-30
Payer: MEDICARE

## 2019-05-30 VITALS
BODY MASS INDEX: 22.62 KG/M2 | HEIGHT: 70 IN | DIASTOLIC BLOOD PRESSURE: 64 MMHG | HEART RATE: 61 BPM | OXYGEN SATURATION: 97 % | RESPIRATION RATE: 15 BRPM | WEIGHT: 158 LBS | SYSTOLIC BLOOD PRESSURE: 118 MMHG

## 2019-05-30 DIAGNOSIS — E85.4 CARDIAC AMYLOIDOSIS (HCC): ICD-10-CM

## 2019-05-30 DIAGNOSIS — G47.30 SLEEP DISORDER BREATHING: ICD-10-CM

## 2019-05-30 DIAGNOSIS — I43 CARDIAC AMYLOIDOSIS (HCC): ICD-10-CM

## 2019-05-30 PROCEDURE — 99204 OFFICE O/P NEW MOD 45 MIN: CPT | Performed by: FAMILY MEDICINE

## 2019-05-30 NOTE — PATIENT INSTRUCTIONS
Sleep Apnea  Sleep apnea is a condition in which breathing pauses or becomes shallow during sleep. Episodes of sleep apnea usually last 10 seconds or longer, and they may occur as many as 20 times an hour. Sleep apnea disrupts your sleep and keeps your body from getting the rest that it needs. This condition can increase your risk of certain health problems, including:  · Heart attack.  · Stroke.  · Obesity.  · Diabetes.  · Heart failure.  · Irregular heartbeat.  There are three kinds of sleep apnea:  · Obstructive sleep apnea. This kind is caused by a blocked or collapsed airway.  · Central sleep apnea. This kind happens when the part of the brain that controls breathing does not send the correct signals to the muscles that control breathing.  · Mixed sleep apnea. This is a combination of obstructive and central sleep apnea.  What are the causes?  The most common cause of this condition is a collapsed or blocked airway. An airway can collapse or become blocked if:  · Your throat muscles are abnormally relaxed.  · Your tongue and tonsils are larger than normal.  · You are overweight.  · Your airway is smaller than normal.  What increases the risk?  This condition is more likely to develop in people who:  · Are overweight.  · Smoke.  · Have a smaller than normal airway.  · Are elderly.  · Are male.  · Drink alcohol.  · Take sedatives or tranquilizers.  · Have a family history of sleep apnea.  What are the signs or symptoms?  Symptoms of this condition include:  · Trouble staying asleep.  · Daytime sleepiness and tiredness.  · Irritability.  · Loud snoring.  · Morning headaches.  · Trouble concentrating.  · Forgetfulness.  · Decreased interest in sex.  · Unexplained sleepiness.  · Mood swings.  · Personality changes.  · Feelings of depression.  · Waking up often during the night to urinate.  · Dry mouth.  · Sore throat.  How is this diagnosed?  This condition may be diagnosed with:  · A medical history.  · A physical  exam.  · A series of tests that are done while you are sleeping (sleep study). These tests are usually done in a sleep lab, but they may also be done at home.  How is this treated?  Treatment for this condition aims to restore normal breathing and to ease symptoms during sleep. It may involve managing health issues that can affect breathing, such as high blood pressure or obesity. Treatment may include:  · Sleeping on your side.  · Using a decongestant if you have nasal congestion.  · Avoiding the use of depressants, including alcohol, sedatives, and narcotics.  · Losing weight if you are overweight.  · Making changes to your diet.  · Quitting smoking.  · Using a device to open your airway while you sleep, such as:  ¨ An oral appliance. This is a custom-made mouthpiece that shifts your lower jaw forward.  ¨ A continuous positive airway pressure (CPAP) device. This device delivers oxygen to your airway through a mask.  ¨ A nasal expiratory positive airway pressure (EPAP) device. This device has valves that you put into each nostril.  ¨ A bi-level positive airway pressure (BPAP) device. This device delivers oxygen to your airway through a mask.  · Surgery if other treatments do not work. During surgery, excess tissue is removed to create a wider airway.  It is important to get treatment for sleep apnea. Without treatment, this condition can lead to:  · High blood pressure.  · Coronary artery disease.  · (Men) An inability to achieve or maintain an erection (impotence).  · Reduced thinking abilities.  Follow these instructions at home:  · Make any lifestyle changes that your health care provider recommends.  · Eat a healthy, well-balanced diet.  · Take over-the-counter and prescription medicines only as told by your health care provider.  · Avoid using depressants, including alcohol, sedatives, and narcotics.  · Take steps to lose weight if you are overweight.  · If you were given a device to open your airway while you  sleep, use it only as told by your health care provider.  · Do not use any tobacco products, such as cigarettes, chewing tobacco, and e-cigarettes. If you need help quitting, ask your health care provider.  · Keep all follow-up visits as told by your health care provider. This is important.  Contact a health care provider if:  · The device that you received to open your airway during sleep is uncomfortable or does not seem to be working.  · Your symptoms do not improve.  · Your symptoms get worse.  Get help right away if:  · You develop chest pain.  · You develop shortness of breath.  · You develop discomfort in your back, arms, or stomach.  · You have trouble speaking.  · You have weakness on one side of your body.  · You have drooping in your face.  These symptoms may represent a serious problem that is an emergency. Do not wait to see if the symptoms will go away. Get medical help right away. Call your local emergency services (911 in the [place] Do not drive yourself to the hospital.   This information is not intended to replace advice given to you by your health care provider. Make sure you discuss any questions you have with your health care provider.  Document Released: 12/08/2003 Document Revised: 08/13/2017 Document Reviewed: 09/26/2016  Elsevier Interactive Patient Education © 2017 [employer]     No

## 2019-05-30 NOTE — PROGRESS NOTES
"     [facility]  Consult Note     Date: 5/30/2019 / Time: 11:13 AM    Patient ID:   Name:             Andre Martinez   YOB: 1940  Age:                 78 y.o.  male   MRN:               [id number]      Thank you for requesting a sleep medicine consultation on Andre Martinez at the sleep center. He presents today with the chief complaints of hypoxia. He is referred by Dr. Cavazos for evaluation and treatment of sleep disorder breathing. He had an OPO on 4/8/19 which showed  O2 Sat. yulissa was 87% and mean O2 sat was 93 % and baseline O2 at 99%. O2 sat was below 88% for 3.3 min of the flow evaluation time. Oxygen Desaturation (>=3%) Index was elevated at 7.7/hr.      HISTORY OF PRESENT ILLNESS:       At night,  Andre Martinez goes to bed around 12 am on weekdays andon the weekends. He gets out of bed at 7 am on weekdays andon the weekends.  He averages about 7 hrs of sleep on a good night. Pt rarely has a bad nights.He falls asleep within 10 minutes. He awakens 3-4 times during the night due to bathroom use. It takes him few min to fall back asleep.      He is not aware of snoring/witnessed apneas/gasping or choking in sleep.  He  denies any symptoms of restless legs syndrome such as an \"urge to move\"  He  legs in the evening or bedtime. He  denies any symptoms of narcolepsy such as sleep paralysis or cataplexy, or any symptoms to suggest parasomnias such as sleep walking or acting out of dreams. He  has not used any medications for the sleep problem.    Overall, he doesnot finds his sleep refreshing. When He  wakes up in the morning, He does feel tired. In terms of  excessive daytime sleepiness, he denies of sleepiness while  at work, while reading or watching TV or while driving. [place] sleepiness scale score is normal at 3/24.He does not take regular naps. He drinks about 0 caffeinated beverages per day.      REVIEW OF SYSTEMS:       Constitutional: Denies fevers, Denies weight " changes  Eyes: Denies changes in vision, no eye pain  Ears/Nose/Throat/Mouth: Denies nasal congestion or sore throat , + sinus pressure   Cardiovascular: Denies chest pain or palpitations   Respiratory: Denies shortness of breath , Denies cough  Gastrointestinal/Hepatic: Denies abdominal pain, nausea, vomiting, diarrhea, constipation or GI bleeding   Genitourinary: Denies bladder dysfunction, dysuria or frequency  Musculoskeletal/Rheum: Denies  joint pain and swelling   Skin/Breast: Denies rash  Neurological: Denies headache, confusion, memory loss or focal weakness/parasthesias  Psychiatric: denies mood disorder     Comprehensive review of systems form is reviewed with the patient and is attached in the EMR.     PMH:  has a past medical history of Amyloidosis (HCC); BPH (benign prostatic hyperplasia); Cardiac amyloidosis (HCC); Chickenpox; Colon polyps; Coronary atherosclerosis of native coronary artery (05/15/2013); Coronary heart disease; Dyslipidemia (08/23/2016); H/O urethral stricture; Kidney cysts; Meniscus tear; Pancreas cyst; Scarlet fever; Sinusitis, chronic; Spinal stenosis, lumbar; SVT (supraventricular tachycardia) (HCC) (10/23/2018); and Thrombocytopenia (HCC) (7/24/2017).  MEDS:   Current Outpatient Prescriptions:   •  AMOXICILLIN PO, Take  by mouth., Disp: , Rfl:   •  torsemide (DEMADEX) 10 MG tablet, Take 1 Tab by mouth every day., Disp: 90 Tab, Rfl: 3  •  Diflunisal 500 MG Tab, Take 0.5 Tabs by mouth 2 Times a Day., Disp: 90 Tab, Rfl: 3  •  atorvastatin (LIPITOR) 40 MG Tab, Take 1 Tab by mouth every bedtime., Disp: 30 Tab, Rfl:   •  VITAMIN B COMPLEX-C PO, Take  by mouth., Disp: , Rfl:   •  Multiple Vitamins-Minerals (CENTRUM SILVER PO), Take  by mouth., Disp: , Rfl:   •  vitamin D (CHOLECALCIFEROL) 1000 UNIT TABS, Take 2 Tabs by mouth every day., Disp: 30 Tab, Rfl: 6  •  aspirin EC (ECOTRIN) 81 MG TBEC, Take 81 mg by mouth every day., Disp: , Rfl:   •  Omega-3 Fatty Acids (SALMON OIL PO), Take 2  Caps by mouth every day., Disp: , Rfl:   •  coenzyme Q-10 30 MG capsule, Take 60 mg by mouth every day., Disp: , Rfl:   •  GLUCOSA-CHONDR-NA CHONDR-MSM PO, Take 1 Tab by mouth every day., Disp: , Rfl:   •  doxycycline (VIBRAMYCIN) 100 MG Tab, Take 1 Tab by mouth 2 times a day., Disp: 20 Tab, Rfl: 0  •  folic acid (FOLVITE) 1 MG Tab, Take 1 mg by mouth every day., Disp: , Rfl:   •  CHONDROITIN SULFATE A PO, Take 625 mg by mouth every day., Disp: , Rfl:   •  thiamine (THIAMINE) 100 MG Tab, Take 100 mg by mouth every day., Disp: , Rfl:   •  Biotin 1000 MCG Tab, Take  by mouth., Disp: , Rfl:   •  Mirabegron ER 50 MG TABLET SR 24 HR, Take 50 mg by mouth every day. (Patient not taking: Reported on 1/3/2019), Disp: 90 Tab, Rfl: 3  •  Guaifenesin 400 MG Tab, Take  by mouth., Disp: , Rfl:   •  Cyanocobalamin (VITAMIN B 12 PO), Take 1 Tab by mouth every day., Disp: , Rfl:   ALLERGIES:   Allergies   Allergen Reactions   • Avelox [Moxifloxacin Hcl In Nacl] Rash     rash   • Oxycodone    • Scopolamine    • Cefdinir Rash     Rash chest, under arm and scalp  Rash chest, under arm and scalp  Rash chest, under arm and scalp     SURGHX:   Past Surgical History:   Procedure Laterality Date   • KNEE RECONSTRUCTION      left, partial. multiple previous surgeries.    • ROTATOR CUFF REPAIR      c/b right biceps tendon rupture, presumably repaired.    • ANKLE FUSION Left    • ARTHROSCOPY, KNEE     • HEMORRHOIDECTOMY     • SINUSCOPE     • SINUSOTOMIES      Dr. Guerrier, total of 4 surgeries   • TRIGGER FINGER RELEASE      x 2     SOCHX:  reports that he has never smoked. He has never used smokeless tobacco. He reports that he drinks alcohol. He reports that he does not use drugs.   FH:   Family History   Problem Relation Age of Onset   • Heart Attack Father 38        doctor in 1946 diagnosed him with MI at home,  before going to the hospital   • Psychiatry Mother         Alzheimers   • Hypertension Brother    • Stroke Brother   "  • Seizures Brother    • Sleep Apnea Neg Hx            Physical Exam:  Vitals/ General Appearance:   Weight/BMI: Body mass index is 22.67 kg/m².  /64 (BP Location: Right arm, Patient Position: Sitting, BP Cuff Size: Adult)   Pulse 61   Resp 15   Ht 1.778 m (5' 10\")   Wt 71.7 kg (158 lb)   SpO2 97%   Vitals:    05/30/19 1109   BP: 118/64   BP Location: Right arm   Patient Position: Sitting   BP Cuff Size: Adult   Pulse: 61   Resp: 15   SpO2: 97%   Weight: 71.7 kg (158 lb)   Height: 1.778 m (5' 10\")       Pt. is alert and oriented to time, place and person. Cooperative and in no apparent distress.       1. Head: Atraumatic, normocephalic.   2. Ears: Normal tympanic membrane and no discharge  3. Nose: No inferior turbinate hypertophy,   4. Throat: Oropharynx appears crowded in that the palate is overhanging (Malam Margareth scale 4. Tonsils are not enlarged, uvula is large, pharynx not inflamed. Tongue is large. has intact dentition and oral hygiene is fair.  5. Neck: Supple. No thyromegaly  6. Chest: Trachea central  7. Lungs auscultation: B/L good air entry, vesicular breath sounds, no wheezing/ronchi sounds  8. Heart auscultation: 1st and 2nd heart sounds normal, regular rhythm. No murmur.  9.  Extremities:  1+ pedal edema.   Peripheral pulses felt.  10. Skin: No rash  11. NEUROLOGICAL EXAMINATION: On neurological exam, the patient was alert and oriented x3. speech was clear and fluent without dysarthria.    INVESTIGATIONS:       ASSESSMENT AND PLAN     1. He  has symptoms of Obstructive Sleep Apnea (MEHRDAD). He  has excessive daytime sleepiness that interferes with activites of daily living. He  risk factors for MEHRDAD include crowded oropharynx.     The pathophysiology of MEHRDAD and the increased risk of cardiovascular morbidity from untreated MEHRDAD is discussed in detail with the patient.    We have discussed diagnostic options including in-laboratory, attended polysomnography and home sleep testing. We have also " discussed treatment options including airway pressurization, reconstructive otolaryngologic surgery, dental appliances and weight management.       Subsequently,treatment options will be discussed based on the diagnostic study. Meanwhile, He is urged to avoid supine sleep, weight gain and alcoholic beverages since all of these can worsen MEHRDAD. He is cautioned against drowsy driving. If He feels sleepy while driving, He must pull over for a break/nap, rather than persist on the road, in the interest of He own safety and that of others on the road.    Plan  -  He  will be scheduled for an overnight PSG to assess sleep related  breathing disorder.   - OPO was reviewed and dicussed with the pt    2. Cardiac amyloidosis: stable and asymptomatic. it is managed by [facility]. He is currently on torsemide and diflunisal.     3. Regarding treatment of other past medical problems and general health maintenance,  He is urged to follow up with PCP.

## 2019-06-05 ENCOUNTER — APPOINTMENT (RX ONLY)
Dept: URBAN - METROPOLITAN AREA CLINIC 4 | Facility: CLINIC | Age: 79
Setting detail: DERMATOLOGY
End: 2019-06-05

## 2019-06-05 DIAGNOSIS — L82.1 OTHER SEBORRHEIC KERATOSIS: ICD-10-CM

## 2019-06-05 DIAGNOSIS — Z71.89 OTHER SPECIFIED COUNSELING: ICD-10-CM

## 2019-06-05 PROCEDURE — ? COUNSELING

## 2019-06-05 PROCEDURE — ? ADDITIONAL NOTES

## 2019-06-05 PROCEDURE — 99213 OFFICE O/P EST LOW 20 MIN: CPT

## 2019-06-05 ASSESSMENT — LOCATION ZONE DERM
LOCATION ZONE: FACE
LOCATION ZONE: TRUNK

## 2019-06-05 ASSESSMENT — LOCATION DETAILED DESCRIPTION DERM
LOCATION DETAILED: STERNAL NOTCH
LOCATION DETAILED: LEFT FOREHEAD

## 2019-06-05 ASSESSMENT — LOCATION SIMPLE DESCRIPTION DERM
LOCATION SIMPLE: CHEST
LOCATION SIMPLE: LEFT FOREHEAD

## 2019-06-05 NOTE — PROCEDURE: ADDITIONAL NOTES
Detail Level: Simple
Additional Notes: Lesion of concern on the face, clinically consistent with a seborrheic keratosis. Offered biopsy for confirmation, patient declines. He agrees to RTC with any changes or concerns.

## 2019-06-22 ENCOUNTER — SLEEP STUDY (OUTPATIENT)
Dept: SLEEP MEDICINE | Facility: MEDICAL CENTER | Age: 79
End: 2019-06-22
Attending: FAMILY MEDICINE
Payer: MEDICARE

## 2019-06-22 DIAGNOSIS — G47.30 SLEEP DISORDER BREATHING: ICD-10-CM

## 2019-06-22 DIAGNOSIS — I43 CARDIAC AMYLOIDOSIS (HCC): ICD-10-CM

## 2019-06-22 DIAGNOSIS — E85.4 CARDIAC AMYLOIDOSIS (HCC): ICD-10-CM

## 2019-06-22 PROCEDURE — 95810 POLYSOM 6/> YRS 4/> PARAM: CPT | Performed by: FAMILY MEDICINE

## 2019-06-23 NOTE — PROCEDURES
Technical summary:The patient underwent a diagnostic polysomnogram. This was a 16 channel montage study to include a 6 channel EEG, a 2 channel EOG, and chin EMG, left and right leg EMG, a snore channel, a nasal pressure transducer, and a nasal oral airflow thermistor.   Respiratory effort was assessed with the use of a thoracic and abdominal monitor and overnight oximetry was obtained. Audio and video recordings were reviewed. This was a fully attended study and sleep stage scoring was performed. The test was technically adequate.       Scoring Criteria: A modification of the the AASM Manual for the Scoring of Sleep and Associated Events, 2012, was used.   Obstructive apnea was scored by cessation of airflow for at least 10 seconds with continuing respiratory effort.  Central apnea was scored by cessation of airflow for at least 10 seconds with no effort.  Hypopnea was scored by a 30% or more reduction in airflow for at least 10 seconds accompanied by an arterial oxygen desaturation of 3% or more.  (For Medicare patients, hypopneas were scored by a 30% or more reduction in airflow for at least 10 seconds accompanied by an arterial oxygen saturation of 4% or more, as required by their insurance, CMS.    General sleep summary:  General sleep summary:  During the overnight study, the sleep latency was 3 min which is normal. The REM latency was 48 which is decreased. The total sleep time was 400 min and sleep efficiency was 88 % which is normal.  Sleep stage proportions showed no N3, decreased REM and increased WASO of 54 min.  In regards to sleep quality there was a normal degree of sleep fragmentation as shown by the arousal index of 10 an hourThe arousals were due to spontaneous arousal and respiratory events.    Respiratory summary: During the overnight study, the patient demonstrated normal obstructive sleep apnea as shown by the apnea hypopnea index of 16.2/hr. There was a total of 37 apneas and 68 hypopneas. 34%  of the apneas were central apneas. In the supine position the respiratory disturbance index was 81.2 an hour and in the non-supine position the respiratory disturbance index was 9.8 per hour. The respiratory events were associated with O2 yulissa of 86% and average O2 saturation of 93%. He spent 5 min of sleep time below 89% O2 saturation. There was snoring. The patient demonstrated a NSR with an average heartbeat of 56 bpm.     Periodic limb movement summary: The patient demonstrated an normal degree of periodic limb movements as shown by the PLM index of 2.3 per hour.      Impression:  1.  Moderate complex sleep apnea with AHI of 16.2/hr and O2 yulissa 86 %      Recommendations:  Recommend the patient return for a CPAP titration. In some cases alternative treatment options may prove effective in resolving sleep apnea and these options include upper airway surgery, the use of a dental orthotic or weight loss and positional therapy. Clinical correlation is required. In general patients with sleep apnea are advised to avoid alcohol and sedatives and to not operate a motor vehicle while drowsy and are at a greater risk for cardiovascular disease.

## 2019-06-28 ENCOUNTER — HOSPITAL ENCOUNTER (OUTPATIENT)
Dept: LAB | Facility: MEDICAL CENTER | Age: 79
End: 2019-06-28
Attending: INTERNAL MEDICINE
Payer: MEDICARE

## 2019-06-28 ENCOUNTER — NON-PROVIDER VISIT (OUTPATIENT)
Dept: INTERNAL MEDICINE | Facility: IMAGING CENTER | Age: 79
End: 2019-06-28
Payer: MEDICARE

## 2019-06-28 LAB
BUN SERPL-MCNC: 22 MG/DL (ref 8–22)
CREAT SERPL-MCNC: 1 MG/DL (ref 0.5–1.4)

## 2019-06-28 PROCEDURE — 82565 ASSAY OF CREATININE: CPT

## 2019-06-28 PROCEDURE — 84520 ASSAY OF UREA NITROGEN: CPT

## 2019-07-01 ENCOUNTER — HOSPITAL ENCOUNTER (OUTPATIENT)
Dept: RADIOLOGY | Facility: MEDICAL CENTER | Age: 79
End: 2019-07-01
Attending: PHYSICIAN ASSISTANT
Payer: MEDICARE

## 2019-07-01 ENCOUNTER — HOSPITAL ENCOUNTER (OUTPATIENT)
Dept: RADIOLOGY | Facility: MEDICAL CENTER | Age: 79
End: 2019-07-01
Attending: INTERNAL MEDICINE
Payer: MEDICARE

## 2019-07-01 DIAGNOSIS — K86.3 CYST AND PSEUDOCYST OF PANCREAS: ICD-10-CM

## 2019-07-01 DIAGNOSIS — K86.2 CYST AND PSEUDOCYST OF PANCREAS: ICD-10-CM

## 2019-07-01 DIAGNOSIS — M54.2 CERVICALGIA: ICD-10-CM

## 2019-07-01 PROCEDURE — 74183 MRI ABD W/O CNTR FLWD CNTR: CPT

## 2019-07-01 PROCEDURE — 72156 MRI NECK SPINE W/O & W/DYE: CPT

## 2019-07-01 PROCEDURE — A9585 GADOBUTROL INJECTION: HCPCS | Performed by: INTERNAL MEDICINE

## 2019-07-01 PROCEDURE — 74181 MRI ABDOMEN W/O CONTRAST: CPT

## 2019-07-01 PROCEDURE — 700117 HCHG RX CONTRAST REV CODE 255: Performed by: INTERNAL MEDICINE

## 2019-07-01 RX ORDER — GADOBUTROL 604.72 MG/ML
7.5 INJECTION INTRAVENOUS ONCE
Status: COMPLETED | OUTPATIENT
Start: 2019-07-01 | End: 2019-07-01

## 2019-07-01 RX ADMIN — GADOBUTROL 7.5 ML: 604.72 INJECTION INTRAVENOUS at 14:37

## 2019-07-09 ENCOUNTER — APPOINTMENT (OUTPATIENT)
Dept: SLEEP MEDICINE | Facility: MEDICAL CENTER | Age: 79
End: 2019-07-09
Payer: MEDICARE

## 2019-08-14 ENCOUNTER — OFFICE VISIT (OUTPATIENT)
Dept: CARDIOLOGY | Facility: MEDICAL CENTER | Age: 79
End: 2019-08-14
Payer: MEDICARE

## 2019-08-14 ENCOUNTER — OFFICE VISIT (OUTPATIENT)
Dept: INTERNAL MEDICINE | Facility: IMAGING CENTER | Age: 79
End: 2019-08-14
Payer: MEDICARE

## 2019-08-14 VITALS
BODY MASS INDEX: 23.19 KG/M2 | OXYGEN SATURATION: 97 % | DIASTOLIC BLOOD PRESSURE: 70 MMHG | SYSTOLIC BLOOD PRESSURE: 122 MMHG | HEIGHT: 70 IN | HEART RATE: 70 BPM | WEIGHT: 162 LBS

## 2019-08-14 VITALS
SYSTOLIC BLOOD PRESSURE: 130 MMHG | WEIGHT: 161 LBS | HEART RATE: 63 BPM | HEIGHT: 70 IN | TEMPERATURE: 97.6 F | DIASTOLIC BLOOD PRESSURE: 78 MMHG | BODY MASS INDEX: 23.05 KG/M2 | RESPIRATION RATE: 16 BRPM | OXYGEN SATURATION: 99 %

## 2019-08-14 DIAGNOSIS — N13.8 BPH WITH OBSTRUCTION/LOWER URINARY TRACT SYMPTOMS: ICD-10-CM

## 2019-08-14 DIAGNOSIS — I25.10 ATHEROSCLEROSIS OF NATIVE CORONARY ARTERY OF NATIVE HEART WITHOUT ANGINA PECTORIS: ICD-10-CM

## 2019-08-14 DIAGNOSIS — L84 CORN: ICD-10-CM

## 2019-08-14 DIAGNOSIS — I43 AMYLOID HEART MUSCLE DISEASE (HCC): ICD-10-CM

## 2019-08-14 DIAGNOSIS — E78.00 PURE HYPERCHOLESTEROLEMIA: ICD-10-CM

## 2019-08-14 DIAGNOSIS — J01.90 ACUTE SINUSITIS, RECURRENCE NOT SPECIFIED, UNSPECIFIED LOCATION: ICD-10-CM

## 2019-08-14 DIAGNOSIS — E85.4 CARDIAC AMYLOIDOSIS (HCC): ICD-10-CM

## 2019-08-14 DIAGNOSIS — E85.4 AMYLOID HEART MUSCLE DISEASE (HCC): ICD-10-CM

## 2019-08-14 DIAGNOSIS — I43 CARDIAC AMYLOIDOSIS (HCC): ICD-10-CM

## 2019-08-14 DIAGNOSIS — D69.6 THROMBOCYTOPENIA (HCC): ICD-10-CM

## 2019-08-14 DIAGNOSIS — E78.5 DYSLIPIDEMIA: ICD-10-CM

## 2019-08-14 DIAGNOSIS — E85.4 AMYLOID HEART DISEASE (HCC): ICD-10-CM

## 2019-08-14 DIAGNOSIS — R93.1 AGATSTON CAC SCORE, >400: ICD-10-CM

## 2019-08-14 DIAGNOSIS — I43 AMYLOID HEART DISEASE (HCC): ICD-10-CM

## 2019-08-14 DIAGNOSIS — I47.19 AVNRT (AV NODAL RE-ENTRY TACHYCARDIA): ICD-10-CM

## 2019-08-14 DIAGNOSIS — I47.10 SVT (SUPRAVENTRICULAR TACHYCARDIA): ICD-10-CM

## 2019-08-14 DIAGNOSIS — N40.1 BPH WITH OBSTRUCTION/LOWER URINARY TRACT SYMPTOMS: ICD-10-CM

## 2019-08-14 PROCEDURE — 99214 OFFICE O/P EST MOD 30 MIN: CPT | Performed by: INTERNAL MEDICINE

## 2019-08-14 PROCEDURE — 99215 OFFICE O/P EST HI 40 MIN: CPT | Performed by: INTERNAL MEDICINE

## 2019-08-14 RX ORDER — TORSEMIDE 10 MG/1
10 TABLET ORAL
Status: SHIPPED | DISCHARGE
Start: 2019-08-14 | End: 2020-03-10

## 2019-08-14 RX ORDER — POTASSIUM CHLORIDE 750 MG/1
10 TABLET, FILM COATED, EXTENDED RELEASE ORAL
Qty: 90 TAB | Refills: 3 | Status: SHIPPED | OUTPATIENT
Start: 2019-08-14 | End: 2020-03-10

## 2019-08-14 RX ORDER — AMOXICILLIN AND CLAVULANATE POTASSIUM 875; 125 MG/1; MG/1
1 TABLET, FILM COATED ORAL 2 TIMES DAILY
Qty: 28 TAB | Refills: 0 | Status: SHIPPED | OUTPATIENT
Start: 2019-08-14 | End: 2020-02-27

## 2019-08-14 NOTE — PATIENT INSTRUCTIONS
Please start taking torsemide 10mg and potassium 10meQ once or twice a week and as needed for leg swelling or weight gain >2 pounds.

## 2019-08-14 NOTE — PROGRESS NOTES
Chief Complaint   Patient presents with   • Skin Lesion     Growth on toe   • Sinusitis       HISTORY OF THE PRESENT ILLNESS: Patient is a 79 y.o. male.     Patient comes in for 3 issues:    1.  He has a growth on his right first toe.  He noticed it recently.  No pain.  He is uncertain of how long it is been present.  Is on the medial plantar surface of the toe.    2.  Complains of fever, sinus congestion with yellow discharge.  Symptoms are similar to previous sinus infection.  He also feels that his ears are plugged.  Mild cough but this is primarily due to postnasal drip.  No dyspnea.  No wheezing.    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  He is followed at [facility] but was also evaluated at [facility].  He discontinued the diuretic due to its significant negative effect on his quality of life.    4.  He is post TURP for BPH and urinary retention.  He has been feeling well.  The urologist recommended repeat post residual evaluation.        Allergies: Avelox [moxifloxacin hcl in nacl]; Oxycodone; Scopolamine; and Cefdinir    Current Outpatient Medications Ordered in Epic   Medication Sig Dispense Refill   • amoxicillin-clavulanate (AUGMENTIN) 875-125 MG Tab Take 1 Tab by mouth 2 times a day. 28 Tab 0   • torsemide (DEMADEX) 10 MG tablet Take 1 Tab by mouth every day. 90 Tab 3   • Diflunisal 500 MG Tab Take 0.5 Tabs by mouth 2 Times a Day. 90 Tab 3   • atorvastatin (LIPITOR) 40 MG Tab Take 1 Tab by mouth every bedtime. 30 Tab    • folic acid (FOLVITE) 1 MG Tab Take 1 mg by mouth every day.     • CHONDROITIN SULFATE A PO Take 625 mg by mouth every day.     • VITAMIN B COMPLEX-C PO Take  by mouth.     • Multiple Vitamins-Minerals (CENTRUM SILVER PO) Take  by mouth.     • thiamine (THIAMINE) 100 MG Tab Take 100 mg by mouth every day.     • Biotin 1000 MCG Tab Take  by mouth.     • Mirabegron ER 50 MG TABLET SR 24 HR Take 50 mg by mouth every day. (Patient not taking: Reported on  "1/3/2019) 90 Tab 3   • Guaifenesin 400 MG Tab Take  by mouth.     • vitamin D (CHOLECALCIFEROL) 1000 UNIT TABS Take 2 Tabs by mouth every day. 30 Tab 6   • aspirin EC (ECOTRIN) 81 MG TBEC Take 81 mg by mouth every day.     • Omega-3 Fatty Acids (SALMON OIL PO) Take 2 Caps by mouth every day.     • Cyanocobalamin (VITAMIN B 12 PO) Take 1 Tab by mouth every day.     • coenzyme Q-10 30 MG capsule Take 60 mg by mouth every day.     • GLUCOSA-CHONDR-NA CHONDR-MSM PO Take 1 Tab by mouth every day.       No current [facility]-ordered facility-administered medications on file.        Past medical history, social history and family history were reviewed from chart today    Review of systems: Per HPI.    Denies headache, chest pain, fever, chills, diarrhea, constipation, abdominal pain, palpitations, depression   All others negative.     Exam: /78 (BP Location: Left arm, Patient Position: Sitting, BP Cuff Size: Adult)   Pulse 63   Temp 36.4 °C (97.6 °F) (Temporal)   Resp 16   Ht 1.778 m (5' 10\")   Wt 73 kg (161 lb)   SpO2 99%   General: Well-appearing. Well-developed. No signs of distress.  HEENT: Grossly normal. Oral cavity is pink and moist.  Neck: Supple without JVD or bruit.  Pulmonary: Clear with good breath sounds. Normal effort.  Cardiovascular: Regular. Carotid and radial pulses are intact.  Abdomen: Soft, nontender, nondistended. Spleen and liver are not enlarged.  Neurologic: Cranial nerves II through XII are grossly normal, alert and oriented x3      Diagnosis:  1. Corn     2. Acute sinusitis, recurrence not specified, unspecified location     3. Amyloid heart disease (HCC)     4. BPH with obstruction/lower urinary tract symptoms         Plan:  Growth on foot is most consistent with corn.  Discussed over-the-counter treatments or referral to podiatry.  His sinus symptoms seem most consistent with acute sinus infection.  His exam is mostly unremarkable.  Recommended 10-14 days of Augmentin.  Continue to " follow-up with cardiology regarding amyloid.  Patient reports he is scheduled for repeat echocardiogram in the near future.  He is asymptomatic.  Scheduled for postvoid residual    Medications: Augmentin  Imaging: Postvoid residual  Referrals: No new referrals  Laboratory: No laboratory today

## 2019-08-14 NOTE — PROGRESS NOTES
"    Cardiology Follow-up Consultation Note    Date of note:    8/14/2019   Primary Care Provider: Azar Cavazos M.D.  Referring Provider: Jose Holman M.D.     Patient Name: Andre Martinez   YOB: 1940  MRN:              [id number]    Chief Complaint: Cardiac Amyloidosis.     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     At our visit, 4/17/2019:    Diagnosed with most likely aTTR cardiac amyloidosis based on TTE at [facility] based on strain pattern. Seen by Dr. Pierson at [place] in [place], and diagnosed with TTR amyloid with fat pad biopsy confirmation.   I have included her summary below:  \"The patient is a delightful 78-year-old who was diagnosed with transthyretin amyloidosis based on a positive PYP scan done at [facility] in January of 2019. He has mildly abnormal serum free light chains, currently kappa 2.83, lambda 1.35 mg/dL with a ratio of 2.10, but there was no monoclonal protein in the serum or in the urine. Of note, he had laser enucleation of the prostate performed 11/13/2018 at [facility], and we will request that that tissue be reviewed for amyloid. He also had a fat aspirate done yesterday. Subsequent to our visit, fat aspirate results returned and are positive for amyloid.     He came to attention due to a preoperative evaluation for prostate surgery. He had a CT calcium score done due to a family history of possible coronary artery disease (sudden death in his father at age 38) in 2013 with a score in the 600s; he was asymptomatic at that time. October 2017, had a stress echocardiogram and underwent almost 12 minutes without any evidence of ischemia. It was noted that he had " increased left ventricular wall thicknesses at that time. A repeat coronary calcium score November 28 showed left main 0, , left circumflex 165, right coronary artery 2093 with a total score of 3091. His cardiologist at that time recommended a PET perfusion study which was done 12/28/2018. The ejection fraction at rest was 30% and increased to 47% (however, his echocardiogram clearly shows normal resting ejection fraction). Perfusion was normal. He did have an episode that night while he was exercising where he developed chest discomfort and rapid palpitations. Emergency room evaluation showed junctional tachycardia which resolved spontaneously. The troponin was slightly elevated with a troponin I of 0.07 ng/L (normal less than 0.04). It was felt that this episode of tachycardia may have been related to the earlier stress test.     He saw Dr. Schreiber in January 2019 for preoperative evaluation prior to surgical intervention for urethral stricture. A transthoracic echocardiogram was performed primarily to reassess the left ventricular ejection fraction which was felt to possibly have been incorrect on the PET scan. The echocardiogram was interpreted as consistent with possible cardiac amyloidosis, left ventricular size was normal, ejection fraction calculated at 54%, but to my review is in the range of 60% to 65%. There are no regional wall motion abnormalities. Moderately increased left ventricular wall thicknesses, the basal septum and posterior wall measured 16/14 mm. The left ventricular mass index was elevated at 158 g/m2. Strain was abnormal with an apical sparing pattern and an overall value of -14%, mildly thickened aortic valve with mild regurgitation, mild dilatation of the sinuses of Valsalva (37 mm). The diastolic filling pattern suggested elevated filling pressure. Left atrial volume was moderately enlarged at 42 mL/m2. The right atrium was described as severely enlarged, to my review I would  "consider it to be moderately enlarged. Right ventricular size is normal with definite increase in right ventricular wall thickness. Right ventricular systolic function appeared normal, although tissue Doppler was slightly low. The inferior vena cava was of normal size with normal inspiratory collapse. The stroke volume index was 35 mL/m2. Cardiac index was reduced at 1.81, but the heart rate was 52 beats per minute.     He had a PYP scan performed on 2019. I reviewed the images and agree with the report. The H/CL ratio which is 1.8, consistent with TTR amyloid. The SPECT images were not available to me, but gave a myocardial score of 2 which is consistent with moderate uptake.     He has never had any significant symptoms. He exercises 1-2 hours per day with aerobic activity with no limiting dyspnea. No orthopnea, paroxysmal nocturnal dyspnea, no edema. No syncope. No palpitations other than the event noted after his PET stress test. No chest pain. As mentioned, his father  suddenly at age 38 of a presumed cardiac event. There is no known family history otherwise of unexplained heart failure or of peripheral neuropathy.     He has had 2 trigger finger releases on the left within the past 10 years. No known carpal tunnel syndrome and no history of spinal stenosis. He does have a history of multiple orthopedic issues as outlined below. This includes the unexpected finding of a right biceps tendon rupture at the time of rotator cuff surgery several years ago.   \"    Continues to exercise aerobically a couple hours a day without symptoms.     Interim Events:  In terms of diastolic heart failure, now off torsemide due to urinary frequency.  Weight up 4-5 pounds. Exercising without difficulty, ran up three flights of stairs today no problem.     In terms of TTR amyloid, prostate biopsy and genetic testing confirmed wild type aTTR amyloid.  He was recently seen at [place] by Dr. Herrera 2019. Prior to that " visit, he was started on tafamidis by [facility]. Unfortunately unable to qualify for further experimental trials due to thrombocytopenia based on a recent evaluation at [facility] as well.   In terms of hypertension, mostly not checking BP at home. Well controlled. Sometimes in the 130s.       In terms of SVT, no further palpitations or fast heart beats.       Review of Systems   Allergic/Immunologic: Positive for environmental allergies.     All other systems reviewed and discussed using a comprehensive questionnaire and are negative.       Past Medical History:   Diagnosis Date   • Amyloidosis (HCC)     TTR, type pending.    • BPH (benign prostatic hyperplasia)     s/p laser encleation of the prostate   • Cardiac amyloidosis (HCC)    • Chickenpox    • Colon polyps    • Coronary atherosclerosis of native coronary artery 05/15/2013    Coronary calcium score in the 3000s, negative PET scan and no symptoms so treated medically.    • Coronary heart disease    • Dyslipidemia 08/23/2016   • H/O urethral stricture     s/p surgical repair 1/15/2019, [place]   • Kidney cysts    • Meniscus tear    • Pancreas cyst    • Scarlet fever    • Sinusitis, chronic    • Spinal stenosis, lumbar    • SVT (supraventricular tachycardia) ([facility]) 10/23/2018   • Thrombocytopenia ([facility]) 7/24/2017       Past Surgical History:   Procedure Laterality Date   • KNEE RECONSTRUCTION  2012    left, partial. multiple previous surgeries.    • ROTATOR CUFF REPAIR  2010    c/b right biceps tendon rupture, presumably repaired.    • ANKLE FUSION Left    • ARTHROSCOPY, KNEE     • HEMORRHOIDECTOMY     • SINUSCOPE     • SINUSOTOMIES      Dr. Guerrier, total of 4 surgeries   • TRIGGER FINGER RELEASE      x 2         Current Outpatient Medications   Medication Sig Dispense Refill   • amoxicillin-clavulanate (AUGMENTIN) 875-125 MG Tab Take 1 Tab by mouth 2 times a day. 28 Tab 0   • NON SPECIFIED      • atorvastatin (LIPITOR) 40 MG Tab Take 1 Tab by mouth every bedtime.  30 Tab    • folic acid (FOLVITE) 1 MG Tab Take 1 mg by mouth every day.     • CHONDROITIN SULFATE A PO Take 625 mg by mouth every day.     • VITAMIN B COMPLEX-C PO Take  by mouth.     • Multiple Vitamins-Minerals (CENTRUM SILVER PO) Take  by mouth.     • thiamine (THIAMINE) 100 MG Tab Take 100 mg by mouth every day.     • Biotin 1000 MCG Tab Take  by mouth.     • Guaifenesin 400 MG Tab Take  by mouth.     • vitamin D (CHOLECALCIFEROL) 1000 UNIT TABS Take 2 Tabs by mouth every day. 30 Tab 6   • aspirin EC (ECOTRIN) 81 MG TBEC Take 81 mg by mouth every day.     • Omega-3 Fatty Acids (SALMON OIL PO) Take 2 Caps by mouth every day.     • Cyanocobalamin (VITAMIN B 12 PO) Take 1 Tab by mouth every day.     • coenzyme Q-10 30 MG capsule Take 60 mg by mouth every day.     • GLUCOSA-CHONDR-NA CHONDR-MSM PO Take 1 Tab by mouth every day.     • torsemide (DEMADEX) 10 MG tablet Take 1 Tab by mouth every day. 90 Tab 3   • Diflunisal 500 MG Tab Take 0.5 Tabs by mouth 2 Times a Day. 90 Tab 3   • Mirabegron ER 50 MG TABLET SR 24 HR Take 50 mg by mouth every day. (Patient not taking: Reported on 1/3/2019) 90 Tab 3     No current facility-administered medications for this visit.          Allergies   Allergen Reactions   • Avelox [Moxifloxacin Hcl In Nacl] Rash     rash   • Oxycodone    • Scopolamine    • Cefdinir Rash     Rash chest, under arm and scalp           Family History   Problem Relation Age of Onset   • Heart Attack Father 38        doctor in 1946 diagnosed him with MI at home,  before going to the hospital   • Psychiatric Illness Mother         Alzheimers   • Hypertension Brother    • Stroke Brother    • Seizures Brother    • Sleep Apnea Neg Hx          Social History     Socioeconomic History   • Marital status:      Spouse name: Not on file   • Number of children: Not on file   • Years of education: Not on file   • Highest education level: Not on file   Occupational History   • Not on file   Social Needs   •  "Financial resource strain: Not on file   • Food insecurity:     Worry: Not on file     Inability: Not on file   • Transportation needs:     Medical: Not on file     Non-medical: Not on file   Tobacco Use   • Smoking status: Never Smoker   • Smokeless tobacco: Never Used   Substance and Sexual Activity   • Alcohol use: Yes     Comment: Social    • Drug use: No   • Sexual activity: Not on file   Lifestyle   • Physical activity:     Days per week: Not on file     Minutes per session: Not on file   • Stress: Not on file   Relationships   • Social connections:     Talks on phone: Not on file     Gets together: Not on file     Attends Jew service: Not on file     Active member of club or organization: Not on file     Attends meetings of clubs or organizations: Not on file     Relationship status: Not on file   • Intimate partner violence:     Fear of current or ex partner: Not on file     Emotionally abused: Not on file     Physically abused: Not on file     Forced sexual activity: Not on file   Other Topics Concern   • Not on file   Social History Narrative    Retired from real estate          Physical Exam:  Ambulatory Vitals  /70 (BP Location: Left arm, Patient Position: Sitting, BP Cuff Size: Adult)   Pulse 70   Ht 1.778 m (5' 10\")   Wt 73.5 kg (162 lb)   SpO2 97%    Oxygen Therapy:  Pulse Oximetry: 97 %  BP Readings from Last 4 Encounters:   08/14/19 122/70   08/14/19 130/78   05/30/19 118/64   05/21/19 128/80       Weight/BMI: Body mass index is 23.24 kg/m².  Wt Readings from Last 4 Encounters:   08/14/19 73.5 kg (162 lb)   08/14/19 73 kg (161 lb)   05/30/19 71.7 kg (158 lb)   05/21/19 72.1 kg (159 lb)       General: No apparent distress  Eyes: nl conjunctiva  ENT: OP clear, normal external appearance of nose and ears  Neck: JVP 4-5 cm H2O, no carotid bruits  Lungs: normal respiratory effort, CTAB  Heart: RRR, + S4, no murmurs, no rubs, 2+ edema bilateral lower extremities. No LV/RV heave on cardiac " "palpatation. Sustained diffuse, displaced PMI. 2+ bilateral radial pulses.  2+ bilateral dp pulses.   Abdomen: soft, non tender, non distended, no masses, normal bowel sounds.  No HSM.  Extremities/MSK: no clubbing, no cyanosis  Neurological: No focal sensory deficits  Psychiatric: Appropriate affect, A/O x 3, intact judgement and insight  Skin: Warm extremities    Exam repeated in full and unchanged except for as noted above.      Lab Data Review:  Lab Results   Component Value Date/Time    CHOLSTRLTOT 137 05/21/2019 08:15 AM    LDL 46 05/21/2019 08:15 AM    HDL 83 05/21/2019 08:15 AM    TRIGLYCERIDE 38 05/21/2019 08:15 AM       Lab Results   Component Value Date/Time    SODIUM 140 05/21/2019 08:15 AM    POTASSIUM 4.6 05/21/2019 08:15 AM    CHLORIDE 102 05/21/2019 08:15 AM    CO2 28 05/21/2019 08:15 AM    GLUCOSE 76 05/21/2019 08:15 AM    BUN 22 06/28/2019 12:00 PM    CREATININE 1.00 06/28/2019 12:00 PM     Lab Results   Component Value Date/Time    ALKPHOSPHAT 88 05/21/2019 08:15 AM    ASTSGOT 39 05/21/2019 08:15 AM    ALTSGPT 50 05/21/2019 08:15 AM    TBILIRUBIN 1.0 05/21/2019 08:15 AM      Lab Results   Component Value Date/Time    WBC 8.7 05/21/2019 08:15 AM     No components found for: HBGA1C  No components found for: TROPONIN  No components found for: BNP      Cardiac Imaging and Procedures Review:    Exercise stress echocardiogram, 10/24/2017: Patient did 11 minutes 59 seconds corresponding with 12.8 metabolic equivalents on the Kael protocol achieving 92% of his age-predicted maximum heart rate with normal systolic augmentation regionally, negative for ischemia.  He also had a stress echocardiogram in 2013 that also was negative for ischemia.     CT coronary calcium scan, 11/16/2018:  \"Coronary calcification:  LMA - 0.0  LCX - 165.1  LAD - 833.2  RCA - 2093.2  PDA - 0.0  Calcium score:  3091.5\"     PET myocardial perfusion imaging, 12/20/2018, images and report reviewed, my interpretation: Demonstrates " systolic dysfunction with a resting ejection fraction of 36% and no territorial ischemia nor transient ischemic dilation.  Left ventricle was borderline dilated.     EKG, 12/20/2018, tracings and report reviewed, mitral rotation: Documents supraventricular tachycardia with a fairly pronounced pseudo-R prime in V1 consistent with AVNRT       Radiology test Review:  CXR: 12/2018  No pulmonary infiltrates or consolidations are noted.  No pleural effusion. No pneumothorax.  Normal cardiopericardial silhouette.      Medical Decision Making:  # Wild Type TTR Amyloid with Cardiac involvement - asymptomatic with NYHA Class I symptoms. He was started by Dr. Pierson at [facility] on tafamadis, and most recently has seen Dr. Herrera from [place] as well as a cardiologist at [facility]. He is not currently a candidate for any of the new clinical trials given his thrombocytopenia, but that may change if his platelet counts recover. He does appear mildly volume overloaded now that he has completed stopped torsemide  -restart torsemide 10mg PO along with kcl 10mEq once or twice a week and for leg swelling, dyspnea, or weight gain >2 pounds.   -f/u at [place] in December as scheduled and with me in March.       # Agatston CAC score, >400  Asymptomatic with excellent exercise tolerance and multiple negative stress tests. Normal LVEF based on last echo performed at [facility].   -continue aspirin 81mg PO daily  -lipitor 40mg PO Daily  -discussed ED precautions    #  AVNRT (AV billy re-entry tachycardia) (HCC)  No evidence of atrial fibrillation/flutter at this time although certainly high risk.  He will let me know if any symptoms develop, and he may be a candidate for empiric anticoagulation given his high CVA risk.     # Pure hypercholesterolemia  Continue lipitor.        Return in about 7 months (around 3/14/2020).      Jaime Raman MD, [facility]  [place] [facility], [facility]  [address] [place] [address]  [place] [place] [address]  Phone: [phone]  Fax: [phone]

## 2019-08-15 DIAGNOSIS — N13.8 BENIGN PROSTATIC HYPERPLASIA WITH URINARY OBSTRUCTION: ICD-10-CM

## 2019-08-15 DIAGNOSIS — N40.1 BENIGN PROSTATIC HYPERPLASIA WITH URINARY OBSTRUCTION: ICD-10-CM

## 2019-08-19 ENCOUNTER — APPOINTMENT (OUTPATIENT)
Dept: RADIOLOGY | Facility: MEDICAL CENTER | Age: 79
End: 2019-08-19
Attending: INTERNAL MEDICINE
Payer: MEDICARE

## 2019-09-16 ENCOUNTER — HOSPITAL ENCOUNTER (OUTPATIENT)
Dept: RADIOLOGY | Facility: MEDICAL CENTER | Age: 79
End: 2019-09-16
Attending: INTERNAL MEDICINE
Payer: MEDICARE

## 2019-09-16 DIAGNOSIS — N40.1 BENIGN PROSTATIC HYPERPLASIA WITH URINARY OBSTRUCTION: ICD-10-CM

## 2019-09-16 DIAGNOSIS — N13.8 BENIGN PROSTATIC HYPERPLASIA WITH URINARY OBSTRUCTION: ICD-10-CM

## 2019-09-16 PROCEDURE — 76857 US EXAM PELVIC LIMITED: CPT

## 2019-09-27 ENCOUNTER — TELEPHONE (OUTPATIENT)
Dept: CARDIOLOGY | Facility: MEDICAL CENTER | Age: 79
End: 2019-09-27

## 2019-09-27 NOTE — TELEPHONE ENCOUNTER
Sent back message about fish oil and the recent Reduce-IT trial, which was primarily for secondary prevention, not primary prevention as is his case.

## 2019-11-08 ENCOUNTER — TELEPHONE (OUTPATIENT)
Dept: CARDIOLOGY | Facility: MEDICAL CENTER | Age: 79
End: 2019-11-08

## 2019-11-08 ENCOUNTER — NON-PROVIDER VISIT (OUTPATIENT)
Dept: INTERNAL MEDICINE | Facility: IMAGING CENTER | Age: 79
End: 2019-11-08
Payer: MEDICARE

## 2019-11-08 ENCOUNTER — HOSPITAL ENCOUNTER (OUTPATIENT)
Facility: MEDICAL CENTER | Age: 79
End: 2019-11-08
Attending: INTERNAL MEDICINE
Payer: MEDICARE

## 2019-11-08 DIAGNOSIS — E78.5 DYSLIPIDEMIA: ICD-10-CM

## 2019-11-08 DIAGNOSIS — E85.4 AMYLOID HEART MUSCLE DISEASE (HCC): ICD-10-CM

## 2019-11-08 DIAGNOSIS — N40.1 BENIGN PROSTATIC HYPERPLASIA WITH URINARY OBSTRUCTION: ICD-10-CM

## 2019-11-08 DIAGNOSIS — I43 AMYLOID HEART MUSCLE DISEASE (HCC): ICD-10-CM

## 2019-11-08 DIAGNOSIS — D69.6 THROMBOCYTOPENIA (HCC): ICD-10-CM

## 2019-11-08 DIAGNOSIS — N13.8 BENIGN PROSTATIC HYPERPLASIA WITH URINARY OBSTRUCTION: ICD-10-CM

## 2019-11-08 DIAGNOSIS — J01.90 ACUTE SINUSITIS, RECURRENCE NOT SPECIFIED, UNSPECIFIED LOCATION: ICD-10-CM

## 2019-11-08 PROCEDURE — 80053 COMPREHEN METABOLIC PANEL: CPT

## 2019-11-08 PROCEDURE — 85025 COMPLETE CBC W/AUTO DIFF WBC: CPT

## 2019-11-08 PROCEDURE — 81003 URINALYSIS AUTO W/O SCOPE: CPT

## 2019-11-08 PROCEDURE — 84153 ASSAY OF PSA TOTAL: CPT

## 2019-11-08 PROCEDURE — 80061 LIPID PANEL: CPT

## 2019-11-08 RX ORDER — TORSEMIDE 5 MG/1
5 TABLET ORAL
Qty: 90 TAB | Refills: 1 | Status: SHIPPED | OUTPATIENT
Start: 2019-11-08 | End: 2020-03-10

## 2019-11-08 RX ORDER — DOXYCYCLINE HYCLATE 100 MG
100 TABLET ORAL 2 TIMES DAILY
Qty: 20 TAB | Refills: 0 | Status: SHIPPED | OUTPATIENT
Start: 2019-11-08 | End: 2020-02-27

## 2019-11-08 NOTE — TELEPHONE ENCOUNTER
----- Message from Li Martinez R.N. sent at 11/7/2019  1:32 PM PST -----  Regarding: FW: Non-Urgent Medical Question  Contact: [phone]    ----- Message -----  From: Bandar Kwan Ass't  Sent: 11/7/2019   8:26 AM PST  To: Li Martinez R.N.  Subject: FW: Non-Urgent Medical Question                    ----- Message -----  From: Andre Martinez  Sent: 11/7/2019   8:04 AM PST  To: Sterling Mora  Subject: Non-Urgent Medical Question                      Meño Raman,    Thank you for your thoughtful September 27 response to the article about fish oil. I did not respond to you previously because [place] the [place]. The [facility] [employer] system is not accessible for patients outside the [place].  I will be visiting Dr. Herrera at [place] on December 4. Should I schedule an appointment with you after that visit?    Andre

## 2019-11-09 LAB
ALBUMIN SERPL BCP-MCNC: 4.2 G/DL (ref 3.2–4.9)
ALBUMIN/GLOB SERPL: 1.6 G/DL
ALP SERPL-CCNC: 86 U/L (ref 30–99)
ALT SERPL-CCNC: 23 U/L (ref 2–50)
ANION GAP SERPL CALC-SCNC: 8 MMOL/L (ref 0–11.9)
APPEARANCE UR: CLEAR
AST SERPL-CCNC: 36 U/L (ref 12–45)
BASOPHILS # BLD AUTO: 0.6 % (ref 0–1.8)
BASOPHILS # BLD: 0.06 K/UL (ref 0–0.12)
BILIRUB SERPL-MCNC: 0.8 MG/DL (ref 0.1–1.5)
BILIRUB UR QL STRIP.AUTO: NEGATIVE
BUN SERPL-MCNC: 25 MG/DL (ref 8–22)
CALCIUM SERPL-MCNC: 9.6 MG/DL (ref 8.5–10.5)
CHLORIDE SERPL-SCNC: 102 MMOL/L (ref 96–112)
CHOLEST SERPL-MCNC: 127 MG/DL (ref 100–199)
CO2 SERPL-SCNC: 29 MMOL/L (ref 20–33)
COLOR UR: YELLOW
CREAT SERPL-MCNC: 1.08 MG/DL (ref 0.5–1.4)
EOSINOPHIL # BLD AUTO: 0.33 K/UL (ref 0–0.51)
EOSINOPHIL NFR BLD: 3.2 % (ref 0–6.9)
ERYTHROCYTE [DISTWIDTH] IN BLOOD BY AUTOMATED COUNT: 49.6 FL (ref 35.9–50)
GLOBULIN SER CALC-MCNC: 2.7 G/DL (ref 1.9–3.5)
GLUCOSE SERPL-MCNC: 76 MG/DL (ref 65–99)
GLUCOSE UR STRIP.AUTO-MCNC: NEGATIVE MG/DL
HCT VFR BLD AUTO: 45.3 % (ref 42–52)
HDLC SERPL-MCNC: 70 MG/DL
HGB BLD-MCNC: 14.7 G/DL (ref 14–18)
IMM GRANULOCYTES # BLD AUTO: 0.06 K/UL (ref 0–0.11)
IMM GRANULOCYTES NFR BLD AUTO: 0.6 % (ref 0–0.9)
KETONES UR STRIP.AUTO-MCNC: NEGATIVE MG/DL
LDLC SERPL CALC-MCNC: 49 MG/DL
LEUKOCYTE ESTERASE UR QL STRIP.AUTO: NEGATIVE
LYMPHOCYTES # BLD AUTO: 4.33 K/UL (ref 1–4.8)
LYMPHOCYTES NFR BLD: 42.3 % (ref 22–41)
MCH RBC QN AUTO: 33.2 PG (ref 27–33)
MCHC RBC AUTO-ENTMCNC: 32.5 G/DL (ref 33.7–35.3)
MCV RBC AUTO: 102.3 FL (ref 81.4–97.8)
MICRO URNS: NORMAL
MONOCYTES # BLD AUTO: 0.68 K/UL (ref 0–0.85)
MONOCYTES NFR BLD AUTO: 6.6 % (ref 0–13.4)
NEUTROPHILS # BLD AUTO: 4.78 K/UL (ref 1.82–7.42)
NEUTROPHILS NFR BLD: 46.7 % (ref 44–72)
NITRITE UR QL STRIP.AUTO: NEGATIVE
NRBC # BLD AUTO: 0 K/UL
NRBC BLD-RTO: 0 /100 WBC
PH UR STRIP.AUTO: 7 [PH] (ref 5–8)
PLATELET # BLD AUTO: 141 K/UL (ref 164–446)
PMV BLD AUTO: 12.5 FL (ref 9–12.9)
POTASSIUM SERPL-SCNC: 4.7 MMOL/L (ref 3.6–5.5)
PROT SERPL-MCNC: 6.9 G/DL (ref 6–8.2)
PROT UR QL STRIP: NEGATIVE MG/DL
PSA SERPL-MCNC: 0.73 NG/ML (ref 0–4)
RBC # BLD AUTO: 4.43 M/UL (ref 4.7–6.1)
RBC UR QL AUTO: NEGATIVE
SODIUM SERPL-SCNC: 139 MMOL/L (ref 135–145)
SP GR UR STRIP.AUTO: 1.02
TRIGL SERPL-MCNC: 38 MG/DL (ref 0–149)
UROBILINOGEN UR STRIP.AUTO-MCNC: 0.2 MG/DL
WBC # BLD AUTO: 10.2 K/UL (ref 4.8–10.8)

## 2019-11-12 ENCOUNTER — PATIENT MESSAGE (OUTPATIENT)
Dept: INTERNAL MEDICINE | Facility: IMAGING CENTER | Age: 79
End: 2019-11-12

## 2019-11-12 DIAGNOSIS — M25.50 ARTHRALGIA, UNSPECIFIED JOINT: ICD-10-CM

## 2019-11-12 RX ORDER — CELECOXIB 200 MG/1
200 CAPSULE ORAL DAILY
Qty: 30 CAP | Refills: 1 | Status: SHIPPED | OUTPATIENT
Start: 2019-11-12 | End: 2019-12-06 | Stop reason: SDUPTHER

## 2019-11-14 ENCOUNTER — OFFICE VISIT (OUTPATIENT)
Dept: INTERNAL MEDICINE | Facility: IMAGING CENTER | Age: 79
End: 2019-11-14
Payer: MEDICARE

## 2019-11-14 VITALS
HEIGHT: 70 IN | RESPIRATION RATE: 12 BRPM | TEMPERATURE: 97.6 F | BODY MASS INDEX: 23.19 KG/M2 | DIASTOLIC BLOOD PRESSURE: 70 MMHG | SYSTOLIC BLOOD PRESSURE: 124 MMHG | WEIGHT: 162 LBS | HEART RATE: 75 BPM | OXYGEN SATURATION: 98 %

## 2019-11-14 DIAGNOSIS — D69.6 THROMBOCYTOPENIA (HCC): ICD-10-CM

## 2019-11-14 DIAGNOSIS — N40.1 BENIGN PROSTATIC HYPERPLASIA WITH URINARY OBSTRUCTION: ICD-10-CM

## 2019-11-14 DIAGNOSIS — D75.89 MACROCYTOSIS WITHOUT ANEMIA: ICD-10-CM

## 2019-11-14 DIAGNOSIS — I42.9 CARDIOMYOPATHY, UNSPECIFIED TYPE (HCC): ICD-10-CM

## 2019-11-14 DIAGNOSIS — I25.10 ATHEROSCLEROSIS OF NATIVE CORONARY ARTERY OF NATIVE HEART WITHOUT ANGINA PECTORIS: ICD-10-CM

## 2019-11-14 DIAGNOSIS — I43 AMYLOID HEART MUSCLE DISEASE (HCC): ICD-10-CM

## 2019-11-14 DIAGNOSIS — N13.8 BENIGN PROSTATIC HYPERPLASIA WITH URINARY OBSTRUCTION: ICD-10-CM

## 2019-11-14 DIAGNOSIS — M25.9 DISEASE OF BONE AND JOINT: ICD-10-CM

## 2019-11-14 DIAGNOSIS — M25.511 CHRONIC RIGHT SHOULDER PAIN: ICD-10-CM

## 2019-11-14 DIAGNOSIS — M47.812 SPONDYLOSIS OF CERVICAL REGION WITHOUT MYELOPATHY OR RADICULOPATHY: ICD-10-CM

## 2019-11-14 DIAGNOSIS — E85.4 AMYLOID HEART MUSCLE DISEASE (HCC): ICD-10-CM

## 2019-11-14 DIAGNOSIS — G89.29 CHRONIC RIGHT SHOULDER PAIN: ICD-10-CM

## 2019-11-14 DIAGNOSIS — I47.10 SVT (SUPRAVENTRICULAR TACHYCARDIA): ICD-10-CM

## 2019-11-14 DIAGNOSIS — L60.0 INGROWN TOENAIL OF BOTH FEET: ICD-10-CM

## 2019-11-14 DIAGNOSIS — Z00.00 MEDICARE ANNUAL WELLNESS VISIT, SUBSEQUENT: ICD-10-CM

## 2019-11-14 DIAGNOSIS — M85.89 OSTEOPENIA OF MULTIPLE SITES: ICD-10-CM

## 2019-11-14 DIAGNOSIS — M89.9 DISEASE OF BONE AND JOINT: ICD-10-CM

## 2019-11-14 DIAGNOSIS — E78.5 DYSLIPIDEMIA: ICD-10-CM

## 2019-11-14 DIAGNOSIS — K63.5 POLYP OF COLON, UNSPECIFIED PART OF COLON, UNSPECIFIED TYPE: ICD-10-CM

## 2019-11-14 PROBLEM — M76.60 CALCIFIC ACHILLES TENDINITIS: Status: ACTIVE | Noted: 2018-11-05

## 2019-11-14 PROBLEM — M65.969: Status: ACTIVE | Noted: 2018-11-05

## 2019-11-14 PROBLEM — M65.28 CALCIFIC ACHILLES TENDINITIS: Status: ACTIVE | Noted: 2018-11-05

## 2019-11-14 PROBLEM — M65.9: Status: ACTIVE | Noted: 2018-11-05

## 2019-11-14 PROCEDURE — G0439 PPPS, SUBSEQ VISIT: HCPCS | Performed by: INTERNAL MEDICINE

## 2019-11-14 ASSESSMENT — ENCOUNTER SYMPTOMS: GENERAL WELL-BEING: EXCELLENT

## 2019-11-14 ASSESSMENT — ACTIVITIES OF DAILY LIVING (ADL): BATHING_REQUIRES_ASSISTANCE: 0

## 2019-11-14 ASSESSMENT — PATIENT HEALTH QUESTIONNAIRE - PHQ9: CLINICAL INTERPRETATION OF PHQ2 SCORE: 0

## 2019-11-14 NOTE — PROGRESS NOTES
"79 y.o. male presents for the following:    Patient comes in for annual physical, review of laboratory and wellness evaluation.  He feels that his health is been good.  He exercises regularly.  He has some orthopedic issues right shoulder, neck and right great toe issues.  He drinks alcohol in moderation.  No tobacco products.  No depression.  No cognitive issues.  No balance issues.    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  He denies chest pain, dyspnea, orthopnea.  He experiences episodes where he feels his heart \"pauses\".  He was seen by cardiology for this in the past and he underwent to cardiac monitors without significant abnormality.  He saw a hemorrhoid specialist in [place] who recommended cardiac monitoring every 6 months.  He is checking into new studies and would like to be part of a trial.  There are new medications that treat mRNA and may be more    BPH-post laser procedure.  He is doing much better.  He still experiences 2 episodes of nocturia.  PSA is overall lower since the procedure.  His baseline PSA was 1.4 and it is currently 0.7.  It had dropped to 0.6 while on finasteride.  He is no longer taking this medication.    Atherosclerotic heart disease-patient had cardiac CT in November 2018 with a score of 3000.  He denies angina, chest pain or other cardiac symptoms as above.  He remains on aspirin and statin therapy.    Hyperlipidemia-stable on statin.  His recent LDL was 49.    SVT-patient reports occasional \"pause\" as discussed above.  No palpitations, syncope or presyncope.    Osteopenia-last bone density was in June 2016.    Review of systems:  Sinus congestion.  He is finishing treatment for presumed acute sinusitis.  He was feeling better but reports his symptoms have returned.  Right shoulder pain.  Patient is scheduled to see Dr. Schaefer.  He was seen by Dr. bojorquez in the past and received an injection and physical therapy.  He would like to resume physical therapy.  Neck symptoms. "  He complains of a grinding sound with twisting of his neck.  No pain.  He is scheduled to see Dr. Knight at [place].  He complains of right great toe tenderness and redness.  Symptoms are along the lateral edge.  Reports an infection on one occasion which resolved with topical antibiotics      Annual Wellness Visit/Health Risk Assessment:    Past medical:  Past Medical History:   Diagnosis Date   • Amyloidosis (HCC)     TTR, type pending.    • BPH (benign prostatic hyperplasia)     s/p laser encleation of the prostate   • Cardiac amyloidosis (HCC)    • Chickenpox    • Colon polyps    • Coronary atherosclerosis of native coronary artery 05/15/2013    Coronary calcium score in the 3000s, negative PET scan and no symptoms so treated medically.    • Coronary heart disease    • Dyslipidemia 2016   • H/O urethral stricture     s/p surgical repair 1/15/2019, [place]   • Kidney cysts    • Meniscus tear    • Pancreas cyst    • Scarlet fever    • Sinusitis, chronic    • Spinal stenosis, lumbar    • SVT (supraventricular tachycardia) ([facility]) 10/23/2018   • Thrombocytopenia ([facility]) 2017       Past surgical:  Past Surgical History:   Procedure Laterality Date   • KNEE RECONSTRUCTION      left, partial. multiple previous surgeries.    • ROTATOR CUFF REPAIR      c/b right biceps tendon rupture, presumably repaired.    • ANKLE FUSION Left    • ARTHROSCOPY, KNEE     • HEMORRHOIDECTOMY     • SINUSCOPE     • SINUSOTOMIES      Dr. Guerrier, total of 4 surgeries   • TRIGGER FINGER RELEASE      x 2       Family history: relating to possible risk factors for your patient  Family History   Problem Relation Age of Onset   • Heart Attack Father 38        doctor in 1946 diagnosed him with MI at home,  before going to the hospital   • Psychiatric Illness Mother         Alzheimers   • Hypertension Brother    • Stroke Brother    • Seizures Brother    • Sleep Apnea Neg Hx        Current Providers (including home care/DME’s):    Colonoscopy 3/21/17 [place] (Dr Domingo) repeat in 5 yrs Dexa 6/3/16 osteopenia PSA  11/8/19 0.73  [facility]      Patient Care Team:  Azar Cavazos M.D. as PCP - General (Internal Medicine)  Jose Holman M.D. as Attending Team Physician (Cardiology)  Sanchez Fonseca M.D. (Urology)      Medications:   Current Outpatient Medications Ordered in Epic   Medication Sig Dispense Refill   • Tafamidis Meglumine, Cardiac, (VYNDAQEL) 20 MG Cap Take 80 mg by mouth.     • celecoxib (CELEBREX) 200 MG Cap Take 1 Cap by mouth every day. 30 Cap 1   • torsemide (DEMADEX) 5 MG Tab Take 1 Tab by mouth 1 time daily as needed. 90 Tab 1   • doxycycline (VIBRAMYCIN) 100 MG Tab Take 1 Tab by mouth 2 times a day. 20 Tab 0   • amoxicillin-clavulanate (AUGMENTIN) 875-125 MG Tab Take 1 Tab by mouth 2 times a day. 28 Tab 0   • NON SPECIFIED      • torsemide (DEMADEX) 10 MG tablet Take 1 Tab by mouth 1 time daily as needed (leg swelling, shortness of breath or weight gain > 2 pounds.).     • potassium chloride ER (KLOR-CON) 10 MEQ tablet Take 1 Tab by mouth 1 time daily as needed (when taking torsemide.). 90 Tab 3   • atorvastatin (LIPITOR) 40 MG Tab Take 1 Tab by mouth every bedtime. 30 Tab    • folic acid (FOLVITE) 1 MG Tab Take 1 mg by mouth every day.     • CHONDROITIN SULFATE A PO Take 625 mg by mouth every day.     • VITAMIN B COMPLEX-C PO Take  by mouth.     • Multiple Vitamins-Minerals (CENTRUM SILVER PO) Take  by mouth.     • thiamine (THIAMINE) 100 MG Tab Take 100 mg by mouth every day.     • Biotin 1000 MCG Tab Take  by mouth.     • Guaifenesin 400 MG Tab Take  by mouth.     • vitamin D (CHOLECALCIFEROL) 1000 UNIT TABS Take 2 Tabs by mouth every day. 30 Tab 6   • aspirin EC (ECOTRIN) 81 MG TBEC Take 81 mg by mouth every day.     • Omega-3 Fatty Acids (SALMON OIL PO) Take 2 Caps by mouth every day.     • Cyanocobalamin (VITAMIN B 12 PO) Take 1 Tab by mouth every day.     • coenzyme Q-10 30 MG  capsule Take 60 mg by mouth every day.     • GLUCOSA-CHONDR-NA CHONDR-MSM PO Take 1 Tab by mouth every day.       No current [facility]-ordered facility-administered medications on file.        Supplements (calcium/vitamins): if not lisited in medications    Chief Complaint   Patient presents with   • Annual Exam         HPI:  Andre Martinez is a 79 y.o. 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   • [place] CAC score, >400 12/06/2018   • Dyslipidemia 12/06/2018   • Enlarged prostate with lower urinary tract symptoms (LUTS) 11/12/2018   • SVT (supraventricular tachycardia) ([facility]) 10/23/2018   • Thrombocytopenia ([facility]) 07/24/2017   • Postural dizziness with presyncope 06/14/2017   • Pure hypercholesterolemia 08/23/2016   • Meniscus tear    • Spinal stenosis, lumbar    • Colon polyps    • Kidney cysts    • Pancreas cyst    • BPH (benign prostatic hyperplasia)    • Atherosclerosis of native coronary artery of native heart without angina pectoris 05/15/2013       Current Outpatient Medications   Medication Sig Dispense Refill   • Tafamidis Meglumine, Cardiac, (VYNDAQEL) 20 MG Cap Take 80 mg by mouth.     • celecoxib (CELEBREX) 200 MG Cap Take 1 Cap by mouth every day. 30 Cap 1   • torsemide (DEMADEX) 5 MG Tab Take 1 Tab by mouth 1 time daily as needed. 90 Tab 1   • doxycycline (VIBRAMYCIN) 100 MG Tab Take 1 Tab by mouth 2 times a day. 20 Tab 0   • amoxicillin-clavulanate (AUGMENTIN) 875-125 MG Tab Take 1 Tab by mouth 2 times a day. 28 Tab 0   • NON SPECIFIED      • torsemide (DEMADEX) 10 MG tablet Take 1 Tab by mouth 1 time daily as needed (leg swelling, shortness of breath or weight gain > 2 pounds.).     • potassium chloride ER (KLOR-CON) 10 MEQ tablet Take 1 Tab by mouth 1 time daily as needed (when taking torsemide.). 90 Tab 3   • atorvastatin (LIPITOR) 40 MG Tab Take 1  Tab by mouth every bedtime. 30 Tab    • folic acid (FOLVITE) 1 MG Tab Take 1 mg by mouth every day.     • CHONDROITIN SULFATE A PO Take 625 mg by mouth every day.     • VITAMIN B COMPLEX-C PO Take  by mouth.     • Multiple Vitamins-Minerals (CENTRUM SILVER PO) Take  by mouth.     • thiamine (THIAMINE) 100 MG Tab Take 100 mg by mouth every day.     • Biotin 1000 MCG Tab Take  by mouth.     • Guaifenesin 400 MG Tab Take  by mouth.     • vitamin D (CHOLECALCIFEROL) 1000 UNIT TABS Take 2 Tabs by mouth every day. 30 Tab 6   • aspirin EC (ECOTRIN) 81 MG TBEC Take 81 mg by mouth every day.     • Omega-3 Fatty Acids (SALMON OIL PO) Take 2 Caps by mouth every day.     • Cyanocobalamin (VITAMIN B 12 PO) Take 1 Tab by mouth every day.     • coenzyme Q-10 30 MG capsule Take 60 mg by mouth every day.     • GLUCOSA-CHONDR-NA CHONDR-MSM PO Take 1 Tab by mouth every day.       No current facility-administered medications for this visit.             Current supplements as per medication list.       Allergies: Avelox [moxifloxacin hcl in nacl]; Ceftin  [cefuroxime axetil]; Demerol; Fluorometholone; Oxycodone; Scopolamine; and Cefdinir    Current social contact/activities:  Social with friends and family.    He  reports that he has never smoked. He has never used smokeless tobacco. He reports current alcohol use. He reports that he does not use drugs.  Counseling given: Not Answered        DPA/Advanced Directive:  On file       ROS:    Gait: Uses : None  Ostomy: No  Other tubes: no   Amputations: no   Chronic oxygen use: no   Last eye exam: June 2019    : Denies any urinary leakage during the last 6 months incontinence.       Screening:  Colonoscopy 3/21/17 [place] (Dr Domingo) repeat in 5 yrs Dexa 6/3/16 osteopenia PSA  11/8/19 0.73  [facility]      Depression Screening    Little interest or pleasure in doing things?  0 - not at all  Feeling down, depressed , or hopeless? 0 - not at all  Patient Health  Questionnaire Score: 0     If depressive symptoms identified deferred to follow up visit unless specifically addressed in assessment and plan.    Interpretation of PHQ-9 Total Score   Score Severity   1-4 No Depression   5-9 Mild Depression   10-14 Moderate Depression   15-19 Moderately Severe Depression   20-27 Severe Depression    Screening for Cognitive Impairment    Three Minute Recall (village, kitchen, baby) 3/3    Marcelo clock face with all 12 numbers and set the hands to show 10 past 10.  Yes    Cognitive concerns identified deferred for follow up unless specifically addressed in assessment and plan.    Fall Risk Assessment    Has the patient had two or more falls in the last year or any fall with injury in the last year?  No    Safety Assessment    Throw rugs on floor.  No  Handrails on all stairs.  No  Good lighting in all hallways.  Yes  Difficulty hearing.  No  Patient counseled about all safety risks that were identified.    Functional Assessment ADLs    Are there any barriers preventing you from cooking for yourself or meeting nutritional needs?  No.    Are there any barriers preventing you from driving safely or obtaining transportation?  No.    Are there any barriers preventing you from using a telephone or calling for help?  No.    Are there any barriers preventing you from shopping?  No.    Are there any barriers preventing you from taking care of your own finances?  No.    Are there any barriers preventing you from managing your medications?  No.    Are there any barriers preventing you from showering, bathing or dressing yourself?  No.    Are you currently engaging in any exercise or physical activity?  Yes.  Cardio 3-4d/wk  What is your perception of your health?  Excellent.      Health Maintenance Summary                Annual Wellness Visit Overdue 10/24/2019      Done 10/23/2018 Visit Dx: Medicare annual wellness visit, subsequent     Patient has more history with this topic...    COLONOSCOPY  Next Due 3/21/2022      Done 3/21/2017      Patient has more history with this topic...    IMM DTaP/Tdap/Td Vaccine Next Due 2024      Done 2014 Imm Admin: Tdap Vaccine     Patient has more history with this topic...          Patient Care Team:  Azar Cavazos M.D. as PCP - General (Internal Medicine)  Jose Holman M.D. as Attending Team Physician (Cardiology)  Sanchez Fonseca M.D. (Urology)        Social History     Tobacco Use   • Smoking status: Never Smoker   • Smokeless tobacco: Never Used   Substance Use Topics   • Alcohol use: Yes     Comment: Social    • Drug use: No     Family History   Problem Relation Age of Onset   • Heart Attack Father 38        doctor in 1946 diagnosed him with MI at home,  before going to the hospital   • Psychiatric Illness Mother         Alzheimers   • Hypertension Brother    • Stroke Brother    • Seizures Brother    • Sleep Apnea Neg Hx      He  has a past medical history of Amyloidosis (HCC), BPH (benign prostatic hyperplasia), Cardiac amyloidosis (HCC), Chickenpox, Colon polyps, Coronary atherosclerosis of native coronary artery (05/15/2013), Coronary heart disease, Dyslipidemia (2016), H/O urethral stricture, Kidney cysts, Meniscus tear, Pancreas cyst, Scarlet fever, Sinusitis, chronic, Spinal stenosis, lumbar, SVT (supraventricular tachycardia) (HCC) (10/23/2018), and Thrombocytopenia (HCC) (2017).   Past Surgical History:   Procedure Laterality Date   • KNEE RECONSTRUCTION      left, partial. multiple previous surgeries.    • ROTATOR CUFF REPAIR      c/b right biceps tendon rupture, presumably repaired.    • ANKLE FUSION Left    • ARTHROSCOPY, KNEE     • HEMORRHOIDECTOMY     • SINUSCOPE     • SINUSOTOMIES      Dr. Guerrier, total of 4 surgeries   • TRIGGER FINGER RELEASE      x 2       Exam:     /70 (BP Location: Left arm, Patient Position: Sitting, BP Cuff Size: Adult)   Pulse 75   Temp 36.4 °C (97.6 °F) (Temporal)   Resp 12    "Ht 1.778 m (5' 10\")   Wt 73.5 kg (162 lb)   SpO2 98%  Body mass index is 23.24 kg/m².    Hearing good.    Dentition good  Alert, oriented in no acute distress.  Eye contact is good, speech goal directed, affect calm  General: Mildly overweight.  No distress.  Normal appearing.  HEENT: Pupils are equal.  Conjunctiva is normal.  Head is normal appearing.  Ears, canals and tympanic membranes are normal.  Oral cavity is pink and moist without lesion.  Neck: Supple without JVD or bruit.  Thyroid is not enlarged.  Pulmonary: Clear with good breath sounds.  Cardiovascular regular rate and rhythm.  No murmur auscultated.  Carotid, radial and pedal pulses are intact.  Abdomen: Soft, nontender, nondistended.  Normal bowel sounds.  Organs are not enlarged.  Neurologic: Cranial nerves intact.  Strength and sensation are normal.  Normal patellar reflex.  Skin: No obvious lesions  Lymph: No cervical, supraclavicular, axillary, abdominal or inguinal adenopathy noted.  Genitourinary: Prostate is 2+ enlarged, soft and without nodule.        Assessment and Plan. The following treatment and monitoring plan is recommended:    1. Medicare annual wellness visit, subsequent     2. Amyloid heart muscle disease (HCC)     3. Benign prostatic hyperplasia with urinary obstruction     4. Dyslipidemia     5. Thrombocytopenia (HCC)     6. SVT (supraventricular tachycardia) (HCC)     7. Cardiomyopathy, unspecified type (HCC)     8. Polyp of colon, unspecified part of colon, unspecified type     9. Atherosclerosis of native coronary artery of native heart without angina pectoris     10. Osteopenia of multiple sites     11. Chronic right shoulder pain     12. Spondylosis of cervical region without myelopathy or radiculopathy     13. Ingrown toenail of both feet     14. Macrocytosis without anemia     15. Disease of bone and joint         Heart issues-patient has complex cardiac history with amyloid, strongly positive cardiac calcium, history of " "SVT.  Reports he is asymptomatic but describes \"pauses\".  They may be occurring more frequently but overall rare.  He had previous Holter monitor.  There is a question regarding repeat Holter monitor or other cardiac monitor every 6 months.  I will defer to cardiology regarding this.  His amyloid heart disease status is unknown.  He has bilateral pitting edema.  He is on diuretics but has taken a lower dose because of significant diuresis and affecting his quality of life.  Angina or other ischemic symptoms.  His cholesterol is at goal.  His blood pressure is at goal.  He is not diabetic.  He is on aspirin.  He is exercising regularly without symptoms.    Prostate issues are stable.  He has ongoing symptoms but improved after surgery.  We reviewed that his lower PSA is due to the procedure.    Hyperlipidemia.  Lipids are at goal as above.    Osteopenia.  Due for bone density.    Macrocytosis without anemia.  Encouraged B12 500 mcg daily.    Thrombocytopenia.  Improved, possibly due to acute phase reactant from sinus infection.  Overall this is been stable for many years.    Shoulder pain-start physical therapy.  Follow-up with orthopedic surgery.  Neck issues-no pain.  Suspect he has degenerative changes he had previous MRI.  He is scheduled to follow-up with surgeon from [place].  Toe pain-exam is most consistent with bilateral ingrown toenails along the lateral edge.  Recommended referral to podiatry.  Services suggested: No services required at this time  Health Care Screening: Age-appropriate preventive services Medicare covers discussed today and ordered if indicated.  Referrals offered: Community-based lifestyle interventions to reduce health risks and promote self-management and wellness, fall prevention, nutrition, physical activity, tobacco-use cessation, weight loss, and mental health services as per orders if indicated.    Discussion today about general wellness and lifestyle habits:    · Prevent falls " and reduce trip hazards; Cautioned about securing or removing rugs.  · Have a working fire alarm and carbon monoxide detector;   · Engage in regular physical activity and social activities       Follow-up: 6 months

## 2019-11-14 NOTE — Clinical Note
"Jaime, I just saw our mutual patient.  He is inquiring about a cardiac monitor.  He was told by amyloid specialist in [place] that it may be a good idea.  He has experienced some episodes he refers to as \"pauses\".  I was hoping to get your input.  Thanks Azar"

## 2019-11-18 ENCOUNTER — OFFICE VISIT (OUTPATIENT)
Dept: INTERNAL MEDICINE | Facility: IMAGING CENTER | Age: 79
End: 2019-11-18
Payer: MEDICARE

## 2019-11-18 VITALS
SYSTOLIC BLOOD PRESSURE: 136 MMHG | TEMPERATURE: 97.4 F | RESPIRATION RATE: 12 BRPM | OXYGEN SATURATION: 99 % | DIASTOLIC BLOOD PRESSURE: 72 MMHG | HEART RATE: 61 BPM

## 2019-11-18 DIAGNOSIS — H61.21 IMPACTED CERUMEN OF RIGHT EAR: ICD-10-CM

## 2019-11-18 NOTE — PROGRESS NOTES
Patient comes in for removal of wax from his right ear.  This was noted on his last evaluation.  He has not had any success with home remedy.  He has had some discomfort in the ear.  He complains of ongoing sinus pain and pressure.  He is finishing his current antibiotics.    It was discussed with the patient that they have a cerumen impaction. The risks and benefits of removal were discussed. Specifically we discussed the benefits of reduced risk of infection, improved visualization of the landmarks and to improve discomfort due to  impaction. Risk were discussed and included but not limited to infection, bleeding, pain and possibly tympanic membrane perforation. A large cerumen plug was removed from the right ear.   forceps were used to remove the plug.  There was some mild bleeding in the canal after removal of the ear wax.  Compression was used for hemostasis.  The bleeding was minimal.Post cerumen impaction care was discussed. Encouraged regular cleaning but to avoid deep penetration with foreign objects such as a Q-tip.

## 2019-11-19 ENCOUNTER — APPOINTMENT (RX ONLY)
Dept: URBAN - METROPOLITAN AREA CLINIC 4 | Facility: CLINIC | Age: 79
Setting detail: DERMATOLOGY
End: 2019-11-19

## 2019-11-19 DIAGNOSIS — L82.1 OTHER SEBORRHEIC KERATOSIS: ICD-10-CM

## 2019-11-19 DIAGNOSIS — D18.0 HEMANGIOMA: ICD-10-CM

## 2019-11-19 DIAGNOSIS — L81.4 OTHER MELANIN HYPERPIGMENTATION: ICD-10-CM

## 2019-11-19 PROBLEM — D18.01 HEMANGIOMA OF SKIN AND SUBCUTANEOUS TISSUE: Status: ACTIVE | Noted: 2019-11-19

## 2019-11-19 PROCEDURE — 99213 OFFICE O/P EST LOW 20 MIN: CPT

## 2019-11-19 ASSESSMENT — LOCATION DETAILED DESCRIPTION DERM
LOCATION DETAILED: LEFT SUPERIOR MEDIAL MIDBACK
LOCATION DETAILED: RIGHT SUPERIOR UPPER BACK
LOCATION DETAILED: RIGHT MID-UPPER BACK

## 2019-11-19 ASSESSMENT — LOCATION SIMPLE DESCRIPTION DERM
LOCATION SIMPLE: LEFT LOWER BACK
LOCATION SIMPLE: RIGHT UPPER BACK

## 2019-11-19 ASSESSMENT — LOCATION ZONE DERM: LOCATION ZONE: TRUNK

## 2019-12-06 DIAGNOSIS — M25.50 ARTHRALGIA, UNSPECIFIED JOINT: ICD-10-CM

## 2019-12-06 RX ORDER — CELECOXIB 200 MG/1
CAPSULE ORAL
Qty: 30 CAP | Refills: 1 | Status: SHIPPED | OUTPATIENT
Start: 2019-12-06 | End: 2019-12-20

## 2019-12-20 DIAGNOSIS — M25.50 ARTHRALGIA, UNSPECIFIED JOINT: ICD-10-CM

## 2019-12-20 RX ORDER — CELECOXIB 200 MG/1
CAPSULE ORAL
Qty: 90 CAP | Refills: 1 | Status: SHIPPED
Start: 2019-12-20 | End: 2020-01-10 | Stop reason: SDUPTHER

## 2019-12-23 ENCOUNTER — SLEEP CENTER VISIT (OUTPATIENT)
Dept: SLEEP MEDICINE | Facility: MEDICAL CENTER | Age: 79
End: 2019-12-23
Payer: MEDICARE

## 2019-12-23 VITALS
OXYGEN SATURATION: 98 % | HEART RATE: 60 BPM | SYSTOLIC BLOOD PRESSURE: 122 MMHG | BODY MASS INDEX: 23.62 KG/M2 | HEIGHT: 70 IN | WEIGHT: 165 LBS | RESPIRATION RATE: 14 BRPM | DIASTOLIC BLOOD PRESSURE: 70 MMHG

## 2019-12-23 DIAGNOSIS — G47.33 OSA (OBSTRUCTIVE SLEEP APNEA): ICD-10-CM

## 2019-12-23 PROCEDURE — 99213 OFFICE O/P EST LOW 20 MIN: CPT | Performed by: FAMILY MEDICINE

## 2019-12-23 NOTE — PATIENT INSTRUCTIONS
"    CPAP and BIPAP Information  CPAP and BIPAP are methods of helping you breathe with the use of air pressure. CPAP stands for \"continuous positive airway pressure.\" BIPAP stands for \"bi-level positive airway pressure.\" In both methods, air is blown into your air passages to help keep you breathing well. With CPAP, the amount of pressure stays the same while you breathe in and out. CPAP is most commonly used for obstructive sleep apnea. For obstructive sleep apnea, CPAP works by holding your airways open so that they do not collapse when your muscles relax during sleep. BIPAP is similar to CPAP except the amount of pressure is increased when you inhale. This helps you take larger breaths. Your health care provider will recommend whether CPAP or BIPAP would be more helpful for you.   WHY ARE CPAP AND BIPAP TREATMENTS USED?  CPAP or BIPAP can be helpful if you have:   · Sleep apnea.    · Chronic obstructive pulmonary disease (COPD).    · Diseases that weaken the muscles of the chest, including muscular dystrophy or neurological diseases such as amyotrophic lateral sclerosis (ALS).  · Other problems that cause breathing to be weak, abnormal, or difficult.    HOW IS CPAP OR BIPAP ADMINISTERED?  Both CPAP and BIPAP are provided by a small machine with a flexible plastic tube that attaches to a plastic mask. The mask fits on your face, and air is blown into your air passages through your nose or mouth. The amount of pressure that is used to blow the air into your air passages can be set on the machine. Your health care provider will determine the pressure setting that should be used based on your individual needs.  WHEN SHOULD CPAP OR BIPAP BE USED?  In most cases, the mask is worn only when sleeping. Generally, you will need to wear the mask throughout the night and during the daytime if you take a nap. In a few cases involving certain medical conditions, people also need to wear the mask at other times when they are " awake. Follow your health care provider's instructions for when to use the machine.   USING THE MASK  · Because the mask needs to be snug, some people feel a trapped or closed-in feeling (claustrophobic) when first using the mask. You may need to get used to the mask gradually. To do this, you can first hold the mask loosely over your nose or mouth. Gradually apply the mask more snugly. You can also gradually increase the amount of time that you use the mask.  · Masks are available in various types and sizes. Some fit over your mouth and nose, and some fit over just your nose. If your mask does not fit well, talk to your health care provider about getting a different one.  · If you are using a nasal mask and you tend to breathe through your mouth, a chin strap may be applied to help keep your mouth closed.    · The CPAP and BIPAP machines have alarms that may sound if the mask comes off or develops a leak.  · If you have trouble with the mask, it is very important that you talk to your health care provider about finding a way to make the mask easier to tolerate. Do not stop using the mask. This could have a negative impact on your health.  TIPS FOR USING THE MACHINE  · Place your CPAP or BIPAP machine on a secure table or stand near an electrical outlet.    · Know where the on-off switch is located on the machine.  · Follow your health care provider's instructions for how to set the pressure on your machine and when you should use it.  · Do not eat or drink while the CPAP or BIPAP machine is on. Food or fluids could get pushed into your lungs by the pressure of the CPAP or BIPAP.  · Do not smoke. Tobacco smoke residue can damage the machine.    · For home use, CPAP and BIPAP machines can be rented or purchased through home health care companies. Many different brands of machines are available. Renting a machine before purchasing may help you find out which particular machine works well for you.  SEEK IMMEDIATE  MEDICAL CARE IF:  · You have redness or open areas around your nose or mouth where the mask fits.    · You have trouble operating the CPAP or BIPAP machine.    · You cannot tolerate wearing the CPAP or BIPAP mask.    This information is not intended to replace advice given to you by your health care provider. Make sure you discuss any questions you have with your health care provider.  Document Released: 09/15/2005 Document Revised: 01/08/2016 Document Reviewed: 07/17/2014  [employer]vier Interactive Patient Education © 2017 [employer]

## 2019-12-23 NOTE — PROGRESS NOTES
[facility] Follow Up Note     Date: 12/23/2019 / Time: 3:25 PM    Patient ID:   Name:             Andre Martinez   YOB: 1940  Age:                 79 y.o.  male   MRN:               [id number]      Thank you for requesting a sleep medicine consultation on Andre Martinez at the sleep center. He presents today with the chief complaints of SS follow up.     HISTORY OF PRESENT ILLNESS:       Pt is currently not on therapy. He goes to sleep around 12 am and wakes up around 7 am. He is getting about 7 hrs of sleep on a good night. The bad nights are rare per week. Overall, he does finds his sleep refreshing. When He  wakes up in the morning, He does not feel tired.Since his last visit he had PSG which showed Moderate complex sleep apnea with AHI of 16.2/hr and O2 yulissa 86 %.    SLEEP HISTORY   He had an OPO on 4/8/19 which showed  O2 Sat. yulissa was 87% and mean O2 sat was 93 % and baseline O2 at 99%. O2 sat was below 88% for 3.3 min of the flow evaluation time. Oxygen Desaturation (>=3%) Index was elevated at 7.7/hr      REVIEW OF SYSTEMS:       Constitutional: Denies fevers, Denies weight changes  Eyes: Denies changes in vision, no eye pain  Ears/Nose/Throat/Mouth: Denies nasal congestion or sore throat   Cardiovascular: Denies chest pain or palpitations   Respiratory: Denies shortness of breath , Denies cough  Gastrointestinal/Hepatic: Denies abdominal pain, nausea, vomiting, diarrhea, constipation or GI bleeding   Genitourinary: Deniesdysuria or frequency  Musculoskeletal/Rheum: Denies  joint pain and swelling   Skin/Breast: Denies rash,   Neurological: Denies headache, confusion, memory loss or focal weakness/parasthesias  Psychiatric: denies mood disorder     Comprehensive review of systems form is reviewed with the patient and is attached in the EMR.     PMH:  has a past medical history of Amyloidosis (HCC), BPH (benign prostatic hyperplasia), Cardiac amyloidosis (HCC),  Chickenpox, Colon polyps, Coronary atherosclerosis of native coronary artery (05/15/2013), Coronary heart disease, Dyslipidemia (08/23/2016), H/O urethral stricture, Kidney cysts, Meniscus tear, Pancreas cyst, Scarlet fever, Sinusitis, chronic, Spinal stenosis, lumbar, SVT (supraventricular tachycardia) (HCC) (10/23/2018), and Thrombocytopenia (HCC) (7/24/2017).  MEDS:   Current Outpatient Medications:   •  celecoxib (CELEBREX) 200 MG Cap, TAKE 1 CAPSULE BY MOUTH EVERY DAY, Disp: 90 Cap, Rfl: 1  •  Tafamidis Meglumine, Cardiac, (VYNDAQEL) 20 MG Cap, Take 80 mg by mouth., Disp: , Rfl:   •  torsemide (DEMADEX) 5 MG Tab, Take 1 Tab by mouth 1 time daily as needed., Disp: 90 Tab, Rfl: 1  •  doxycycline (VIBRAMYCIN) 100 MG Tab, Take 1 Tab by mouth 2 times a day., Disp: 20 Tab, Rfl: 0  •  torsemide (DEMADEX) 10 MG tablet, Take 1 Tab by mouth 1 time daily as needed (leg swelling, shortness of breath or weight gain > 2 pounds.)., Disp: , Rfl:   •  potassium chloride ER (KLOR-CON) 10 MEQ tablet, Take 1 Tab by mouth 1 time daily as needed (when taking torsemide.)., Disp: 90 Tab, Rfl: 3  •  atorvastatin (LIPITOR) 40 MG Tab, Take 1 Tab by mouth every bedtime., Disp: 30 Tab, Rfl:   •  folic acid (FOLVITE) 1 MG Tab, Take 1 mg by mouth every day., Disp: , Rfl:   •  CHONDROITIN SULFATE A PO, Take 625 mg by mouth every day., Disp: , Rfl:   •  VITAMIN B COMPLEX-C PO, Take  by mouth., Disp: , Rfl:   •  Multiple Vitamins-Minerals (CENTRUM SILVER PO), Take  by mouth., Disp: , Rfl:   •  thiamine (THIAMINE) 100 MG Tab, Take 100 mg by mouth every day., Disp: , Rfl:   •  Biotin 1000 MCG Tab, Take  by mouth., Disp: , Rfl:   •  Guaifenesin 400 MG Tab, Take  by mouth., Disp: , Rfl:   •  vitamin D (CHOLECALCIFEROL) 1000 UNIT TABS, Take 2 Tabs by mouth every day., Disp: 30 Tab, Rfl: 6  •  aspirin EC (ECOTRIN) 81 MG TBEC, Take 81 mg by mouth every day., Disp: , Rfl:   •  Omega-3 Fatty Acids (SALMON OIL PO), Take 2 Caps by mouth every day., Disp:  ", Rfl:   •  Cyanocobalamin (VITAMIN B 12 PO), Take 1 Tab by mouth every day., Disp: , Rfl:   •  coenzyme Q-10 30 MG capsule, Take 60 mg by mouth every day., Disp: , Rfl:   •  GLUCOSA-CHONDR-NA CHONDR-MSM PO, Take 1 Tab by mouth every day., Disp: , Rfl:   •  amoxicillin-clavulanate (AUGMENTIN) 875-125 MG Tab, Take 1 Tab by mouth 2 times a day., Disp: 28 Tab, Rfl: 0  •  NON SPECIFIED, , Disp: , Rfl:   ALLERGIES:   Allergies   Allergen Reactions   • Avelox [Moxifloxacin Hcl In Nacl] Rash     rash   • Ceftin  [Cefuroxime Axetil]    • Demerol    • Fluorometholone    • Oxycodone    • Scopolamine    • Cefdinir Rash     Rash chest, under arm and scalp       SURGHX:   Past Surgical History:   Procedure Laterality Date   • KNEE RECONSTRUCTION      left, partial. multiple previous surgeries.    • ROTATOR CUFF REPAIR      c/b right biceps tendon rupture, presumably repaired.    • ANKLE FUSION Left    • ARTHROSCOPY, KNEE     • HEMORRHOIDECTOMY     • SINUSCOPE     • SINUSOTOMIES      Dr. Guerrier, total of 4 surgeries   • TRIGGER FINGER RELEASE      x 2     SOCHX:  reports that he has never smoked. He has never used smokeless tobacco. He reports current alcohol use. He reports that he does not use drugs..  FH:   Family History   Problem Relation Age of Onset   • Heart Attack Father 38        doctor in 1946 diagnosed him with MI at home,  before going to the hospital   • Psychiatric Illness Mother         Alzheimers   • Hypertension Brother    • Stroke Brother    • Seizures Brother    • Sleep Apnea Neg Hx          Physical Exam:  Vitals/ General Appearance:   Weight/BMI: Body mass index is 23.68 kg/m².  /70 (BP Location: Left arm, Patient Position: Sitting, BP Cuff Size: Adult)   Pulse 60   Resp 14   Ht 1.778 m (5' 10\")   Wt 74.8 kg (165 lb)   SpO2 98%   Vitals:    19 1522   BP: 122/70   BP Location: Left arm   Patient Position: Sitting   BP Cuff Size: Adult   Pulse: 60   Resp: 14   SpO2: 98%   Weight: " "74.8 kg (165 lb)   Height: 1.778 m (5' 10\")       Pt. is alert and oriented to time, place and person. Cooperative and in no apparent distress.       -Head: Atraumatic, normocephalic.   -. Throat: Oropharynx appears crowded in that the palate is overhanging pharynx not inflamed.   -. Neck: Supple. No thyromegaly  -. Chest: Trachea central, no spine deformity   -. Lungs auscultation: B/L good air entry, vesicular breath sounds, no adventitious sounds  -. Heart auscultation: 1st and 2nd heart sounds normal, regular rhythm. No appreciable murmur.  - Extremities: no pedal edema.  - Skin: No rash  - NEUROLOGICAL EXAMINATION: On neurological exam, the patient was alert and oriented x3. speech was clear and fluent without dysarthria.      ASSESSMENT AND PLAN     1. Sleep Apnea    The pathophysiology of sleep anea and the increased risk of cardiovascular morbidity from untreated sleep apnea is discussed in detail with the patient.  He is urged to avoid supine sleep, weight gain and alcoholic beverages since all of these can worsen sleep apnea. He is cautioned against drowsy driving. If He feels sleepy while driving, He must pull over for a break/nap, rather than persist on the road, in the interest of He own safety and that of others on the road.   Plan   - Auto CPAP vs overnight CPAP titration vs dental appliance and surgeries was dicussed in detail. After informed discussion CPAP/BiPAP titration is ordered today    - SS was reviewed and discussed with the pt   - compliance was reinforced     2. Regarding treatment of other past medical problems and general health maintenance,  He is urged to follow up with PCP.      "

## 2020-01-10 DIAGNOSIS — M25.50 ARTHRALGIA, UNSPECIFIED JOINT: ICD-10-CM

## 2020-01-10 RX ORDER — CELECOXIB 200 MG/1
200 CAPSULE ORAL
Qty: 90 CAP | Refills: 1 | Status: SHIPPED | OUTPATIENT
Start: 2020-01-10 | End: 2020-06-23

## 2020-01-22 ENCOUNTER — SLEEP STUDY (OUTPATIENT)
Dept: SLEEP MEDICINE | Facility: MEDICAL CENTER | Age: 80
End: 2020-01-22
Attending: FAMILY MEDICINE
Payer: MEDICARE

## 2020-01-22 DIAGNOSIS — G47.33 OSA (OBSTRUCTIVE SLEEP APNEA): ICD-10-CM

## 2020-01-22 PROCEDURE — 95811 POLYSOM 6/>YRS CPAP 4/> PARM: CPT | Performed by: FAMILY MEDICINE

## 2020-01-23 NOTE — PROCEDURES
Comments:  The patient underwent a diagnostic polysomnogram using the standard montage for measurement of parameters of sleep, respiratory events, movement abnormalities, and heart rate and rhythm.    A microphone was used to monitor snoring.    Interpretation:  Study start time was 09:54:54 PM.  Diagnostic recording time was 4h 32.0m with a total sleep time of 3h 5.5m resulting in a sleep efficiency of 68.20%%.  Sleep latency from the start of the study was 05 minutes and the latency from sleep to REM was 118 minutes.In total,45  arousals were scored for an arousal index of 14.6. Sleep stages showed decreased SE, increased WASO of 60 min, normal N3 and decreased REM sleep.    Respiratory:  There were a total of 3 apneas consisting of 0 obstructive apneas, 0 mixed apneas, and 3 central apneas.  A total of 9 hypopneas were scored.The apnea index was 0.97 per hour and the hypopnea index was 2.91 per hour resulting in an overall AHI of 3.88.AHI during rem was 13.9 and AHI while supine was 0.00.  25 % of the apneas were central apneas.    Oximetry:  There was a mean oxygen saturation of 95.0% with a minimum oxygen saturation of 89.0%.  Time spent with oxygen saturations below 89% was 9.8 minutes.    Cardiac:  The highest heart rate seen while awake was 76 BPM while the highest heart rate during sleep was 73 BPM with an average sleeping heart rate of 54 BPM.    Limb Movements:  There were a total of 0 PLMs during sleep, of which 0 were PLMS arousals.  This resulted in a PLMS index of 0.0 and a PLMS arousal index of 0.0.     CPAP was tried from 4 to 6cm H2O.    CPAP Titration:  The PAP titration was initiated with CPAP 4 cm of water and the pressure which was slowly titrated up in an attempt to eliminate sleep disordered breathing and snoring. The final pressure tested during the study was CPAP 6 cm water and at this final pressure the patient was observed in non supine REM sleep stage. The apnea hypopnea index improved  to 2.4 per hour and O2 yulissa 90%. The average O2 stauration was 95%. He  spent 0 % of sleep time below 89% O2 saturation.  The patient utilized vitera mask with heated humidification. The CPAP was not well-tolerated and therefore the titration was ended at 0227 on patients request.     Impression:  1.  Complex sleep apnea   2.  Successful titration       Recommendations:  I recommend CPAP 6 cm with vitera mask. Recommended 30 day compliance download to assess the efficacy of the recommended pressure and compliance for further outpatient monitoring and management of CPAP therapy. In some cases alternative treatment options may prove effective in resolving sleep apnea and these options include upper airway surgery, the use of a dental orthotic or weight loss and positional therapy. Clinical correlation is required. In general patients with sleep apnea are advised to avoid alcohol and sedatives and to not operate a motor vehicle while drowsy and are at a greater risk for cardiovascular disease.

## 2020-01-28 ENCOUNTER — SLEEP CENTER VISIT (OUTPATIENT)
Dept: SLEEP MEDICINE | Facility: MEDICAL CENTER | Age: 80
End: 2020-01-28
Payer: MEDICARE

## 2020-01-28 VITALS
HEIGHT: 70 IN | SYSTOLIC BLOOD PRESSURE: 128 MMHG | BODY MASS INDEX: 23.48 KG/M2 | DIASTOLIC BLOOD PRESSURE: 56 MMHG | HEART RATE: 53 BPM | OXYGEN SATURATION: 97 % | WEIGHT: 164 LBS

## 2020-01-28 DIAGNOSIS — G47.33 OSA (OBSTRUCTIVE SLEEP APNEA): ICD-10-CM

## 2020-01-28 PROCEDURE — 99213 OFFICE O/P EST LOW 20 MIN: CPT | Performed by: FAMILY MEDICINE

## 2020-01-28 NOTE — PROGRESS NOTES
[facility] Follow Up Note     Date: 1/28/2020 / Time: 3:40 PM    Patient ID:   Name:             Andre Martinez   YOB: 1940  Age:                 79 y.o.  male   MRN:               [id number]      Thank you for requesting a sleep medicine consultation on Andre Martinez at the sleep center. He presents today with the chief complaints of MEHRDAD and SS follow up.     HISTORY OF PRESENT ILLNESS:       Pt is currently not on therapy. He goes to sleep around 12 pm and wakes up around 7 am. He is getting about 6-7 hrs of sleep on a good night. The bad nights are rare per week. Overall, he doesnot finds his sleep refreshing. He does not take regular naps. The PAP titration was initiated with CPAP 4 cm of water and the pressure which was slowly titrated up in an attempt to eliminate sleep disordered breathing and snoring. The final pressure tested during the study was CPAP 6 cm water and at this final pressure the patient was observed in non supine REM sleep stage. The apnea hypopnea index improved to 2.4 per hour and O2 yulissa 90%. The average O2 stauration was 95%. He  spent 0 % of sleep time below 89% O2 saturation.  The patient utilized vitera mask with heated humidification.    SLEEP HISTORY   PSG which showed Moderate complex sleep apnea with AHI of 16.2/hr and O2 yulissa 86 %.      REVIEW OF SYSTEMS:       Constitutional: Denies fevers, Denies weight changes  Eyes: Denies changes in vision, no eye pain  Ears/Nose/Throat/Mouth: Denies nasal congestion or sore throat   Cardiovascular: Denies chest pain or palpitations   Respiratory: Denies shortness of breath , Denies cough  Gastrointestinal/Hepatic: Denies abdominal pain, nausea, vomiting, diarrhea, constipation or GI bleeding   Genitourinary: Deniesdysuria or frequency  Musculoskeletal/Rheum: Denies  joint pain and swelling   Skin/Breast: Denies rash,   Neurological: Denies headache, confusion, memory loss or focal  weakness/parasthesias  Psychiatric: denies mood disorder   Sleep: + snoring     Comprehensive review of systems form is reviewed with the patient and is attached in the EMR.     PMH:  has a past medical history of Amyloidosis (HCC), BPH (benign prostatic hyperplasia), Cardiac amyloidosis (HCC), Chickenpox, Colon polyps, Coronary atherosclerosis of native coronary artery (05/15/2013), Coronary heart disease, Dyslipidemia (08/23/2016), H/O urethral stricture, Kidney cysts, Meniscus tear, Pancreas cyst, Scarlet fever, Sinusitis, chronic, Spinal stenosis, lumbar, SVT (supraventricular tachycardia) (HCC) (10/23/2018), and Thrombocytopenia (HCC) (7/24/2017).  MEDS:   Current Outpatient Medications:   •  celecoxib (CELEBREX) 200 MG Cap, Take 1 Cap by mouth every day., Disp: 90 Cap, Rfl: 1  •  Tafamidis Meglumine, Cardiac, (VYNDAQEL) 20 MG Cap, Take 80 mg by mouth., Disp: , Rfl:   •  torsemide (DEMADEX) 5 MG Tab, Take 1 Tab by mouth 1 time daily as needed., Disp: 90 Tab, Rfl: 1  •  amoxicillin-clavulanate (AUGMENTIN) 875-125 MG Tab, Take 1 Tab by mouth 2 times a day., Disp: 28 Tab, Rfl: 0  •  atorvastatin (LIPITOR) 40 MG Tab, Take 1 Tab by mouth every bedtime., Disp: 30 Tab, Rfl:   •  Multiple Vitamins-Minerals (CENTRUM SILVER PO), Take  by mouth., Disp: , Rfl:   •  Biotin 1000 MCG Tab, Take  by mouth., Disp: , Rfl:   •  Guaifenesin 400 MG Tab, Take  by mouth., Disp: , Rfl:   •  aspirin EC (ECOTRIN) 81 MG TBEC, Take 81 mg by mouth every day., Disp: , Rfl:   •  Omega-3 Fatty Acids (SALMON OIL PO), Take 2 Caps by mouth every day., Disp: , Rfl:   •  Cyanocobalamin (VITAMIN B 12 PO), Take 1 Tab by mouth every day., Disp: , Rfl:   •  coenzyme Q-10 30 MG capsule, Take 60 mg by mouth every day., Disp: , Rfl:   •  GLUCOSA-CHONDR-NA CHONDR-MSM PO, Take 1 Tab by mouth every day., Disp: , Rfl:   •  doxycycline (VIBRAMYCIN) 100 MG Tab, Take 1 Tab by mouth 2 times a day. (Patient not taking: Reported on 1/28/2020), Disp: 20 Tab, Rfl:  0  •  NON SPECIFIED, , Disp: , Rfl:   •  torsemide (DEMADEX) 10 MG tablet, Take 1 Tab by mouth 1 time daily as needed (leg swelling, shortness of breath or weight gain > 2 pounds.)., Disp: , Rfl:   •  potassium chloride ER (KLOR-CON) 10 MEQ tablet, Take 1 Tab by mouth 1 time daily as needed (when taking torsemide.)., Disp: 90 Tab, Rfl: 3  •  folic acid (FOLVITE) 1 MG Tab, Take 1 mg by mouth every day., Disp: , Rfl:   •  CHONDROITIN SULFATE A PO, Take 625 mg by mouth every day., Disp: , Rfl:   •  VITAMIN B COMPLEX-C PO, Take  by mouth., Disp: , Rfl:   •  thiamine (THIAMINE) 100 MG Tab, Take 100 mg by mouth every day., Disp: , Rfl:   •  vitamin D (CHOLECALCIFEROL) 1000 UNIT TABS, Take 2 Tabs by mouth every day., Disp: 30 Tab, Rfl: 6  ALLERGIES:   Allergies   Allergen Reactions   • Avelox [Moxifloxacin Hcl In Nacl] Rash     rash   • Ceftin  [Cefuroxime Axetil]    • Demerol    • Fluorometholone    • Oxycodone    • Scopolamine    • Cefdinir Rash     Rash chest, under arm and scalp       SURGHX:   Past Surgical History:   Procedure Laterality Date   • KNEE RECONSTRUCTION      left, partial. multiple previous surgeries.    • ROTATOR CUFF REPAIR      c/b right biceps tendon rupture, presumably repaired.    • ANKLE FUSION Left    • ARTHROSCOPY, KNEE     • HEMORRHOIDECTOMY     • SINUSCOPE     • SINUSOTOMIES      Dr. Guerrier, total of 4 surgeries   • TRIGGER FINGER RELEASE      x 2     SOCHX:  reports that he has never smoked. He has never used smokeless tobacco. He reports current alcohol use. He reports that he does not use drugs..  FH:   Family History   Problem Relation Age of Onset   • Heart Attack Father 38        doctor in 1946 diagnosed him with MI at home,  before going to the hospital   • Psychiatric Illness Mother         Alzheimers   • Hypertension Brother    • Stroke Brother    • Seizures Brother    • Sleep Apnea Neg Hx          Physical Exam:  Vitals/ General Appearance:   Weight/BMI: Body mass index  "is 23.53 kg/m².  /56 (BP Location: Left arm, Patient Position: Sitting, BP Cuff Size: Adult)   Pulse (!) 53   Ht 1.778 m (5' 10\")   Wt 74.4 kg (164 lb)   SpO2 97%   Vitals:    01/28/20 1527   BP: 128/56   BP Location: Left arm   Patient Position: Sitting   BP Cuff Size: Adult   Pulse: (!) 53   SpO2: 97%   Weight: 74.4 kg (164 lb)   Height: 1.778 m (5' 10\")       Pt. is alert and oriented to time, place and person. Cooperative and in no apparent distress.       -Head: Atraumatic, normocephalic.   -. Nose: No inferior turbinate hypertophy  -. Throat: Oropharynx appears crowded in that the palate is  overhanging (Malam Margareth scale 4. Tonsils are not enlarged  -. Neck: Supple. No thyromegaly  -. Chest: Trachea central, no spine deformity   -. Lungs auscultation: B/L good air entry, vesicular breath sounds, no adventitious sounds  -. Heart auscultation: 1st and 2nd heart sounds normal, regular rhythm. No appreciable murmur.  - Extremities:  no pedal edema.  - Skin: No visible rash  - NEUROLOGICAL EXAMINATION: On neurological exam, the patient was alert and oriented x3. speech was clear and fluent without dysarthria.      ASSESSMENT AND PLAN     1. Sleep Apnea .   The pathophysiology of sleep anea and the increased risk of cardiovascular morbidity from untreated sleep apnea is discussed in detail with the patient.     He is urged to avoid supine sleep, weight gain and alcoholic beverages since all of these can worsen sleep apnea. He is cautioned against drowsy driving. If He feels sleepy while driving, He must pull over for a break/nap, rather than persist on the road, in the interest of He own safety and that of others on the road.   Plan   - Auto CPAP vs overnight CPAP titration vs dental appliance and surgeries was dicussed in detail. After informed discussion CPAP 6 cm with vitera mask    - F/u in 2 month to assess the efficiacy of recommended pressure    - SS was reviewed and discussed with the pt   - " compliance was reinforced     2. Regarding treatment of other past medical problems and general health maintenance,  He is urged to follow up with PCP.

## 2020-02-27 DIAGNOSIS — Z71.84 TRAVEL ADVICE ENCOUNTER: ICD-10-CM

## 2020-02-27 RX ORDER — AZITHROMYCIN 250 MG/1
TABLET, FILM COATED ORAL
Qty: 6 TAB | Refills: 0 | Status: SHIPPED | OUTPATIENT
Start: 2020-02-27 | End: 2020-02-28

## 2020-02-28 RX ORDER — DOXYCYCLINE HYCLATE 100 MG
100 TABLET ORAL 2 TIMES DAILY
Qty: 20 TAB | Refills: 0 | Status: SHIPPED | OUTPATIENT
Start: 2020-02-28 | End: 2020-03-10

## 2020-03-05 ENCOUNTER — OFFICE VISIT (OUTPATIENT)
Dept: INTERNAL MEDICINE | Facility: IMAGING CENTER | Age: 80
End: 2020-03-05
Payer: MEDICARE

## 2020-03-05 VITALS
SYSTOLIC BLOOD PRESSURE: 118 MMHG | HEART RATE: 61 BPM | RESPIRATION RATE: 12 BRPM | BODY MASS INDEX: 22.9 KG/M2 | WEIGHT: 160 LBS | TEMPERATURE: 98.7 F | DIASTOLIC BLOOD PRESSURE: 72 MMHG | OXYGEN SATURATION: 97 % | HEIGHT: 70 IN

## 2020-03-05 DIAGNOSIS — J01.10 ACUTE NON-RECURRENT FRONTAL SINUSITIS: ICD-10-CM

## 2020-03-05 PROBLEM — M67.911 ROTATOR CUFF DISORDER, RIGHT: Status: ACTIVE | Noted: 2019-11-19

## 2020-03-05 PROCEDURE — 99213 OFFICE O/P EST LOW 20 MIN: CPT | Performed by: INTERNAL MEDICINE

## 2020-03-05 RX ORDER — PREDNISONE 20 MG/1
TABLET ORAL
Qty: 20 TAB | Refills: 0 | Status: SHIPPED | OUTPATIENT
Start: 2020-03-05 | End: 2020-03-10

## 2020-03-05 RX ORDER — AMOXICILLIN AND CLAVULANATE POTASSIUM 875; 125 MG/1; MG/1
1 TABLET, FILM COATED ORAL 2 TIMES DAILY
Qty: 20 TAB | Refills: 0 | Status: SHIPPED | OUTPATIENT
Start: 2020-03-05 | End: 2020-03-20 | Stop reason: SDUPTHER

## 2020-03-05 ASSESSMENT — FIBROSIS 4 INDEX: FIB4 SCORE: 4.15

## 2020-03-06 DIAGNOSIS — E78.00 PURE HYPERCHOLESTEROLEMIA: ICD-10-CM

## 2020-03-06 RX ORDER — ATORVASTATIN CALCIUM 40 MG/1
40 TABLET, FILM COATED ORAL
Qty: 90 TAB | Refills: 3 | Status: SHIPPED | OUTPATIENT
Start: 2020-03-06 | End: 2021-01-07 | Stop reason: SDUPTHER

## 2020-03-06 NOTE — PROGRESS NOTES
Chief Complaint   Patient presents with   • Sinus Problem       HISTORY OF THE PRESENT ILLNESS: Patient is a 79 y.o. male.     Patient comes in complaining of bilateral frontal sinus pain.  Symptoms are similar to previous sinus infection.  Symptoms began approximately 2 weeks ago when he was in [facility].  He was started on doxycycline 5 days ago but is not feeling better.  He feels the pressure is worse.  He has noticed some increase in nasal discharge once the antibiotics were started but this is stopped.  He has not tried other over-the-counter medications.    Allergies: Avelox [moxifloxacin hcl in nacl]; Ceftin  [cefuroxime axetil]; Demerol; Fluorometholone; Oxycodone; Scopolamine; Azithromycin; and Cefdinir    Current Outpatient Medications Ordered in Epic   Medication Sig Dispense Refill   • amoxicillin-clavulanate (AUGMENTIN) 875-125 MG Tab Take 1 Tab by mouth 2 times a day. Take with food. 20 Tab 0   • predniSONE (DELTASONE) 20 MG Tab 4 tabs daily x 5 20 Tab 0   • doxycycline (VIBRAMYCIN) 100 MG Tab Take 1 Tab by mouth 2 times a day. 20 Tab 0   • Tafamidis (VYNDAMAX) 61 MG Cap Take 61 mg by mouth every day. 90 Cap 3   • celecoxib (CELEBREX) 200 MG Cap Take 1 Cap by mouth every day. 90 Cap 1   • torsemide (DEMADEX) 5 MG Tab Take 1 Tab by mouth 1 time daily as needed. 90 Tab 1   • NON SPECIFIED      • torsemide (DEMADEX) 10 MG tablet Take 1 Tab by mouth 1 time daily as needed (leg swelling, shortness of breath or weight gain > 2 pounds.).     • potassium chloride ER (KLOR-CON) 10 MEQ tablet Take 1 Tab by mouth 1 time daily as needed (when taking torsemide.). 90 Tab 3   • atorvastatin (LIPITOR) 40 MG Tab Take 1 Tab by mouth every bedtime. 30 Tab    • folic acid (FOLVITE) 1 MG Tab Take 1 mg by mouth every day.     • CHONDROITIN SULFATE A PO Take 625 mg by mouth every day.     • VITAMIN B COMPLEX-C PO Take  by mouth.     • Multiple Vitamins-Minerals (CENTRUM SILVER PO) Take  by mouth.     • thiamine (THIAMINE) 100  "MG Tab Take 100 mg by mouth every day.     • Biotin 1000 MCG Tab Take  by mouth.     • Guaifenesin 400 MG Tab Take  by mouth.     • vitamin D (CHOLECALCIFEROL) 1000 UNIT TABS Take 2 Tabs by mouth every day. 30 Tab 6   • aspirin EC (ECOTRIN) 81 MG TBEC Take 81 mg by mouth every day.     • Omega-3 Fatty Acids (SALMON OIL PO) Take 2 Caps by mouth every day.     • Cyanocobalamin (VITAMIN B 12 PO) Take 1 Tab by mouth every day.     • coenzyme Q-10 30 MG capsule Take 60 mg by mouth every day.     • GLUCOSA-CHONDR-NA CHONDR-MSM PO Take 1 Tab by mouth every day.       No current [facility]-ordered facility-administered medications on file.        Past medical history, social history and family history were reviewed from chart today    Review of systems: Per HPI.  Patient reports temperature is above normal but not a fever.  Denies chest pain, fever, chills, diarrhea, constipation, abdominal pain, palpitations, depression   All others negative.     Exam: /72 (BP Location: Left arm, Patient Position: Sitting, BP Cuff Size: Adult)   Pulse 61   Temp 37.1 °C (98.7 °F) (Temporal)   Resp 12   Ht 1.778 m (5' 10\")   Wt 72.6 kg (160 lb)   SpO2 97%   General: Mildly ill but in no distress.  HEENT: Grossly normal. Oral cavity is pink and moist.  Nasal cavities are patent with clear discharge.  Neck: Supple without JVD or bruit.  Pulmonary: Clear with good breath sounds. Normal effort.  Cardiovascular: Regular. Carotid and radial pulses are intact.  Abdomen: Soft, nontender, nondistended. Spleen and liver are not enlarged.  Neurologic: Cranial nerves II through XII are grossly normal, alert and oriented x3      Diagnosis:  1. Acute non-recurrent frontal sinusitis       Patient with ongoing bilateral frontal sinus pain.  Symptoms similar to previous sinus infections.  Unclear if patient is not responding to doxycycline versus viral or other cause of his symptoms.  Switch to Augmentin  Prednisone  Follow-up if symptoms persist or " worsen

## 2020-03-10 ENCOUNTER — OFFICE VISIT (OUTPATIENT)
Dept: CARDIOLOGY | Facility: MEDICAL CENTER | Age: 80
End: 2020-03-10
Payer: MEDICARE

## 2020-03-10 VITALS
OXYGEN SATURATION: 98 % | DIASTOLIC BLOOD PRESSURE: 66 MMHG | BODY MASS INDEX: 22.76 KG/M2 | HEART RATE: 60 BPM | WEIGHT: 159 LBS | SYSTOLIC BLOOD PRESSURE: 118 MMHG | HEIGHT: 70 IN

## 2020-03-10 DIAGNOSIS — E78.5 DYSLIPIDEMIA: ICD-10-CM

## 2020-03-10 DIAGNOSIS — I47.19 AVNRT (AV NODAL RE-ENTRY TACHYCARDIA) (HCC): ICD-10-CM

## 2020-03-10 DIAGNOSIS — E78.00 PURE HYPERCHOLESTEROLEMIA: ICD-10-CM

## 2020-03-10 DIAGNOSIS — I47.10 SVT (SUPRAVENTRICULAR TACHYCARDIA) (HCC): ICD-10-CM

## 2020-03-10 DIAGNOSIS — I50.32 CHRONIC DIASTOLIC CONGESTIVE HEART FAILURE (HCC): ICD-10-CM

## 2020-03-10 DIAGNOSIS — R93.1 AGATSTON CAC SCORE, >400: ICD-10-CM

## 2020-03-10 DIAGNOSIS — I43 CARDIAC AMYLOIDOSIS (HCC): ICD-10-CM

## 2020-03-10 DIAGNOSIS — E85.4 CARDIAC AMYLOIDOSIS (HCC): ICD-10-CM

## 2020-03-10 DIAGNOSIS — D69.6 THROMBOCYTOPENIA (HCC): ICD-10-CM

## 2020-03-10 PROCEDURE — 99214 OFFICE O/P EST MOD 30 MIN: CPT | Performed by: INTERNAL MEDICINE

## 2020-03-10 RX ORDER — TORSEMIDE 5 MG/1
5 TABLET ORAL DAILY
Qty: 90 TAB | Refills: 3 | Status: SHIPPED | OUTPATIENT
Start: 2020-03-10 | End: 2021-01-07

## 2020-03-10 ASSESSMENT — FIBROSIS 4 INDEX: FIB4 SCORE: 4.15

## 2020-03-10 NOTE — PROGRESS NOTES
"    Cardiology Follow-up Consultation Note    Date of note:    3/10/2020  Primary Care Provider: Azar Cavazos M.D.  Referring Provider: Jose Holman M.D.     Patient Name: Andre Martinez   YOB: 1940  MRN:              [id number]    Chief Complaint: Cardiac Amyloidosis.     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 Seen by Dr. Pierson at [facility] in [place], and diagnosed with TTR amyloid with fat pad biopsy confirmation.   I have included her summary below:  \"The patient is a delightful 78-year-old who was diagnosed with transthyretin amyloidosis based on a positive PYP scan done at [facility] in January of 2019. He has mildly abnormal serum free light chains, currently kappa 2.83, lambda 1.35 mg/dL with a ratio of 2.10, but there was no monoclonal protein in the serum or in the urine. Of note, he had laser enucleation of the prostate performed 11/13/2018 at [facility], and we will request that that tissue be reviewed for amyloid. He also had a fat aspirate done yesterday. Subsequent to our visit, fat aspirate results returned and are positive for amyloid.     He came to attention due to a preoperative evaluation for prostate surgery. He had a CT calcium score done due to a family history of possible coronary artery disease (sudden death in his father at age 38) in 2013 with a score in the 600s; he was asymptomatic at that time. October 2017, had a stress echocardiogram and underwent almost 12 minutes without any evidence of ischemia. It was noted that he had " increased left ventricular wall thicknesses at that time. A repeat coronary calcium score November 28 showed left main 0, , left circumflex 165, right coronary artery 2093 with a total score of 3091. His cardiologist at that time recommended a PET perfusion study which was done 12/28/2018. The ejection fraction at rest was 30% and increased to 47% (however, his echocardiogram clearly shows normal resting ejection fraction). Perfusion was normal. He did have an episode that night while he was exercising where he developed chest discomfort and rapid palpitations. Emergency room evaluation showed junctional tachycardia which resolved spontaneously. The troponin was slightly elevated with a troponin I of 0.07 ng/L (normal less than 0.04). It was felt that this episode of tachycardia may have been related to the earlier stress test.     He saw Dr. Schreiber in January 2019 for preoperative evaluation prior to surgical intervention for urethral stricture. A transthoracic echocardiogram was performed primarily to reassess the left ventricular ejection fraction which was felt to possibly have been incorrect on the PET scan. The echocardiogram was interpreted as consistent with possible cardiac amyloidosis, left ventricular size was normal, ejection fraction calculated at 54%, but to my review is in the range of 60% to 65%. There are no regional wall motion abnormalities. Moderately increased left ventricular wall thicknesses, the basal septum and posterior wall measured 16/14 mm. The left ventricular mass index was elevated at 158 g/m2. Strain was abnormal with an apical sparing pattern and an overall value of -14%, mildly thickened aortic valve with mild regurgitation, mild dilatation of the sinuses of Valsalva (37 mm). The diastolic filling pattern suggested elevated filling pressure. Left atrial volume was moderately enlarged at 42 mL/m2. The right atrium was described as severely enlarged, to my review I would  "consider it to be moderately enlarged. Right ventricular size is normal with definite increase in right ventricular wall thickness. Right ventricular systolic function appeared normal, although tissue Doppler was slightly low. The inferior vena cava was of normal size with normal inspiratory collapse. The stroke volume index was 35 mL/m2. Cardiac index was reduced at 1.81, but the heart rate was 52 beats per minute.     He had a PYP scan performed on 2019. I reviewed the images and agree with the report. The H/CL ratio which is 1.8, consistent with TTR amyloid. The SPECT images were not available to me, but gave a myocardial score of 2 which is consistent with moderate uptake.     He has never had any significant symptoms. He exercises 1-2 hours per day with aerobic activity with no limiting dyspnea. No orthopnea, paroxysmal nocturnal dyspnea, no edema. No syncope. No palpitations other than the event noted after his PET stress test. No chest pain. As mentioned, his father  suddenly at age 38 of a presumed cardiac event. There is no known family history otherwise of unexplained heart failure or of peripheral neuropathy.     He has had 2 trigger finger releases on the left within the past 10 years. No known carpal tunnel syndrome and no history of spinal stenosis. He does have a history of multiple orthopedic issues as outlined below. This includes the unexpected finding of a right biceps tendon rupture at the time of rotator cuff surgery several years ago.   \"    Continues to exercise aerobically a couple hours a day without symptoms.       At our visit, 2019:  In terms of diastolic heart failure, now off torsemide due to urinary frequency.  Weight up 4-5 pounds. Exercising without difficulty, ran up three flights of stairs today no problem.     In terms of TTR amyloid, prostate biopsy and genetic testing confirmed wild type aTTR amyloid.  He was recently seen at [facility] by Dr. Herrera 2019. Prior " to that visit, he was started on tafamidis by [facility]. Unfortunately unable to qualify for further experimental trials due to thrombocytopenia based on a recent evaluation at [facility] as well.     In terms of hypertension, mostly not checking BP at home. Well controlled. Sometimes in the 130s.     Interim Events:  In terms of CHF, taking torsemide 5mg PO daily. Took 1 week snow-shoeing in the [place] without symptoms.     In terms of SVT, no further palpitations or fast heart beats.     In terms of hypertension, well controlled.     For his amyloid, he remains on tafamidis. He is followed by Dr. Herrera at [facility] and is being consider for other studies as well if his NT-proBNP becomes elevated.       Review of Systems   Allergic/Immunologic: Positive for environmental allergies.     Constitution: Negative for chills, fever and night sweats.   HENT: Negative for nosebleeds.    Eyes: Negative for vision loss in left eye and vision loss in right eye.   Respiratory: Negative for hemoptysis.    Gastrointestinal: Negative for hematemesis, hematochezia and melena.   Genitourinary: Negative for hematuria.   Neurological: Negative for focal weakness, numbness and paresthesias.     All other systems reviewed and discussed using a comprehensive questionnaire and are negative.       Past Medical History:   Diagnosis Date   • Amyloidosis (HCC)     TTR, type pending.    • BPH (benign prostatic hyperplasia)     s/p laser encleation of the prostate   • Cardiac amyloidosis (HCC)    • Chickenpox    • Colon polyps    • Coronary atherosclerosis of native coronary artery 05/15/2013    Coronary calcium score in the 3000s, negative PET scan and no symptoms so treated medically.    • Coronary heart disease    • Dyslipidemia 08/23/2016   • H/O urethral stricture     s/p surgical repair 1/15/2019, [facility]   • Kidney cysts    • Meniscus tear    • Pancreas cyst    • Scarlet fever    • Sinusitis, chronic    • Spinal stenosis, lumbar    • SVT  (supraventricular tachycardia) ([facility]) 10/23/2018   • Thrombocytopenia ([facility]) 7/24/2017       Past Surgical History:   Procedure Laterality Date   • KNEE RECONSTRUCTION  2012    left, partial. multiple previous surgeries.    • ROTATOR CUFF REPAIR  2010    c/b right biceps tendon rupture, presumably repaired.    • ANKLE FUSION Left    • ARTHROSCOPY, KNEE     • HEMORRHOIDECTOMY     • SINUSCOPE     • SINUSOTOMIES      Dr. Guerrier, total of 4 surgeries   • TRIGGER FINGER RELEASE      x 2         Current Outpatient Medications   Medication Sig Dispense Refill   • atorvastatin (LIPITOR) 40 MG Tab Take 1 Tab by mouth every bedtime. 90 Tab 3   • Tafamidis (VYNDAMAX) 61 MG Cap Take 61 mg by mouth every day. 90 Cap 3   • celecoxib (CELEBREX) 200 MG Cap Take 1 Cap by mouth every day. 90 Cap 1   • torsemide (DEMADEX) 5 MG Tab Take 1 Tab by mouth 1 time daily as needed. 90 Tab 1   • potassium chloride ER (KLOR-CON) 10 MEQ tablet Take 1 Tab by mouth 1 time daily as needed (when taking torsemide.). 90 Tab 3   • folic acid (FOLVITE) 1 MG Tab Take 1 mg by mouth every day.     • CHONDROITIN SULFATE A PO Take 625 mg by mouth every day.     • VITAMIN B COMPLEX-C PO Take  by mouth.     • Multiple Vitamins-Minerals (CENTRUM SILVER PO) Take  by mouth.     • thiamine (THIAMINE) 100 MG Tab Take 100 mg by mouth every day.     • Biotin 1000 MCG Tab Take  by mouth.     • vitamin D (CHOLECALCIFEROL) 1000 UNIT TABS Take 2 Tabs by mouth every day. 30 Tab 6   • aspirin EC (ECOTRIN) 81 MG TBEC Take 81 mg by mouth every day.     • Omega-3 Fatty Acids (SALMON OIL PO) Take 2 Caps by mouth every day.     • Cyanocobalamin (VITAMIN B 12 PO) Take 1 Tab by mouth every day.     • coenzyme Q-10 30 MG capsule Take 60 mg by mouth every day.     • amoxicillin-clavulanate (AUGMENTIN) 875-125 MG Tab Take 1 Tab by mouth 2 times a day. Take with food. 20 Tab 0   • NON SPECIFIED      • Guaifenesin 400 MG Tab Take  by mouth.     • GLUCOSA-CHONDR-NA CHONDR-MSM PO Take 1  Tab by mouth every day.       No current facility-administered medications for this visit.          Allergies   Allergen Reactions   • Avelox [Moxifloxacin Hcl In Nacl] Rash     rash   • Ceftin  [Cefuroxime Axetil]    • Demerol    • Fluorometholone    • Oxycodone    • Scopolamine    • Azithromycin Itching   • Cefdinir Rash     Rash chest, under arm and scalp           Family History   Problem Relation Age of Onset   • Heart Attack Father 38        doctor in 1946 diagnosed him with MI at home,  before going to the hospital   • Psychiatric Illness Mother         Alzheimers   • Hypertension Brother    • Stroke Brother    • Seizures Brother    • Sleep Apnea Neg Hx          Social History     Socioeconomic History   • Marital status:      Spouse name: Not on file   • Number of children: Not on file   • Years of education: Not on file   • Highest education level: Not on file   Occupational History   • Not on file   Social Needs   • Financial resource strain: Not on file   • Food insecurity     Worry: Not on file     Inability: Not on file   • Transportation needs     Medical: Not on file     Non-medical: Not on file   Tobacco Use   • Smoking status: Never Smoker   • Smokeless tobacco: Never Used   Substance and Sexual Activity   • Alcohol use: Yes     Comment: Social    • Drug use: No   • Sexual activity: Not on file   Lifestyle   • Physical activity     Days per week: Not on file     Minutes per session: Not on file   • Stress: Not on file   Relationships   • Social connections     Talks on phone: Not on file     Gets together: Not on file     Attends Anabaptism service: Not on file     Active member of club or organization: Not on file     Attends meetings of clubs or organizations: Not on file     Relationship status: Not on file   • Intimate partner violence     Fear of current or ex partner: Not on file     Emotionally abused: Not on file     Physically abused: Not on file     Forced sexual activity: Not on  "file   Other Topics Concern   • Not on file   Social History Narrative    Retired from real estate          Physical Exam:  Ambulatory Vitals  /66 (BP Location: Left arm, Patient Position: Sitting, BP Cuff Size: Adult)   Pulse 60   Ht 1.778 m (5' 10\")   Wt 72.1 kg (159 lb)   SpO2 98%    Oxygen Therapy:  Pulse Oximetry: 98 %  BP Readings from Last 4 Encounters:   03/10/20 118/66   03/05/20 118/72   01/28/20 128/56   12/23/19 122/70       Weight/BMI: Body mass index is 22.81 kg/m².  Wt Readings from Last 4 Encounters:   03/10/20 72.1 kg (159 lb)   03/05/20 72.6 kg (160 lb)   01/28/20 74.4 kg (164 lb)   12/23/19 74.8 kg (165 lb)       General: No apparent distress  Eyes: nl conjunctiva  ENT: OP clear, normal external appearance of nose and ears  Neck: JVP 4-5 cm H2O, no carotid bruits  Lungs: normal respiratory effort, CTAB  Heart: RRR, + S4, no murmurs, no rubs, 1+ edema bilateral lower extremities. No LV/RV heave on cardiac palpatation. Sustained diffuse, displaced PMI. 2+ bilateral radial pulses.  2+ bilateral dp pulses.   Abdomen: soft, non tender, non distended, no masses, normal bowel sounds.  No HSM.  Extremities/MSK: no clubbing, no cyanosis  Neurological: No focal sensory deficits  Psychiatric: Appropriate affect, A/O x 3, intact judgement and insight  Skin: Warm extremities    Exam repeated in full and unchanged except for as noted above.      Lab Data Review:  Lab Results   Component Value Date/Time    CHOLSTRLTOT 127 11/08/2019 08:05 AM    LDL 49 11/08/2019 08:05 AM    HDL 70 11/08/2019 08:05 AM    TRIGLYCERIDE 38 11/08/2019 08:05 AM       Lab Results   Component Value Date/Time    SODIUM 139 11/08/2019 08:05 AM    POTASSIUM 4.7 11/08/2019 08:05 AM    CHLORIDE 102 11/08/2019 08:05 AM    CO2 29 11/08/2019 08:05 AM    GLUCOSE 76 11/08/2019 08:05 AM    BUN 25 (H) 11/08/2019 08:05 AM    CREATININE 1.08 11/08/2019 08:05 AM     Lab Results   Component Value Date/Time    ALKPHOSPHAT 86 11/08/2019 08:05 " "AM    ASTSGOT 36 11/08/2019 08:05 AM    ALTSGPT 23 11/08/2019 08:05 AM    TBILIRUBIN 0.8 11/08/2019 08:05 AM      Lab Results   Component Value Date/Time    WBC 10.2 11/08/2019 08:05 AM     No components found for: HBGA1C  No components found for: TROPONIN  No components found for: BNP      Cardiac Imaging and Procedures Review:    Exercise stress echocardiogram, 10/24/2017: Patient did 11 minutes 59 seconds corresponding with 12.8 metabolic equivalents on the Kael protocol achieving 92% of his age-predicted maximum heart rate with normal systolic augmentation regionally, negative for ischemia.  He also had a stress echocardiogram in 2013 that also was negative for ischemia.     CT coronary calcium scan, 11/16/2018:  \"Coronary calcification:  LMA - 0.0  LCX - 165.1  LAD - 833.2  RCA - 2093.2  PDA - 0.0  Calcium score:  3091.5\"     PET myocardial perfusion imaging, 12/20/2018, images and report reviewed, my interpretation: Demonstrates systolic dysfunction with a resting ejection fraction of 36% and no territorial ischemia nor transient ischemic dilation.  Left ventricle was borderline dilated.     EKG, 12/20/2018, tracings and report reviewed, mitral rotation: Documents supraventricular tachycardia with a fairly pronounced pseudo-R prime in V1 consistent with AVNRT       Radiology test Review:  CXR: 12/2018  No pulmonary infiltrates or consolidations are noted.  No pleural effusion. No pneumothorax.  Normal cardiopericardial silhouette.       Medical Decision Making:  # Wild Type TTR Amyloid with Cardiac involvement - asymptomatic with NYHA Class I symptoms. He was started by Dr. Pierson at [facility] on tafamadis, and most recently has seen Dr. Herrera from [place] as well as a cardiologist at [facility]. He is not currently a candidate for any of the new clinical trials given his thrombocytopenia, but that may change if his platelet counts recover. He is euvolemic today.   -continue torsemide 5mg PO daily   -f/u at " [facility] as planned  -continue tafamidis, will continue to be evaluated for other studies. Will check CBC and NT-proBNP at least Q6 months, and close to Q3 months as this might change his management options. Ordered again today.     # Agatston CAC score, >400  Asymptomatic with excellent exercise tolerance and multiple negative stress tests. Normal LVEF based on last echo performed at [facility].   -continue aspirin 81mg PO daily  -lipitor 40mg PO Daily  -discussed ED precautions    #  AVNRT (AV billy re-entry tachycardia) (HCC)  No evidence of atrial fibrillation/flutter at this time although certainly high risk.  He will let me know if any symptoms develop, and he may be a candidate for empiric anticoagulation given his high CVA risk.     # Pure hypercholesterolemia  Continue lipitor.    # Thrombocytopenia - recheck cbc, does exclude him currently from some trials.         Return in about 6 months (around 9/10/2020).      Jaime Raman MD, [facility]  [facility], [address]  [address] [place] [id number]  Phone: [phone]  Fax: [phone]

## 2020-03-11 ENCOUNTER — HOSPITAL ENCOUNTER (OUTPATIENT)
Facility: MEDICAL CENTER | Age: 80
End: 2020-03-11
Attending: INTERNAL MEDICINE
Payer: MEDICARE

## 2020-03-11 ENCOUNTER — NON-PROVIDER VISIT (OUTPATIENT)
Dept: INTERNAL MEDICINE | Facility: IMAGING CENTER | Age: 80
End: 2020-03-11
Payer: MEDICARE

## 2020-03-11 DIAGNOSIS — E78.5 DYSLIPIDEMIA: ICD-10-CM

## 2020-03-11 DIAGNOSIS — I47.10 SVT (SUPRAVENTRICULAR TACHYCARDIA) (HCC): ICD-10-CM

## 2020-03-11 DIAGNOSIS — I47.19 AVNRT (AV NODAL RE-ENTRY TACHYCARDIA) (HCC): ICD-10-CM

## 2020-03-11 DIAGNOSIS — E78.00 PURE HYPERCHOLESTEROLEMIA: ICD-10-CM

## 2020-03-11 DIAGNOSIS — D69.6 THROMBOCYTOPENIA (HCC): ICD-10-CM

## 2020-03-11 DIAGNOSIS — E85.4 CARDIAC AMYLOIDOSIS (HCC): ICD-10-CM

## 2020-03-11 DIAGNOSIS — I50.32 CHRONIC DIASTOLIC CONGESTIVE HEART FAILURE (HCC): ICD-10-CM

## 2020-03-11 DIAGNOSIS — I43 CARDIAC AMYLOIDOSIS (HCC): ICD-10-CM

## 2020-03-11 LAB
ALBUMIN SERPL BCP-MCNC: 4.4 G/DL (ref 3.2–4.9)
ALBUMIN/GLOB SERPL: 1.5 G/DL
ALP SERPL-CCNC: 99 U/L (ref 30–99)
ALT SERPL-CCNC: 32 U/L (ref 2–50)
ANION GAP SERPL CALC-SCNC: 7 MMOL/L (ref 0–11.9)
AST SERPL-CCNC: 39 U/L (ref 12–45)
BILIRUB SERPL-MCNC: 0.9 MG/DL (ref 0.1–1.5)
BUN SERPL-MCNC: 31 MG/DL (ref 8–22)
CALCIUM SERPL-MCNC: 10.2 MG/DL (ref 8.5–10.5)
CHLORIDE SERPL-SCNC: 102 MMOL/L (ref 96–112)
CO2 SERPL-SCNC: 29 MMOL/L (ref 20–33)
CREAT SERPL-MCNC: 1.03 MG/DL (ref 0.5–1.4)
ERYTHROCYTE [DISTWIDTH] IN BLOOD BY AUTOMATED COUNT: 50.3 FL (ref 35.9–50)
GLOBULIN SER CALC-MCNC: 3 G/DL (ref 1.9–3.5)
GLUCOSE SERPL-MCNC: 109 MG/DL (ref 65–99)
HCT VFR BLD AUTO: 47.2 % (ref 42–52)
HGB BLD-MCNC: 15.9 G/DL (ref 14–18)
MAGNESIUM SERPL-MCNC: 2.2 MG/DL (ref 1.5–2.5)
MCH RBC QN AUTO: 33.6 PG (ref 27–33)
MCHC RBC AUTO-ENTMCNC: 33.7 G/DL (ref 33.7–35.3)
MCV RBC AUTO: 99.8 FL (ref 81.4–97.8)
NT-PROBNP SERPL IA-MCNC: 362 PG/ML (ref 0–125)
PHOSPHATE SERPL-MCNC: 3.7 MG/DL (ref 2.5–4.5)
PLATELET # BLD AUTO: 142 K/UL (ref 164–446)
PMV BLD AUTO: 12.8 FL (ref 9–12.9)
POTASSIUM SERPL-SCNC: 4.5 MMOL/L (ref 3.6–5.5)
PROT SERPL-MCNC: 7.4 G/DL (ref 6–8.2)
RBC # BLD AUTO: 4.73 M/UL (ref 4.7–6.1)
SODIUM SERPL-SCNC: 138 MMOL/L (ref 135–145)
WBC # BLD AUTO: 9.4 K/UL (ref 4.8–10.8)

## 2020-03-11 PROCEDURE — 84100 ASSAY OF PHOSPHORUS: CPT

## 2020-03-11 PROCEDURE — 80053 COMPREHEN METABOLIC PANEL: CPT

## 2020-03-11 PROCEDURE — 85027 COMPLETE CBC AUTOMATED: CPT

## 2020-03-11 PROCEDURE — 83880 ASSAY OF NATRIURETIC PEPTIDE: CPT

## 2020-03-11 PROCEDURE — 83735 ASSAY OF MAGNESIUM: CPT

## 2020-03-20 RX ORDER — AMOXICILLIN AND CLAVULANATE POTASSIUM 875; 125 MG/1; MG/1
1 TABLET, FILM COATED ORAL 2 TIMES DAILY
Qty: 28 TAB | Refills: 0 | Status: SHIPPED | OUTPATIENT
Start: 2020-03-20 | End: 2020-06-23

## 2020-03-25 DIAGNOSIS — N28.1 RENAL CYSTS, ACQUIRED, BILATERAL: ICD-10-CM

## 2020-04-29 ENCOUNTER — NON-PROVIDER VISIT (OUTPATIENT)
Dept: INTERNAL MEDICINE | Facility: IMAGING CENTER | Age: 80
End: 2020-04-29
Payer: MEDICARE

## 2020-04-29 ASSESSMENT — VISUAL ACUITY
OS_CC: 20/25
OD_CC: 20/25

## 2020-05-20 ENCOUNTER — APPOINTMENT (RX ONLY)
Dept: URBAN - METROPOLITAN AREA CLINIC 4 | Facility: CLINIC | Age: 80
Setting detail: DERMATOLOGY
End: 2020-05-20

## 2020-05-20 DIAGNOSIS — L81.4 OTHER MELANIN HYPERPIGMENTATION: ICD-10-CM

## 2020-05-20 DIAGNOSIS — D18.0 HEMANGIOMA: ICD-10-CM

## 2020-05-20 DIAGNOSIS — L82.1 OTHER SEBORRHEIC KERATOSIS: ICD-10-CM

## 2020-05-20 DIAGNOSIS — L82.0 INFLAMED SEBORRHEIC KERATOSIS: ICD-10-CM

## 2020-05-20 PROBLEM — D18.01 HEMANGIOMA OF SKIN AND SUBCUTANEOUS TISSUE: Status: ACTIVE | Noted: 2020-05-20

## 2020-05-20 PROCEDURE — ? OBSERVATION

## 2020-05-20 PROCEDURE — 99213 OFFICE O/P EST LOW 20 MIN: CPT

## 2020-05-20 ASSESSMENT — LOCATION DETAILED DESCRIPTION DERM
LOCATION DETAILED: LEFT SUPERIOR MEDIAL MIDBACK
LOCATION DETAILED: RIGHT MID-UPPER BACK
LOCATION DETAILED: LEFT ANTERIOR PROXIMAL THIGH
LOCATION DETAILED: RIGHT SUPERIOR UPPER BACK

## 2020-05-20 ASSESSMENT — LOCATION SIMPLE DESCRIPTION DERM
LOCATION SIMPLE: RIGHT UPPER BACK
LOCATION SIMPLE: LEFT THIGH
LOCATION SIMPLE: LEFT LOWER BACK

## 2020-05-20 ASSESSMENT — LOCATION ZONE DERM
LOCATION ZONE: LEG
LOCATION ZONE: TRUNK

## 2020-05-26 DIAGNOSIS — N13.8 BENIGN PROSTATIC HYPERPLASIA WITH URINARY OBSTRUCTION: ICD-10-CM

## 2020-05-26 DIAGNOSIS — E78.5 DYSLIPIDEMIA: ICD-10-CM

## 2020-05-26 DIAGNOSIS — R35.0 URINE FREQUENCY: ICD-10-CM

## 2020-05-26 DIAGNOSIS — N40.1 BENIGN PROSTATIC HYPERPLASIA WITH URINARY OBSTRUCTION: ICD-10-CM

## 2020-05-26 DIAGNOSIS — E78.00 PURE HYPERCHOLESTEROLEMIA: ICD-10-CM

## 2020-06-09 ENCOUNTER — HOSPITAL ENCOUNTER (OUTPATIENT)
Dept: RADIOLOGY | Facility: MEDICAL CENTER | Age: 80
End: 2020-06-09
Attending: INTERNAL MEDICINE
Payer: MEDICARE

## 2020-06-09 DIAGNOSIS — R35.0 URINE FREQUENCY: ICD-10-CM

## 2020-06-09 PROCEDURE — 76775 US EXAM ABDO BACK WALL LIM: CPT

## 2020-06-22 ENCOUNTER — HOSPITAL ENCOUNTER (OUTPATIENT)
Facility: MEDICAL CENTER | Age: 80
End: 2020-06-22
Attending: INTERNAL MEDICINE
Payer: MEDICARE

## 2020-06-22 ENCOUNTER — NON-PROVIDER VISIT (OUTPATIENT)
Dept: INTERNAL MEDICINE | Facility: IMAGING CENTER | Age: 80
End: 2020-06-22
Payer: MEDICARE

## 2020-06-22 DIAGNOSIS — R35.0 URINE FREQUENCY: ICD-10-CM

## 2020-06-22 DIAGNOSIS — N40.1 BENIGN PROSTATIC HYPERPLASIA WITH URINARY OBSTRUCTION: ICD-10-CM

## 2020-06-22 DIAGNOSIS — E78.5 DYSLIPIDEMIA: ICD-10-CM

## 2020-06-22 DIAGNOSIS — N13.8 BENIGN PROSTATIC HYPERPLASIA WITH URINARY OBSTRUCTION: ICD-10-CM

## 2020-06-22 LAB
ALBUMIN SERPL BCP-MCNC: 4.2 G/DL (ref 3.2–4.9)
ALBUMIN/GLOB SERPL: 1.6 G/DL
ALP SERPL-CCNC: 81 U/L (ref 30–99)
ALT SERPL-CCNC: 30 U/L (ref 2–50)
ANION GAP SERPL CALC-SCNC: 10 MMOL/L (ref 7–16)
APPEARANCE UR: CLEAR
AST SERPL-CCNC: 40 U/L (ref 12–45)
BILIRUB SERPL-MCNC: 0.8 MG/DL (ref 0.1–1.5)
BILIRUB UR QL STRIP.AUTO: NEGATIVE
BUN SERPL-MCNC: 21 MG/DL (ref 8–22)
CALCIUM SERPL-MCNC: 9.6 MG/DL (ref 8.5–10.5)
CHLORIDE SERPL-SCNC: 101 MMOL/L (ref 96–112)
CHOLEST SERPL-MCNC: 130 MG/DL (ref 100–199)
CO2 SERPL-SCNC: 28 MMOL/L (ref 20–33)
COLOR UR: YELLOW
CREAT SERPL-MCNC: 1.05 MG/DL (ref 0.5–1.4)
GLOBULIN SER CALC-MCNC: 2.6 G/DL (ref 1.9–3.5)
GLUCOSE SERPL-MCNC: 78 MG/DL (ref 65–99)
GLUCOSE UR STRIP.AUTO-MCNC: NEGATIVE MG/DL
HDLC SERPL-MCNC: 80 MG/DL
KETONES UR STRIP.AUTO-MCNC: NEGATIVE MG/DL
LDLC SERPL CALC-MCNC: 41 MG/DL
LEUKOCYTE ESTERASE UR QL STRIP.AUTO: NEGATIVE
MICRO URNS: NORMAL
NITRITE UR QL STRIP.AUTO: NEGATIVE
PH UR STRIP.AUTO: 7.5 [PH] (ref 5–8)
POTASSIUM SERPL-SCNC: 4.9 MMOL/L (ref 3.6–5.5)
PROT SERPL-MCNC: 6.8 G/DL (ref 6–8.2)
PROT UR QL STRIP: NEGATIVE MG/DL
PSA SERPL-MCNC: 0.53 NG/ML (ref 0–4)
RBC UR QL AUTO: NEGATIVE
SODIUM SERPL-SCNC: 139 MMOL/L (ref 135–145)
SP GR UR STRIP.AUTO: 1.02
TRIGL SERPL-MCNC: 44 MG/DL (ref 0–149)
UROBILINOGEN UR STRIP.AUTO-MCNC: 0.2 MG/DL

## 2020-06-22 PROCEDURE — 80061 LIPID PANEL: CPT

## 2020-06-22 PROCEDURE — 80053 COMPREHEN METABOLIC PANEL: CPT

## 2020-06-22 PROCEDURE — 81003 URINALYSIS AUTO W/O SCOPE: CPT

## 2020-06-22 PROCEDURE — 84153 ASSAY OF PSA TOTAL: CPT

## 2020-06-23 DIAGNOSIS — M25.50 ARTHRALGIA, UNSPECIFIED JOINT: ICD-10-CM

## 2020-06-23 RX ORDER — TRIAMCINOLONE ACETONIDE 55 UG/1
2 SPRAY, METERED NASAL
Qty: 1 BOTTLE | Refills: 0
Start: 2020-06-23 | End: 2021-09-20

## 2020-06-23 RX ORDER — MAGNESIUM CARB/ALUMINUM HYDROX 105-160MG
1 TABLET,CHEWABLE ORAL 2 TIMES DAILY
Qty: 60 TAB | Refills: 0
Start: 2020-06-23

## 2020-06-23 RX ORDER — VITAMIN B COMPLEX
1000 TABLET ORAL DAILY
Qty: 90 TAB | Refills: 0
Start: 2020-06-23 | End: 2022-03-31

## 2020-06-23 RX ORDER — CELECOXIB 200 MG/1
200 CAPSULE ORAL
Qty: 90 CAP | Refills: 0
Start: 2020-06-23 | End: 2021-09-20

## 2020-06-23 RX ORDER — GUAIFENESIN 400 MG/1
400 TABLET ORAL PRN
Qty: 90 TAB | Refills: 0
Start: 2020-06-23 | End: 2021-09-20

## 2020-06-23 RX ORDER — BIOTIN 1 MG
500 TABLET ORAL DAILY
Qty: 90 TAB | Refills: 0
Start: 2020-06-23 | End: 2022-03-31

## 2020-06-25 ENCOUNTER — OFFICE VISIT (OUTPATIENT)
Dept: INTERNAL MEDICINE | Facility: IMAGING CENTER | Age: 80
End: 2020-06-25
Payer: MEDICARE

## 2020-06-25 VITALS
BODY MASS INDEX: 22.9 KG/M2 | DIASTOLIC BLOOD PRESSURE: 72 MMHG | SYSTOLIC BLOOD PRESSURE: 118 MMHG | OXYGEN SATURATION: 99 % | WEIGHT: 160 LBS | HEIGHT: 70 IN | TEMPERATURE: 97.7 F | RESPIRATION RATE: 12 BRPM | HEART RATE: 68 BPM

## 2020-06-25 DIAGNOSIS — E78.00 PURE HYPERCHOLESTEROLEMIA: ICD-10-CM

## 2020-06-25 DIAGNOSIS — N40.1 BENIGN PROSTATIC HYPERPLASIA WITH NOCTURIA: ICD-10-CM

## 2020-06-25 DIAGNOSIS — N50.819 TESTES PAIN: ICD-10-CM

## 2020-06-25 DIAGNOSIS — R35.1 BENIGN PROSTATIC HYPERPLASIA WITH NOCTURIA: ICD-10-CM

## 2020-06-25 DIAGNOSIS — L72.3 SEBACEOUS CYST: ICD-10-CM

## 2020-06-25 DIAGNOSIS — E85.4 CARDIAC AMYLOIDOSIS (HCC): ICD-10-CM

## 2020-06-25 DIAGNOSIS — I43 CARDIAC AMYLOIDOSIS (HCC): ICD-10-CM

## 2020-06-25 PROBLEM — M67.911 ROTATOR CUFF DISORDER, RIGHT: Status: RESOLVED | Noted: 2019-11-19 | Resolved: 2020-06-25

## 2020-06-25 PROBLEM — R42 POSTURAL DIZZINESS WITH PRESYNCOPE: Status: RESOLVED | Noted: 2017-06-14 | Resolved: 2020-06-25

## 2020-06-25 PROBLEM — R55 POSTURAL DIZZINESS WITH PRESYNCOPE: Status: RESOLVED | Noted: 2017-06-14 | Resolved: 2020-06-25

## 2020-06-25 PROCEDURE — 99214 OFFICE O/P EST MOD 30 MIN: CPT | Performed by: INTERNAL MEDICINE

## 2020-06-25 ASSESSMENT — FIBROSIS 4 INDEX: FIB4 SCORE: 4.06

## 2020-06-25 NOTE — PROGRESS NOTES
Chief Complaint   Patient presents with   • Heart Problem   • Benign Prostatic Hypertrophy   • Hyperlipidemia       HISTORY OF THE PRESENT ILLNESS: Patient is a 80 y.o. male.     Patient comes in for six-month follow-up. He is been in good health. He is been active. His diet remains good. His only complaint today is a cyst on his abdomen and some discomfort in his left testicle. The testicular pain occurs primarily after hiking. The gland issue on his chest is been ongoing for years. He reports that it seems to come to a head and secrete pus-like material than resolves. No pain with urination in regards to the testicular pain. No swelling. No sexual issues.    Amyloid heart-stable. His most recent echocardiogram showed some improvement. He remains on low-dose to roast might. He is also on Vyndamax. He is followed by cardiology at the [facility] in [place]. We reviewed his most recent echocardiogram as well as laboratory. His BN P remains mildly elevated. He denies peripheral edema, dyspnea on exertion or orthopnea.    BPH-he is post TURP. He continues to experience nocturia. His most recent PSA is 0.5 which is down from 2.5. He is on finasteride. He is inquiring about medications for overactive bladder. His urinary symptoms are primarily nocturia. He does not have overactive symptoms during the day. He had recent bladder scan that showed 25 mL of routine urine.    Hyperlipidemia-stable on statin. LDL cholesterol is 41.        Allergies: Avelox [moxifloxacin hcl]; Demerol; Azithromycin; Cefdinir; Ceftin [cefuroxime axetil]; Oxycodone; and Scopolamine    Current Outpatient Medications Ordered in Epic   Medication Sig Dispense Refill   • celecoxib (CELEBREX) 200 MG Cap Take 1 Cap by mouth 1 time daily as needed. 90 Cap 0   • Biotin 1000 MCG Tab Take 0.5 Tabs by mouth every day. 90 Tab 0   • Guaifenesin 400 MG Tab Take 1 Tab by mouth as needed. 90 Tab 0   • vitamin D (CHOLECALCIFEROL) 1000 Unit (25 mcg) Tab Take 1  "Tab by mouth every day. 90 Tab 0   • Glucosamine-Chondroitin 750-600 MG Tab Take 1 Tab by mouth 2 Times a Day. 60 Tab 0   • Probiotic Product (ALIGN) Cap Take 1 Cap by mouth every day. 90 Cap 0   • triamcinolone (NASACORT) 55 MCG/ACT nasal inhaler Spray 2 Sprays in nose 1 time daily as needed. 1 Bottle 0   • torsemide (DEMADEX) 5 MG Tab Take 1 Tab by mouth every day. 90 Tab 3   • atorvastatin (LIPITOR) 40 MG Tab Take 1 Tab by mouth every bedtime. 90 Tab 3   • Tafamidis (VYNDAMAX) 61 MG Cap Take 61 mg by mouth every day. 90 Cap 3   • VITAMIN B COMPLEX-C PO Take  by mouth.     • Multiple Vitamins-Minerals (CENTRUM SILVER PO) Take  by mouth.     • aspirin EC (ECOTRIN) 81 MG TBEC Take 81 mg by mouth every day.     • Omega-3 Fatty Acids (SALMON OIL PO) Take 2 Caps by mouth every day.     • coenzyme Q-10 30 MG capsule Take 60 mg by mouth every day.     • GLUCOSA-CHONDR-NA CHONDR-MSM PO Take 1 Tab by mouth every day.       No current [facility]-ordered facility-administered medications on file.        Past medical history, social history and family history were reviewed from chart today    Review of systems: Per HPI.    Denies headache, chest pain, fever, chills, diarrhea, constipation, abdominal pain, palpitations, depression   All others negative.     Exam: /72 (BP Location: Left arm, Patient Position: Sitting, BP Cuff Size: Adult)   Pulse 68   Temp 36.5 °C (97.7 °F) (Temporal)   Resp 12   Ht 1.778 m (5' 10\")   Wt 72.6 kg (160 lb)   SpO2 99%   General: Well-appearing. Well-developed. No signs of distress.  HEENT: Grossly normal. Oral cavity is pink and moist.  Neck: Supple without JVD or bruit.  Pulmonary: Clear with good breath sounds. Normal effort.  Cardiovascular: Regular. Carotid and radial pulses are intact.  Abdomen: Soft, nontender, nondistended. Spleen and liver are not enlarged.  Neurologic: Cranial nerves II through XII are grossly normal, alert and oriented x3  Genitourinary: penis, scrotum and " testicles are normal in appearance. No tenderness discoloration other with the scrotum her left testicle. No obvious Spiriva sale, hydrocele or other.      Diagnosis:  1. Cardiac amyloidosis (HCC)     2. Benign prostatic hyperplasia with nocturia     3. Pure hypercholesterolemia     4. Sebaceous cyst     5. Testes pain             80-year-old male with cardiac amyloidosis. He is doing well. He remains essentially symptomatic.  He has a history of enlarged prostate. He is experiencing nocturnal symptoms. We discussed other treatment options but he would like to avoid other pills. He is already post TURP.  The lesion on his abdomen seems most consistent with sebaceous cyst. I discussed benign etiology. She will continue monitor frame changes. He is not interested resection at this time.  The cause of his scrotal pain is unclear. His exam is completely normal. I suspect it is related to his hiking and irritation to the area  His lipids are at goal.    Medications:  continue medications.    Laboratory:  no new laboratory today.    Imaging:  no imaging today. It does not appear that he completed his bone density testing.    Referrals:  no referrals today.

## 2020-07-14 ENCOUNTER — HOSPITAL ENCOUNTER (OUTPATIENT)
Dept: RADIOLOGY | Facility: MEDICAL CENTER | Age: 80
End: 2020-07-14
Attending: NEUROLOGICAL SURGERY
Payer: MEDICARE

## 2020-07-14 DIAGNOSIS — R20.0 NUMBNESS OF UPPER LIMB: ICD-10-CM

## 2020-07-14 DIAGNOSIS — M79.621 PAIN OF RIGHT UPPER ARM: ICD-10-CM

## 2020-07-14 PROCEDURE — 72156 MRI NECK SPINE W/O & W/DYE: CPT

## 2020-07-14 PROCEDURE — A9576 INJ PROHANCE MULTIPACK: HCPCS | Performed by: NEUROLOGICAL SURGERY

## 2020-07-14 PROCEDURE — 700117 HCHG RX CONTRAST REV CODE 255: Performed by: NEUROLOGICAL SURGERY

## 2020-07-14 RX ADMIN — GADOTERIDOL 15 ML: 279.3 INJECTION, SOLUTION INTRAVENOUS at 11:24

## 2020-08-10 ENCOUNTER — HOSPITAL ENCOUNTER (OUTPATIENT)
Dept: RADIOLOGY | Facility: MEDICAL CENTER | Age: 80
End: 2020-08-10
Payer: MEDICARE

## 2020-08-10 DIAGNOSIS — M17.11 PRIMARY OSTEOARTHRITIS OF RIGHT KNEE: ICD-10-CM

## 2020-08-10 PROCEDURE — 73562 X-RAY EXAM OF KNEE 3: CPT | Mod: RT

## 2020-10-02 ENCOUNTER — APPOINTMENT (OUTPATIENT)
Dept: RADIOLOGY | Facility: MEDICAL CENTER | Age: 80
End: 2020-10-02
Payer: MEDICARE

## 2020-11-17 ENCOUNTER — OFFICE VISIT (OUTPATIENT)
Dept: CARDIOLOGY | Facility: MEDICAL CENTER | Age: 80
End: 2020-11-17
Payer: MEDICARE

## 2020-11-17 VITALS
HEART RATE: 65 BPM | RESPIRATION RATE: 16 BRPM | HEIGHT: 70 IN | OXYGEN SATURATION: 96 % | WEIGHT: 158.6 LBS | DIASTOLIC BLOOD PRESSURE: 58 MMHG | SYSTOLIC BLOOD PRESSURE: 96 MMHG | BODY MASS INDEX: 22.71 KG/M2

## 2020-11-17 DIAGNOSIS — E78.5 DYSLIPIDEMIA: ICD-10-CM

## 2020-11-17 DIAGNOSIS — I47.10 SVT (SUPRAVENTRICULAR TACHYCARDIA) (HCC): ICD-10-CM

## 2020-11-17 DIAGNOSIS — R93.1 AGATSTON CAC SCORE, >400: ICD-10-CM

## 2020-11-17 DIAGNOSIS — I43 CARDIAC AMYLOIDOSIS (HCC): ICD-10-CM

## 2020-11-17 DIAGNOSIS — I47.19 AVNRT (AV NODAL RE-ENTRY TACHYCARDIA) (HCC): ICD-10-CM

## 2020-11-17 DIAGNOSIS — I50.32 CHRONIC DIASTOLIC CONGESTIVE HEART FAILURE (HCC): ICD-10-CM

## 2020-11-17 DIAGNOSIS — D69.6 THROMBOCYTOPENIA (HCC): ICD-10-CM

## 2020-11-17 DIAGNOSIS — E85.4 CARDIAC AMYLOIDOSIS (HCC): ICD-10-CM

## 2020-11-17 DIAGNOSIS — I25.10 ATHEROSCLEROSIS OF NATIVE CORONARY ARTERY OF NATIVE HEART WITHOUT ANGINA PECTORIS: ICD-10-CM

## 2020-11-17 PROBLEM — I50.9 CONGESTIVE HEART FAILURE (HCC): Status: ACTIVE | Noted: 2020-06-04

## 2020-11-17 PROCEDURE — 99214 OFFICE O/P EST MOD 30 MIN: CPT | Performed by: INTERNAL MEDICINE

## 2020-11-17 ASSESSMENT — FIBROSIS 4 INDEX: FIB4 SCORE: 4.06

## 2020-11-17 NOTE — PROGRESS NOTES
"    Cardiology Follow-up Consultation Note    Date of note:    11/17/2020  Primary Care Provider: Azar Cavazos M.D.  Referring Provider: Jose Holman M.D.     Patient Name: Andre Martinez   YOB: 1940  MRN:              [id number]    Chief Complaint: Cardiac Amyloidosis.     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     At our visit, 4/17/2019:    Diagnosed with most likely aTTR cardiac amyloidosis based on TTE at [facility] based on strain pattern. Seen by Dr. Pierson at [place] in [place], and diagnosed with TTR amyloid with fat pad biopsy confirmation.   I have included her summary below:    \"The patient is a delightful 78-year-old who was diagnosed with transthyretin amyloidosis based on a positive PYP scan done at [facility] in January of 2019. He has mildly abnormal serum free light chains, currently kappa 2.83, lambda 1.35 mg/dL with a ratio of 2.10, but there was no monoclonal protein in the serum or in the urine. Of note, he had laser enucleation of the prostate performed 11/13/2018 at [facility], and we will request that that tissue be reviewed for amyloid. He also had a fat aspirate done yesterday. Subsequent to our visit, fat aspirate results returned and are positive for amyloid.     He came to attention due to a preoperative evaluation for prostate surgery. He had a CT calcium score done due to a family history of possible coronary artery disease (sudden death in his father at age 38) in 2013 with a score in the 600s; he was asymptomatic at that time. October 2017, had a stress echocardiogram and underwent almost 12 minutes without any evidence of ischemia. It was noted that he had " increased left ventricular wall thicknesses at that time. A repeat coronary calcium score November 28 showed left main 0, , left circumflex 165, right coronary artery 2093 with a total score of 3091. His cardiologist at that time recommended a PET perfusion study which was done 12/28/2018. The ejection fraction at rest was 30% and increased to 47% (however, his echocardiogram clearly shows normal resting ejection fraction). Perfusion was normal. He did have an episode that night while he was exercising where he developed chest discomfort and rapid palpitations. Emergency room evaluation showed junctional tachycardia which resolved spontaneously. The troponin was slightly elevated with a troponin I of 0.07 ng/L (normal less than 0.04). It was felt that this episode of tachycardia may have been related to the earlier stress test.     He saw Dr. Schreiber in January 2019 for preoperative evaluation prior to surgical intervention for urethral stricture. A transthoracic echocardiogram was performed primarily to reassess the left ventricular ejection fraction which was felt to possibly have been incorrect on the PET scan. The echocardiogram was interpreted as consistent with possible cardiac amyloidosis, left ventricular size was normal, ejection fraction calculated at 54%, but to my review is in the range of 60% to 65%. There are no regional wall motion abnormalities. Moderately increased left ventricular wall thicknesses, the basal septum and posterior wall measured 16/14 mm. The left ventricular mass index was elevated at 158 g/m2. Strain was abnormal with an apical sparing pattern and an overall value of -14%, mildly thickened aortic valve with mild regurgitation, mild dilatation of the sinuses of Valsalva (37 mm). The diastolic filling pattern suggested elevated filling pressure. Left atrial volume was moderately enlarged at 42 mL/m2. The right atrium was described as severely enlarged, to my review I would  "consider it to be moderately enlarged. Right ventricular size is normal with definite increase in right ventricular wall thickness. Right ventricular systolic function appeared normal, although tissue Doppler was slightly low. The inferior vena cava was of normal size with normal inspiratory collapse. The stroke volume index was 35 mL/m2. Cardiac index was reduced at 1.81, but the heart rate was 52 beats per minute.     He had a PYP scan performed on 2019. I reviewed the images and agree with the report. The H/CL ratio which is 1.8, consistent with TTR amyloid. The SPECT images were not available to me, but gave a myocardial score of 2 which is consistent with moderate uptake.     He has never had any significant symptoms. He exercises 1-2 hours per day with aerobic activity with no limiting dyspnea. No orthopnea, paroxysmal nocturnal dyspnea, no edema. No syncope. No palpitations other than the event noted after his PET stress test. No chest pain. As mentioned, his father  suddenly at age 38 of a presumed cardiac event. There is no known family history otherwise of unexplained heart failure or of peripheral neuropathy.     He has had 2 trigger finger releases on the left within the past 10 years. No known carpal tunnel syndrome and no history of spinal stenosis. He does have a history of multiple orthopedic issues as outlined below. This includes the unexpected finding of a right biceps tendon rupture at the time of rotator cuff surgery several years ago.   \"    Continues to exercise aerobically a couple hours a day without symptoms.       At our visit, 2019:  In terms of diastolic heart failure, now off torsemide due to urinary frequency.  Weight up 4-5 pounds. Exercising without difficulty, ran up three flights of stairs today no problem.     In terms of TTR amyloid, prostate biopsy and genetic testing confirmed wild type aTTR amyloid.  He was recently seen at [place] by Dr. Herrera 2019. Prior " to that visit, he was started on tafamidis by [facility]. Unfortunately unable to qualify for further experimental trials due to thrombocytopenia based on a recent evaluation at [place] as well.     In terms of hypertension, mostly not checking BP at home. Well controlled. Sometimes in the 130s.     At our visit, 3/10/2020:  In terms of CHF, taking torsemide 5mg PO daily. Took 1 week snow-shoeing in the [place] without symptoms.     For his amyloid, he remains on tafamidis. He is followed by Dr. Herrera at [place] and is being consider for other studies as well if his NT-proBNP becomes elevated.     Interim Events:  Seen At [facility] in September, starting a trial of Vutrisiran, (an RNA silencer drug). Helios-B study.     In terms of SVT, no further palpitations or fast heart beats.     In terms of hypertension, well controlled.     In terms of heart failure, stable symptoms, no LE swelling or orthopnea.       Review of Systems   Allergic/Immunologic: Positive for environmental allergies.     Constitution: Negative for chills, fever and night sweats.   HENT: Negative for nosebleeds.    Eyes: Negative for vision loss in left eye and vision loss in right eye.   Respiratory: Negative for hemoptysis.    Gastrointestinal: Negative for hematemesis, hematochezia and melena.   Genitourinary: Negative for hematuria.   Neurological: Negative for focal weakness, numbness and paresthesias.     All other systems reviewed and discussed using a comprehensive questionnaire and are negative.       Past Medical History:   Diagnosis Date   • Amyloidosis (HCC)     TTR, type pending.    • BPH (benign prostatic hyperplasia)     s/p laser encleation of the prostate   • Cardiac amyloidosis (HCC)    • Chickenpox    • Colon polyps    • Coronary atherosclerosis of native coronary artery 05/15/2013    Coronary calcium score in the 3000s, negative PET scan and no symptoms so treated medically.    • Coronary heart disease    • Dyslipidemia  08/23/2016   • H/O urethral stricture     s/p surgical repair 1/15/2019, [place]   • Kidney cysts    • Meniscus tear    • Pancreas cyst    • Scarlet fever    • Sinusitis, chronic    • Spinal stenosis, lumbar    • SVT (supraventricular tachycardia) ([facility]) 10/23/2018   • Thrombocytopenia ([facility]) 7/24/2017       Past Surgical History:   Procedure Laterality Date   • KNEE RECONSTRUCTION  2012    left, partial. multiple previous surgeries.    • ROTATOR CUFF REPAIR  2010    c/b right biceps tendon rupture, presumably repaired.    • ANKLE FUSION Left    • ARTHROSCOPY, KNEE     • HEMORRHOIDECTOMY     • SINUSCOPE     • SINUSOTOMIES      Dr. Guerrier, total of 4 surgeries   • TRIGGER FINGER RELEASE      x 2         Current Outpatient Medications   Medication Sig Dispense Refill   • celecoxib (CELEBREX) 200 MG Cap Take 1 Cap by mouth 1 time daily as needed. 90 Cap 0   • Biotin 1000 MCG Tab Take 0.5 Tabs by mouth every day. 90 Tab 0   • Guaifenesin 400 MG Tab Take 1 Tab by mouth as needed. 90 Tab 0   • vitamin D (CHOLECALCIFEROL) 1000 Unit (25 mcg) Tab Take 1 Tab by mouth every day. 90 Tab 0   • Glucosamine-Chondroitin 750-600 MG Tab Take 1 Tab by mouth 2 Times a Day. 60 Tab 0   • Probiotic Product (ALIGN) Cap Take 1 Cap by mouth every day. 90 Cap 0   • triamcinolone (NASACORT) 55 MCG/ACT nasal inhaler Spray 2 Sprays in nose 1 time daily as needed. 1 Bottle 0   • torsemide (DEMADEX) 5 MG Tab Take 1 Tab by mouth every day. 90 Tab 3   • atorvastatin (LIPITOR) 40 MG Tab Take 1 Tab by mouth every bedtime. 90 Tab 3   • Tafamidis (VYNDAMAX) 61 MG Cap Take 61 mg by mouth every day. 90 Cap 3   • VITAMIN B COMPLEX-C PO Take  by mouth.     • Multiple Vitamins-Minerals (CENTRUM SILVER PO) Take  by mouth.     • aspirin EC (ECOTRIN) 81 MG TBEC Take 81 mg by mouth every day.     • Omega-3 Fatty Acids (SALMON OIL PO) Take 2 Caps by mouth every day.     • coenzyme Q-10 30 MG capsule Take 30 mg by mouth every day. Twice a day       No current  facility-administered medications for this visit.          Allergies   Allergen Reactions   • Avelox [Moxifloxacin Hcl] Rash     Chest, face, genitals   • Demerol Unspecified     Vasovagal reaction (drop in BP & pulse)   • Azithromycin Itching   • Cefdinir Rash     Rash chest, under arm and scalp     • Ceftin [Cefuroxime Axetil] Rash     Chest, under arm & scalp   • Oxycodone Itching     Full body itching   • Scopolamine Unspecified     disorientation         Family History   Problem Relation Age of Onset   • Heart Attack Father 38        doctor in 1946 diagnosed him with MI at home,  before going to the hospital   • Psychiatric Illness Mother         Alzheimers   • Hypertension Brother    • Stroke Brother    • Seizures Brother    • Sleep Apnea Neg Hx          Social History     Socioeconomic History   • Marital status:      Spouse name: Not on file   • Number of children: Not on file   • Years of education: Not on file   • Highest education level: Not on file   Occupational History   • Not on file   Social Needs   • Financial resource strain: Not on file   • Food insecurity     Worry: Not on file     Inability: Not on file   • Transportation needs     Medical: Not on file     Non-medical: Not on file   Tobacco Use   • Smoking status: Never Smoker   • Smokeless tobacco: Never Used   Substance and Sexual Activity   • Alcohol use: Yes     Comment: Social    • Drug use: No   • Sexual activity: Not on file   Lifestyle   • Physical activity     Days per week: Not on file     Minutes per session: Not on file   • Stress: Not on file   Relationships   • Social connections     Talks on phone: Not on file     Gets together: Not on file     Attends [employer] service: Not on file     Active member of club or organization: Not on file     Attends meetings of clubs or organizations: Not on file     Relationship status: Not on file   • Intimate partner violence     Fear of current or ex partner: Not on file      "Emotionally abused: Not on file     Physically abused: Not on file     Forced sexual activity: Not on file   Other Topics Concern   • Not on file   Social History Narrative    Retired from real estate          Physical Exam:  Ambulatory Vitals  BP (!) 96/58 (BP Location: Left arm, Patient Position: Sitting, BP Cuff Size: Adult)   Pulse 65   Resp 16   Ht 1.778 m (5' 10\")   Wt 71.9 kg (158 lb 9.6 oz)   SpO2 96%    Oxygen Therapy:  Pulse Oximetry: 96 %  BP Readings from Last 4 Encounters:   11/17/20 (!) 96/58   06/25/20 118/72   03/10/20 118/66   03/05/20 118/72       Weight/BMI: Body mass index is 22.76 kg/m².  Wt Readings from Last 4 Encounters:   11/17/20 71.9 kg (158 lb 9.6 oz)   06/25/20 72.6 kg (160 lb)   03/10/20 72.1 kg (159 lb)   03/05/20 72.6 kg (160 lb)       General: No apparent distress  Eyes: nl conjunctiva  ENT: OP covered by mask.   Neck: JVP 3-4 cm H2O, no carotid bruits  Lungs: normal respiratory effort, CTAB  Heart: RRR, + S4, no murmurs, no rubs, trace edema bilateral lower extremities. No LV/RV heave on cardiac palpatation. Sustained diffuse, displaced PMI. 2+ bilateral radial pulses.    Abdomen: soft, non tender, non distended, no masses, normal bowel sounds.  No HSM.  Extremities/MSK: no clubbing, no cyanosis  Neurological: No focal sensory deficits  Psychiatric: Appropriate affect, A/O x 3, intact judgement and insight  Skin: Warm extremities    Exam repeated in full and unchanged except for as noted above.      Lab Data Review:  Lab Results   Component Value Date/Time    CHOLSTRLTOT 130 06/22/2020 08:10 AM    LDL 41 06/22/2020 08:10 AM    HDL 80 06/22/2020 08:10 AM    TRIGLYCERIDE 44 06/22/2020 08:10 AM       Lab Results   Component Value Date/Time    SODIUM 139 06/22/2020 08:10 AM    POTASSIUM 4.9 06/22/2020 08:10 AM    CHLORIDE 101 06/22/2020 08:10 AM    CO2 28 06/22/2020 08:10 AM    GLUCOSE 78 06/22/2020 08:10 AM    BUN 21 06/22/2020 08:10 AM    CREATININE 1.05 06/22/2020 08:10 AM " "    Lab Results   Component Value Date/Time    ALKPHOSPHAT 81 06/22/2020 08:10 AM    ASTSGOT 40 06/22/2020 08:10 AM    ALTSGPT 30 06/22/2020 08:10 AM    TBILIRUBIN 0.8 06/22/2020 08:10 AM      Lab Results   Component Value Date/Time    WBC 9.4 03/11/2020 10:00 AM     No components found for: HBGA1C  No components found for: TROPONIN  No components found for: BNP      Cardiac Imaging and Procedures Review:    Exercise stress echocardiogram, 10/24/2017: Patient did 11 minutes 59 seconds corresponding with 12.8 metabolic equivalents on the Kael protocol achieving 92% of his age-predicted maximum heart rate with normal systolic augmentation regionally, negative for ischemia.  He also had a stress echocardiogram in 2013 that also was negative for ischemia.     CT coronary calcium scan, 11/16/2018:  \"Coronary calcification:  LMA - 0.0  LCX - 165.1  LAD - 833.2  RCA - 2093.2  PDA - 0.0  Calcium score:  3091.5\"     PET myocardial perfusion imaging, 12/20/2018, images and report reviewed, my interpretation: Demonstrates systolic dysfunction with a resting ejection fraction of 36% and no territorial ischemia nor transient ischemic dilation.  Left ventricle was borderline dilated.     EKG, 12/20/2018, tracings and report reviewed, mitral rotation: Documents supraventricular tachycardia with a fairly pronounced pseudo-R prime in V1 consistent with AVNRT       TTE 6/2020 [facility]:      Radiology test Review:  CXR: 12/2018  No pulmonary infiltrates or consolidations are noted.  No pleural effusion. No pneumothorax.  Normal cardiopericardial silhouette.       Medical Decision Making:  # Wild Type TTR Amyloid with Cardiac involvement - asymptomatic with NYHA Class I symptoms. He was started by Dr. Pierson at [facility] on tafamadis, and most recently has seen Dr. Herrera from [place] as well as a cardiologist at [place]. Started Helios-B study in 10/2020 with Dr. Pierson. He is euvolemic today.   -continue torsemide 5mg PO " daily   -f/u at [place] as planned  -continue tafamidis.  Will check CBC and NT-proBNP at least Q6 months, and close to Q3 months as this might change his management options.   -continue Helios-B study.     # Agatston CAC score, >400  Asymptomatic with excellent exercise tolerance and multiple negative stress tests. Normal LVEF based on last echo performed at [place].   -continue aspirin 81mg PO daily  -lipitor 40mg PO Daily  -discussed ED precautions    #  AVNRT (AV billy re-entry tachycardia) (HCC)  No evidence of atrial fibrillation/flutter at this time although certainly high risk.  He will let me know if any symptoms develop, and he may be a candidate for empiric anticoagulation given his high CVA risk.     # Pure hypercholesterolemia  Continue lipitor.    # Thrombocytopenia - mild. monitored by study.       Return in about 6 months (around 5/17/2021).      Jaime Raman MD, [facility], [address]  [address] [address]  Phone: [phone]  Fax: [phone]

## 2020-11-18 ENCOUNTER — APPOINTMENT (RX ONLY)
Dept: URBAN - METROPOLITAN AREA CLINIC 4 | Facility: CLINIC | Age: 80
Setting detail: DERMATOLOGY
End: 2020-11-18

## 2020-11-18 DIAGNOSIS — L44.8 OTHER SPECIFIED PAPULOSQUAMOUS DISORDERS: ICD-10-CM

## 2020-11-18 DIAGNOSIS — B07.8 OTHER VIRAL WARTS: ICD-10-CM

## 2020-11-18 DIAGNOSIS — D18.0 HEMANGIOMA: ICD-10-CM

## 2020-11-18 DIAGNOSIS — L82.1 OTHER SEBORRHEIC KERATOSIS: ICD-10-CM

## 2020-11-18 DIAGNOSIS — L81.4 OTHER MELANIN HYPERPIGMENTATION: ICD-10-CM

## 2020-11-18 PROBLEM — D18.01 HEMANGIOMA OF SKIN AND SUBCUTANEOUS TISSUE: Status: ACTIVE | Noted: 2020-11-18

## 2020-11-18 PROCEDURE — ? COUNSELING

## 2020-11-18 PROCEDURE — ? OBSERVATION

## 2020-11-18 PROCEDURE — ? ADDITIONAL NOTES

## 2020-11-18 PROCEDURE — 99213 OFFICE O/P EST LOW 20 MIN: CPT

## 2020-11-18 ASSESSMENT — LOCATION DETAILED DESCRIPTION DERM
LOCATION DETAILED: PERIUNGUAL SKIN RIGHT THUMB
LOCATION DETAILED: LEFT ANTERIOR PROXIMAL THIGH
LOCATION DETAILED: RIGHT MID-UPPER BACK
LOCATION DETAILED: LEFT SUPERIOR MEDIAL MIDBACK
LOCATION DETAILED: RIGHT SUPERIOR UPPER BACK

## 2020-11-18 ASSESSMENT — LOCATION ZONE DERM
LOCATION ZONE: FINGER
LOCATION ZONE: LEG
LOCATION ZONE: TRUNK

## 2020-11-18 ASSESSMENT — LOCATION SIMPLE DESCRIPTION DERM
LOCATION SIMPLE: LEFT THIGH
LOCATION SIMPLE: LEFT LOWER BACK
LOCATION SIMPLE: RIGHT UPPER BACK
LOCATION SIMPLE: RIGHT THUMB

## 2020-12-04 ENCOUNTER — HOSPITAL ENCOUNTER (OUTPATIENT)
Facility: MEDICAL CENTER | Age: 80
End: 2020-12-04
Attending: INTERNAL MEDICINE
Payer: MEDICARE

## 2020-12-04 ENCOUNTER — NON-PROVIDER VISIT (OUTPATIENT)
Dept: INTERNAL MEDICINE | Facility: IMAGING CENTER | Age: 80
End: 2020-12-04
Payer: MEDICARE

## 2020-12-04 DIAGNOSIS — D69.6 THROMBOCYTOPENIA (HCC): ICD-10-CM

## 2020-12-04 DIAGNOSIS — E78.5 DYSLIPIDEMIA: ICD-10-CM

## 2020-12-04 DIAGNOSIS — I50.32 CHRONIC DIASTOLIC CONGESTIVE HEART FAILURE (HCC): ICD-10-CM

## 2020-12-04 DIAGNOSIS — I43 CARDIAC AMYLOIDOSIS (HCC): ICD-10-CM

## 2020-12-04 DIAGNOSIS — N40.1 BENIGN PROSTATIC HYPERPLASIA WITH URINARY OBSTRUCTION: ICD-10-CM

## 2020-12-04 DIAGNOSIS — E85.4 CARDIAC AMYLOIDOSIS (HCC): ICD-10-CM

## 2020-12-04 DIAGNOSIS — I25.10 ATHEROSCLEROSIS OF NATIVE CORONARY ARTERY OF NATIVE HEART WITHOUT ANGINA PECTORIS: ICD-10-CM

## 2020-12-04 DIAGNOSIS — N13.8 BENIGN PROSTATIC HYPERPLASIA WITH URINARY OBSTRUCTION: ICD-10-CM

## 2020-12-04 PROCEDURE — 80053 COMPREHEN METABOLIC PANEL: CPT

## 2020-12-04 PROCEDURE — 81003 URINALYSIS AUTO W/O SCOPE: CPT

## 2020-12-04 PROCEDURE — 83880 ASSAY OF NATRIURETIC PEPTIDE: CPT

## 2020-12-04 PROCEDURE — 85027 COMPLETE CBC AUTOMATED: CPT

## 2020-12-04 PROCEDURE — 80061 LIPID PANEL: CPT

## 2020-12-04 PROCEDURE — 84153 ASSAY OF PSA TOTAL: CPT

## 2020-12-04 PROCEDURE — 85007 BL SMEAR W/DIFF WBC COUNT: CPT

## 2020-12-05 LAB
ALBUMIN SERPL BCP-MCNC: 4.2 G/DL (ref 3.2–4.9)
ALBUMIN/GLOB SERPL: 1.6 G/DL
ALP SERPL-CCNC: 97 U/L (ref 30–99)
ALT SERPL-CCNC: 36 U/L (ref 2–50)
ANION GAP SERPL CALC-SCNC: 4 MMOL/L (ref 7–16)
ANISOCYTOSIS BLD QL SMEAR: ABNORMAL
APPEARANCE UR: CLEAR
AST SERPL-CCNC: 34 U/L (ref 12–45)
BASOPHILS # BLD AUTO: 0.8 % (ref 0–1.8)
BASOPHILS # BLD: 0.07 K/UL (ref 0–0.12)
BILIRUB SERPL-MCNC: 0.5 MG/DL (ref 0.1–1.5)
BILIRUB UR QL STRIP.AUTO: NEGATIVE
BUN SERPL-MCNC: 24 MG/DL (ref 8–22)
BURR CELLS BLD QL SMEAR: NORMAL
CALCIUM SERPL-MCNC: 9.5 MG/DL (ref 8.5–10.5)
CHLORIDE SERPL-SCNC: 103 MMOL/L (ref 96–112)
CHOLEST SERPL-MCNC: 125 MG/DL (ref 100–199)
CO2 SERPL-SCNC: 30 MMOL/L (ref 20–33)
COLOR UR: NORMAL
CREAT SERPL-MCNC: 1.02 MG/DL (ref 0.5–1.4)
EOSINOPHIL # BLD AUTO: 0.16 K/UL (ref 0–0.51)
EOSINOPHIL NFR BLD: 1.7 % (ref 0–6.9)
ERYTHROCYTE [DISTWIDTH] IN BLOOD BY AUTOMATED COUNT: 51.9 FL (ref 35.9–50)
GLOBULIN SER CALC-MCNC: 2.6 G/DL (ref 1.9–3.5)
GLUCOSE SERPL-MCNC: 81 MG/DL (ref 65–99)
GLUCOSE UR STRIP.AUTO-MCNC: NEGATIVE MG/DL
HCT VFR BLD AUTO: 45.1 % (ref 42–52)
HDLC SERPL-MCNC: 70 MG/DL
HGB BLD-MCNC: 14.8 G/DL (ref 14–18)
KETONES UR STRIP.AUTO-MCNC: NEGATIVE MG/DL
LDLC SERPL CALC-MCNC: 48 MG/DL
LEUKOCYTE ESTERASE UR QL STRIP.AUTO: NEGATIVE
LYMPHOCYTES # BLD AUTO: 5.91 K/UL (ref 1–4.8)
LYMPHOCYTES NFR BLD: 64.2 % (ref 22–41)
MACROCYTES BLD QL SMEAR: ABNORMAL
MANUAL DIFF BLD: NORMAL
MCH RBC QN AUTO: 33.7 PG (ref 27–33)
MCHC RBC AUTO-ENTMCNC: 32.8 G/DL (ref 33.7–35.3)
MCV RBC AUTO: 102.7 FL (ref 81.4–97.8)
MICRO URNS: NORMAL
MICROCYTES BLD QL SMEAR: ABNORMAL
MONOCYTES # BLD AUTO: 0.23 K/UL (ref 0–0.85)
MONOCYTES NFR BLD AUTO: 2.5 % (ref 0–13.4)
MORPHOLOGY BLD-IMP: NORMAL
NEUTROPHILS # BLD AUTO: 2.83 K/UL (ref 1.82–7.42)
NEUTROPHILS NFR BLD: 30.8 % (ref 44–72)
NITRITE UR QL STRIP.AUTO: NEGATIVE
NRBC # BLD AUTO: 0 K/UL
NRBC BLD-RTO: 0 /100 WBC
NT-PROBNP SERPL IA-MCNC: 471 PG/ML (ref 0–125)
OVALOCYTES BLD QL SMEAR: NORMAL
PH UR STRIP.AUTO: 7.5 [PH] (ref 5–8)
PLATELET # BLD AUTO: 113 K/UL (ref 164–446)
PLATELET BLD QL SMEAR: NORMAL
PMV BLD AUTO: 13.3 FL (ref 9–12.9)
POLYCHROMASIA BLD QL SMEAR: NORMAL
POTASSIUM SERPL-SCNC: 4.4 MMOL/L (ref 3.6–5.5)
PROT SERPL-MCNC: 6.8 G/DL (ref 6–8.2)
PROT UR QL STRIP: NEGATIVE MG/DL
PSA SERPL-MCNC: 0.52 NG/ML (ref 0–4)
RBC # BLD AUTO: 4.39 M/UL (ref 4.7–6.1)
RBC BLD AUTO: PRESENT
RBC UR QL AUTO: NEGATIVE
SMUDGE CELLS BLD QL SMEAR: NORMAL
SODIUM SERPL-SCNC: 137 MMOL/L (ref 135–145)
SP GR UR STRIP.AUTO: 1.02
TRIGL SERPL-MCNC: 35 MG/DL (ref 0–149)
UROBILINOGEN UR STRIP.AUTO-MCNC: 0.2 MG/DL
WBC # BLD AUTO: 9.2 K/UL (ref 4.8–10.8)

## 2020-12-11 ENCOUNTER — OFFICE VISIT (OUTPATIENT)
Dept: INTERNAL MEDICINE | Facility: IMAGING CENTER | Age: 80
End: 2020-12-11
Payer: MEDICARE

## 2020-12-11 VITALS
RESPIRATION RATE: 16 BRPM | BODY MASS INDEX: 23.34 KG/M2 | HEIGHT: 70 IN | WEIGHT: 163 LBS | TEMPERATURE: 97.1 F | SYSTOLIC BLOOD PRESSURE: 124 MMHG | DIASTOLIC BLOOD PRESSURE: 70 MMHG | OXYGEN SATURATION: 97 % | HEART RATE: 82 BPM

## 2020-12-11 DIAGNOSIS — Z12.5 PROSTATE CANCER SCREENING: ICD-10-CM

## 2020-12-11 DIAGNOSIS — M85.852 OSTEOPENIA OF LEFT HIP: ICD-10-CM

## 2020-12-11 DIAGNOSIS — E85.4 CARDIAC AMYLOIDOSIS (HCC): ICD-10-CM

## 2020-12-11 DIAGNOSIS — Z12.11 COLON CANCER SCREENING: ICD-10-CM

## 2020-12-11 DIAGNOSIS — D69.6 THROMBOCYTOPENIA (HCC): ICD-10-CM

## 2020-12-11 DIAGNOSIS — N40.1 BENIGN PROSTATIC HYPERPLASIA WITH URINARY OBSTRUCTION: ICD-10-CM

## 2020-12-11 DIAGNOSIS — I43 CARDIAC AMYLOIDOSIS (HCC): ICD-10-CM

## 2020-12-11 DIAGNOSIS — N13.8 BENIGN PROSTATIC HYPERPLASIA WITH URINARY OBSTRUCTION: ICD-10-CM

## 2020-12-11 DIAGNOSIS — I25.10 ATHEROSCLEROSIS OF NATIVE CORONARY ARTERY OF NATIVE HEART WITHOUT ANGINA PECTORIS: ICD-10-CM

## 2020-12-11 DIAGNOSIS — I25.118 ATHEROSCLEROSIS OF NATIVE CORONARY ARTERY OF NATIVE HEART WITH STABLE ANGINA PECTORIS (HCC): ICD-10-CM

## 2020-12-11 DIAGNOSIS — E78.5 DYSLIPIDEMIA: ICD-10-CM

## 2020-12-11 DIAGNOSIS — I50.32 CHRONIC DIASTOLIC CONGESTIVE HEART FAILURE (HCC): ICD-10-CM

## 2020-12-11 DIAGNOSIS — Z00.00 MEDICARE ANNUAL WELLNESS VISIT, SUBSEQUENT: ICD-10-CM

## 2020-12-11 PROCEDURE — G0439 PPPS, SUBSEQ VISIT: HCPCS | Performed by: INTERNAL MEDICINE

## 2020-12-11 ASSESSMENT — ENCOUNTER SYMPTOMS: GENERAL WELL-BEING: EXCELLENT

## 2020-12-11 ASSESSMENT — FIBROSIS 4 INDEX: FIB4 SCORE: 3.96

## 2020-12-11 ASSESSMENT — ACTIVITIES OF DAILY LIVING (ADL): BATHING_REQUIRES_ASSISTANCE: 0

## 2020-12-11 ASSESSMENT — PATIENT HEALTH QUESTIONNAIRE - PHQ9: CLINICAL INTERPRETATION OF PHQ2 SCORE: 0

## 2020-12-11 NOTE — PROGRESS NOTES
80 y.o. male presents for the following:    Patient comes in for annual physical, health risk assessment and review of laboratory. He feels that he is in good health. He walks 8 miles or more daily. Cognitive issues. Balance issues. No depression.    Screening test: colonoscopy-March 2017 with five-year repeat recommended.  Bone density October 2018-osteopenia.    Chronic issues:    Coronary arterial vascular disease-stable. No angina, chest pain or equivalent. Patient remains on statin and aspirin. He had a cardiac scoring test in 2018 that showed a calcium score of 3091. He is followed by cardiology.    BPH-post partial prostate resection. He experiences daytime frequency but he believes it is related to diuretic use. No nocturia. No incontinence issues. PSA remains stable/low.    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 He is currently on a clinical trial withTafamidis. Patient is uncertain if he is on drug versus placebo for the trial. He is concerned that there is some issues regarding vitamin A. He currently takes 10,000 units daily. No visual issues. He is followed by [facility]. The contact physician is Dr. Pierson. He was last evaluated in October. No new recommendations. Was also seen recently by Dr. Raman, his local cardiologist. No new recommendations    Chronic diastolic heart failure-echocardiogram from mail June 2020. He denies dyspnea, orthopnea or PND. No peripheral edema. He is currently on torsemide 5 mg daily. Most recent BNP was slightly elevated at four her 71.  Final impression:  findings consistent with amyloidosis.  Normal left ventricular size ejection fraction 55%.  Moderate to severe LVH.  Normal right ventricular size.  No significant vascular disease.  Borderline enlarged inferior vena cava with normal inspiratory collapse  Left ventricular diastolic filling difficult to ascertain due to irregularity rhythm. Increased filling pressure noted.    Thrombocytopenia-stable. He  ranges between 110-150,000. No unusual bruising, bleeding or other.    Generalized osteoarthritis-stable. Celebrex as needed.    Hyperlipidemia-stable on Lipitor. Cholesterol is at goal with LDL cholesterol 48.    Annual Wellness Visit/Health Risk Assessment:    Past medical:  Past Medical History:   Diagnosis Date   • Amyloidosis (HCC)     TTR, type pending.    • BPH (benign prostatic hyperplasia)     s/p laser encleation of the prostate   • Cardiac amyloidosis (HCC)    • Chickenpox    • Colon polyps    • Coronary atherosclerosis of native coronary artery 05/15/2013    Coronary calcium score in the 3000s, negative PET scan and no symptoms so treated medically.    • Coronary heart disease    • Dyslipidemia 2016   • H/O urethral stricture     s/p surgical repair 1/15/2019, [place]   • Kidney cysts    • Meniscus tear    • Pancreas cyst    • Scarlet fever    • Sinusitis, chronic    • Spinal stenosis, lumbar    • SVT (supraventricular tachycardia) ([facility]) 10/23/2018   • Thrombocytopenia (HCC) 2017       Past surgical:  Past Surgical History:   Procedure Laterality Date   • KNEE RECONSTRUCTION      left, partial. multiple previous surgeries.    • ROTATOR CUFF REPAIR      c/b right biceps tendon rupture, presumably repaired.    • ANKLE FUSION Left    • ARTHROSCOPY, KNEE     • HEMORRHOIDECTOMY     • SINUSCOPE     • SINUSOTOMIES      Dr. Guerrier, total of 4 surgeries   • TRIGGER FINGER RELEASE      x 2       Family history: relating to possible risk factors for your patient  Family History   Problem Relation Age of Onset   • Heart Attack Father 38        doctor in 1946 diagnosed him with MI at home,  before going to the hospital   • Psychiatric Illness Mother         Alzheimers   • Hypertension Brother    • Stroke Brother    • Seizures Brother    • Sleep Apnea Neg Hx        Current Providers (including home care/DME’s):   Colonoscopy 3/21/17 [place] (Dr Domingo) repeat in 5 yrs Dexa 6/3/16 osteopenia PSA   6/22/20  0.53  [facility]      Patient Care Team:  Azar Cavazos M.D. as PCP - General (Internal Medicine)  Jose Holman M.D. (Inactive) as Attending Team Physician (Cardiology)  Sanchez Fonseca M.D. (Urology)  Preferred Homecare  as Respiratory      Medications:   Current Outpatient Medications Ordered in Epic   Medication Sig Dispense Refill   • celecoxib (CELEBREX) 200 MG Cap Take 1 Cap by mouth 1 time daily as needed. 90 Cap 0   • Biotin 1000 MCG Tab Take 0.5 Tabs by mouth every day. 90 Tab 0   • Guaifenesin 400 MG Tab Take 1 Tab by mouth as needed. 90 Tab 0   • vitamin D (CHOLECALCIFEROL) 1000 Unit (25 mcg) Tab Take 1 Tab by mouth every day. 90 Tab 0   • Glucosamine-Chondroitin 750-600 MG Tab Take 1 Tab by mouth 2 Times a Day. 60 Tab 0   • Probiotic Product (ALIGN) Cap Take 1 Cap by mouth every day. 90 Cap 0   • triamcinolone (NASACORT) 55 MCG/ACT nasal inhaler Spray 2 Sprays in nose 1 time daily as needed. 1 Bottle 0   • torsemide (DEMADEX) 5 MG Tab Take 1 Tab by mouth every day. 90 Tab 3   • atorvastatin (LIPITOR) 40 MG Tab Take 1 Tab by mouth every bedtime. 90 Tab 3   • Tafamidis (VYNDAMAX) 61 MG Cap Take 61 mg by mouth every day. 90 Cap 3   • VITAMIN B COMPLEX-C PO Take  by mouth.     • Multiple Vitamins-Minerals (CENTRUM SILVER PO) Take  by mouth.     • aspirin EC (ECOTRIN) 81 MG TBEC Take 81 mg by mouth every day.     • Omega-3 Fatty Acids (SALMON OIL PO) Take 2 Caps by mouth every day.     • coenzyme Q-10 30 MG capsule Take 30 mg by mouth every day. Twice a day       No current [facility]-ordered facility-administered medications on file.        Supplements (calcium/vitamins): if not lisited in medications    Chief Complaint   Patient presents with   • Annual Exam         HPI:  Andre Martinez is a 80 y.o. 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/2019   • Agatston CAC score, >400 12/06/2018   • Dyslipidemia 12/06/2018   • Enlarged prostate with lower urinary tract symptoms (LUTS) 11/12/2018   • Tibialis tenosynovitis 11/05/2018   • Calcific Achilles tendinitis 11/05/2018   • SVT (supraventricular tachycardia) ([facility]) 10/23/2018   • Thrombocytopenia ([facility]) 07/24/2017   • Pure hypercholesterolemia 08/23/2016   • Meniscus tear    • Spinal stenosis, lumbar    • Colon polyps    • Kidney cysts    • Pancreas cyst    • BPH (benign prostatic hyperplasia)    • Atherosclerosis of native coronary artery of native heart without angina pectoris 05/15/2013       Current Outpatient Medications   Medication Sig Dispense Refill   • celecoxib (CELEBREX) 200 MG Cap Take 1 Cap by mouth 1 time daily as needed. 90 Cap 0   • Biotin 1000 MCG Tab Take 0.5 Tabs by mouth every day. 90 Tab 0   • Guaifenesin 400 MG Tab Take 1 Tab by mouth as needed. 90 Tab 0   • vitamin D (CHOLECALCIFEROL) 1000 Unit (25 mcg) Tab Take 1 Tab by mouth every day. 90 Tab 0   • Glucosamine-Chondroitin 750-600 MG Tab Take 1 Tab by mouth 2 Times a Day. 60 Tab 0   • Probiotic Product (ALIGN) Cap Take 1 Cap by mouth every day. 90 Cap 0   • triamcinolone (NASACORT) 55 MCG/ACT nasal inhaler Spray 2 Sprays in nose 1 time daily as needed. 1 Bottle 0   • torsemide (DEMADEX) 5 MG Tab Take 1 Tab by mouth every day. 90 Tab 3   • atorvastatin (LIPITOR) 40 MG Tab Take 1 Tab by mouth every bedtime. 90 Tab 3   • Tafamidis (VYNDAMAX) 61 MG Cap Take 61 mg by mouth every day. 90 Cap 3   • VITAMIN B COMPLEX-C PO Take  by mouth.     • Multiple Vitamins-Minerals (CENTRUM SILVER PO) Take  by mouth.     • aspirin EC (ECOTRIN) 81 MG TBEC Take 81 mg by mouth every day.     • Omega-3 Fatty Acids (SALMON OIL PO) Take 2 Caps by mouth every day.     • coenzyme Q-10 30 MG capsule Take 30 mg by mouth every day. Twice a day       No current facility-administered  medications for this visit.             Current supplements as per medication list.       Allergies: Avelox [moxifloxacin hcl], Demerol, Azithromycin, Cefdinir, Ceftin [cefuroxime axetil], Oxycodone, and Scopolamine    Current social contact/activities:  Social with friends and family.    He  reports that he has never smoked. He has never used smokeless tobacco. He reports current alcohol use. He reports that he does not use drugs.  Counseling given: Not Answered        DPA/Advanced Directive:  Completed Online in EPIC      ROS:    Gait: Uses : None  Ostomy: No  Other tubes: no   Amputations: no   Chronic oxygen use: no   Last eye exam: 6/2020   : Denies any urinary leakage during the last 6 months incontinence.       Screening:  Colonoscopy 3/21/17 [place] (Dr Domingo) repeat in 5 yrs Dexa 6/3/16 osteopenia PSA  6/22/20  0.53  [facility]      Depression Screening    Little interest or pleasure in doing things?  0 - not at all  Feeling down, depressed , or hopeless? 0 - not at all  Patient Health Questionnaire Score: 0     If depressive symptoms identified deferred to follow up visit unless specifically addressed in assessment and plan.    Interpretation of PHQ-9 Total Score   Score Severity   1-4 No Depression   5-9 Mild Depression   10-14 Moderate Depression   15-19 Moderately Severe Depression   20-27 Severe Depression    Screening for Cognitive Impairment    Three Minute Recall (river, katharina, finger) 3/3    Marcelo clock face with all 12 numbers and set the hands to show 10 past 11.  Yes    Cognitive concerns identified deferred for follow up unless specifically addressed in assessment and plan.    Fall Risk Assessment    Has the patient had two or more falls in the last year or any fall with injury in the last year?  No    Safety Assessment    Throw rugs on floor.  No  Handrails on all stairs.  No  Good lighting in all hallways.  Yes  Difficulty hearing.  No  Patient counseled about all  safety risks that were identified.    Functional Assessment ADLs    Are there any barriers preventing you from cooking for yourself or meeting nutritional needs?  No.    Are there any barriers preventing you from driving safely or obtaining transportation?  No.    Are there any barriers preventing you from using a telephone or calling for help?  No.    Are there any barriers preventing you from shopping?  No.    Are there any barriers preventing you from taking care of your own finances?  No.    Are there any barriers preventing you from managing your medications?  No.    Are there any barriers preventing you from showering, bathing or dressing yourself?  No.    Are you currently engaging in any exercise or physical activity?  Yes.     What is your perception of your health?  Excellent.      Health Maintenance Summary                Annual Wellness Visit Next Due 2021      Done 2020 Visit Dx: Medicare annual wellness visit, subsequent     Patient has more history with this topic...    COLONOSCOPY Next Due 3/21/2022      Done 3/21/2017 REFERRAL TO GI FOR COLONOSCOPY     Patient has more history with this topic...    IMM DTaP/Tdap/Td Vaccine Next Due 2024      Done 2014 Imm Admin: Tdap Vaccine     Patient has more history with this topic...          Patient Care Team:  Azar Cavazos M.D. as PCP - General (Internal Medicine)  Jose Holman M.D. (Inactive) as Attending Team Physician (Cardiology)  Sanchez Fonseca M.D. (Urology)  [facility]  as Respiratory        Social History     Tobacco Use   • Smoking status: Never Smoker   • Smokeless tobacco: Never Used   Substance Use Topics   • Alcohol use: Yes     Comment: Social    • Drug use: No     Family History   Problem Relation Age of Onset   • Heart Attack Father 38        doctor in 1946 diagnosed him with MI at home,  before going to the hospital   • Psychiatric Illness Mother         Alzheimers   • Hypertension Brother    •  "Stroke Brother    • Seizures Brother    • Sleep Apnea Neg Hx      He  has a past medical history of Amyloidosis (HCC), BPH (benign prostatic hyperplasia), Cardiac amyloidosis (HCC), Chickenpox, Colon polyps, Coronary atherosclerosis of native coronary artery (05/15/2013), Coronary heart disease, Dyslipidemia (08/23/2016), H/O urethral stricture, Kidney cysts, Meniscus tear, Pancreas cyst, Scarlet fever, Sinusitis, chronic, Spinal stenosis, lumbar, SVT (supraventricular tachycardia) (HCC) (10/23/2018), and Thrombocytopenia (HCC) (7/24/2017).   Past Surgical History:   Procedure Laterality Date   • KNEE RECONSTRUCTION  2012    left, partial. multiple previous surgeries.    • ROTATOR CUFF REPAIR  2010    c/b right biceps tendon rupture, presumably repaired.    • ANKLE FUSION Left    • ARTHROSCOPY, KNEE     • HEMORRHOIDECTOMY     • SINUSCOPE     • SINUSOTOMIES      Dr. Guerrier, total of 4 surgeries   • TRIGGER FINGER RELEASE      x 2       Exam:     /70 (BP Location: Left arm, Patient Position: Sitting, BP Cuff Size: Adult)   Pulse 82   Temp 36.2 °C (97.1 °F) (Temporal)   Resp 16   Ht 1.778 m (5' 10\")   Wt 73.9 kg (163 lb)   SpO2 97%  Body mass index is 23.39 kg/m².    Hearing good.    Dentition good  Alert, oriented in no acute distress.  Eye contact is good, speech goal directed, affect calm  General: Mildly overweight.  No distress.  Normal appearing.  HEENT: Pupils are equal.  Conjunctiva is normal.  Head is normal appearing.  Ears, canals and tympanic membranes are normal.  Oral cavity is pink and moist without lesion.  Neck: Supple without JVD or bruit.  Thyroid is not enlarged.  Pulmonary: Clear with good breath sounds.  Cardiovascular regular rate and rhythm.  No murmur auscultated.  Carotid, radial and pedal pulses are intact.  Abdomen: Soft, nontender, nondistended.  Normal bowel sounds.  Organs are not enlarged.  Neurologic: Cranial nerves intact.  Strength and sensation are normal.  Normal " patellar reflex.  Skin: No obvious lesions  Lymph: No cervical, supraclavicular, axillary, abdominal or inguinal adenopathy noted.  Genitourinary: Prostate is not enlarged, soft and without nodule.        Assessment and Plan. The following treatment and monitoring plan is recommended:    1. Medicare annual wellness visit, subsequent     2. Atherosclerosis of native coronary artery of native heart without angina pectoris     3. Benign prostatic hyperplasia with urinary obstruction     4. Cardiac amyloidosis (HCC)     5. Chronic diastolic congestive heart failure (HCC)     6. Thrombocytopenia (HCC)     7. Dyslipidemia     8. Colon cancer screening     9. Prostate cancer screening     10. Osteopenia of left hip  DS-BONE DENSITY STUDY (DEXA)       Healthy 80-year-old male.  Cardiac issues are stable. No complications from amyloidosis. No complication from trial medication. He remains asymptomatic from atherosclerosis.  Lipids are at goal. No change in treatment.  BPH is stable. I suspect his increase urination during the day as from the demo decks. He is going to try discontinuing for a couple of days to see if symptoms resolve. He will then resume. He understands the importance of daily diuretic use.  Thrombocytopenia stable. Unchanged for more than five years  Due for bone density  We discussed routine laboratory every 3 months    Services suggested: No services required at this time  Health Care Screening: Age-appropriate preventive services Medicare covers discussed today and ordered if indicated.  Referrals offered: Community-based lifestyle interventions to reduce health risks and promote self-management and wellness, fall prevention, nutrition, physical activity, tobacco-use cessation, weight loss, and mental health services as per orders if indicated.    Discussion today about general wellness and lifestyle habits:    · Prevent falls and reduce trip hazards; Cautioned about securing or removing rugs.  · Have a  working fire alarm and carbon monoxide detector;   · Engage in regular physical activity and social activities       Follow-up: 6 months

## 2020-12-17 ENCOUNTER — HOSPITAL ENCOUNTER (OUTPATIENT)
Dept: RADIOLOGY | Facility: MEDICAL CENTER | Age: 80
End: 2020-12-17
Attending: INTERNAL MEDICINE
Payer: MEDICARE

## 2020-12-17 DIAGNOSIS — M85.852 OSTEOPENIA OF LEFT HIP: ICD-10-CM

## 2020-12-17 PROCEDURE — 77080 DXA BONE DENSITY AXIAL: CPT

## 2021-01-06 DIAGNOSIS — I47.10 SVT (SUPRAVENTRICULAR TACHYCARDIA) (HCC): ICD-10-CM

## 2021-01-06 DIAGNOSIS — I25.10 ATHEROSCLEROSIS OF NATIVE CORONARY ARTERY OF NATIVE HEART WITHOUT ANGINA PECTORIS: ICD-10-CM

## 2021-01-06 DIAGNOSIS — E78.5 DYSLIPIDEMIA: ICD-10-CM

## 2021-01-07 DIAGNOSIS — E78.00 PURE HYPERCHOLESTEROLEMIA: ICD-10-CM

## 2021-01-07 RX ORDER — TORSEMIDE 5 MG/1
TABLET ORAL
Qty: 90 TAB | Refills: 3 | Status: SHIPPED | OUTPATIENT
Start: 2021-01-07 | End: 2021-05-19

## 2021-01-07 RX ORDER — ATORVASTATIN CALCIUM 40 MG/1
40 TABLET, FILM COATED ORAL
Qty: 90 TAB | Refills: 3 | Status: SHIPPED | OUTPATIENT
Start: 2021-01-07 | End: 2021-05-19

## 2021-01-08 ENCOUNTER — OFFICE VISIT (OUTPATIENT)
Dept: INTERNAL MEDICINE | Facility: IMAGING CENTER | Age: 81
End: 2021-01-08
Payer: MEDICARE

## 2021-01-08 VITALS
OXYGEN SATURATION: 94 % | HEART RATE: 57 BPM | SYSTOLIC BLOOD PRESSURE: 130 MMHG | RESPIRATION RATE: 12 BRPM | DIASTOLIC BLOOD PRESSURE: 70 MMHG | TEMPERATURE: 97 F

## 2021-01-08 DIAGNOSIS — E85.4 CARDIAC AMYLOIDOSIS (HCC): ICD-10-CM

## 2021-01-08 DIAGNOSIS — E50.9 VITAMIN A DEFICIENCY: ICD-10-CM

## 2021-01-08 DIAGNOSIS — I43 CARDIAC AMYLOIDOSIS (HCC): ICD-10-CM

## 2021-01-08 PROCEDURE — 99213 OFFICE O/P EST LOW 20 MIN: CPT | Performed by: INTERNAL MEDICINE

## 2021-01-11 DIAGNOSIS — Z23 NEED FOR VACCINATION: ICD-10-CM

## 2021-01-14 ENCOUNTER — IMMUNIZATION (OUTPATIENT)
Dept: FAMILY PLANNING/WOMEN'S HEALTH CLINIC | Facility: IMMUNIZATION CENTER | Age: 81
End: 2021-01-14
Attending: INTERNAL MEDICINE
Payer: MEDICARE

## 2021-01-14 DIAGNOSIS — Z23 ENCOUNTER FOR VACCINATION: Primary | ICD-10-CM

## 2021-01-14 DIAGNOSIS — Z23 NEED FOR VACCINATION: ICD-10-CM

## 2021-01-14 PROCEDURE — 0001A PFIZER SARS-COV-2 VACCINE: CPT | Performed by: INTERNAL MEDICINE

## 2021-01-14 PROCEDURE — 91300 PFIZER SARS-COV-2 VACCINE: CPT | Performed by: INTERNAL MEDICINE

## 2021-01-26 NOTE — PROGRESS NOTES
Chief Complaint   Patient presents with   • Results     Vitamin A def       HISTORY OF THE PRESENT ILLNESS: Patient is a 80 y.o. male.       Patient comes in to discuss vitamin A deficiency. He recently had labs drawn that showed mixed results. His retinol was normal but vitamin A was low. He's currently on a medical trial with the medication calledVutrisiran. It is for treatment of amyloidosis with cardiomyopathy. Patient was aware of possible side effect. He is unaware of any follow-up testing on the vitamin A level. He does have a letter from the coordinator of the study, Dr. Pierson from [place]. In the report that there was episodes of non-clinically significant vitamin A. Patient is uncertain if he wants to continue with the study. He denies any visual symptoms. He is currently on vitamin A supplementation 10,000 units twice daily.      Allergies: Avelox [moxifloxacin hcl], Demerol, Azithromycin, Cefdinir, Ceftin [cefuroxime axetil], Oxycodone, and Scopolamine    Current Outpatient Medications Ordered in Epic   Medication Sig Dispense Refill   • torsemide (DEMADEX) 5 MG Tab TAKE 1 TABLET BY MOUTH EVERY DAY 90 Tab 3   • atorvastatin (LIPITOR) 40 MG Tab Take 1 Tab by mouth every bedtime. 90 Tab 3   • vitamin A 3 mg (34074 units) capsule Take 10,000 Units by mouth every day.     • celecoxib (CELEBREX) 200 MG Cap Take 1 Cap by mouth 1 time daily as needed. 90 Cap 0   • Biotin 1000 MCG Tab Take 0.5 Tabs by mouth every day. 90 Tab 0   • Guaifenesin 400 MG Tab Take 1 Tab by mouth as needed. 90 Tab 0   • vitamin D (CHOLECALCIFEROL) 1000 Unit (25 mcg) Tab Take 1 Tab by mouth every day. 90 Tab 0   • Glucosamine-Chondroitin 750-600 MG Tab Take 1 Tab by mouth 2 Times a Day. 60 Tab 0   • Probiotic Product (ALIGN) Cap Take 1 Cap by mouth every day. 90 Cap 0   • triamcinolone (NASACORT) 55 MCG/ACT nasal inhaler Spray 2 Sprays in nose 1 time daily as needed. 1 Bottle 0   • Tafamidis (VYNDAMAX) 61 MG Cap Take 61 mg by mouth every  day. 90 Cap 3   • VITAMIN B COMPLEX-C PO Take  by mouth.     • Multiple Vitamins-Minerals (CENTRUM SILVER PO) Take  by mouth.     • aspirin EC (ECOTRIN) 81 MG TBEC Take 81 mg by mouth every day.     • Omega-3 Fatty Acids (SALMON OIL PO) Take 2 Caps by mouth every day.     • coenzyme Q-10 30 MG capsule Take 30 mg by mouth every day. Twice a day       No current [facility]-ordered facility-administered medications on file.        Past medical history, social history and family history were reviewed from chart today    Review of systems: Per HPI.    Denies headache, chest pain, fever, chills, diarrhea, constipation, abdominal pain, palpitations, depression   All others negative.     Exam: /70 (BP Location: Left arm, Patient Position: Sitting, BP Cuff Size: Adult)   Pulse (!) 57   Temp 36.1 °C (97 °F) (Temporal)   Resp 12   SpO2 94%   General: Well-appearing. Well-developed. No signs of distress.  HEENT: Grossly normal. Oral cavity is pink and moist.  Neck: Supple without JVD or bruit.  Pulmonary: Clear with good breath sounds. Normal effort.  Cardiovascular: Regular. Carotid and radial pulses are intact.  Abdomen: Soft, nontender, nondistended. Spleen and liver are not enlarged.  Neurologic: Cranial nerves II through XII are grossly normal, alert and oriented x3      Diagnosis:  1. Cardiac amyloidosis (HCC)     2. Vitamin A deficiency           Patient with questions about wanting to continue with trial on medicine for treatment of amyloidosis. He has documented cardiac amyloidosis. His cardiac function is normal.  I discussed with him that only he can make the decision if he wants to remain in the study. Because we do not know his vitamin A level prior to the study is impossible to say if the drug has caused his current changes. He does not appear to have clinical vitamin A deficiency disease. Encouraged him to consider seeing ophthalmologist for evaluation.  At this time I see no clinical downside from him being  on the study.

## 2021-02-05 ENCOUNTER — IMMUNIZATION (OUTPATIENT)
Dept: FAMILY PLANNING/WOMEN'S HEALTH CLINIC | Facility: IMMUNIZATION CENTER | Age: 81
End: 2021-02-05
Payer: MEDICARE

## 2021-02-05 DIAGNOSIS — Z23 ENCOUNTER FOR VACCINATION: Primary | ICD-10-CM

## 2021-02-05 PROCEDURE — 91300 PFIZER SARS-COV-2 VACCINE: CPT | Performed by: INTERNAL MEDICINE

## 2021-02-05 PROCEDURE — 0002A PFIZER SARS-COV-2 VACCINE: CPT | Performed by: INTERNAL MEDICINE

## 2021-02-19 ENCOUNTER — HOSPITAL ENCOUNTER (OUTPATIENT)
Facility: MEDICAL CENTER | Age: 81
End: 2021-02-19
Attending: INTERNAL MEDICINE
Payer: MEDICARE

## 2021-02-19 ENCOUNTER — OFFICE VISIT (OUTPATIENT)
Dept: INTERNAL MEDICINE | Facility: IMAGING CENTER | Age: 81
End: 2021-02-19
Payer: MEDICARE

## 2021-02-19 VITALS
TEMPERATURE: 97 F | DIASTOLIC BLOOD PRESSURE: 80 MMHG | SYSTOLIC BLOOD PRESSURE: 110 MMHG | BODY MASS INDEX: 23.24 KG/M2 | HEART RATE: 61 BPM | WEIGHT: 162 LBS | RESPIRATION RATE: 12 BRPM | OXYGEN SATURATION: 97 %

## 2021-02-19 DIAGNOSIS — E85.4 CARDIAC AMYLOIDOSIS (HCC): ICD-10-CM

## 2021-02-19 DIAGNOSIS — I43 CARDIAC AMYLOIDOSIS (HCC): ICD-10-CM

## 2021-02-19 LAB
ANION GAP SERPL CALC-SCNC: 6 MMOL/L (ref 7–16)
BUN SERPL-MCNC: 20 MG/DL (ref 8–22)
CALCIUM SERPL-MCNC: 10 MG/DL (ref 8.5–10.5)
CHLORIDE SERPL-SCNC: 102 MMOL/L (ref 96–112)
CO2 SERPL-SCNC: 31 MMOL/L (ref 20–33)
CREAT SERPL-MCNC: 0.98 MG/DL (ref 0.5–1.4)
GLUCOSE SERPL-MCNC: 115 MG/DL (ref 65–99)
POTASSIUM SERPL-SCNC: 5.2 MMOL/L (ref 3.6–5.5)
SODIUM SERPL-SCNC: 139 MMOL/L (ref 135–145)

## 2021-02-19 PROCEDURE — 99214 OFFICE O/P EST MOD 30 MIN: CPT | Performed by: INTERNAL MEDICINE

## 2021-02-19 PROCEDURE — 80048 BASIC METABOLIC PNL TOTAL CA: CPT

## 2021-02-19 ASSESSMENT — FIBROSIS 4 INDEX: FIB4 SCORE: 3.96

## 2021-02-19 NOTE — PROGRESS NOTES
Chief Complaint   Patient presents with   • Heart Problem       HISTORY OF THE PRESENT ILLNESS: Patient is a 80 y.o. male.     Patient comes in for evaluation of fluid overload.  He is on a diuretic due to cardiac amyloidosis.  He has never been diagnosed with restrictive or systolic heart failure.  Cardiologist thought he may have mild fluid overload and started him on furosemide 5 mg.  Is because excess urination.  Patient reduced the dose to 2.5 mg approximately 40 days ago.  He has recently had multiple BNP values.  They have ranged between 100-400.  The patient reports that a value taken with the clinical trial he is undergoing was approximately 460 in January.  We have a value of 471 in early December.    Allergies: Avelox [moxifloxacin hcl], Demerol, Azithromycin, Cefdinir, Ceftin [cefuroxime axetil], Oxycodone, and Scopolamine    Current Outpatient Medications Ordered in Epic   Medication Sig Dispense Refill   • torsemide (DEMADEX) 5 MG Tab TAKE 1 TABLET BY MOUTH EVERY DAY 90 Tab 3   • atorvastatin (LIPITOR) 40 MG Tab Take 1 Tab by mouth every bedtime. 90 Tab 3   • vitamin A 3 mg (15265 units) capsule Take 10,000 Units by mouth every day.     • celecoxib (CELEBREX) 200 MG Cap Take 1 Cap by mouth 1 time daily as needed. 90 Cap 0   • Biotin 1000 MCG Tab Take 0.5 Tabs by mouth every day. 90 Tab 0   • Guaifenesin 400 MG Tab Take 1 Tab by mouth as needed. 90 Tab 0   • vitamin D (CHOLECALCIFEROL) 1000 Unit (25 mcg) Tab Take 1 Tab by mouth every day. 90 Tab 0   • Glucosamine-Chondroitin 750-600 MG Tab Take 1 Tab by mouth 2 Times a Day. 60 Tab 0   • Probiotic Product (ALIGN) Cap Take 1 Cap by mouth every day. 90 Cap 0   • triamcinolone (NASACORT) 55 MCG/ACT nasal inhaler Spray 2 Sprays in nose 1 time daily as needed. 1 Bottle 0   • Tafamidis (VYNDAMAX) 61 MG Cap Take 61 mg by mouth every day. 90 Cap 3   • VITAMIN B COMPLEX-C PO Take  by mouth.     • Multiple Vitamins-Minerals (CENTRUM SILVER PO) Take  by mouth.      • aspirin EC (ECOTRIN) 81 MG TBEC Take 81 mg by mouth every day.     • Omega-3 Fatty Acids (SALMON OIL PO) Take 2 Caps by mouth every day.     • coenzyme Q-10 30 MG capsule Take 30 mg by mouth every day. Twice a day       No current [facility]-ordered facility-administered medications on file.       Past medical history, social history and family history were reviewed from chart today    Review of systems: Per HPI.    Denies headache, chest pain, fever, chills, diarrhea, constipation, abdominal pain, palpitations, depression   All others negative.     Exam: /80 (BP Location: Left arm, Patient Position: Sitting, BP Cuff Size: Adult)   Pulse 61   Temp 36.1 °C (97 °F) (Temporal)   Resp 12   Wt 73.5 kg (162 lb)   SpO2 97%   General: Well-appearing. Well-developed. No signs of distress.  HEENT: Grossly normal. Oral cavity is pink and moist.  Neck: Supple without JVD or bruit.  Pulmonary: Clear with good breath sounds. Normal effort.  Cardiovascular: Regular. Carotid and radial pulses are intact.  Patient has nonpitting edema above his sock.  The area below his sock is normal in appearance.  Abdomen: Soft, nontender, nondistended. Spleen and liver are not enlarged.  Neurologic: Cranial nerves II through XII are grossly normal, alert and oriented x3      Diagnosis:  1. Cardiac amyloidosis (HCC)  proBrain Natriuretic Peptide, NT    Basic Metabolic Panel       I do not detect significant fluid overload.  Repeat BMP.  I would also like to assess his electrolytes

## 2021-02-24 ENCOUNTER — HOSPITAL ENCOUNTER (OUTPATIENT)
Facility: MEDICAL CENTER | Age: 81
End: 2021-02-24
Attending: INTERNAL MEDICINE
Payer: MEDICARE

## 2021-02-24 ENCOUNTER — NON-PROVIDER VISIT (OUTPATIENT)
Dept: INTERNAL MEDICINE | Facility: IMAGING CENTER | Age: 81
End: 2021-02-24
Payer: MEDICARE

## 2021-02-24 DIAGNOSIS — I50.32 CHRONIC DIASTOLIC CONGESTIVE HEART FAILURE (HCC): ICD-10-CM

## 2021-02-24 LAB — NT-PROBNP SERPL IA-MCNC: 391 PG/ML (ref 0–125)

## 2021-02-24 PROCEDURE — 83880 ASSAY OF NATRIURETIC PEPTIDE: CPT

## 2021-02-25 DIAGNOSIS — S16.1XXA STRAIN OF NECK MUSCLE, INITIAL ENCOUNTER: ICD-10-CM

## 2021-02-26 ENCOUNTER — HOSPITAL ENCOUNTER (OUTPATIENT)
Facility: MEDICAL CENTER | Age: 81
End: 2021-02-26
Attending: PHYSICIAN ASSISTANT
Payer: MEDICARE

## 2021-02-26 PROCEDURE — 87086 URINE CULTURE/COLONY COUNT: CPT

## 2021-03-01 LAB
BACTERIA UR CULT: NORMAL
SIGNIFICANT IND 70042: NORMAL
SITE SITE: NORMAL
SOURCE SOURCE: NORMAL

## 2021-03-01 RX ORDER — TAFAMIDIS 61 MG/1
CAPSULE, LIQUID FILLED ORAL
Qty: 90 CAPSULE | Refills: 3 | Status: SHIPPED | OUTPATIENT
Start: 2021-03-01 | End: 2021-12-13

## 2021-03-22 DIAGNOSIS — R79.89 ABNORMAL LIVER FUNCTION TEST: ICD-10-CM

## 2021-04-25 ENCOUNTER — HOSPITAL ENCOUNTER (OUTPATIENT)
Dept: RADIOLOGY | Facility: MEDICAL CENTER | Age: 81
End: 2021-04-25
Attending: ORTHOPAEDIC SURGERY
Payer: MEDICARE

## 2021-04-25 DIAGNOSIS — M17.11 UNILATERAL PRIMARY OSTEOARTHRITIS, RIGHT KNEE: ICD-10-CM

## 2021-04-25 DIAGNOSIS — M25.561 RIGHT KNEE PAIN, UNSPECIFIED CHRONICITY: ICD-10-CM

## 2021-04-25 PROCEDURE — 73721 MRI JNT OF LWR EXTRE W/O DYE: CPT | Mod: RT,MG

## 2021-04-28 ENCOUNTER — OFFICE VISIT (OUTPATIENT)
Dept: INTERNAL MEDICINE | Facility: IMAGING CENTER | Age: 81
End: 2021-04-28
Payer: MEDICARE

## 2021-04-28 VITALS
SYSTOLIC BLOOD PRESSURE: 120 MMHG | RESPIRATION RATE: 12 BRPM | TEMPERATURE: 98 F | DIASTOLIC BLOOD PRESSURE: 72 MMHG | HEART RATE: 63 BPM | OXYGEN SATURATION: 98 %

## 2021-04-28 DIAGNOSIS — G56.01 CARPAL TUNNEL SYNDROME OF RIGHT WRIST: ICD-10-CM

## 2021-04-28 DIAGNOSIS — R20.0 THUMB NUMBNESS: ICD-10-CM

## 2021-04-28 PROCEDURE — 99214 OFFICE O/P EST MOD 30 MIN: CPT | Performed by: INTERNAL MEDICINE

## 2021-04-29 NOTE — PROGRESS NOTES
Chief Complaint   Patient presents with   • Other     hand numbness       HISTORY OF THE PRESENT ILLNESS: Patient is a 80 y.o. male.     Patient comes in with complaint of numbness in his right thumb.  Symptoms have been present for a few months.  Symptoms have waxed and waned but are becoming more prevalent.  He occasionally feels hyperesthesia but also with episodes of numbness or decreased sensation.    Allergies: Avelox [moxifloxacin hcl], Demerol, Azithromycin, Cefdinir, Ceftin [cefuroxime axetil], Oxycodone, and Scopolamine    Current Outpatient Medications Ordered in Epic   Medication Sig Dispense Refill   • VYNDAMAX 61 MG Cap TAKE 1 CAPSULE BY MOUTH  EVERY DAY 90 capsule 3   • torsemide (DEMADEX) 5 MG Tab TAKE 1 TABLET BY MOUTH EVERY DAY 90 Tab 3   • atorvastatin (LIPITOR) 40 MG Tab Take 1 Tab by mouth every bedtime. 90 Tab 3   • vitamin A 3 mg (33359 units) capsule Take 10,000 Units by mouth every day.     • celecoxib (CELEBREX) 200 MG Cap Take 1 Cap by mouth 1 time daily as needed. 90 Cap 0   • Biotin 1000 MCG Tab Take 0.5 Tabs by mouth every day. 90 Tab 0   • Guaifenesin 400 MG Tab Take 1 Tab by mouth as needed. 90 Tab 0   • vitamin D (CHOLECALCIFEROL) 1000 Unit (25 mcg) Tab Take 1 Tab by mouth every day. 90 Tab 0   • Glucosamine-Chondroitin 750-600 MG Tab Take 1 Tab by mouth 2 Times a Day. 60 Tab 0   • Probiotic Product (ALIGN) Cap Take 1 Cap by mouth every day. 90 Cap 0   • triamcinolone (NASACORT) 55 MCG/ACT nasal inhaler Spray 2 Sprays in nose 1 time daily as needed. 1 Bottle 0   • VITAMIN B COMPLEX-C PO Take  by mouth.     • Multiple Vitamins-Minerals (CENTRUM SILVER PO) Take  by mouth.     • aspirin EC (ECOTRIN) 81 MG TBEC Take 81 mg by mouth every day.     • Omega-3 Fatty Acids (SALMON OIL PO) Take 2 Caps by mouth every day.     • coenzyme Q-10 30 MG capsule Take 30 mg by mouth every day. Twice a day       No current [facility]-ordered facility-administered medications on file.       Past medical  history, social history and family history were reviewed from chart today    Review of systems: Per HPI.    Denies headache, chest pain, fever, chills, diarrhea, constipation, abdominal pain, palpitations, depression   All others negative.     Exam: /72 (BP Location: Left arm, Patient Position: Sitting, BP Cuff Size: Adult)   Pulse 63   Temp 36.7 °C (98 °F) (Temporal)   Resp 12   SpO2 98%   General: Well-appearing. Well-developed. No signs of distress.  HEENT: Grossly normal. Oral cavity is pink and moist.  Neck: Supple without JVD or bruit.  Pulmonary: Clear with good breath sounds. Normal effort.  Cardiovascular: Regular. Carotid and radial pulses are intact.  Abdomen: Soft, nontender, nondistended. Spleen and liver are not enlarged.  Neurologic: Subjective decreased sensation on the pad and tip of the right thumb compared to left.  Subjectively positive Tinel's on the right  Extremities: Bilateral hands are normal in appearance.    Diagnosis:  1. Thumb numbness     2. Carpal tunnel syndrome of right wrist         Thumb numbness presumably from carpal tunnel.  Discussed wearing carpal tunnel brace.  Refer to orthopedics for evaluation

## 2021-05-10 ENCOUNTER — HOSPITAL ENCOUNTER (OUTPATIENT)
Dept: RADIOLOGY | Facility: MEDICAL CENTER | Age: 81
End: 2021-05-10
Attending: ORTHOPAEDIC SURGERY
Payer: MEDICARE

## 2021-05-10 DIAGNOSIS — M17.12 OSTEOARTHRITIS OF LEFT KNEE, UNSPECIFIED OSTEOARTHRITIS TYPE: ICD-10-CM

## 2021-05-10 PROCEDURE — 73562 X-RAY EXAM OF KNEE 3: CPT | Mod: LT

## 2021-05-18 ENCOUNTER — APPOINTMENT (RX ONLY)
Dept: URBAN - METROPOLITAN AREA CLINIC 4 | Facility: CLINIC | Age: 81
Setting detail: DERMATOLOGY
End: 2021-05-18

## 2021-05-18 DIAGNOSIS — L81.4 OTHER MELANIN HYPERPIGMENTATION: ICD-10-CM

## 2021-05-18 DIAGNOSIS — L82.1 OTHER SEBORRHEIC KERATOSIS: ICD-10-CM

## 2021-05-18 DIAGNOSIS — D18.0 HEMANGIOMA: ICD-10-CM

## 2021-05-18 PROBLEM — D18.01 HEMANGIOMA OF SKIN AND SUBCUTANEOUS TISSUE: Status: ACTIVE | Noted: 2021-05-18

## 2021-05-18 PROCEDURE — ? COUNSELING

## 2021-05-18 PROCEDURE — 99213 OFFICE O/P EST LOW 20 MIN: CPT

## 2021-05-18 ASSESSMENT — LOCATION ZONE DERM: LOCATION ZONE: TRUNK

## 2021-05-18 ASSESSMENT — LOCATION SIMPLE DESCRIPTION DERM
LOCATION SIMPLE: LEFT LOWER BACK
LOCATION SIMPLE: RIGHT UPPER BACK

## 2021-05-18 ASSESSMENT — LOCATION DETAILED DESCRIPTION DERM
LOCATION DETAILED: LEFT SUPERIOR MEDIAL MIDBACK
LOCATION DETAILED: RIGHT SUPERIOR UPPER BACK

## 2021-05-19 ENCOUNTER — OFFICE VISIT (OUTPATIENT)
Dept: CARDIOLOGY | Facility: MEDICAL CENTER | Age: 81
End: 2021-05-19
Payer: MEDICARE

## 2021-05-19 VITALS
OXYGEN SATURATION: 98 % | RESPIRATION RATE: 16 BRPM | SYSTOLIC BLOOD PRESSURE: 120 MMHG | HEART RATE: 60 BPM | DIASTOLIC BLOOD PRESSURE: 70 MMHG | BODY MASS INDEX: 22.5 KG/M2 | HEIGHT: 70 IN | WEIGHT: 157.2 LBS

## 2021-05-19 DIAGNOSIS — R93.1 AGATSTON CAC SCORE, >400: ICD-10-CM

## 2021-05-19 DIAGNOSIS — E78.00 PURE HYPERCHOLESTEROLEMIA: ICD-10-CM

## 2021-05-19 DIAGNOSIS — I25.10 ATHEROSCLEROSIS OF NATIVE CORONARY ARTERY OF NATIVE HEART WITHOUT ANGINA PECTORIS: ICD-10-CM

## 2021-05-19 DIAGNOSIS — I50.32 CHRONIC DIASTOLIC CONGESTIVE HEART FAILURE (HCC): ICD-10-CM

## 2021-05-19 DIAGNOSIS — I43 CARDIAC AMYLOIDOSIS (HCC): ICD-10-CM

## 2021-05-19 DIAGNOSIS — I47.19 AVNRT (AV NODAL RE-ENTRY TACHYCARDIA) (HCC): ICD-10-CM

## 2021-05-19 DIAGNOSIS — E85.4 CARDIAC AMYLOIDOSIS (HCC): ICD-10-CM

## 2021-05-19 DIAGNOSIS — E78.5 DYSLIPIDEMIA: ICD-10-CM

## 2021-05-19 DIAGNOSIS — D69.6 THROMBOCYTOPENIA (HCC): ICD-10-CM

## 2021-05-19 DIAGNOSIS — I47.10 SVT (SUPRAVENTRICULAR TACHYCARDIA) (HCC): ICD-10-CM

## 2021-05-19 PROCEDURE — 99213 OFFICE O/P EST LOW 20 MIN: CPT | Performed by: INTERNAL MEDICINE

## 2021-05-19 RX ORDER — TORSEMIDE 5 MG/1
5 TABLET ORAL
Qty: 90 TABLET | Refills: 3 | Status: SHIPPED | OUTPATIENT
Start: 2021-05-19 | End: 2022-05-31

## 2021-05-19 RX ORDER — ATORVASTATIN CALCIUM 40 MG/1
40 TABLET, FILM COATED ORAL
Qty: 90 TABLET | Refills: 3 | Status: SHIPPED | OUTPATIENT
Start: 2021-05-19 | End: 2022-05-31

## 2021-05-19 ASSESSMENT — FIBROSIS 4 INDEX: FIB4 SCORE: 3.96

## 2021-05-19 NOTE — PROGRESS NOTES
"    Cardiology Follow-up Consultation Note    Date of note:   5/19/2021  Primary Care Provider: Azar Cavazos M.D.  Referring Provider: Jose Holman M.D.     Patient Name: Andre Martinez   YOB: 1940  MRN:              [id number]    Chief Complaint: Cardiac Amyloidosis.     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 Seen by Dr. Pierson at [place] in [place], and diagnosed with TTR amyloid with fat pad biopsy confirmation.   I have included her summary below:    \"The patient is a delightful 78-year-old who was diagnosed with transthyretin amyloidosis based on a positive PYP scan done at [facility] in January of 2019. He has mildly abnormal serum free light chains, currently kappa 2.83, lambda 1.35 mg/dL with a ratio of 2.10, but there was no monoclonal protein in the serum or in the urine. Of note, he had laser enucleation of the prostate performed 11/13/2018 at [facility], and we will request that that tissue be reviewed for amyloid. He also had a fat aspirate done yesterday. Subsequent to our visit, fat aspirate results returned and are positive for amyloid.     He came to attention due to a preoperative evaluation for prostate surgery. He had a CT calcium score done due to a family history of possible coronary artery disease (sudden death in his father at age 38) in 2013 with a score in the 600s; he was asymptomatic at that time. October 2017, had a stress echocardiogram and underwent almost 12 minutes without any evidence of ischemia. It was noted that he had " increased left ventricular wall thicknesses at that time. A repeat coronary calcium score November 28 showed left main 0, , left circumflex 165, right coronary artery 2093 with a total score of 3091. His cardiologist at that time recommended a PET perfusion study which was done 12/28/2018. The ejection fraction at rest was 30% and increased to 47% (however, his echocardiogram clearly shows normal resting ejection fraction). Perfusion was normal. He did have an episode that night while he was exercising where he developed chest discomfort and rapid palpitations. Emergency room evaluation showed junctional tachycardia which resolved spontaneously. The troponin was slightly elevated with a troponin I of 0.07 ng/L (normal less than 0.04). It was felt that this episode of tachycardia may have been related to the earlier stress test.     He saw Dr. Schreiber in January 2019 for preoperative evaluation prior to surgical intervention for urethral stricture. A transthoracic echocardiogram was performed primarily to reassess the left ventricular ejection fraction which was felt to possibly have been incorrect on the PET scan. The echocardiogram was interpreted as consistent with possible cardiac amyloidosis, left ventricular size was normal, ejection fraction calculated at 54%, but to my review is in the range of 60% to 65%. There are no regional wall motion abnormalities. Moderately increased left ventricular wall thicknesses, the basal septum and posterior wall measured 16/14 mm. The left ventricular mass index was elevated at 158 g/m2. Strain was abnormal with an apical sparing pattern and an overall value of -14%, mildly thickened aortic valve with mild regurgitation, mild dilatation of the sinuses of Valsalva (37 mm). The diastolic filling pattern suggested elevated filling pressure. Left atrial volume was moderately enlarged at 42 mL/m2. The right atrium was described as severely enlarged, to my review I would  "consider it to be moderately enlarged. Right ventricular size is normal with definite increase in right ventricular wall thickness. Right ventricular systolic function appeared normal, although tissue Doppler was slightly low. The inferior vena cava was of normal size with normal inspiratory collapse. The stroke volume index was 35 mL/m2. Cardiac index was reduced at 1.81, but the heart rate was 52 beats per minute.     He had a PYP scan performed on 2019. I reviewed the images and agree with the report. The H/CL ratio which is 1.8, consistent with TTR amyloid. The SPECT images were not available to me, but gave a myocardial score of 2 which is consistent with moderate uptake.     He has never had any significant symptoms. He exercises 1-2 hours per day with aerobic activity with no limiting dyspnea. No orthopnea, paroxysmal nocturnal dyspnea, no edema. No syncope. No palpitations other than the event noted after his PET stress test. No chest pain. As mentioned, his father  suddenly at age 38 of a presumed cardiac event. There is no known family history otherwise of unexplained heart failure or of peripheral neuropathy.     He has had 2 trigger finger releases on the left within the past 10 years. No known carpal tunnel syndrome and no history of spinal stenosis. He does have a history of multiple orthopedic issues as outlined below. This includes the unexpected finding of a right biceps tendon rupture at the time of rotator cuff surgery several years ago.   \"    Continues to exercise aerobically a couple hours a day without symptoms.       At our visit, 2019:  In terms of diastolic heart failure, now off torsemide due to urinary frequency.  Weight up 4-5 pounds. Exercising without difficulty, ran up three flights of stairs today no problem.     In terms of TTR amyloid, prostate biopsy and genetic testing confirmed wild type aTTR amyloid.  He was recently seen at [place] by Dr. Herrera 2019. Prior " to that visit, he was started on tafamidis by [facility]. Unfortunately unable to qualify for further experimental trials due to thrombocytopenia based on a recent evaluation at [facility] as well.     In terms of hypertension, mostly not checking BP at home. Well controlled. Sometimes in the 130s.     At our visit, 3/10/2020:  In terms of CHF, taking torsemide 5mg PO daily. Took 1 week snow-shoeing in the [place] without symptoms.     For his amyloid, he remains on tafamidis. He is followed by Dr. Herrera at [place] and is being consider for other studies as well if his NT-proBNP becomes elevated.     At our visit,  11/17/2020:  Seen At [facility] in September, starting a trial of Vutrisiran, (an RNA silencer drug). Helios-B study.     Interim Events:  Walking frequently without symptoms, 5-6 miles a day.     In terms of SVT, no further palpitations or fast heart beats.     In terms of hypertension, well controlled.     In terms of heart failure, stable symptoms, no LE swelling or orthopnea.     Remains in Helios-B study.     Review of Systems   Allergic/Immunologic: Positive for environmental allergies.     Constitution: Negative for chills, fever and night sweats.   HENT: Negative for nosebleeds.    Eyes: Negative for vision loss in left eye and vision loss in right eye.   Respiratory: Negative for hemoptysis.    Gastrointestinal: Negative for hematemesis, hematochezia and melena.   Genitourinary: Negative for hematuria.   Neurological: Negative for focal weakness, numbness and paresthesias.       Past Medical History:   Diagnosis Date   • Amyloidosis (HCC)     TTR, type pending.    • BPH (benign prostatic hyperplasia)     s/p laser encleation of the prostate   • Cardiac amyloidosis (HCC)    • Chickenpox    • Colon polyps    • Coronary atherosclerosis of native coronary artery 05/15/2013    Coronary calcium score in the 3000s, negative PET scan and no symptoms so treated medically.    • Coronary heart disease     • Dyslipidemia 08/23/2016   • H/O urethral stricture     s/p surgical repair 1/15/2019, [place]   • Kidney cysts    • Meniscus tear    • Pancreas cyst    • Scarlet fever    • Sinusitis, chronic    • Spinal stenosis, lumbar    • SVT (supraventricular tachycardia) ([facility]) 10/23/2018   • Thrombocytopenia ([facility]) 7/24/2017       Past Surgical History:   Procedure Laterality Date   • KNEE RECONSTRUCTION  2012    left, partial. multiple previous surgeries.    • ROTATOR CUFF REPAIR  2010    c/b right biceps tendon rupture, presumably repaired.    • ANKLE FUSION Left    • ARTHROSCOPY, KNEE     • HEMORRHOIDECTOMY     • SINUSCOPE     • SINUSOTOMIES      Dr. Guerrier, total of 4 surgeries   • TRIGGER FINGER RELEASE      x 2         Current Outpatient Medications   Medication Sig Dispense Refill   • VYNDAMAX 61 MG Cap TAKE 1 CAPSULE BY MOUTH  EVERY DAY 90 capsule 3   • torsemide (DEMADEX) 5 MG Tab TAKE 1 TABLET BY MOUTH EVERY DAY 90 Tab 3   • atorvastatin (LIPITOR) 40 MG Tab Take 1 Tab by mouth every bedtime. 90 Tab 3   • vitamin A 3 mg (21173 units) capsule Take 10,000 Units by mouth every day.     • celecoxib (CELEBREX) 200 MG Cap Take 1 Cap by mouth 1 time daily as needed. 90 Cap 0   • Biotin 1000 MCG Tab Take 0.5 Tabs by mouth every day. 90 Tab 0   • Guaifenesin 400 MG Tab Take 1 Tab by mouth as needed. 90 Tab 0   • vitamin D (CHOLECALCIFEROL) 1000 Unit (25 mcg) Tab Take 1 Tab by mouth every day. 90 Tab 0   • Glucosamine-Chondroitin 750-600 MG Tab Take 1 Tab by mouth 2 Times a Day. 60 Tab 0   • Probiotic Product (ALIGN) Cap Take 1 Cap by mouth every day. 90 Cap 0   • triamcinolone (NASACORT) 55 MCG/ACT nasal inhaler Spray 2 Sprays in nose 1 time daily as needed. 1 Bottle 0   • VITAMIN B COMPLEX-C PO Take  by mouth.     • Multiple Vitamins-Minerals (CENTRUM SILVER PO) Take  by mouth.     • aspirin EC (ECOTRIN) 81 MG TBEC Take 81 mg by mouth every day.     • Omega-3 Fatty Acids (SALMON OIL PO) Take 2 Caps by mouth every day.     •  coenzyme Q-10 30 MG capsule Take 30 mg by mouth every day. Twice a day       No current facility-administered medications for this visit.         Allergies   Allergen Reactions   • Avelox [Moxifloxacin Hcl] Rash     Chest, face, genitals   • Demerol Unspecified     Vasovagal reaction (drop in BP & pulse)   • Azithromycin Itching   • Cefdinir Rash     Rash chest, under arm and scalp     • Ceftin [Cefuroxime Axetil] Rash     Chest, under arm & scalp   • Oxycodone Itching     Full body itching   • Scopolamine Unspecified     disorientation         Family History   Problem Relation Age of Onset   • Heart Attack Father 38        doctor in 1946 diagnosed him with MI at home,  before going to the hospital   • Psychiatric Illness Mother         Alzheimers   • Hypertension Brother    • Stroke Brother    • Seizures Brother    • Sleep Apnea Neg Hx          Social History     Socioeconomic History   • Marital status:      Spouse name: Not on file   • Number of children: Not on file   • Years of education: Not on file   • Highest education level: Not on file   Occupational History   • Not on file   Tobacco Use   • Smoking status: Never Smoker   • Smokeless tobacco: Never Used   Vaping Use   • Vaping Use: Never used   Substance and Sexual Activity   • Alcohol use: Yes     Comment: Social    • Drug use: No   • Sexual activity: Not on file   Other Topics Concern   • Not on file   Social History Narrative    Retired from real estate      Social Determinants of Health     Financial Resource Strain:    • Difficulty of Paying Living Expenses:    Food Insecurity:    • Worried About Running Out of Food in the Last Year:    • Ran Out of Food in the Last Year:    Transportation Needs:    • Lack of Transportation (Medical):    • Lack of Transportation (Non-Medical):    Physical Activity:    • Days of Exercise per Week:    • Minutes of Exercise per Session:    Stress:    • Feeling of Stress :    Social Connections:    • Frequency  "of Communication with Friends and Family:    • Frequency of Social Gatherings with Friends and Family:    • Attends Islam Services:    • Active Member of Clubs or Organizations:    • Attends Club or Organization Meetings:    • Marital Status:    Intimate Partner Violence:    • Fear of Current or Ex-Partner:    • Emotionally Abused:    • Physically Abused:    • Sexually Abused:          Physical Exam:  Ambulatory Vitals  /70 (BP Location: Left arm, Patient Position: Sitting)   Pulse 60   Resp 16   Ht 1.778 m (5' 10\")   Wt 71.3 kg (157 lb 3.2 oz)   SpO2 98%    Oxygen Therapy:  Pulse Oximetry: 98 %  BP Readings from Last 4 Encounters:   05/19/21 120/70   04/28/21 120/72   02/19/21 110/80   01/08/21 130/70       Weight/BMI: Body mass index is 22.56 kg/m².  Wt Readings from Last 4 Encounters:   05/19/21 71.3 kg (157 lb 3.2 oz)   02/19/21 73.5 kg (162 lb)   12/11/20 73.9 kg (163 lb)   11/17/20 71.9 kg (158 lb 9.6 oz)       General: No apparent distress  Eyes: nl conjunctiva  ENT: OP covered by mask.   Neck: JVP 4-5 cm H2O, no carotid bruits  Lungs: normal respiratory effort, CTAB  Heart: RRR, + S4, no murmurs, no rubs, trace edema bilateral lower extremities. No LV/RV heave on cardiac palpatation. Sustained diffuse, displaced PMI. 2+ bilateral radial pulses.    Abdomen: soft, non tender, non distended, no masses, normal bowel sounds.  No HSM.  Extremities/MSK: no clubbing, no cyanosis  Neurological: No focal sensory deficits  Psychiatric: Appropriate affect, A/O x 3, intact judgement and insight  Skin: Warm extremities    Exam repeated in full and unchanged except for as noted above.      Lab Data Review:  Lab Results   Component Value Date/Time    CHOLSTRLTOT 125 12/04/2020 08:00 AM    LDL 48 12/04/2020 08:00 AM    HDL 70 12/04/2020 08:00 AM    TRIGLYCERIDE 35 12/04/2020 08:00 AM       Lab Results   Component Value Date/Time    SODIUM 139 02/19/2021 09:15 AM    POTASSIUM 5.2 02/19/2021 09:15 AM    CHLORIDE " "102 02/19/2021 09:15 AM    CO2 31 02/19/2021 09:15 AM    GLUCOSE 115 (H) 02/19/2021 09:15 AM    BUN 20 02/19/2021 09:15 AM    CREATININE 0.98 02/19/2021 09:15 AM     Lab Results   Component Value Date/Time    ALKPHOSPHAT 97 12/04/2020 08:00 AM    ASTSGOT 34 12/04/2020 08:00 AM    ALTSGPT 36 12/04/2020 08:00 AM    TBILIRUBIN 0.5 12/04/2020 08:00 AM      Lab Results   Component Value Date/Time    WBC 9.2 12/04/2020 08:00 AM     No components found for: HBGA1C  No components found for: TROPONIN  No results found for: BNP      Cardiac Imaging and Procedures Review:    Exercise stress echocardiogram, 10/24/2017: Patient did 11 minutes 59 seconds corresponding with 12.8 metabolic equivalents on the Kael protocol achieving 92% of his age-predicted maximum heart rate with normal systolic augmentation regionally, negative for ischemia.  He also had a stress echocardiogram in 2013 that also was negative for ischemia.     CT coronary calcium scan, 11/16/2018:  \"Coronary calcification:  LMA - 0.0  LCX - 165.1  LAD - 833.2  RCA - 2093.2  PDA - 0.0  Calcium score:  3091.5\"     PET myocardial perfusion imaging, 12/20/2018, images and report reviewed, my interpretation: Demonstrates systolic dysfunction with a resting ejection fraction of 36% and no territorial ischemia nor transient ischemic dilation.  Left ventricle was borderline dilated.     EKG, 12/20/2018, tracings and report reviewed, mitral rotation: Documents supraventricular tachycardia with a fairly pronounced pseudo-R prime in V1 consistent with AVNRT       TTE 6/2020 [facility]:      Radiology test Review:  CXR: 12/2018  No pulmonary infiltrates or consolidations are noted.  No pleural effusion. No pneumothorax.  Normal cardiopericardial silhouette.       Medical Decision Making:  # Wild Type TTR Amyloid with Cardiac involvement - asymptomatic with NYHA Class I symptoms. He was started by Dr. Pierson at [facility] on tafamadis, and most recently has seen Dr. Herrera " from [place] as well as a cardiologist at [place]. Started Helios-B study in 10/2020 with Dr. Pierson. He is euvolemic today.   -continue torsemide 5mg PO daily   -f/u at [place] as planned  -continue tafamidis.  Will check CBC and NT-proBNP at least Q6 months, and close to Q3 months as this might change his management options.   -continue Helios-B study.     # Agatston CAC score, >400  Asymptomatic with excellent exercise tolerance and multiple negative stress tests. Normal LVEF based on last echo performed at [place].   -continue aspirin 81mg PO daily  -lipitor 40mg PO Daily  -discussed ED precautions    #  AVNRT (AV billy re-entry tachycardia) (HCC)  No evidence of atrial fibrillation/flutter at this time although certainly high risk.  He will let me know if any symptoms develop, and he may be a candidate for empiric anticoagulation given his high CVA risk.     # Pure hypercholesterolemia  Continue lipitor.    # Thrombocytopenia - mild. monitored by study.       Return in about 6 months (around 11/19/2021).      Jaime Raman MD, [facility]  [facility], [address]  [address] [address]  Phone: [phone]  Fax: [phone]

## 2021-06-03 ENCOUNTER — HOSPITAL ENCOUNTER (OUTPATIENT)
Dept: RADIOLOGY | Facility: MEDICAL CENTER | Age: 81
End: 2021-06-03
Attending: NURSE PRACTITIONER
Payer: MEDICARE

## 2021-06-03 DIAGNOSIS — M54.2 CERVICALGIA: ICD-10-CM

## 2021-06-03 PROCEDURE — 72050 X-RAY EXAM NECK SPINE 4/5VWS: CPT

## 2021-06-04 ENCOUNTER — HOSPITAL ENCOUNTER (OUTPATIENT)
Dept: RADIOLOGY | Facility: MEDICAL CENTER | Age: 81
End: 2021-06-04
Attending: NEUROLOGICAL SURGERY
Payer: MEDICARE

## 2021-06-04 DIAGNOSIS — M79.621 PAIN OF RIGHT UPPER ARM: ICD-10-CM

## 2021-06-04 DIAGNOSIS — M48.02 SPINAL STENOSIS IN CERVICAL REGION: ICD-10-CM

## 2021-06-04 PROCEDURE — A9576 INJ PROHANCE MULTIPACK: HCPCS

## 2021-06-04 PROCEDURE — 700117 HCHG RX CONTRAST REV CODE 255

## 2021-06-04 PROCEDURE — 72156 MRI NECK SPINE W/O & W/DYE: CPT | Mod: MH

## 2021-06-04 RX ADMIN — GADOTERIDOL 15 ML: 279.3 INJECTION, SOLUTION INTRAVENOUS at 10:37

## 2021-06-22 RX ORDER — AMOXICILLIN AND CLAVULANATE POTASSIUM 875; 125 MG/1; MG/1
1 TABLET, FILM COATED ORAL 2 TIMES DAILY
Qty: 28 TABLET | Refills: 0 | Status: SHIPPED | OUTPATIENT
Start: 2021-06-22 | End: 2021-09-20

## 2021-07-26 DIAGNOSIS — E85.4 CARDIAC AMYLOIDOSIS (HCC): ICD-10-CM

## 2021-07-26 DIAGNOSIS — N13.8 BENIGN PROSTATIC HYPERPLASIA WITH URINARY OBSTRUCTION: ICD-10-CM

## 2021-07-26 DIAGNOSIS — N40.1 BENIGN PROSTATIC HYPERPLASIA WITH NOCTURIA: ICD-10-CM

## 2021-07-26 DIAGNOSIS — E78.00 PURE HYPERCHOLESTEROLEMIA: ICD-10-CM

## 2021-07-26 DIAGNOSIS — D69.6 THROMBOCYTOPENIA (HCC): ICD-10-CM

## 2021-07-26 DIAGNOSIS — I43 CARDIAC AMYLOIDOSIS (HCC): ICD-10-CM

## 2021-07-26 DIAGNOSIS — E78.5 DYSLIPIDEMIA: ICD-10-CM

## 2021-07-26 DIAGNOSIS — E50.9 VITAMIN A DEFICIENCY: ICD-10-CM

## 2021-07-26 DIAGNOSIS — R35.1 BENIGN PROSTATIC HYPERPLASIA WITH NOCTURIA: ICD-10-CM

## 2021-07-26 DIAGNOSIS — I50.32 CHRONIC DIASTOLIC CONGESTIVE HEART FAILURE (HCC): ICD-10-CM

## 2021-07-26 DIAGNOSIS — N40.1 BENIGN PROSTATIC HYPERPLASIA WITH URINARY OBSTRUCTION: ICD-10-CM

## 2021-09-07 DIAGNOSIS — M54.50 ACUTE MIDLINE LOW BACK PAIN WITHOUT SCIATICA: ICD-10-CM

## 2021-09-09 ENCOUNTER — NON-PROVIDER VISIT (OUTPATIENT)
Dept: INTERNAL MEDICINE | Facility: IMAGING CENTER | Age: 81
End: 2021-09-09
Payer: MEDICARE

## 2021-09-09 ENCOUNTER — HOSPITAL ENCOUNTER (OUTPATIENT)
Facility: MEDICAL CENTER | Age: 81
End: 2021-09-09
Attending: INTERNAL MEDICINE
Payer: MEDICARE

## 2021-09-09 DIAGNOSIS — D69.6 THROMBOCYTOPENIA (HCC): ICD-10-CM

## 2021-09-09 DIAGNOSIS — N13.8 BENIGN PROSTATIC HYPERPLASIA WITH URINARY OBSTRUCTION: ICD-10-CM

## 2021-09-09 DIAGNOSIS — E78.5 DYSLIPIDEMIA: ICD-10-CM

## 2021-09-09 DIAGNOSIS — N40.1 BENIGN PROSTATIC HYPERPLASIA WITH URINARY OBSTRUCTION: ICD-10-CM

## 2021-09-09 DIAGNOSIS — I50.32 CHRONIC DIASTOLIC CONGESTIVE HEART FAILURE (HCC): ICD-10-CM

## 2021-09-09 PROCEDURE — 83704 LIPOPROTEIN BLD QUAN PART: CPT

## 2021-09-09 PROCEDURE — 83880 ASSAY OF NATRIURETIC PEPTIDE: CPT

## 2021-09-09 PROCEDURE — 84153 ASSAY OF PSA TOTAL: CPT

## 2021-09-09 PROCEDURE — 80061 LIPID PANEL: CPT

## 2021-09-09 PROCEDURE — 80053 COMPREHEN METABOLIC PANEL: CPT

## 2021-09-09 PROCEDURE — 85007 BL SMEAR W/DIFF WBC COUNT: CPT

## 2021-09-09 PROCEDURE — 81003 URINALYSIS AUTO W/O SCOPE: CPT

## 2021-09-09 PROCEDURE — 85027 COMPLETE CBC AUTOMATED: CPT

## 2021-09-10 LAB
ALBUMIN SERPL BCP-MCNC: 4.3 G/DL (ref 3.2–4.9)
ALBUMIN/GLOB SERPL: 1.7 G/DL
ALP SERPL-CCNC: 116 U/L (ref 30–99)
ALT SERPL-CCNC: 28 U/L (ref 2–50)
ANION GAP SERPL CALC-SCNC: 13 MMOL/L (ref 7–16)
ANISOCYTOSIS BLD QL SMEAR: ABNORMAL
APPEARANCE UR: CLEAR
AST SERPL-CCNC: 31 U/L (ref 12–45)
BASOPHILS # BLD AUTO: 0.9 % (ref 0–1.8)
BASOPHILS # BLD: 0.1 K/UL (ref 0–0.12)
BILIRUB SERPL-MCNC: 0.9 MG/DL (ref 0.1–1.5)
BILIRUB UR QL STRIP.AUTO: NEGATIVE
BUN SERPL-MCNC: 20 MG/DL (ref 8–22)
BURR CELLS BLD QL SMEAR: NORMAL
CALCIUM SERPL-MCNC: 9.9 MG/DL (ref 8.5–10.5)
CHLORIDE SERPL-SCNC: 101 MMOL/L (ref 96–112)
CO2 SERPL-SCNC: 26 MMOL/L (ref 20–33)
COLOR UR: YELLOW
CREAT SERPL-MCNC: 1.08 MG/DL (ref 0.5–1.4)
EOSINOPHIL # BLD AUTO: 0 K/UL (ref 0–0.51)
EOSINOPHIL NFR BLD: 0 % (ref 0–6.9)
ERYTHROCYTE [DISTWIDTH] IN BLOOD BY AUTOMATED COUNT: 53.4 FL (ref 35.9–50)
GLOBULIN SER CALC-MCNC: 2.6 G/DL (ref 1.9–3.5)
GLUCOSE SERPL-MCNC: 83 MG/DL (ref 65–99)
GLUCOSE UR STRIP.AUTO-MCNC: NEGATIVE MG/DL
HCT VFR BLD AUTO: 46.9 % (ref 42–52)
HGB BLD-MCNC: 15.1 G/DL (ref 14–18)
KETONES UR STRIP.AUTO-MCNC: NEGATIVE MG/DL
LEUKOCYTE ESTERASE UR QL STRIP.AUTO: NEGATIVE
LYMPHOCYTES # BLD AUTO: 6.94 K/UL (ref 1–4.8)
LYMPHOCYTES NFR BLD: 63.7 % (ref 22–41)
MACROCYTES BLD QL SMEAR: ABNORMAL
MANUAL DIFF BLD: NORMAL
MCH RBC QN AUTO: 34 PG (ref 27–33)
MCHC RBC AUTO-ENTMCNC: 32.2 G/DL (ref 33.7–35.3)
MCV RBC AUTO: 105.6 FL (ref 81.4–97.8)
MICRO URNS: NORMAL
MONOCYTES # BLD AUTO: 0.29 K/UL (ref 0–0.85)
MONOCYTES NFR BLD AUTO: 2.7 % (ref 0–13.4)
MORPHOLOGY BLD-IMP: NORMAL
NEUTROPHILS # BLD AUTO: 3.56 K/UL (ref 1.82–7.42)
NEUTROPHILS NFR BLD: 32.7 % (ref 44–72)
NITRITE UR QL STRIP.AUTO: NEGATIVE
NRBC # BLD AUTO: 0 K/UL
NRBC BLD-RTO: 0 /100 WBC
NT-PROBNP SERPL IA-MCNC: 379 PG/ML (ref 0–125)
PH UR STRIP.AUTO: 8 [PH] (ref 5–8)
PLATELET # BLD AUTO: 134 K/UL (ref 164–446)
PLATELET BLD QL SMEAR: NORMAL
PMV BLD AUTO: 13.7 FL (ref 9–12.9)
POIKILOCYTOSIS BLD QL SMEAR: NORMAL
POTASSIUM SERPL-SCNC: 4.9 MMOL/L (ref 3.6–5.5)
PROT SERPL-MCNC: 6.9 G/DL (ref 6–8.2)
PROT UR QL STRIP: NEGATIVE MG/DL
PSA SERPL-MCNC: 0.55 NG/ML (ref 0–4)
RBC # BLD AUTO: 4.44 M/UL (ref 4.7–6.1)
RBC BLD AUTO: PRESENT
RBC UR QL AUTO: NEGATIVE
SMUDGE CELLS BLD QL SMEAR: NORMAL
SODIUM SERPL-SCNC: 140 MMOL/L (ref 135–145)
SP GR UR STRIP.AUTO: 1.02
UROBILINOGEN UR STRIP.AUTO-MCNC: 0.2 MG/DL
WBC # BLD AUTO: 10.9 K/UL (ref 4.8–10.8)

## 2021-09-14 ENCOUNTER — HOSPITAL ENCOUNTER (OUTPATIENT)
Dept: RADIOLOGY | Facility: MEDICAL CENTER | Age: 81
End: 2021-09-14
Attending: INTERNAL MEDICINE
Payer: MEDICARE

## 2021-09-14 DIAGNOSIS — Z01.818 PREPROCEDURAL EXAMINATION: ICD-10-CM

## 2021-09-14 LAB
CHOLEST SERPL-MCNC: 140 MG/DL
HDL PARTICAL NO Q4363: 25.5 UMOL/L
HDL SIZE Q4361: 10.5 NM
HDLC SERPL-MCNC: 74 MG/DL (ref 40–59)
HLD.LARGE SERPL-SCNC: 15.5 UMOL/L
L VLDL PART NO Q4357: <1.5 NMOL/L
LDL SERPL QN: 21.2 NM
LDL SERPL-SCNC: 558 NMOL/L
LDL SMALL SERPL-SCNC: <165 NMOL/L
LDLC SERPL CALC-MCNC: 55 MG/DL
PATHOLOGY STUDY: ABNORMAL
TRIGL SERPL-MCNC: 56 MG/DL (ref 30–149)
VLDL SIZE Q4362: 47.5 NM

## 2021-09-14 PROCEDURE — 76700 US EXAM ABDOM COMPLETE: CPT

## 2021-09-16 ENCOUNTER — OFFICE VISIT (OUTPATIENT)
Dept: INTERNAL MEDICINE | Facility: IMAGING CENTER | Age: 81
End: 2021-09-16
Payer: MEDICARE

## 2021-09-16 VITALS
BODY MASS INDEX: 22.67 KG/M2 | TEMPERATURE: 97 F | DIASTOLIC BLOOD PRESSURE: 70 MMHG | OXYGEN SATURATION: 99 % | WEIGHT: 158 LBS | SYSTOLIC BLOOD PRESSURE: 124 MMHG | HEART RATE: 63 BPM | RESPIRATION RATE: 12 BRPM

## 2021-09-16 DIAGNOSIS — G89.29 CHRONIC LOW BACK PAIN WITHOUT SCIATICA, UNSPECIFIED BACK PAIN LATERALITY: ICD-10-CM

## 2021-09-16 DIAGNOSIS — I43 CARDIAC AMYLOIDOSIS (HCC): ICD-10-CM

## 2021-09-16 DIAGNOSIS — E85.4 CARDIAC AMYLOIDOSIS (HCC): ICD-10-CM

## 2021-09-16 DIAGNOSIS — D72.820 LYMPHOCYTOSIS: ICD-10-CM

## 2021-09-16 DIAGNOSIS — M54.50 CHRONIC LOW BACK PAIN WITHOUT SCIATICA, UNSPECIFIED BACK PAIN LATERALITY: ICD-10-CM

## 2021-09-16 DIAGNOSIS — R79.89 ABNORMAL CBC: ICD-10-CM

## 2021-09-16 DIAGNOSIS — N21.0 BLADDER STONE: ICD-10-CM

## 2021-09-16 PROCEDURE — 99214 OFFICE O/P EST MOD 30 MIN: CPT | Performed by: INTERNAL MEDICINE

## 2021-09-16 ASSESSMENT — FIBROSIS 4 INDEX: FIB4 SCORE: 3.5

## 2021-09-16 NOTE — PROGRESS NOTES
Chief Complaint   Patient presents with   • Cardiomyopathy (Non-ischemic)   • Back Pain       HISTORY OF THE PRESENT ILLNESS: Patient is a 81 y.o. male.     Patient comes in for routine follow-up.  Since her last visit he underwent right total knee arthroplasty.  He has some swelling in the knee but is overall very happy with the results.  He is hiking and exercising without significant discomfort.  Generally, he has been feeling well.    We discussed his back issues.  He is having ongoing back pain.  He is going to follow-up with physical therapy.  He also received an epidural.  He is uncertain if it was helpful.    Diagnosed with bladder stone.  He is scheduled for removal at the end of October.    He is questions regarding the COVID-19 booster vaccinations.  He received 2 doses of the Pfizer vaccine.  His last was in February 2021.    He has a history of amyloid cardiomyopathy.  He denies chest pain, dyspnea or edema.  He remains on low-dose diuretic.  He also remains on 2 experimental medications -Vyndamax and .  He has been receiving treatment through [facility] in [place].    We discussed his recent laboratory.  Overall his labs are excellent.  His CBC is abnormal with leukocytosis and lymphoid shift.  He also has atypical red blood cells including macrocytes, microcytes, echinocytosis, smudge cells and anisocytosis.  This was confirmed by peripheral smear.  He denies any night sweats, weight loss, abdominal pain or other.      Allergies: Avelox [moxifloxacin hcl], Demerol, Azithromycin, Cefdinir, Ceftin [cefuroxime axetil], Oxycodone, and Scopolamine    Current Outpatient Medications Ordered in Epic   Medication Sig Dispense Refill   • torsemide (DEMADEX) 5 MG Tab Take 1 tablet by mouth every day. 90 tablet 3   • atorvastatin (LIPITOR) 40 MG Tab Take 1 tablet by mouth at bedtime. 90 tablet 3   • VYNDAMAX 61 MG Cap TAKE 1 CAPSULE BY MOUTH  EVERY DAY 90 capsule 3   • vitamin A 3 mg (67903 units) capsule Take  10,000 Units by mouth every day.     • celecoxib (CELEBREX) 200 MG Cap Take 1 Cap by mouth 1 time daily as needed. 90 Cap 0   • Biotin 1000 MCG Tab Take 0.5 Tabs by mouth every day. 90 Tab 0   • Guaifenesin 400 MG Tab Take 1 Tab by mouth as needed. 90 Tab 0   • vitamin D (CHOLECALCIFEROL) 1000 Unit (25 mcg) Tab Take 1 Tab by mouth every day. 90 Tab 0   • Glucosamine-Chondroitin 750-600 MG Tab Take 1 Tab by mouth 2 Times a Day. 60 Tab 0   • Probiotic Product (ALIGN) Cap Take 1 Cap by mouth every day. 90 Cap 0   • triamcinolone (NASACORT) 55 MCG/ACT nasal inhaler Spray 2 Sprays in nose 1 time daily as needed. 1 Bottle 0   • VITAMIN B COMPLEX-C PO Take  by mouth.     • Multiple Vitamins-Minerals (CENTRUM SILVER PO) Take  by mouth.     • aspirin EC (ECOTRIN) 81 MG TBEC Take 81 mg by mouth every day.     • Omega-3 Fatty Acids (SALMON OIL PO) Take 2 Caps by mouth every day.     • coenzyme Q-10 30 MG capsule Take 30 mg by mouth every day. Twice a day     • amoxicillin-clavulanate (AUGMENTIN) 875-125 MG Tab Take 1 tablet by mouth 2 times a day. Take with food. 28 tablet 0     No current Epic-ordered facility-administered medications on file.       Past medical history, social history and family history were reviewed from chart today    Review of systems: Per HPI.    Denies headache, chest pain, fever, chills, diarrhea, constipation, abdominal pain, palpitations, depression   All others negative.     Exam: /70 (BP Location: Left arm, Patient Position: Sitting, BP Cuff Size: Adult)   Pulse 63   Temp 36.1 °C (97 °F) (Temporal)   Resp 12   Wt 71.7 kg (158 lb)   SpO2 99%   General: Well-appearing. Well-developed. No signs of distress.  HEENT: Grossly normal. Oral cavity is pink and moist.  Neck: Supple without JVD or bruit.  Pulmonary: Clear with good breath sounds. Normal effort.  Cardiovascular: Regular. Carotid and radial pulses are intact. No edema  Abdomen: Soft, nontender, nondistended. Spleen and liver are not  enlarged.  Neurologic: Cranial nerves II through XII are grossly normal, alert and oriented x3      Diagnosis:  1. Lymphocytosis  CBC WITH DIFFERENTIAL    Sed Rate    SPEP W/REFLEX TO AMY, A, G, M    BETA-2-MICROGLOBULIN    JAK2 GENE MUT RFLX CALR RFLX MPL    LDH    REFERRAL TO HEMATOLOGY ONCOLOGY    VITAMIN B12    IRON/TOTAL IRON BIND    FERRITIN   2. Abnormal CBC  CBC WITH DIFFERENTIAL    Sed Rate    SPEP W/REFLEX TO AMY, A, G, M    BETA-2-MICROGLOBULIN    JAK2 GENE MUT RFLX CALR RFLX MPL    LDH    REFERRAL TO HEMATOLOGY ONCOLOGY    VITAMIN B12    IRON/TOTAL IRON BIND    FERRITIN   3. Cardiac amyloidosis (HCC)  REFERRAL TO HEMATOLOGY ONCOLOGY   4. Bladder stone     5. Chronic low back pain without sciatica, unspecified back pain laterality         Overall I think his health issues are stable.  I recommended no change in medication at this time.  Recommended further evaluation of the abnormal CBC.  This is ongoing from January of this year.  I would like him to see hematology.  This may be a side effect of his treatment for thyroid or possibly amyloid related MDS?

## 2021-09-20 ENCOUNTER — APPOINTMENT (OUTPATIENT)
Dept: RADIOLOGY | Facility: MEDICAL CENTER | Age: 81
End: 2021-09-20
Attending: INTERNAL MEDICINE
Payer: MEDICARE

## 2021-09-20 DIAGNOSIS — K86.2 PANCREATIC CYST: ICD-10-CM

## 2021-09-20 PROBLEM — M53.3 SACROILIAC JOINT PAIN: Status: ACTIVE | Noted: 2021-09-01

## 2021-09-20 PROCEDURE — A9576 INJ PROHANCE MULTIPACK: HCPCS | Performed by: PSYCHIATRY & NEUROLOGY

## 2021-09-20 PROCEDURE — 700117 HCHG RX CONTRAST REV CODE 255: Performed by: PSYCHIATRY & NEUROLOGY

## 2021-09-20 PROCEDURE — 74183 MRI ABD W/O CNTR FLWD CNTR: CPT | Mod: MH

## 2021-09-20 RX ORDER — PANTOPRAZOLE SODIUM 40 MG/1
TABLET, DELAYED RELEASE ORAL
COMMUNITY
Start: 2021-07-16 | End: 2021-11-09

## 2021-09-20 RX ORDER — ASPIRIN 81 MG/1
81 TABLET ORAL
COMMUNITY

## 2021-09-20 RX ORDER — ALUMINUM ZIRCONIUM OCTACHLOROHYDREX GLY 16 G/100G
GEL TOPICAL
COMMUNITY
End: 2022-02-10

## 2021-09-20 RX ADMIN — GADOTERIDOL 15 ML: 279.3 INJECTION, SOLUTION INTRAVENOUS at 14:25

## 2021-09-21 ENCOUNTER — HOSPITAL ENCOUNTER (OUTPATIENT)
Dept: LAB | Facility: MEDICAL CENTER | Age: 81
End: 2021-09-21
Attending: INTERNAL MEDICINE
Payer: MEDICARE

## 2021-09-21 DIAGNOSIS — D72.820 LYMPHOCYTOSIS: ICD-10-CM

## 2021-09-21 DIAGNOSIS — R79.89 ABNORMAL CBC: ICD-10-CM

## 2021-09-21 DIAGNOSIS — E78.5 DYSLIPIDEMIA: ICD-10-CM

## 2021-09-21 LAB
BASOPHILS # BLD AUTO: 0.8 % (ref 0–1.8)
BASOPHILS # BLD: 0.08 K/UL (ref 0–0.12)
EOSINOPHIL # BLD AUTO: 0.59 K/UL (ref 0–0.51)
EOSINOPHIL NFR BLD: 5.8 % (ref 0–6.9)
ERYTHROCYTE [DISTWIDTH] IN BLOOD BY AUTOMATED COUNT: 50.2 FL (ref 35.9–50)
ERYTHROCYTE [SEDIMENTATION RATE] IN BLOOD BY WESTERGREN METHOD: 5 MM/HOUR (ref 0–20)
FERRITIN SERPL-MCNC: 67.7 NG/ML (ref 22–322)
HCT VFR BLD AUTO: 45.8 % (ref 42–52)
HGB BLD-MCNC: 15.4 G/DL (ref 14–18)
IMM GRANULOCYTES # BLD AUTO: 0.02 K/UL (ref 0–0.11)
IMM GRANULOCYTES NFR BLD AUTO: 0.2 % (ref 0–0.9)
IRON SATN MFR SERPL: 13 % (ref 15–55)
IRON SERPL-MCNC: 38 UG/DL (ref 50–180)
LDH SERPL L TO P-CCNC: 294 U/L (ref 107–266)
LYMPHOCYTES # BLD AUTO: 4.72 K/UL (ref 1–4.8)
LYMPHOCYTES NFR BLD: 46.2 % (ref 22–41)
MCH RBC QN AUTO: 33.8 PG (ref 27–33)
MCHC RBC AUTO-ENTMCNC: 33.6 G/DL (ref 33.7–35.3)
MCV RBC AUTO: 100.4 FL (ref 81.4–97.8)
MONOCYTES # BLD AUTO: 0.7 K/UL (ref 0–0.85)
MONOCYTES NFR BLD AUTO: 6.9 % (ref 0–13.4)
NEUTROPHILS # BLD AUTO: 4.1 K/UL (ref 1.82–7.42)
NEUTROPHILS NFR BLD: 40.1 % (ref 44–72)
NRBC # BLD AUTO: 0 K/UL
NRBC BLD-RTO: 0 /100 WBC
PLATELET # BLD AUTO: 105 K/UL (ref 164–446)
PMV BLD AUTO: 13.9 FL (ref 9–12.9)
RBC # BLD AUTO: 4.56 M/UL (ref 4.7–6.1)
TIBC SERPL-MCNC: 297 UG/DL (ref 250–450)
UIBC SERPL-MCNC: 259 UG/DL (ref 110–370)
VIT B12 SERPL-MCNC: 692 PG/ML (ref 211–911)
WBC # BLD AUTO: 10.2 K/UL (ref 4.8–10.8)

## 2021-09-21 PROCEDURE — 85025 COMPLETE CBC W/AUTO DIFF WBC: CPT

## 2021-09-21 PROCEDURE — 83615 LACTATE (LD) (LDH) ENZYME: CPT

## 2021-09-21 PROCEDURE — 83550 IRON BINDING TEST: CPT

## 2021-09-21 PROCEDURE — 81270 JAK2 GENE: CPT | Mod: GA

## 2021-09-21 PROCEDURE — 84165 PROTEIN E-PHORESIS SERUM: CPT

## 2021-09-21 PROCEDURE — 83704 LIPOPROTEIN BLD QUAN PART: CPT

## 2021-09-21 PROCEDURE — 82232 ASSAY OF BETA-2 PROTEIN: CPT

## 2021-09-21 PROCEDURE — 85652 RBC SED RATE AUTOMATED: CPT

## 2021-09-21 PROCEDURE — 36415 COLL VENOUS BLD VENIPUNCTURE: CPT

## 2021-09-21 PROCEDURE — 81219 CALR GENE COM VARIANTS: CPT

## 2021-09-21 PROCEDURE — 82728 ASSAY OF FERRITIN: CPT

## 2021-09-21 PROCEDURE — 81338 MPL GENE COMMON VARIANTS: CPT

## 2021-09-21 PROCEDURE — 82607 VITAMIN B-12: CPT

## 2021-09-21 PROCEDURE — 80061 LIPID PANEL: CPT | Mod: XU

## 2021-09-21 PROCEDURE — 84155 ASSAY OF PROTEIN SERUM: CPT

## 2021-09-21 PROCEDURE — 83540 ASSAY OF IRON: CPT

## 2021-09-22 LAB — B2 MICROGLOB SERPL-MCNC: 2.4 MG/L (ref 1.1–2.4)

## 2021-09-24 LAB
ALBUMIN SERPL ELPH-MCNC: 4.06 G/DL (ref 3.75–5.01)
ALPHA1 GLOB SERPL ELPH-MCNC: 0.3 G/DL (ref 0.19–0.46)
ALPHA2 GLOB SERPL ELPH-MCNC: 0.59 G/DL (ref 0.48–1.05)
B-GLOBULIN SERPL ELPH-MCNC: 0.64 G/DL (ref 0.48–1.1)
CHOLEST SERPL-MCNC: 133 MG/DL
FASTING STATUS PATIENT QL REPORTED: NORMAL
GAMMA GLOB SERPL ELPH-MCNC: 1.21 G/DL (ref 0.62–1.51)
HDL PARTICAL NO Q4363: 25.6 UMOL/L
HDL SIZE Q4361: 10.3 NM
HDLC SERPL-MCNC: 73 MG/DL (ref 40–59)
HLD.LARGE SERPL-SCNC: 13.8 UMOL/L
INTERPRETATION SERPL IFE-IMP: NORMAL
L VLDL PART NO Q4357: 1.5 NMOL/L
LDL SERPL QN: 21.3 NM
LDL SERPL-SCNC: 458 NMOL/L
LDL SMALL SERPL-SCNC: <165 NMOL/L
LDLC SERPL CALC-MCNC: 52 MG/DL
MONOCLON BAND OBS SERPL: NORMAL
PATHOLOGY STUDY: ABNORMAL
PATHOLOGY STUDY: NORMAL
PROT SERPL-MCNC: 6.8 G/DL (ref 6.3–8.2)
TRIGL SERPL-MCNC: 42 MG/DL (ref 30–149)
VLDL SIZE Q4362: 49.6 NM

## 2021-09-30 LAB — GENE XXX MUT ANL BLD/T: NOT DETECTED

## 2021-10-02 LAB
JAK2 P.V617F BLD/T QL: NOT DETECTED
SPECIMEN SOURCE: NORMAL

## 2021-10-08 LAB — MPL P.W515 BLD/T QL: NOT DETECTED

## 2021-10-21 ENCOUNTER — NON-PROVIDER VISIT (OUTPATIENT)
Dept: INTERNAL MEDICINE | Facility: IMAGING CENTER | Age: 81
End: 2021-10-21
Payer: MEDICARE

## 2021-10-21 ENCOUNTER — HOSPITAL ENCOUNTER (OUTPATIENT)
Facility: MEDICAL CENTER | Age: 81
End: 2021-10-21
Attending: UROLOGY
Payer: MEDICARE

## 2021-10-21 PROCEDURE — 87086 URINE CULTURE/COLONY COUNT: CPT

## 2021-10-23 LAB
BACTERIA UR CULT: NORMAL
SIGNIFICANT IND 70042: NORMAL
SITE SITE: NORMAL
SOURCE SOURCE: NORMAL

## 2021-10-25 ENCOUNTER — APPOINTMENT (RX ONLY)
Dept: URBAN - METROPOLITAN AREA CLINIC 4 | Facility: CLINIC | Age: 81
Setting detail: DERMATOLOGY
End: 2021-10-25

## 2021-11-02 ENCOUNTER — OFFICE VISIT (OUTPATIENT)
Dept: INTERNAL MEDICINE | Facility: IMAGING CENTER | Age: 81
End: 2021-11-02
Payer: MEDICARE

## 2021-11-02 VITALS
SYSTOLIC BLOOD PRESSURE: 128 MMHG | OXYGEN SATURATION: 94 % | HEART RATE: 65 BPM | TEMPERATURE: 97.5 F | DIASTOLIC BLOOD PRESSURE: 78 MMHG | RESPIRATION RATE: 12 BRPM

## 2021-11-02 DIAGNOSIS — E85.4 CARDIAC AMYLOIDOSIS (HCC): ICD-10-CM

## 2021-11-02 DIAGNOSIS — D72.820 LYMPHOCYTOSIS: ICD-10-CM

## 2021-11-02 DIAGNOSIS — R21 RASH: ICD-10-CM

## 2021-11-02 DIAGNOSIS — I43 CARDIAC AMYLOIDOSIS (HCC): ICD-10-CM

## 2021-11-02 DIAGNOSIS — R79.89 ABNORMAL CBC: ICD-10-CM

## 2021-11-02 DIAGNOSIS — N21.0 BLADDER STONE: ICD-10-CM

## 2021-11-02 PROCEDURE — 99214 OFFICE O/P EST MOD 30 MIN: CPT | Performed by: INTERNAL MEDICINE

## 2021-11-02 NOTE — PROGRESS NOTES
Chief Complaint   Patient presents with   • Rash   • Lab Results     Abnormal CBC.  Patient saw hematologist at [facility]       HISTORY OF THE PRESENT ILLNESS: Patient is a 81 y.o. male.     Patient comes in for evaluation of rash on right leg.  It has been present for more than a week.  He tried over-the-counter topical hydrocortisone which resolved the symptoms in 3 days.    We also discussed his recent evaluations at [facility] in [place] as well as [place].    He was seen for a bladder stone by urology.  He reports that a bladder stone resolved.  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  They recommended removal of his other 2 lobes of the prostate.  This was primarily because he is experiencing ongoing nocturia.  At this time he has decided to defer on additional intervention.    He had an evaluation at [place] and [place] for abnormalities in his CBC.  Initially he had multiple abnormal forms of RBC.  Subsequent testing was normal.  He declined testing at [facility].  He had elevations in his kappa chains.  He also was noted to have a higher than normal B lymphocyte population with concern for chronic lymphocytic leukemia.  His subsequent follow-up at [facility] in [place].  The hematologist there recommended surveillance only.  No bone marrow biopsy recommended.    Allergies: Avelox [moxifloxacin hcl], Demerol, Azithromycin, Cefdinir, Ceftin [cefuroxime axetil], Oxycodone, and Scopolamine    Current Outpatient Medications Ordered in Epic   Medication Sig Dispense Refill   • Probiotic Product (ALIGN) 4 MG Cap Align     • pantoprazole (PROTONIX) 40 MG Tablet Delayed Response TAKE 1 TABLET BY MOUTH DAILY FOR 6 WEEKS WHILE ON ASPIRIN     • aspirin 81 MG EC tablet Take 81 mg by mouth.     • torsemide (DEMADEX) 5 MG Tab Take 1 tablet by mouth every day. 90 tablet 3   • atorvastatin (LIPITOR) 40 MG Tab Take 1 tablet by mouth at bedtime. 90 tablet 3   • VYNDAMAX 61 MG Cap TAKE 1 CAPSULE BY MOUTH  EVERY DAY 90 capsule  3   • vitamin A 3 mg (39969 units) capsule Take 10,000 Units by mouth every day.     • Biotin 1000 MCG Tab Take 0.5 Tabs by mouth every day. 90 Tab 0   • vitamin D (CHOLECALCIFEROL) 1000 Unit (25 mcg) Tab Take 1 Tab by mouth every day. 90 Tab 0   • Glucosamine-Chondroitin 750-600 MG Tab Take 1 Tab by mouth 2 Times a Day. 60 Tab 0   • Probiotic Product (ALIGN) Cap Take 1 Cap by mouth every day. 90 Cap 0   • VITAMIN B COMPLEX-C PO Take  by mouth.     • Multiple Vitamins-Minerals (CENTRUM SILVER PO) Take  by mouth.     • aspirin EC (ECOTRIN) 81 MG TBEC Take 81 mg by mouth every day.     • Omega-3 Fatty Acids (SALMON OIL PO) Take 2 Caps by mouth every day.     • coenzyme Q-10 30 MG capsule Take 30 mg by mouth every day. Twice a day       No current [facility]-ordered facility-administered medications on file.       Past medical history, social history and family history were reviewed from chart today    Review of systems: Per HPI.    Denies headache, chest pain, fever, chills, diarrhea, constipation, abdominal pain, palpitations, depression   All others negative.     Exam: /78 (BP Location: Left arm, Patient Position: Sitting, BP Cuff Size: Adult)   Pulse 65   Temp 36.4 °C (97.5 °F) (Temporal)   Resp 12   SpO2 94%   General: Well-appearing. Well-developed. No signs of distress.  HEENT: Grossly normal. Oral cavity is pink and moist.  Neck: Supple without JVD or bruit.  Pulmonary: Clear with good breath sounds. Normal effort.  Cardiovascular: Regular. Carotid and radial pulses are intact.  Abdomen: Soft, nontender, nondistended. Spleen and liver are not enlarged.  Neurologic: Cranial nerves II through XII are grossly normal, alert and oriented x3      Diagnosis:  1. Rash     2. Lymphocytosis     3. Abnormal CBC     4. Cardiac amyloidosis (HCC)     5. Bladder stone         Overall patient is doing well.  Rash resolved.  He has follow-up with hematology in 3 months.  Cardiac issues are stable.  He remains on  experimental medication.  Bladder stone resolved

## 2021-11-09 ENCOUNTER — OFFICE VISIT (OUTPATIENT)
Dept: CARDIOLOGY | Facility: MEDICAL CENTER | Age: 81
End: 2021-11-09
Payer: MEDICARE

## 2021-11-09 VITALS
HEIGHT: 69 IN | OXYGEN SATURATION: 99 % | WEIGHT: 158 LBS | DIASTOLIC BLOOD PRESSURE: 82 MMHG | HEART RATE: 60 BPM | SYSTOLIC BLOOD PRESSURE: 128 MMHG | RESPIRATION RATE: 14 BRPM | BODY MASS INDEX: 23.4 KG/M2

## 2021-11-09 DIAGNOSIS — I47.19 AVNRT (AV NODAL RE-ENTRY TACHYCARDIA) (HCC): ICD-10-CM

## 2021-11-09 DIAGNOSIS — E85.4 CARDIAC AMYLOIDOSIS (HCC): ICD-10-CM

## 2021-11-09 DIAGNOSIS — I43 CARDIAC AMYLOIDOSIS (HCC): ICD-10-CM

## 2021-11-09 DIAGNOSIS — I47.10 SVT (SUPRAVENTRICULAR TACHYCARDIA) (HCC): ICD-10-CM

## 2021-11-09 DIAGNOSIS — D69.6 THROMBOCYTOPENIA (HCC): ICD-10-CM

## 2021-11-09 DIAGNOSIS — R93.1 AGATSTON CAC SCORE, >400: ICD-10-CM

## 2021-11-09 DIAGNOSIS — E78.5 DYSLIPIDEMIA: ICD-10-CM

## 2021-11-09 DIAGNOSIS — I50.32 CHRONIC DIASTOLIC CONGESTIVE HEART FAILURE (HCC): ICD-10-CM

## 2021-11-09 DIAGNOSIS — E78.00 PURE HYPERCHOLESTEROLEMIA: ICD-10-CM

## 2021-11-09 PROCEDURE — 99214 OFFICE O/P EST MOD 30 MIN: CPT | Performed by: INTERNAL MEDICINE

## 2021-11-09 RX ORDER — PHENAZOPYRIDINE HYDROCHLORIDE 100 MG/1
1 TABLET, FILM COATED ORAL 3 TIMES DAILY
COMMUNITY
Start: 2021-10-28 | End: 2022-02-10

## 2021-11-09 ASSESSMENT — FIBROSIS 4 INDEX: FIB4 SCORE: 4.46

## 2021-11-09 NOTE — PROGRESS NOTES
"    Cardiology Follow-up Consultation Note    Date of note:   11/9/2021  Primary Care Provider: Azar Cavazos M.D.  Referring Provider: Jose Holman M.D.     Patient Name: Andre Martinez   YOB: 1940  MRN:              [id number]    Chief Complaint: Cardiac Amyloidosis.     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 Seen by Dr. Pierson at [place] in [place], and diagnosed with TTR amyloid with fat pad biopsy confirmation.   I have included her summary below:    \"The patient is a delightful 78-year-old who was diagnosed with transthyretin amyloidosis based on a positive PYP scan done at [facility] in January of 2019. He has mildly abnormal serum free light chains, currently kappa 2.83, lambda 1.35 mg/dL with a ratio of 2.10, but there was no monoclonal protein in the serum or in the urine. Of note, he had laser enucleation of the prostate performed 11/13/2018 at [facility], and we will request that that tissue be reviewed for amyloid. He also had a fat aspirate done yesterday. Subsequent to our visit, fat aspirate results returned and are positive for amyloid.     He came to attention due to a preoperative evaluation for prostate surgery. He had a CT calcium score done due to a family history of possible coronary artery disease (sudden death in his father at age 38) in 2013 with a score in the 600s; he was asymptomatic at that time. October 2017, had a stress echocardiogram and underwent almost 12 minutes without any evidence of ischemia. It was noted that he had " increased left ventricular wall thicknesses at that time. A repeat coronary calcium score November 28 showed left main 0, , left circumflex 165, right coronary artery 2093 with a total score of 3091. His cardiologist at that time recommended a PET perfusion study which was done 12/28/2018. The ejection fraction at rest was 30% and increased to 47% (however, his echocardiogram clearly shows normal resting ejection fraction). Perfusion was normal. He did have an episode that night while he was exercising where he developed chest discomfort and rapid palpitations. Emergency room evaluation showed junctional tachycardia which resolved spontaneously. The troponin was slightly elevated with a troponin I of 0.07 ng/L (normal less than 0.04). It was felt that this episode of tachycardia may have been related to the earlier stress test.     He saw Dr. Schreiber in January 2019 for preoperative evaluation prior to surgical intervention for urethral stricture. A transthoracic echocardiogram was performed primarily to reassess the left ventricular ejection fraction which was felt to possibly have been incorrect on the PET scan. The echocardiogram was interpreted as consistent with possible cardiac amyloidosis, left ventricular size was normal, ejection fraction calculated at 54%, but to my review is in the range of 60% to 65%. There are no regional wall motion abnormalities. Moderately increased left ventricular wall thicknesses, the basal septum and posterior wall measured 16/14 mm. The left ventricular mass index was elevated at 158 g/m2. Strain was abnormal with an apical sparing pattern and an overall value of -14%, mildly thickened aortic valve with mild regurgitation, mild dilatation of the sinuses of Valsalva (37 mm). The diastolic filling pattern suggested elevated filling pressure. Left atrial volume was moderately enlarged at 42 mL/m2. The right atrium was described as severely enlarged, to my review I would  "consider it to be moderately enlarged. Right ventricular size is normal with definite increase in right ventricular wall thickness. Right ventricular systolic function appeared normal, although tissue Doppler was slightly low. The inferior vena cava was of normal size with normal inspiratory collapse. The stroke volume index was 35 mL/m2. Cardiac index was reduced at 1.81, but the heart rate was 52 beats per minute.     He had a PYP scan performed on 2019. I reviewed the images and agree with the report. The H/CL ratio which is 1.8, consistent with TTR amyloid. The SPECT images were not available to me, but gave a myocardial score of 2 which is consistent with moderate uptake.     He has never had any significant symptoms. He exercises 1-2 hours per day with aerobic activity with no limiting dyspnea. No orthopnea, paroxysmal nocturnal dyspnea, no edema. No syncope. No palpitations other than the event noted after his PET stress test. No chest pain. As mentioned, his father  suddenly at age 38 of a presumed cardiac event. There is no known family history otherwise of unexplained heart failure or of peripheral neuropathy.     He has had 2 trigger finger releases on the left within the past 10 years. No known carpal tunnel syndrome and no history of spinal stenosis. He does have a history of multiple orthopedic issues as outlined below. This includes the unexpected finding of a right biceps tendon rupture at the time of rotator cuff surgery several years ago.   \"    Continues to exercise aerobically a couple hours a day without symptoms.       At our visit, 2019:  In terms of diastolic heart failure, now off torsemide due to urinary frequency.  Weight up 4-5 pounds. Exercising without difficulty, ran up three flights of stairs today no problem.     In terms of TTR amyloid, prostate biopsy and genetic testing confirmed wild type aTTR amyloid.  He was recently seen at [place] by Dr. Herrera 2019. Prior " "to that visit, he was started on tafamidis by [facility]. Unfortunately unable to qualify for further experimental trials due to thrombocytopenia based on a recent evaluation at [place] as well.     In terms of hypertension, mostly not checking BP at home. Well controlled. Sometimes in the 130s.     At our visit, 3/10/2020:  In terms of CHF, taking torsemide 5mg PO daily. Took 1 week snow-shoeing in the [place] without symptoms.     For his amyloid, he remains on tafamidis. He is followed by Dr. Herrera at [place] and is being consider for other studies as well if his NT-proBNP becomes elevated.     At our visit,  11/17/2020:  Seen At [facility] in September, starting a trial of Vutrisiran, (an RNA silencer drug). Helios-B study.     At our visit, 5/19/2021:  Walking frequently without symptoms, 5-6 miles a day.     Remains in Helios-B study.     Interim Events:  In terms of SVT, no further palpitations or fast heart beats.     In terms of hypertension, well controlled.     In terms of heart failure, stable symptoms, no LE swelling or orthopnea.     Did have a \"pause\" for a couple minutes in the middle of hiking where he had no energy. Stopped and this resolved. Did not feel it was his heart racing.     Labs reviewed from [place], NT proBNP is 406, stable.     Review of Systems   Allergic/Immunologic: Positive for environmental allergies.     Constitution: Negative for chills, fever and night sweats.   HENT: Negative for nosebleeds.    Eyes: Negative for vision loss in left eye and vision loss in right eye.   Respiratory: Negative for hemoptysis.    Gastrointestinal: Negative for hematemesis, hematochezia and melena.   Genitourinary: Negative for hematuria.   Neurological: Negative for focal weakness, numbness and paresthesias.       Past Medical History:   Diagnosis Date   • Amyloidosis (HCC)     TTR, type pending.    • BPH (benign prostatic hyperplasia)     s/p laser encleation of the prostate   • Cardiac " amyloidosis (HCC)    • Chickenpox    • Colon polyps    • Coronary atherosclerosis of native coronary artery 05/15/2013    Coronary calcium score in the 3000s, negative PET scan and no symptoms so treated medically.    • Coronary heart disease    • Dyslipidemia 08/23/2016   • H/O urethral stricture     s/p surgical repair 1/15/2019, [place]   • Kidney cysts    • Meniscus tear    • Pancreas cyst    • Scarlet fever    • Sinusitis, chronic    • Spinal stenosis, lumbar    • SVT (supraventricular tachycardia) ([facility]) 10/23/2018   • Thrombocytopenia ([facility]) 7/24/2017       Past Surgical History:   Procedure Laterality Date   • KNEE RECONSTRUCTION  2012    left, partial. multiple previous surgeries.    • ROTATOR CUFF REPAIR  2010    c/b right biceps tendon rupture, presumably repaired.    • ANKLE FUSION Left    • ARTHROSCOPY, KNEE     • HEMORRHOIDECTOMY     • SINUSCOPE     • SINUSOTOMIES      Dr. Guerrier, total of 4 surgeries   • TRIGGER FINGER RELEASE      x 2         Current Outpatient Medications   Medication Sig Dispense Refill   • Probiotic Product (ALIGN) 4 MG Cap Align     • aspirin 81 MG EC tablet Take 81 mg by mouth.     • torsemide (DEMADEX) 5 MG Tab Take 1 tablet by mouth every day. 90 tablet 3   • atorvastatin (LIPITOR) 40 MG Tab Take 1 tablet by mouth at bedtime. 90 tablet 3   • VYNDAMAX 61 MG Cap TAKE 1 CAPSULE BY MOUTH  EVERY DAY 90 capsule 3   • vitamin A 3 mg (75458 units) capsule Take 10,000 Units by mouth every day.     • Biotin 1000 MCG Tab Take 0.5 Tabs by mouth every day. 90 Tab 0   • vitamin D (CHOLECALCIFEROL) 1000 Unit (25 mcg) Tab Take 1 Tab by mouth every day. 90 Tab 0   • Glucosamine-Chondroitin 750-600 MG Tab Take 1 Tab by mouth 2 Times a Day. 60 Tab 0   • Probiotic Product (ALIGN) Cap Take 1 Cap by mouth every day. 90 Cap 0   • VITAMIN B COMPLEX-C PO Take  by mouth.     • Multiple Vitamins-Minerals (CENTRUM SILVER PO) Take  by mouth.     • aspirin EC (ECOTRIN) 81 MG TBEC Take 81 mg by mouth every day.      • Omega-3 Fatty Acids (SALMON OIL PO) Take 2 Caps by mouth every day.     • coenzyme Q-10 30 MG capsule Take 30 mg by mouth every day. Twice a day     • pantoprazole (PROTONIX) 40 MG Tablet Delayed Response TAKE 1 TABLET BY MOUTH DAILY FOR 6 WEEKS WHILE ON ASPIRIN (Patient not taking: Reported on 2021)       No current facility-administered medications for this visit.         Allergies   Allergen Reactions   • Avelox [Moxifloxacin Hcl] Rash     Chest, face, genitals   • Demerol Unspecified     Vasovagal reaction (drop in BP & pulse)   • Azithromycin Itching   • Cefdinir Rash     Rash chest, under arm and scalp     • Ceftin [Cefuroxime Axetil] Rash     Chest, under arm & scalp   • Oxycodone Itching     Full body itching   • Scopolamine Unspecified     disorientation         Family History   Problem Relation Age of Onset   • Heart Attack Father 38        doctor in 1946 diagnosed him with MI at home,  before going to the hospital   • Psychiatric Illness Mother         Alzheimers   • Hypertension Brother    • Stroke Brother    • Seizures Brother    • Sleep Apnea Neg Hx          Social History     Socioeconomic History   • Marital status:      Spouse name: Not on file   • Number of children: Not on file   • Years of education: Not on file   • Highest education level: Not on file   Occupational History   • Not on file   Tobacco Use   • Smoking status: Never Smoker   • Smokeless tobacco: Never Used   Vaping Use   • Vaping Use: Never used   Substance and Sexual Activity   • Alcohol use: Yes     Comment: Social    • Drug use: No   • Sexual activity: Not on file   Other Topics Concern   • Not on file   Social History Narrative    Retired from real estate      Social Determinants of Health     Financial Resource Strain:    • Difficulty of Paying Living Expenses: Not on file   Food Insecurity:    • Worried About Running Out of Food in the Last Year: Not on file   • Ran Out of Food in the Last Year: Not on  "file   Transportation Needs:    • Lack of Transportation (Medical): Not on file   • Lack of Transportation (Non-Medical): Not on file   Physical Activity:    • Days of Exercise per Week: Not on file   • Minutes of Exercise per Session: Not on file   Stress:    • Feeling of Stress : Not on file   Social Connections:    • Frequency of Communication with Friends and Family: Not on file   • Frequency of Social Gatherings with Friends and Family: Not on file   • Attends Latter-day Services: Not on file   • Active Member of Clubs or Organizations: Not on file   • Attends Club or Organization Meetings: Not on file   • Marital Status: Not on file   Intimate Partner Violence:    • Fear of Current or Ex-Partner: Not on file   • Emotionally Abused: Not on file   • Physically Abused: Not on file   • Sexually Abused: Not on file   Housing Stability:    • Unable to Pay for Housing in the Last Year: Not on file   • Number of Places Lived in the Last Year: Not on file   • Unstable Housing in the Last Year: Not on file         Physical Exam:  Ambulatory Vitals  /82 (BP Location: Left arm, Patient Position: Sitting, BP Cuff Size: Adult)   Pulse 60   Resp 14   Ht 1.753 m (5' 9\")   Wt 71.7 kg (158 lb)   SpO2 99%    Oxygen Therapy:  Pulse Oximetry: 99 %  BP Readings from Last 4 Encounters:   11/09/21 128/82   11/02/21 128/78   09/16/21 124/70   05/19/21 120/70       Weight/BMI: Body mass index is 23.33 kg/m².  Wt Readings from Last 4 Encounters:   11/09/21 71.7 kg (158 lb)   09/16/21 71.7 kg (158 lb)   05/19/21 71.3 kg (157 lb 3.2 oz)   02/19/21 73.5 kg (162 lb)       General: No apparent distress  Eyes: nl conjunctiva  ENT: OP covered by mask.   Neck: JVP 4-5 cm H2O, no carotid bruits  Lungs: normal respiratory effort, CTAB  Heart: RRR, + S4, no murmurs, no rubs, trace edema bilateral lower extremities. No LV/RV heave on cardiac palpatation. Sustained diffuse, displaced PMI. 2+ bilateral radial pulses.    Abdomen: soft, non " "tender, non distended, no masses, normal bowel sounds.  No HSM.  Extremities/MSK: no clubbing, no cyanosis  Neurological: No focal sensory deficits  Psychiatric: Appropriate affect, A/O x 3, intact judgement and insight  Skin: Warm extremities    Exam repeated in full and unchanged except for as noted above.        Lab Data Review:  Lab Results   Component Value Date/Time    CHOLSTRLTOT 133 09/21/2021 07:45 AM    CHOLSTRLTOT 125 12/04/2020 08:00 AM    LDL 48 12/04/2020 08:00 AM    HDL 73 (H) 09/21/2021 07:45 AM    HDL 70 12/04/2020 08:00 AM    TRIGLYCERIDE 42 09/21/2021 07:45 AM    TRIGLYCERIDE 35 12/04/2020 08:00 AM       Lab Results   Component Value Date/Time    SODIUM 140 09/09/2021 08:00 AM    POTASSIUM 4.9 09/09/2021 08:00 AM    CHLORIDE 101 09/09/2021 08:00 AM    CO2 26 09/09/2021 08:00 AM    GLUCOSE 83 09/09/2021 08:00 AM    BUN 20 09/09/2021 08:00 AM    CREATININE 1.08 09/09/2021 08:00 AM     Lab Results   Component Value Date/Time    ALKPHOSPHAT 116 (H) 09/09/2021 08:00 AM    ASTSGOT 31 09/09/2021 08:00 AM    ALTSGPT 28 09/09/2021 08:00 AM    TBILIRUBIN 0.9 09/09/2021 08:00 AM      Lab Results   Component Value Date/Time    WBC 10.2 09/21/2021 07:46 AM     No components found for: HBGA1C  No components found for: TROPONIN  No results found for: BNP      Cardiac Imaging and Procedures Review:    Exercise stress echocardiogram, 10/24/2017: Patient did 11 minutes 59 seconds corresponding with 12.8 metabolic equivalents on the Kael protocol achieving 92% of his age-predicted maximum heart rate with normal systolic augmentation regionally, negative for ischemia.  He also had a stress echocardiogram in 2013 that also was negative for ischemia.     CT coronary calcium scan, 11/16/2018:  \"Coronary calcification:  LMA - 0.0  LCX - 165.1  LAD - 833.2  RCA - 2093.2  PDA - 0.0  Calcium score:  3091.5\"     PET myocardial perfusion imaging, 12/20/2018, images and report reviewed, my interpretation: Demonstrates " systolic dysfunction with a resting ejection fraction of 36% and no territorial ischemia nor transient ischemic dilation.  Left ventricle was borderline dilated.     EKG, 12/20/2018, tracings and report reviewed, mitral rotation: Documents supraventricular tachycardia with a fairly pronounced pseudo-R prime in V1 consistent with AVNRT       TTE 6/2020 [facility] ER:      Radiology test Review:  CXR: 12/2018  No pulmonary infiltrates or consolidations are noted.  No pleural effusion. No pneumothorax.  Normal cardiopericardial silhouette.       Medical Decision Making:  # Wild Type TTR Amyloid with Cardiac involvement - asymptomatic with NYHA Class I symptoms. He was started by Dr. Pierson at [facility] on tafamadis, and has seen Dr. Herrera from [place] as well as a cardiologist at [place]. Started Helios-B study in 10/2020 with Dr. Pierson. He is euvolemic today.   -continue torsemide 5mg PO daily   -f/u at [place] as planned  -continue tafamidis.  Will check CBC and NT-proBNP at least Q6 months, and close to Q3 months as this might change his management options.   -continue Helios-B study.     # Agatston CAC score, >400  Asymptomatic with excellent exercise tolerance and multiple negative stress tests. Normal LVEF based on last echo performed at [place].   -continue aspirin 81mg PO daily  -lipitor 40mg PO Daily  -discussed ED precautions    #  AVNRT (AV billy re-entry tachycardia) (HCC)  No evidence of atrial fibrillation/flutter at this time although certainly high risk.  He will let me know if any symptoms develop, and he may be a candidate for empiric anticoagulation given his high CVA risk.     # Pure hypercholesterolemia  Continue lipitor.    # Thrombocytopenia. - mild. monitored by study. Has seen hematology, no need for biopsy at this time.     # Leukocytosis - has resolved.     Return in about 6 months (around 5/9/2022).      Jaime Raman MD, [facility]  [facility] [place]  [address] [address]  Phone: [phone]  Fax: [phone]

## 2021-11-15 ENCOUNTER — APPOINTMENT (RX ONLY)
Dept: URBAN - METROPOLITAN AREA CLINIC 4 | Facility: CLINIC | Age: 81
Setting detail: DERMATOLOGY
End: 2021-11-15

## 2021-11-15 DIAGNOSIS — L81.4 OTHER MELANIN HYPERPIGMENTATION: ICD-10-CM

## 2021-11-15 DIAGNOSIS — D18.0 HEMANGIOMA: ICD-10-CM

## 2021-11-15 DIAGNOSIS — L82.1 OTHER SEBORRHEIC KERATOSIS: ICD-10-CM

## 2021-11-15 PROBLEM — D18.01 HEMANGIOMA OF SKIN AND SUBCUTANEOUS TISSUE: Status: ACTIVE | Noted: 2021-11-15

## 2021-11-15 PROCEDURE — ? COUNSELING

## 2021-11-15 PROCEDURE — 99213 OFFICE O/P EST LOW 20 MIN: CPT

## 2021-11-15 ASSESSMENT — LOCATION ZONE DERM
LOCATION ZONE: TRUNK
LOCATION ZONE: FACE

## 2021-11-15 ASSESSMENT — LOCATION SIMPLE DESCRIPTION DERM
LOCATION SIMPLE: LEFT UPPER BACK
LOCATION SIMPLE: RIGHT UPPER BACK
LOCATION SIMPLE: LEFT TEMPLE
LOCATION SIMPLE: LEFT LOWER BACK

## 2021-11-15 ASSESSMENT — LOCATION DETAILED DESCRIPTION DERM
LOCATION DETAILED: LEFT SUPERIOR MEDIAL MIDBACK
LOCATION DETAILED: LEFT LATERAL TEMPLE
LOCATION DETAILED: LEFT MEDIAL UPPER BACK
LOCATION DETAILED: RIGHT SUPERIOR UPPER BACK

## 2021-12-13 DIAGNOSIS — E87.5 HYPERKALEMIA: ICD-10-CM

## 2021-12-13 RX ORDER — TAFAMIDIS 61 MG/1
CAPSULE, LIQUID FILLED ORAL
Qty: 90 CAPSULE | Refills: 1 | Status: SHIPPED | OUTPATIENT
Start: 2021-12-13 | End: 2022-06-27

## 2021-12-27 ENCOUNTER — HOSPITAL ENCOUNTER (OUTPATIENT)
Dept: LAB | Facility: MEDICAL CENTER | Age: 81
End: 2021-12-27
Attending: INTERNAL MEDICINE
Payer: MEDICARE

## 2021-12-27 DIAGNOSIS — E87.5 HYPERKALEMIA: ICD-10-CM

## 2021-12-27 LAB
ANION GAP SERPL CALC-SCNC: 11 MMOL/L (ref 7–16)
BUN SERPL-MCNC: 22 MG/DL (ref 8–22)
CALCIUM SERPL-MCNC: 9.6 MG/DL (ref 8.5–10.5)
CHLORIDE SERPL-SCNC: 102 MMOL/L (ref 96–112)
CO2 SERPL-SCNC: 27 MMOL/L (ref 20–33)
CORTIS SERPL-MCNC: 8.8 UG/DL (ref 0–23)
CREAT SERPL-MCNC: 0.98 MG/DL (ref 0.5–1.4)
GLUCOSE SERPL-MCNC: 91 MG/DL (ref 65–99)
POTASSIUM SERPL-SCNC: 4.3 MMOL/L (ref 3.6–5.5)
POTASSIUM UR-SCNC: 23 MMOL/L
SODIUM SERPL-SCNC: 140 MMOL/L (ref 135–145)

## 2021-12-27 PROCEDURE — 82088 ASSAY OF ALDOSTERONE: CPT

## 2021-12-27 PROCEDURE — 84133 ASSAY OF URINE POTASSIUM: CPT

## 2021-12-27 PROCEDURE — 80048 BASIC METABOLIC PNL TOTAL CA: CPT

## 2021-12-27 PROCEDURE — 84244 ASSAY OF RENIN: CPT

## 2021-12-27 PROCEDURE — 82533 TOTAL CORTISOL: CPT

## 2021-12-27 PROCEDURE — 36415 COLL VENOUS BLD VENIPUNCTURE: CPT

## 2021-12-28 RX ORDER — AMOXICILLIN AND CLAVULANATE POTASSIUM 875; 125 MG/1; MG/1
1 TABLET, FILM COATED ORAL 2 TIMES DAILY
Qty: 20 TABLET | Refills: 0 | Status: SHIPPED | OUTPATIENT
Start: 2021-12-28 | End: 2022-01-19 | Stop reason: SDUPTHER

## 2021-12-29 LAB — ALDOST SERPL-MCNC: 8 NG/DL

## 2022-01-07 ENCOUNTER — HOSPITAL ENCOUNTER (OUTPATIENT)
Dept: LAB | Facility: MEDICAL CENTER | Age: 82
End: 2022-01-07
Attending: INTERNAL MEDICINE
Payer: MEDICARE

## 2022-01-07 PROCEDURE — 84244 ASSAY OF RENIN: CPT

## 2022-01-12 ENCOUNTER — APPOINTMENT (OUTPATIENT)
Dept: INTERNAL MEDICINE | Facility: IMAGING CENTER | Age: 82
End: 2022-01-12
Payer: MEDICARE

## 2022-01-14 LAB — RENIN PLAS-CCNC: 0.6 NG/ML/HR

## 2022-01-20 RX ORDER — AMOXICILLIN AND CLAVULANATE POTASSIUM 875; 125 MG/1; MG/1
1 TABLET, FILM COATED ORAL 2 TIMES DAILY
Qty: 20 TABLET | Refills: 0 | Status: SHIPPED | OUTPATIENT
Start: 2022-01-20 | End: 2022-02-10

## 2022-01-25 DIAGNOSIS — J01.90 SUBACUTE SINUSITIS, UNSPECIFIED LOCATION: ICD-10-CM

## 2022-01-28 ENCOUNTER — HOSPITAL ENCOUNTER (OUTPATIENT)
Dept: RADIOLOGY | Facility: MEDICAL CENTER | Age: 82
End: 2022-01-28
Attending: INTERNAL MEDICINE
Payer: MEDICARE

## 2022-01-28 DIAGNOSIS — J32.9 CHRONIC SINUSITIS, UNSPECIFIED LOCATION: ICD-10-CM

## 2022-01-28 DIAGNOSIS — J01.90 SUBACUTE SINUSITIS, UNSPECIFIED LOCATION: ICD-10-CM

## 2022-01-28 PROCEDURE — 70220 X-RAY EXAM OF SINUSES: CPT

## 2022-01-28 RX ORDER — DOXYCYCLINE HYCLATE 100 MG
100 TABLET ORAL 2 TIMES DAILY
Qty: 20 TABLET | Refills: 0 | Status: SHIPPED | OUTPATIENT
Start: 2022-01-28 | End: 2022-03-31

## 2022-01-31 ENCOUNTER — OFFICE VISIT (OUTPATIENT)
Dept: INTERNAL MEDICINE | Facility: IMAGING CENTER | Age: 82
End: 2022-01-31
Payer: MEDICARE

## 2022-01-31 ENCOUNTER — APPOINTMENT (OUTPATIENT)
Dept: RADIOLOGY | Facility: MEDICAL CENTER | Age: 82
End: 2022-01-31
Attending: INTERNAL MEDICINE
Payer: MEDICARE

## 2022-01-31 VITALS
SYSTOLIC BLOOD PRESSURE: 122 MMHG | RESPIRATION RATE: 12 BRPM | HEART RATE: 54 BPM | OXYGEN SATURATION: 94 % | TEMPERATURE: 97.1 F | DIASTOLIC BLOOD PRESSURE: 68 MMHG

## 2022-01-31 DIAGNOSIS — J32.9 CHRONIC SINUSITIS, UNSPECIFIED LOCATION: ICD-10-CM

## 2022-01-31 DIAGNOSIS — U09.9 POST-COVID-19 CONDITION: ICD-10-CM

## 2022-01-31 PROCEDURE — 99213 OFFICE O/P EST LOW 20 MIN: CPT | Performed by: INTERNAL MEDICINE

## 2022-01-31 NOTE — PROGRESS NOTES
Chief Complaint   Patient presents with   • Sinus Problem     Sinusitis symptoms after COVID       HISTORY OF THE PRESENT ILLNESS: Patient is a 81 y.o. male.     Patient comes in complaining of sinus pain and pressure.  Symptoms began after recent COVID-19 infection.  He was treated with Augmentin x2.  This did not seem to improve the symptoms.  His symptoms wax and wane.  Complaining of facial pain and congestion, sinus pain and congestion.  Discharge from the nose was clear.  No cough or fever.  No photophobia or visual change.  Over the last 2 or 3 days he reports his symptoms are improving.  Compared to last week he reports that he is doing much better    Allergies: Avelox [moxifloxacin hcl], Demerol, Azithromycin, Cefdinir, Ceftin [cefuroxime axetil], Oxycodone, and Scopolamine    Current Outpatient Medications Ordered in Epic   Medication Sig Dispense Refill   • doxycycline (VIBRAMYCIN) 100 MG Tab Take 1 Tablet by mouth 2 times a day. 20 Tablet 0   • amoxicillin-clavulanate (AUGMENTIN) 875-125 MG Tab Take 1 Tablet by mouth 2 times a day. Take with food. 20 Tablet 0   • VYNDAMAX 61 MG Cap TAKE 1 CAPSULE BY MOUTH  DAILY 90 Capsule 1   • phenazopyridine (PYRIDIUM) 100 MG Tab Take 1 tablet  by mouth 3 times a day.     • Probiotic Product (ALIGN) 4 MG Cap Align     • aspirin 81 MG EC tablet Take 81 mg by mouth.     • torsemide (DEMADEX) 5 MG Tab Take 1 tablet by mouth every day. 90 tablet 3   • atorvastatin (LIPITOR) 40 MG Tab Take 1 tablet by mouth at bedtime. 90 tablet 3   • vitamin A 3 mg (02974 units) capsule Take 10,000 Units by mouth every day.     • Biotin 1000 MCG Tab Take 0.5 Tabs by mouth every day. 90 Tab 0   • vitamin D (CHOLECALCIFEROL) 1000 Unit (25 mcg) Tab Take 1 Tab by mouth every day. 90 Tab 0   • Glucosamine-Chondroitin 750-600 MG Tab Take 1 Tab by mouth 2 Times a Day. 60 Tab 0   • Probiotic Product (ALIGN) Cap Take 1 Cap by mouth every day. 90 Cap 0   • VITAMIN B COMPLEX-C PO Take  by mouth.      • Multiple Vitamins-Minerals (CENTRUM SILVER PO) Take  by mouth.     • Omega-3 Fatty Acids (SALMON OIL PO) Take 2 Caps by mouth every day.     • coenzyme Q-10 30 MG capsule Take 30 mg by mouth every day. Twice a day       No current Psychiatric-ordered facility-administered medications on file.       Past medical history, social history and family history were reviewed from chart today    Review of systems: Per HPI.    Denies headache, chest pain, fever, chills, diarrhea, constipation, abdominal pain, palpitations, depression   All others negative.     Exam: /68 (BP Location: Left arm, Patient Position: Sitting, BP Cuff Size: Adult)   Pulse (!) 54   Temp 36.2 °C (97.1 °F) (Temporal)   Resp 12   SpO2 94%   General: Well-appearing. Well-developed. No signs of distress.  HEENT: Grossly normal.  Nasal cavities are edematous.  They are pale and boggy but without discharge.  PERRLA, EOMI.   Neck: Supple without JVD or bruit.  Pulmonary: Clear with good breath sounds. Normal effort.  Cardiovascular: Regular. Carotid and radial pulses are intact.  Abdomen: Soft, nontender, nondistended. Spleen and liver are not enlarged.  Neurologic: Cranial nerves II through XII are grossly normal, alert and oriented x3      Diagnosis:  1. 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  We discussed possibly starting steroids but encouraged deferring unless symptoms persist or worsen.  If symptoms should significantly change I would recommend MRI to rule out other pathology.  Continue with nasal irrigation.  Encouraged resuming nasal steroid.

## 2022-02-03 DIAGNOSIS — G89.29 CHRONIC NONINTRACTABLE HEADACHE, UNSPECIFIED HEADACHE TYPE: ICD-10-CM

## 2022-02-03 DIAGNOSIS — R51.9 CHRONIC NONINTRACTABLE HEADACHE, UNSPECIFIED HEADACHE TYPE: ICD-10-CM

## 2022-02-03 DIAGNOSIS — U09.9 POST-COVID-19 CONDITION: ICD-10-CM

## 2022-02-04 ENCOUNTER — HOSPITAL ENCOUNTER (OUTPATIENT)
Dept: RADIOLOGY | Facility: MEDICAL CENTER | Age: 82
End: 2022-02-04
Attending: INTERNAL MEDICINE
Payer: MEDICARE

## 2022-02-04 DIAGNOSIS — G89.29 CHRONIC NONINTRACTABLE HEADACHE, UNSPECIFIED HEADACHE TYPE: ICD-10-CM

## 2022-02-04 DIAGNOSIS — U09.9 POST-COVID-19 CONDITION: ICD-10-CM

## 2022-02-04 DIAGNOSIS — R51.9 CHRONIC NONINTRACTABLE HEADACHE, UNSPECIFIED HEADACHE TYPE: ICD-10-CM

## 2022-02-04 PROCEDURE — 70551 MRI BRAIN STEM W/O DYE: CPT | Mod: MF

## 2022-02-04 PROCEDURE — 70544 MR ANGIOGRAPHY HEAD W/O DYE: CPT | Mod: MG

## 2022-02-07 RX ORDER — METHYLPREDNISOLONE 4 MG/1
TABLET ORAL
Qty: 21 TABLET | Refills: 0 | Status: SHIPPED | OUTPATIENT
Start: 2022-02-07 | End: 2022-02-10

## 2022-02-08 DIAGNOSIS — E78.5 DYSLIPIDEMIA: ICD-10-CM

## 2022-02-08 DIAGNOSIS — I50.32 CHRONIC DIASTOLIC CONGESTIVE HEART FAILURE (HCC): ICD-10-CM

## 2022-02-08 DIAGNOSIS — I25.10 ATHEROSCLEROSIS OF NATIVE CORONARY ARTERY OF NATIVE HEART WITHOUT ANGINA PECTORIS: ICD-10-CM

## 2022-02-08 DIAGNOSIS — N40.1 BENIGN PROSTATIC HYPERPLASIA WITH URINARY OBSTRUCTION: ICD-10-CM

## 2022-02-08 DIAGNOSIS — N13.8 BENIGN PROSTATIC HYPERPLASIA WITH URINARY OBSTRUCTION: ICD-10-CM

## 2022-02-08 DIAGNOSIS — E87.5 HYPERKALEMIA: ICD-10-CM

## 2022-02-08 DIAGNOSIS — E78.00 PURE HYPERCHOLESTEROLEMIA: ICD-10-CM

## 2022-02-08 DIAGNOSIS — D69.6 THROMBOCYTOPENIA (HCC): ICD-10-CM

## 2022-02-10 ENCOUNTER — OFFICE VISIT (OUTPATIENT)
Dept: INTERNAL MEDICINE | Facility: IMAGING CENTER | Age: 82
End: 2022-02-10
Payer: MEDICARE

## 2022-02-10 VITALS
HEART RATE: 58 BPM | OXYGEN SATURATION: 100 % | WEIGHT: 163 LBS | BODY MASS INDEX: 24.14 KG/M2 | DIASTOLIC BLOOD PRESSURE: 70 MMHG | SYSTOLIC BLOOD PRESSURE: 132 MMHG | HEIGHT: 69 IN | RESPIRATION RATE: 14 BRPM | TEMPERATURE: 98.5 F

## 2022-02-10 DIAGNOSIS — J34.89 SINUS PAIN: ICD-10-CM

## 2022-02-10 DIAGNOSIS — U09.9 POST-COVID-19 CONDITION: ICD-10-CM

## 2022-02-10 PROCEDURE — 99213 OFFICE O/P EST LOW 20 MIN: CPT | Performed by: INTERNAL MEDICINE

## 2022-02-10 RX ORDER — PREDNISONE 10 MG/1
TABLET ORAL
Qty: 11 TABLET | Refills: 0 | Status: SHIPPED | OUTPATIENT
Start: 2022-02-10 | End: 2022-02-12

## 2022-02-10 ASSESSMENT — FIBROSIS 4 INDEX: FIB4 SCORE: 4.46

## 2022-02-10 NOTE — PROGRESS NOTES
Chief Complaint   Patient presents with   • Sinus Problem       HISTORY OF THE PRESENT ILLNESS: Patient is a 81 y.o. male.     Patient comes in to review recent MRI/MRV.  This was done due to ongoing maxillary and frontal sinus pain.  He has a history of sinus disease and is post extensive sinus surgery.  He has been treated for presumed sinus infection with 2 courses of Augmentin.  He is currently on doxycycline and methylprednisolone.  He felt that on day one of the Medrol that his symptoms improved but as the taper has begun his symptoms have returned.  No significant nasal discharge.  Symptoms seem worse at night.  No fever or chills.  He is recently post COVID-19.  His current symptoms began after the illness.    Allergies: Avelox [moxifloxacin hcl], Demerol, Azithromycin, Cefdinir, Ceftin [cefuroxime axetil], Oxycodone, and Scopolamine    Current Outpatient Medications Ordered in Epic   Medication Sig Dispense Refill   • methylPREDNISolone (MEDROL DOSEPAK) 4 MG Tablet Therapy Pack As directed on the packaging label. 21 Tablet 0   • doxycycline (VIBRAMYCIN) 100 MG Tab Take 1 Tablet by mouth 2 times a day. 20 Tablet 0   • VYNDAMAX 61 MG Cap TAKE 1 CAPSULE BY MOUTH  DAILY 90 Capsule 1   • aspirin 81 MG EC tablet Take 81 mg by mouth.     • torsemide (DEMADEX) 5 MG Tab Take 1 tablet by mouth every day. 90 tablet 3   • atorvastatin (LIPITOR) 40 MG Tab Take 1 tablet by mouth at bedtime. 90 tablet 3   • vitamin A 3 mg (87539 units) capsule Take 10,000 Units by mouth every day.     • Biotin 1000 MCG Tab Take 0.5 Tabs by mouth every day. 90 Tab 0   • vitamin D (CHOLECALCIFEROL) 1000 Unit (25 mcg) Tab Take 1 Tab by mouth every day. 90 Tab 0   • Glucosamine-Chondroitin 750-600 MG Tab Take 1 Tab by mouth 2 Times a Day. 60 Tab 0   • Probiotic Product (ALIGN) Cap Take 1 Cap by mouth every day. 90 Cap 0   • VITAMIN B COMPLEX-C PO Take  by mouth.     • Multiple Vitamins-Minerals (CENTRUM SILVER PO) Take  by mouth.     •  "Omega-3 Fatty Acids (SALMON OIL PO) Take 2 Caps by mouth every day.     • coenzyme Q-10 30 MG capsule Take 30 mg by mouth every day. Twice a day       No current [facility]-ordered facility-administered medications on file.       Past medical history, social history and family history were reviewed from chart today    Review of systems: Per HPI.    All others negative.     Exam: /70 (BP Location: Left arm, Patient Position: Sitting, BP Cuff Size: Adult)   Pulse (!) 58   Temp 36.9 °C (98.5 °F) (Temporal)   Resp 14   Ht 1.753 m (5' 9\")   Wt 73.9 kg (163 lb)   SpO2 100%   General: Well-nourished, well-developed. No change in appearance. No distress.  HEENT: Normocephalic.  Pulmonary: Clear.. Normal effort.  Cardiovascular: Regular   Abdomen: Normal appearing. Soft, nontender, nondistended.   Neurologic: Cranial nerves II through XII are grossly intact, alert and oriented x3        Diagnosis:  1. 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  All findings were discussed including mild cerebral atrophy, enlargement of the ventricles and minimal findings regarding the sinus cavities.    He seemed to improve with higher dose steroid.  We will try a few days of prednisone 40 mg.  He will continue with doxycycline but I do not recommend further antibiotics.  He has been referred to ENT.  "

## 2022-02-11 DIAGNOSIS — J34.89 SINUS PAIN: ICD-10-CM

## 2022-02-18 ENCOUNTER — HOSPITAL ENCOUNTER (OUTPATIENT)
Facility: MEDICAL CENTER | Age: 82
End: 2022-02-18
Attending: INTERNAL MEDICINE
Payer: MEDICARE

## 2022-02-18 ENCOUNTER — OFFICE VISIT (OUTPATIENT)
Dept: INTERNAL MEDICINE | Facility: IMAGING CENTER | Age: 82
End: 2022-02-18
Payer: MEDICARE

## 2022-02-18 VITALS
SYSTOLIC BLOOD PRESSURE: 124 MMHG | OXYGEN SATURATION: 99 % | RESPIRATION RATE: 12 BRPM | TEMPERATURE: 97.4 F | HEART RATE: 65 BPM | DIASTOLIC BLOOD PRESSURE: 70 MMHG

## 2022-02-18 DIAGNOSIS — R51.9 FRONTAL HEADACHE: ICD-10-CM

## 2022-02-18 PROCEDURE — 86140 C-REACTIVE PROTEIN: CPT

## 2022-02-18 PROCEDURE — 99213 OFFICE O/P EST LOW 20 MIN: CPT | Performed by: INTERNAL MEDICINE

## 2022-02-18 PROCEDURE — 85652 RBC SED RATE AUTOMATED: CPT

## 2022-02-18 RX ORDER — RIMEGEPANT SULFATE 75 MG/75MG
75 TABLET, ORALLY DISINTEGRATING ORAL
Qty: 2 TABLET | Refills: 0 | COMMUNITY
Start: 2022-02-18 | End: 2022-03-31

## 2022-02-18 NOTE — PROGRESS NOTES
Chief Complaint   Patient presents with   • Headache       HISTORY OF THE PRESENT ILLNESS: Patient is a 81 y.o. male.     Patient with ongoing frontal sinus pain.  Symptoms wax and wane.  He also complains of swelling around the left eye and cheek.  This is not present today.  He is also noticed some visual issues.  He was seen by ENT, Dr. Zhao, today.  His evaluation was negative for significant sinus disease.  He felt the most likely etiology was migraine headache.  Since that time the patient did some investigating on the Internet and is now concerned he may have temporal arteritis.  No polymyalgia symptoms.  He states the pain is localized today to area in his frontal scalp well above the sphenoid sinus.    Allergies: Avelox [moxifloxacin hcl], Demerol, Azithromycin, Cefdinir, Ceftin [cefuroxime axetil], Oxycodone, and Scopolamine    Current Outpatient Medications Ordered in Epic   Medication Sig Dispense Refill   • Rimegepant Sulfate (NURTEC) 75 MG TABLET DISPERSIBLE Take 1 Tablet by mouth 1 time a day as needed. 2 Tablet 0   • doxycycline (VIBRAMYCIN) 100 MG Tab Take 1 Tablet by mouth 2 times a day. 20 Tablet 0   • VYNDAMAX 61 MG Cap TAKE 1 CAPSULE BY MOUTH  DAILY 90 Capsule 1   • aspirin 81 MG EC tablet Take 81 mg by mouth.     • torsemide (DEMADEX) 5 MG Tab Take 1 tablet by mouth every day. 90 tablet 3   • atorvastatin (LIPITOR) 40 MG Tab Take 1 tablet by mouth at bedtime. 90 tablet 3   • vitamin A 3 mg (28704 units) capsule Take 10,000 Units by mouth every day.     • Biotin 1000 MCG Tab Take 0.5 Tabs by mouth every day. 90 Tab 0   • vitamin D (CHOLECALCIFEROL) 1000 Unit (25 mcg) Tab Take 1 Tab by mouth every day. 90 Tab 0   • Glucosamine-Chondroitin 750-600 MG Tab Take 1 Tab by mouth 2 Times a Day. 60 Tab 0   • Probiotic Product (ALIGN) Cap Take 1 Cap by mouth every day. 90 Cap 0   • VITAMIN B COMPLEX-C PO Take  by mouth.     • Multiple Vitamins-Minerals (CENTRUM SILVER PO) Take  by mouth.     • Omega-3  Fatty Acids (SALMON OIL PO) Take 2 Caps by mouth every day.     • coenzyme Q-10 30 MG capsule Take 30 mg by mouth every day. Twice a day       No current [facility]-ordered facility-administered medications on file.       Past medical history, social history and family history were reviewed from chart today    Review of systems: Per HPI.    All others negative.     Exam: /70 (BP Location: Left arm, Patient Position: Sitting, BP Cuff Size: Adult)   Pulse 65   Temp 36.3 °C (97.4 °F) (Temporal)   Resp 12   SpO2 99%   General: Well-nourished, well-developed. No change in appearance. No distress.  HEENT: Normocephalic.  Pulmonary: Clear.. Normal effort.  Cardiovascular: Regular   Abdomen: Normal appearing. Soft, nontender, nondistended.   Neurologic: Cranial nerves II through XII are grossly intact, alert and oriented x3        Diagnosis:  1. Frontal headache  Sed Rate    CRP QUANTITIVE (NON-CARDIAC)       Because of the patient's symptoms are unclear.  He had extensive sinus evaluation which was negative.  I had previously mentioned possibility of migraine but he felt this was less likely.  ENT also feels migraine is most likely etiology.  Patient is concerned about temporal arteritis.  I discussed with him that I think this is unlikely for multiple reasons but agreed to order sedimentation rate and CRP.    Samples of Nurtec 75 were given.  I asked him to try 1 tablet to see if it improves his symptoms.  If it does and symptoms return then I would like him to take another tablet tomorrow.  If he does not respond to the initial tablet I would like him to take another tablet tomorrow if his symptoms persist.    We also discussed the possibility of chronic daily or tension headache.  We discussed muscle relaxants, acupuncture or possible use of heat towards the base of the spine.  I also mentioned trigger point injections or other treatment for the posterior scalp/neck.  At this time we will try the Nurtec above and  see how he responds.      My total time spent caring for the patient on the day of the encounter was  greater than 15 minutes.   This includes obtaining history, reviewing chart, physical exam, patient education, reviewing outside records, placing orders, interpreting tests and coordinating care.

## 2022-02-19 LAB
CRP SERPL HS-MCNC: <0.3 MG/DL (ref 0–0.75)
ERYTHROCYTE [SEDIMENTATION RATE] IN BLOOD BY WESTERGREN METHOD: 4 MM/HOUR (ref 0–20)

## 2022-02-22 ENCOUNTER — HOSPITAL ENCOUNTER (OUTPATIENT)
Facility: MEDICAL CENTER | Age: 82
End: 2022-02-22
Attending: INTERNAL MEDICINE
Payer: MEDICARE

## 2022-02-22 ENCOUNTER — NON-PROVIDER VISIT (OUTPATIENT)
Dept: INTERNAL MEDICINE | Facility: IMAGING CENTER | Age: 82
End: 2022-02-22
Payer: MEDICARE

## 2022-02-22 DIAGNOSIS — I25.10 ATHEROSCLEROSIS OF NATIVE CORONARY ARTERY OF NATIVE HEART WITHOUT ANGINA PECTORIS: ICD-10-CM

## 2022-02-22 DIAGNOSIS — D69.6 THROMBOCYTOPENIA (HCC): ICD-10-CM

## 2022-02-22 DIAGNOSIS — E87.5 HYPERKALEMIA: ICD-10-CM

## 2022-02-22 DIAGNOSIS — E78.00 PURE HYPERCHOLESTEROLEMIA: ICD-10-CM

## 2022-02-22 DIAGNOSIS — N40.1 BENIGN PROSTATIC HYPERPLASIA WITH URINARY OBSTRUCTION: ICD-10-CM

## 2022-02-22 DIAGNOSIS — N13.8 BENIGN PROSTATIC HYPERPLASIA WITH URINARY OBSTRUCTION: ICD-10-CM

## 2022-02-22 DIAGNOSIS — I50.32 CHRONIC DIASTOLIC CONGESTIVE HEART FAILURE (HCC): ICD-10-CM

## 2022-02-22 LAB
ALBUMIN SERPL BCP-MCNC: 4.1 G/DL (ref 3.2–4.9)
ALBUMIN/GLOB SERPL: 1.5 G/DL
ALP SERPL-CCNC: 142 U/L (ref 30–99)
ALT SERPL-CCNC: 39 U/L (ref 2–50)
ANION GAP SERPL CALC-SCNC: 8 MMOL/L (ref 7–16)
ANISOCYTOSIS BLD QL SMEAR: ABNORMAL
APPEARANCE UR: CLEAR
AST SERPL-CCNC: 46 U/L (ref 12–45)
BASOPHILS # BLD AUTO: 0 % (ref 0–1.8)
BASOPHILS # BLD: 0 K/UL (ref 0–0.12)
BILIRUB SERPL-MCNC: 0.8 MG/DL (ref 0.1–1.5)
BILIRUB UR QL STRIP.AUTO: NEGATIVE
BUN SERPL-MCNC: 20 MG/DL (ref 8–22)
BURR CELLS BLD QL SMEAR: NORMAL
CALCIUM SERPL-MCNC: 9.8 MG/DL (ref 8.5–10.5)
CHLORIDE SERPL-SCNC: 105 MMOL/L (ref 96–112)
CHOLEST SERPL-MCNC: 134 MG/DL (ref 100–199)
CO2 SERPL-SCNC: 27 MMOL/L (ref 20–33)
COLOR UR: YELLOW
CREAT SERPL-MCNC: 1.07 MG/DL (ref 0.5–1.4)
EOSINOPHIL # BLD AUTO: 0.22 K/UL (ref 0–0.51)
EOSINOPHIL NFR BLD: 1.8 % (ref 0–6.9)
ERYTHROCYTE [DISTWIDTH] IN BLOOD BY AUTOMATED COUNT: 52.3 FL (ref 35.9–50)
GLOBULIN SER CALC-MCNC: 2.8 G/DL (ref 1.9–3.5)
GLUCOSE SERPL-MCNC: 77 MG/DL (ref 65–99)
GLUCOSE UR STRIP.AUTO-MCNC: NEGATIVE MG/DL
HCT VFR BLD AUTO: 44.5 % (ref 42–52)
HDLC SERPL-MCNC: 79 MG/DL
HGB BLD-MCNC: 14.9 G/DL (ref 14–18)
KETONES UR STRIP.AUTO-MCNC: NEGATIVE MG/DL
LDLC SERPL CALC-MCNC: 49 MG/DL
LEUKOCYTE ESTERASE UR QL STRIP.AUTO: NEGATIVE
LYMPHOCYTES # BLD AUTO: 6.9 K/UL (ref 1–4.8)
LYMPHOCYTES NFR BLD: 57 % (ref 22–41)
MACROCYTES BLD QL SMEAR: ABNORMAL
MANUAL DIFF BLD: NORMAL
MCH RBC QN AUTO: 33.8 PG (ref 27–33)
MCHC RBC AUTO-ENTMCNC: 33.5 G/DL (ref 33.7–35.3)
MCV RBC AUTO: 100.9 FL (ref 81.4–97.8)
MICRO URNS: NORMAL
MONOCYTES # BLD AUTO: 0.53 K/UL (ref 0–0.85)
MONOCYTES NFR BLD AUTO: 4.4 % (ref 0–13.4)
MORPHOLOGY BLD-IMP: NORMAL
NEUTROPHILS # BLD AUTO: 4.45 K/UL (ref 1.82–7.42)
NEUTROPHILS NFR BLD: 36.8 % (ref 44–72)
NITRITE UR QL STRIP.AUTO: NEGATIVE
NRBC # BLD AUTO: 0 K/UL
NRBC BLD-RTO: 0 /100 WBC
NT-PROBNP SERPL IA-MCNC: 484 PG/ML (ref 0–125)
PH UR STRIP.AUTO: 7 [PH] (ref 5–8)
PLATELET # BLD AUTO: 99 K/UL (ref 164–446)
PLATELET BLD QL SMEAR: NORMAL
PMV BLD AUTO: 13.5 FL (ref 9–12.9)
POIKILOCYTOSIS BLD QL SMEAR: NORMAL
POTASSIUM SERPL-SCNC: 5.2 MMOL/L (ref 3.6–5.5)
PROT SERPL-MCNC: 6.9 G/DL (ref 6–8.2)
PROT UR QL STRIP: NEGATIVE MG/DL
PSA SERPL-MCNC: 0.57 NG/ML (ref 0–4)
RBC # BLD AUTO: 4.41 M/UL (ref 4.7–6.1)
RBC BLD AUTO: PRESENT
RBC UR QL AUTO: NEGATIVE
SODIUM SERPL-SCNC: 140 MMOL/L (ref 135–145)
SP GR UR STRIP.AUTO: 1.02
TRIGL SERPL-MCNC: 30 MG/DL (ref 0–149)
UROBILINOGEN UR STRIP.AUTO-MCNC: 1 MG/DL
WBC # BLD AUTO: 12.1 K/UL (ref 4.8–10.8)

## 2022-02-22 PROCEDURE — 83880 ASSAY OF NATRIURETIC PEPTIDE: CPT

## 2022-02-22 PROCEDURE — 84153 ASSAY OF PSA TOTAL: CPT

## 2022-02-22 PROCEDURE — 81003 URINALYSIS AUTO W/O SCOPE: CPT

## 2022-02-22 PROCEDURE — 85027 COMPLETE CBC AUTOMATED: CPT

## 2022-02-22 PROCEDURE — 80053 COMPREHEN METABOLIC PANEL: CPT

## 2022-02-22 PROCEDURE — 85007 BL SMEAR W/DIFF WBC COUNT: CPT

## 2022-02-22 PROCEDURE — 80061 LIPID PANEL: CPT

## 2022-03-23 DIAGNOSIS — Z79.899 HIGH RISK MEDICATION USE: ICD-10-CM

## 2022-03-23 DIAGNOSIS — E85.4 CARDIAC AMYLOIDOSIS (HCC): ICD-10-CM

## 2022-03-23 DIAGNOSIS — R79.89 ELEVATED LIVER FUNCTION TESTS: ICD-10-CM

## 2022-03-23 DIAGNOSIS — I43 CARDIAC AMYLOIDOSIS (HCC): ICD-10-CM

## 2022-03-30 DIAGNOSIS — R74.02 ELEVATED LDH: ICD-10-CM

## 2022-03-31 ENCOUNTER — OFFICE VISIT (OUTPATIENT)
Dept: INTERNAL MEDICINE | Facility: IMAGING CENTER | Age: 82
End: 2022-03-31
Payer: MEDICARE

## 2022-03-31 ENCOUNTER — HOSPITAL ENCOUNTER (OUTPATIENT)
Facility: MEDICAL CENTER | Age: 82
End: 2022-03-31
Attending: INTERNAL MEDICINE
Payer: MEDICARE

## 2022-03-31 VITALS
RESPIRATION RATE: 12 BRPM | OXYGEN SATURATION: 99 % | TEMPERATURE: 97.5 F | WEIGHT: 159 LBS | DIASTOLIC BLOOD PRESSURE: 74 MMHG | HEIGHT: 69 IN | HEART RATE: 60 BPM | BODY MASS INDEX: 23.55 KG/M2 | SYSTOLIC BLOOD PRESSURE: 124 MMHG

## 2022-03-31 DIAGNOSIS — R74.02 ELEVATED LDH: ICD-10-CM

## 2022-03-31 DIAGNOSIS — Z00.00 MEDICARE ANNUAL WELLNESS VISIT, INITIAL: ICD-10-CM

## 2022-03-31 DIAGNOSIS — N40.1 BENIGN PROSTATIC HYPERPLASIA WITH URINARY OBSTRUCTION: ICD-10-CM

## 2022-03-31 DIAGNOSIS — R79.89 ELEVATED LIVER FUNCTION TESTS: ICD-10-CM

## 2022-03-31 DIAGNOSIS — I50.32 CHRONIC DIASTOLIC CONGESTIVE HEART FAILURE (HCC): ICD-10-CM

## 2022-03-31 DIAGNOSIS — Z79.899 HIGH RISK MEDICATION USE: ICD-10-CM

## 2022-03-31 DIAGNOSIS — E85.4 CARDIAC AMYLOIDOSIS (HCC): ICD-10-CM

## 2022-03-31 DIAGNOSIS — R35.1 BENIGN PROSTATIC HYPERPLASIA WITH NOCTURIA: ICD-10-CM

## 2022-03-31 DIAGNOSIS — N40.1 BENIGN PROSTATIC HYPERPLASIA WITH NOCTURIA: ICD-10-CM

## 2022-03-31 DIAGNOSIS — K86.2 PANCREAS CYST: ICD-10-CM

## 2022-03-31 DIAGNOSIS — I43 CARDIAC AMYLOIDOSIS (HCC): ICD-10-CM

## 2022-03-31 DIAGNOSIS — D69.6 THROMBOCYTOPENIA (HCC): ICD-10-CM

## 2022-03-31 DIAGNOSIS — E78.00 PURE HYPERCHOLESTEROLEMIA: ICD-10-CM

## 2022-03-31 DIAGNOSIS — N13.8 BENIGN PROSTATIC HYPERPLASIA WITH URINARY OBSTRUCTION: ICD-10-CM

## 2022-03-31 DIAGNOSIS — G58.8 OTHER MONONEUROPATHY: ICD-10-CM

## 2022-03-31 DIAGNOSIS — I25.10 ATHEROSCLEROSIS OF NATIVE CORONARY ARTERY OF NATIVE HEART WITHOUT ANGINA PECTORIS: ICD-10-CM

## 2022-03-31 PROCEDURE — 87496 CYTOMEG DNA AMP PROBE: CPT

## 2022-03-31 PROCEDURE — 83615 LACTATE (LD) (LDH) ENZYME: CPT

## 2022-03-31 PROCEDURE — 87799 DETECT AGENT NOS DNA QUANT: CPT

## 2022-03-31 PROCEDURE — 82977 ASSAY OF GGT: CPT

## 2022-03-31 PROCEDURE — G0439 PPPS, SUBSEQ VISIT: HCPCS | Performed by: INTERNAL MEDICINE

## 2022-03-31 PROCEDURE — 85025 COMPLETE CBC W/AUTO DIFF WBC: CPT

## 2022-03-31 PROCEDURE — 85007 BL SMEAR W/DIFF WBC COUNT: CPT

## 2022-03-31 PROCEDURE — 80076 HEPATIC FUNCTION PANEL: CPT

## 2022-03-31 RX ORDER — VITAMIN B COMPLEX
2000 TABLET ORAL DAILY
Qty: 100 TABLET | Refills: 3
Start: 2022-03-31

## 2022-03-31 ASSESSMENT — FIBROSIS 4 INDEX: FIB4 SCORE: 5.95

## 2022-03-31 ASSESSMENT — ENCOUNTER SYMPTOMS: GENERAL WELL-BEING: GOOD

## 2022-03-31 ASSESSMENT — PATIENT HEALTH QUESTIONNAIRE - PHQ9: CLINICAL INTERPRETATION OF PHQ2 SCORE: 0

## 2022-03-31 ASSESSMENT — ACTIVITIES OF DAILY LIVING (ADL): BATHING_REQUIRES_ASSISTANCE: 0

## 2022-04-01 LAB
ALBUMIN SERPL BCP-MCNC: 4.4 G/DL (ref 3.2–4.9)
ALP SERPL-CCNC: 120 U/L (ref 30–99)
ALT SERPL-CCNC: 31 U/L (ref 2–50)
ANISOCYTOSIS BLD QL SMEAR: ABNORMAL
AST SERPL-CCNC: 35 U/L (ref 12–45)
BASOPHILS # BLD AUTO: 0 % (ref 0–1.8)
BASOPHILS # BLD: 0 K/UL (ref 0–0.12)
BILIRUB CONJ SERPL-MCNC: <0.2 MG/DL (ref 0.1–0.5)
BILIRUB INDIRECT SERPL-MCNC: ABNORMAL MG/DL (ref 0–1)
BILIRUB SERPL-MCNC: 0.6 MG/DL (ref 0.1–1.5)
BURR CELLS BLD QL SMEAR: NORMAL
EOSINOPHIL # BLD AUTO: 0.46 K/UL (ref 0–0.51)
EOSINOPHIL NFR BLD: 5.3 % (ref 0–6.9)
ERYTHROCYTE [DISTWIDTH] IN BLOOD BY AUTOMATED COUNT: 52.6 FL (ref 35.9–50)
GGT SERPL-CCNC: 141 U/L (ref 7–51)
HCT VFR BLD AUTO: 46.6 % (ref 42–52)
HGB BLD-MCNC: 15.3 G/DL (ref 14–18)
LDH SERPL L TO P-CCNC: 255 U/L (ref 107–266)
LG PLATELETS BLD QL SMEAR: NORMAL
LYMPHOCYTES # BLD AUTO: 5.87 K/UL (ref 1–4.8)
LYMPHOCYTES NFR BLD: 67.5 % (ref 22–41)
MACROCYTES BLD QL SMEAR: ABNORMAL
MANUAL DIFF BLD: NORMAL
MCH RBC QN AUTO: 33.7 PG (ref 27–33)
MCHC RBC AUTO-ENTMCNC: 32.8 G/DL (ref 33.7–35.3)
MCV RBC AUTO: 102.6 FL (ref 81.4–97.8)
MONOCYTES # BLD AUTO: 0.3 K/UL (ref 0–0.85)
MONOCYTES NFR BLD AUTO: 3.5 % (ref 0–13.4)
MORPHOLOGY BLD-IMP: NORMAL
NEUTROPHILS # BLD AUTO: 2.06 K/UL (ref 1.82–7.42)
NEUTROPHILS NFR BLD: 23.7 % (ref 44–72)
NRBC # BLD AUTO: 0 K/UL
NRBC BLD-RTO: 0 /100 WBC
OVALOCYTES BLD QL SMEAR: NORMAL
PLATELET # BLD AUTO: 138 K/UL (ref 164–446)
PLATELET BLD QL SMEAR: NORMAL
PMV BLD AUTO: 12.7 FL (ref 9–12.9)
POIKILOCYTOSIS BLD QL SMEAR: NORMAL
PROT SERPL-MCNC: 7.2 G/DL (ref 6–8.2)
RBC # BLD AUTO: 4.54 M/UL (ref 4.7–6.1)
RBC BLD AUTO: PRESENT
VARIANT LYMPHS BLD QL SMEAR: NORMAL
WBC # BLD AUTO: 8.7 K/UL (ref 4.8–10.8)

## 2022-04-02 PROBLEM — E78.5 DYSLIPIDEMIA: Status: RESOLVED | Noted: 2018-12-06 | Resolved: 2022-04-02

## 2022-04-02 PROBLEM — M65.9: Status: RESOLVED | Noted: 2018-11-05 | Resolved: 2022-04-02

## 2022-04-02 PROBLEM — M65.969: Status: RESOLVED | Noted: 2018-11-05 | Resolved: 2022-04-02

## 2022-04-02 PROBLEM — I47.10 SVT (SUPRAVENTRICULAR TACHYCARDIA) (HCC): Status: RESOLVED | Noted: 2018-10-23 | Resolved: 2022-04-02

## 2022-04-02 NOTE — PROGRESS NOTES
81 y.o. male presents for the followin. Medicare annual wellness visit, initial  Patient comes in for annual health risk assessment, physical and review of laboratory.  He considers himself in good health.  No depression.  No balance issues.  No cognitive issues.    2. Cardiac amyloidosis (HCC)  Stable per echocardiogram.  Patient remains on clinical trial.  He is currently on.Vyndamax.  No obvious other organ involvement.    3. Thrombocytopenia (HCC)  Stable.  Most recent platelet count was 99,000.  No bleeding issues.    4. Elevated liver function tests  New issue.  Patient recently has had elevation in AST and alkaline phosphatase.  His numbers are trending better.  No regular alcohol use.  No obvious hepatotoxic medications.  Patient is aware that it may be a nonhepatic etiology    5. Elevated LDH  History of mildly elevated LDH.  Also history of leukocytosis with lymphocytosis.  He has been seen by hematology in the past.  No classic B symptoms.    6. Benign prostatic hyperplasia with urinary obstruction  Stable.  He continues to experience 3 episodes or more of nocturia.  Currently on no pharmacotherapy.  He is not interested in further work-up or treatment at this time.  PSA remains low, 0.57    7. Pure hypercholesterolemia  Stable/at goal.  He remains on moderate dose statin therapy.  Lab Results   Component Value Date/Time    CHOLSTRLTOT 134 2022 08:40 AM    LDL 49 2022 08:40 AM    HDL 79 2022 08:40 AM    TRIGLYCERIDE 30 2022 08:40 AM         8. Other mononeuropathy - right second digit  New issue.  Patient complains of intermittent numbness on the tip of the second digit.  Symptoms have been present for weeks.  No obvious trauma.  No classic carpal tunnel findings    9. Pancreas cyst  Stable.  Patient had MRI in 2021.  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    10. Chronic diastolic congestive heart failure (HCC)  Stable.   Presumably secondary to amyloidosis.  He remains on low-dose diuretic.  BNP remains stable at approximately 400.    11. Atherosclerosis of native coronary artery of native heart without angina pectoris  Per cardiac calcium evaluation.  Significant calcium score, >3000.  Lipids are at goal.  Blood pressure is at goal.  He does not smoke.  No diabetes.  No anginal-like symptoms.  He is on baby aspirin also.      Annual Wellness Visit/Health Risk Assessment:    Past medical:  Past Medical History:   Diagnosis Date   • Amyloidosis (HCC)     TTR, type pending.    • BPH (benign prostatic hyperplasia)     s/p laser encleation of the prostate   • Cardiac amyloidosis (HCC)    • Chickenpox    • Colon polyps    • Coronary atherosclerosis of native coronary artery 05/15/2013    Coronary calcium score in the 3000s, negative PET scan and no symptoms so treated medically.    • Coronary heart disease    • Dyslipidemia 2016   • H/O urethral stricture     s/p surgical repair 1/15/2019, [place]   • Kidney cysts    • Meniscus tear    • Pancreas cyst    • Scarlet fever    • Sinusitis, chronic    • Spinal stenosis, lumbar    • SVT (supraventricular tachycardia) ([facility]) 10/23/2018   • Thrombocytopenia ([facility]) 2017       Past surgical:  Past Surgical History:   Procedure Laterality Date   • KNEE RECONSTRUCTION      left, partial. multiple previous surgeries.    • ROTATOR CUFF REPAIR      c/b right biceps tendon rupture, presumably repaired.    • ANKLE FUSION Left    • ARTHROSCOPY, KNEE     • HEMORRHOIDECTOMY     • SINUSCOPE     • SINUSOTOMIES      Dr. Guerrier, total of 4 surgeries   • TRIGGER FINGER RELEASE      x 2       Family history: relating to possible risk factors for your patient  Family History   Problem Relation Age of Onset   • Heart Attack Father 38        doctor in 1946 diagnosed him with MI at home,  before going to the hospital   • Psychiatric Illness Mother         Alzheimers   • Hypertension Brother    •  Stroke Brother    • Seizures Brother    • Sleep Apnea Neg Hx        Current Providers (including home care/DME’s):   Colonoscopy 3/21/17 [place] (Dr Domingo) repeat in 5 yrs Dexa 12/17/20 osteopenia PSA  9/9/21  0.55  [facility]      Patient Care Team:  Azar Cavazos M.D. as PCP - General (Internal Medicine)  Sanchez Fonseca M.D. (Urology)  [facility]  as Respiratory Therapist  Jaime Raman M.D. as Cardiologist (Cardiovascular Disease (Cardiology))      Medications:   Current Outpatient Medications Ordered in Epic   Medication Sig Dispense Refill   • vitamin D3 (CHOLECALCIFEROL) 1000 Unit (25 mcg) Tab Take 2 Tablets by mouth every day. 100 Tablet 3   • VYNDAMAX 61 MG Cap TAKE 1 CAPSULE BY MOUTH  DAILY 90 Capsule 1   • aspirin 81 MG EC tablet Take 81 mg by mouth.     • torsemide (DEMADEX) 5 MG Tab Take 1 tablet by mouth every day. 90 tablet 3   • atorvastatin (LIPITOR) 40 MG Tab Take 1 tablet by mouth at bedtime. 90 tablet 3   • vitamin A 3 mg (92604 units) capsule Take 10,000 Units by mouth every day.     • Glucosamine-Chondroitin 750-600 MG Tab Take 1 Tab by mouth 2 Times a Day. 60 Tab 0   • Probiotic Product (ALIGN) Cap Take 1 Cap by mouth every day. 90 Cap 0   • VITAMIN B COMPLEX-C PO Take  by mouth.     • Multiple Vitamins-Minerals (CENTRUM SILVER PO) Take  by mouth.     • Omega-3 Fatty Acids (SALMON OIL PO) Take 2 Caps by mouth every day.     • coenzyme Q-10 30 MG capsule Take 30 mg by mouth every day. Twice a day       No current [facility]-ordered facility-administered medications on file.       Supplements (calcium/vitamins): if not lisited in medications    No chief complaint on file.        HPI:  Andre Martinez is a 81 y.o. 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/2019   • Agatston CAC score, >400 12/06/2018   • Enlarged prostate with lower urinary tract symptoms (LUTS) 11/12/2018   • Calcific Achilles tendinitis 11/05/2018   • Thrombocytopenia ([facility]) 07/24/2017   • Pure hypercholesterolemia 08/23/2016   • Spinal stenosis, lumbar    • Colon polyps    • Kidney cysts    • Pancreas cyst    • Atherosclerosis of native coronary artery of native heart without angina pectoris 05/15/2013       Current Outpatient Medications   Medication Sig Dispense Refill   • vitamin D3 (CHOLECALCIFEROL) 1000 Unit (25 mcg) Tab Take 2 Tablets by mouth every day. 100 Tablet 3   • VYNDAMAX 61 MG Cap TAKE 1 CAPSULE BY MOUTH  DAILY 90 Capsule 1   • aspirin 81 MG EC tablet Take 81 mg by mouth.     • torsemide (DEMADEX) 5 MG Tab Take 1 tablet by mouth every day. 90 tablet 3   • atorvastatin (LIPITOR) 40 MG Tab Take 1 tablet by mouth at bedtime. 90 tablet 3   • vitamin A 3 mg (20538 units) capsule Take 10,000 Units by mouth every day.     • Glucosamine-Chondroitin 750-600 MG Tab Take 1 Tab by mouth 2 Times a Day. 60 Tab 0   • Probiotic Product (ALIGN) Cap Take 1 Cap by mouth every day. 90 Cap 0   • VITAMIN B COMPLEX-C PO Take  by mouth.     • Multiple Vitamins-Minerals (CENTRUM SILVER PO) Take  by mouth.     • Omega-3 Fatty Acids (SALMON OIL PO) Take 2 Caps by mouth every day.     • coenzyme Q-10 30 MG capsule Take 30 mg by mouth every day. Twice a day       No current facility-administered medications for this visit.            Current supplements as per medication list.       Allergies: Avelox [moxifloxacin hcl], Demerol, Azithromycin, Cefdinir, Ceftin [cefuroxime axetil], Oxycodone, and Scopolamine    Current social contact/activities:  Social with friends and family..    He  reports that he has never smoked. He has never used smokeless tobacco. He reports current alcohol use. He reports that he does not use drugs.  Counseling given: Not  Answered        DPA/Advanced Directive: Completed.  Available in epic      ROS:    Gait: Uses : None  Ostomy: No  Other tubes: no   Amputations: no   Chronic oxygen use: no   Last eye exam: Less than 1 year ago  : Denies any urinary leakage during the last 6 months incontinence.       Screening:  Colonoscopy 3/21/17 [place] (Dr Domingo) repeat in 5 yrs Dexa 12/17/20 osteopenia PSA  9/9/21  0.55  [facility]      Depression Screening  Little interest or pleasure in doing things?  0 - not at all  Feeling down, depressed , or hopeless? 0 - not at all  Patient Health Questionnaire Score: 0     If depressive symptoms identified deferred to follow up visit unless specifically addressed in assessment and plan.    Interpretation of PHQ-9 Total Score   Score Severity   1-4 No Depression   5-9 Mild Depression   10-14 Moderate Depression   15-19 Moderately Severe Depression   20-27 Severe Depression    Screening for Cognitive Impairment  Three Minute Recall (daughter, heaven, mountain) 3/3    Marcelo clock face with all 12 numbers and set the hands to show 10 past 11.  Yes    Cognitive concerns identified deferred for follow up unless specifically addressed in assessment and plan.    Fall Risk Assessment  Has the patient had two or more falls in the last year or any fall with injury in the last year?  No    Safety Assessment  Throw rugs on floor.  No  Handrails on all stairs.  No  Good lighting in all hallways.  Yes  Difficulty hearing.  No  Patient counseled about all safety risks that were identified.    Functional Assessment ADLs  Are there any barriers preventing you from cooking for yourself or meeting nutritional needs?  No.    Are there any barriers preventing you from driving safely or obtaining transportation?  No.    Are there any barriers preventing you from using a telephone or calling for help?  No.    Are there any barriers preventing you from shopping?  No.    Are there any barriers  preventing you from taking care of your own finances?  No.    Are there any barriers preventing you from managing your medications?  No.    Are there any barriers preventing you from showering, bathing or dressing yourself?  No.    Are you currently engaging in any exercise or physical activity?  Yes.  walking  What is your perception of your health?  Good.      Health Maintenance Summary          Annual Wellness Visit (Every 366 Days) Next due on 4/1/2023 03/31/2022  Visit Dx: Medicare annual wellness visit, initial    12/11/2020  Visit Dx: Medicare annual wellness visit, subsequent    11/14/2019  Visit Dx: Medicare annual wellness visit, subsequent    11/14/2019  Subsequent Annual Wellness Visit - Includes PPPS ()    10/23/2018  Visit Dx: Medicare annual wellness visit, subsequent    Only the first 5 history entries have been loaded, but more history exists.          COLORECTAL CANCER SCREENING (COLONOSCOPY - Every 10 Years) Next due on 3/21/2027    03/21/2017  COLONOSCOPY (Done)    03/21/2017  REFERRAL TO GI FOR COLONOSCOPY    05/08/2012  COLONOSCOPY (Done)          IMM DTaP/Tdap/Td Vaccine (3 - Td or Tdap) Next due on 3/18/2031    03/18/2021  Imm Admin: Tdap Vaccine    08/06/2014  Imm Admin: Tdap Vaccine    06/02/2010  Imm Admin: TD Vaccine    12/10/1999  Imm Admin: TD Vaccine          IMM HEP B VACCINE (Series Information) Aged Out    07/18/2000  Imm Admin: Hepatitis B Vaccine Adolescent/Pediatric    07/18/2000  Imm Admin: Hepatitis B Vaccine (Adol/Adult)    01/13/2000  Imm Admin: Hepatitis B Vaccine Adolescent/Pediatric    01/13/2000  Imm Admin: Hepatitis B Vaccine (Adol/Adult)    12/16/1999  Imm Admin: Hepatitis B Vaccine Adolescent/Pediatric    Only the first 5 history entries have been loaded, but more history exists.          IMM MENINGOCOCCAL VACCINE (MCV4) (Series Information) Aged Out    08/06/2014  Imm Admin: Meningococcal Conjugate Vaccine MCV4 (Menactra)    08/06/2014  Imm Admin:  Meningococcal Polysaccharide Vaccine MPSV4 - HISTORICAL DATA    09/23/2004  Imm Admin: Meningococcal Polysaccharide Vaccine MPSV4 - HISTORICAL DATA    12/10/1999  Imm Admin: Meningococcal Polysaccharide Vaccine MPSV4 - HISTORICAL DATA          IMM PNEUMOCOCCAL VACCINE: 65+ Years (Series Information) Completed    02/04/2015  Imm Admin: Pneumococcal Conjugate Vaccine (Prevnar/PCV-13)    01/17/2013  Imm Admin: Pneumococcal polysaccharide vaccine (PPSV-23)    07/25/2006  Imm Admin: Pneumococcal polysaccharide vaccine (PPSV-23)          IMM ZOSTER VACCINES (Series Information) Completed    07/31/2018  Imm Admin: Zoster Vaccine Recombinant (RZV) (SHINGRIX)    04/09/2018  Imm Admin: Zoster Vaccine Recombinant (RZV) (SHINGRIX)    11/14/2006  Imm Admin: Zoster Vaccine Live (ZVL) (Zostavax) - HISTORICAL DATA    11/14/2005  Imm Admin: Zoster Vaccine Live (ZVL) (Zostavax) - HISTORICAL DATA          COVID-19 Vaccine (Series Information) Completed    09/16/2021  Imm Admin: Pfizer SARS-CoV-2 Vaccine    02/05/2021  Imm Admin: Pfizer SARS-CoV-2 Vaccine    01/14/2021  Imm Admin: Pfizer SARS-CoV-2 Vaccine          IMM INFLUENZA (Series Information) Completed    10/14/2021  Imm Admin: Influenza Vaccine Adult HD    11/02/2020  Imm Admin: Influenza Vaccine Adult HD    09/04/2019  Imm Admin: Influenza Vaccine Adult HD    08/31/2018  Imm Admin: Influenza Vaccine Adult HD    09/06/2017  Imm Admin: Influenza Vaccine Adult HD    Only the first 5 history entries have been loaded, but more history exists.                Patient Care Team:  Azar Cavazos M.D. as PCP - General (Internal Medicine)  Sanchez Fonseca M.D. (Urology)  [facility]  as Respiratory Therapist  Jaime Raman M.D. as Cardiologist (Cardiovascular Disease (Cardiology))        Social History     Tobacco Use   • Smoking status: Never Smoker   • Smokeless tobacco: Never Used   Vaping Use   • Vaping Use: Never used   Substance Use Topics   • Alcohol use: Yes      "Comment: Social    • Drug use: No     Family History   Problem Relation Age of Onset   • Heart Attack Father 38        doctor in 1946 diagnosed him with MI at home,  before going to the hospital   • Psychiatric Illness Mother         Alzheimers   • Hypertension Brother    • Stroke Brother    • Seizures Brother    • Sleep Apnea Neg Hx      He  has a past medical history of Amyloidosis (HCC), BPH (benign prostatic hyperplasia), Cardiac amyloidosis (HCC), Chickenpox, Colon polyps, Coronary atherosclerosis of native coronary artery (05/15/2013), Coronary heart disease, Dyslipidemia (2016), H/O urethral stricture, Kidney cysts, Meniscus tear, Pancreas cyst, Scarlet fever, Sinusitis, chronic, Spinal stenosis, lumbar, SVT (supraventricular tachycardia) (HCC) (10/23/2018), and Thrombocytopenia (HCC) (2017).   Past Surgical History:   Procedure Laterality Date   • KNEE RECONSTRUCTION      left, partial. multiple previous surgeries.    • ROTATOR CUFF REPAIR      c/b right biceps tendon rupture, presumably repaired.    • ANKLE FUSION Left    • ARTHROSCOPY, KNEE     • HEMORRHOIDECTOMY     • SINUSCOPE     • SINUSOTOMIES      Dr. Guerrier, total of 4 surgeries   • TRIGGER FINGER RELEASE      x 2       Exam:     /74 (BP Location: Left arm, Patient Position: Sitting, BP Cuff Size: Adult)   Pulse 60   Temp 36.4 °C (97.5 °F) (Temporal)   Resp 12   Ht 1.753 m (5' 9\")   Wt 72.1 kg (159 lb)   SpO2 99%  Body mass index is 23.48 kg/m².    Hearing good.    Dentition good  Alert, oriented in no acute distress.  Eye contact is good, speech goal directed, affect calm  General:   No distress.  Normal appearing.  HEENT: Pupils are equal.  Conjunctiva is normal.  Head is normal appearing.  Right ear canal is obstructed with cerumen.  Oral cavity is pink and moist without lesion.  Neck: Supple without JVD or bruit.  Thyroid is not enlarged.  Pulmonary: Clear with good breath sounds.  Cardiovascular regular rate " and rhythm.  No murmur auscultated.  Carotid, radial and pedal pulses are intact.  Abdomen: Soft, nontender, nondistended.  Normal bowel sounds.  Organs are not enlarged.  Neurologic: Cranial nerves intact.  Strength and sensation are normal.  Normal patellar reflex.  Skin: No obvious lesions  Lymph: No supraclavicular, axillary, abdominal or inguinal adenopathy noted.  Shotty cervical adenopathy noted      Assessment and Plan. The following treatment and monitoring plan is recommended:    1. Medicare annual wellness visit, initial     2. Cardiac amyloidosis (HCC)     3. Thrombocytopenia (HCC)  CBC WITH DIFFERENTIAL   4. Elevated liver function tests  EBV PCR SERUM,PLASMA,CSF    CMV By PCR,Qual   5. Elevated LDH  CBC WITH DIFFERENTIAL   6. Benign prostatic hyperplasia with urinary obstruction     7. Pure hypercholesterolemia     8. Other mononeuropathy - right second digit     9. Pancreas cyst     10. Benign prostatic hyperplasia with nocturia     11. Chronic diastolic congestive heart failure (HCC)     12. Atherosclerosis of native coronary artery of native heart without angina pectoris         Very healthy 81-year-old male.    Treatment plan for amyloidosis is to continue with Vydamax.  He is closely followed by treatment team.  Cardiac issues appear stable including no progression of heart failure or other functional issues with the heart.  No obvious ischemic changes.  He is on appropriate treatment including statin and aspirin.    He has had recent changes in his CBC including leukocytosis, lymphocytosis and thrombocytopenia.  Cannot exclude myelodysplastic syndrome.  He has been seen by hematology who felt he may have early stages of CLL.  At this time the plan is to continue to monitor his CBC for changes.  Refer back to hematology if indicated    BPH is chronic but stable.  Plan at this time is to monitor for change in symptoms.  Consider pharmacotherapy if symptoms should worsen.    Lipids are at goal.  No  change in treatment.    Unclear cause of his finger numbness.  I suspect peripheral issue.  We discussed that if the symptoms should persist or worsen that the neck step would be EMG.    Pancreatic cyst remained stable.  Continue surveillance with MRI every 1- 2 years.    We will continue to monitor his liver enzymes.  His trend is improving.  The next step would be imaging of the liver with either MRI or ultrasound.     Services suggested: No services required at this time  Health Care Screening: Age-appropriate preventive services Medicare covers discussed today and ordered if indicated.  Referrals offered: Community-based lifestyle interventions to reduce health risks and promote self-management and wellness, fall prevention, nutrition, physical activity, tobacco-use cessation, weight loss, and mental health services as per orders if indicated.    Discussion today about general wellness and lifestyle habits:    · Prevent falls and reduce trip hazards; Cautioned about securing or removing rugs.  · Have a working fire alarm and carbon monoxide detector;   · Engage in regular physical activity and social activities       Follow-up: Patient will follow up for removal of cerumen.  Continue lab surveillance.  Regular checkup in 3 months

## 2022-04-06 LAB
DIAGNOSTIC IMP SPEC-IMP: NOT DETECTED
EBV DNA # SPEC NAA+PROBE: <390 CPY/ML
EBV DNA SPEC NAA+PROBE-LOG#: <2.6 LOG
SPECIMEN SOURCE: NORMAL

## 2022-04-07 LAB
CMV DNA SPEC QL NAA+PROBE: NOT DETECTED
SPECIMEN SOURCE: NORMAL

## 2022-04-18 ENCOUNTER — OFFICE VISIT (OUTPATIENT)
Dept: INTERNAL MEDICINE | Facility: IMAGING CENTER | Age: 82
End: 2022-04-18
Payer: MEDICARE

## 2022-04-18 ENCOUNTER — HOSPITAL ENCOUNTER (OUTPATIENT)
Facility: MEDICAL CENTER | Age: 82
End: 2022-04-18
Attending: INTERNAL MEDICINE
Payer: MEDICARE

## 2022-04-18 VITALS
DIASTOLIC BLOOD PRESSURE: 80 MMHG | OXYGEN SATURATION: 98 % | RESPIRATION RATE: 12 BRPM | SYSTOLIC BLOOD PRESSURE: 130 MMHG | HEART RATE: 60 BPM | TEMPERATURE: 97.3 F

## 2022-04-18 DIAGNOSIS — R74.9 ABNORMAL SERUM ENZYME LEVEL, UNSPECIFIED: ICD-10-CM

## 2022-04-18 DIAGNOSIS — R79.89 ELEVATED LIVER FUNCTION TESTS: ICD-10-CM

## 2022-04-18 DIAGNOSIS — H61.23 BILATERAL IMPACTED CERUMEN: ICD-10-CM

## 2022-04-18 DIAGNOSIS — M25.50 ARTHRALGIA, UNSPECIFIED JOINT: ICD-10-CM

## 2022-04-18 LAB
ALBUMIN SERPL BCP-MCNC: 4.2 G/DL (ref 3.2–4.9)
ALP SERPL-CCNC: 102 U/L (ref 30–99)
ALT SERPL-CCNC: 32 U/L (ref 2–50)
AST SERPL-CCNC: 48 U/L (ref 12–45)
BILIRUB CONJ SERPL-MCNC: <0.2 MG/DL (ref 0.1–0.5)
BILIRUB INDIRECT SERPL-MCNC: ABNORMAL MG/DL (ref 0–1)
BILIRUB SERPL-MCNC: 0.6 MG/DL (ref 0.1–1.5)
PROT SERPL-MCNC: 7 G/DL (ref 6–8.2)

## 2022-04-18 PROCEDURE — 99212 OFFICE O/P EST SF 10 MIN: CPT | Mod: 25 | Performed by: INTERNAL MEDICINE

## 2022-04-18 PROCEDURE — 80076 HEPATIC FUNCTION PANEL: CPT

## 2022-04-18 PROCEDURE — 69210 REMOVE IMPACTED EAR WAX UNI: CPT | Performed by: INTERNAL MEDICINE

## 2022-04-18 NOTE — PROGRESS NOTES
Chief Complaint   Patient presents with   • Cerumen Impaction       HISTORY OF THE PRESENT ILLNESS: Patient is a 81 y.o. male.     Patient comes in for bilateral cerumen impaction.  We discussed the impaction at his physical approximately 3 weeks ago.  He traveled to [place].  Now he is back he has noticed some change in hearing and is interested in evaluation and removal if still present.    We also reviewed his recent laboratory.  His liver function tests are returning to normal.  His CBC is improved also.  He tested negative for CMV and Steve-Barr.    It was discussed with the patient that they have a cerumen impaction. The risks and benefits of removal were discussed. Specifically we discussed the benefits of reduced risk of infection, improved visualization of the landmarks and to improve discomfort due to  impaction. Risk were discussed and included but not limited to infection, bleeding, pain and possibly tympanic membrane perforation. A large cerumen plug was removed from each ear canal. The ear was lavaged with water and forceps were used to remove the plug. No complications from some impaction removal. Post cerumen impaction care was discussed. Encouraged regular cleaning but to avoid deep penetration with foreign objects such as a Q-tip.        Allergies: Avelox [moxifloxacin hcl], Demerol, Azithromycin, Cefdinir, Ceftin [cefuroxime axetil], Oxycodone, and Scopolamine    Current Outpatient Medications Ordered in Epic   Medication Sig Dispense Refill   • vitamin D3 (CHOLECALCIFEROL) 1000 Unit (25 mcg) Tab Take 2 Tablets by mouth every day. 100 Tablet 3   • VYNDAMAX 61 MG Cap TAKE 1 CAPSULE BY MOUTH  DAILY 90 Capsule 1   • aspirin 81 MG EC tablet Take 81 mg by mouth.     • torsemide (DEMADEX) 5 MG Tab Take 1 tablet by mouth every day. 90 tablet 3   • atorvastatin (LIPITOR) 40 MG Tab Take 1 tablet by mouth at bedtime. 90 tablet 3   • vitamin A 3 mg (52141 units) capsule Take 10,000 Units by mouth every day.      • Glucosamine-Chondroitin 750-600 MG Tab Take 1 Tab by mouth 2 Times a Day. 60 Tab 0   • Probiotic Product (ALIGN) Cap Take 1 Cap by mouth every day. 90 Cap 0   • VITAMIN B COMPLEX-C PO Take  by mouth.     • Multiple Vitamins-Minerals (CENTRUM SILVER PO) Take  by mouth.     • Omega-3 Fatty Acids (SALMON OIL PO) Take 2 Caps by mouth every day.     • coenzyme Q-10 30 MG capsule Take 30 mg by mouth every day. Twice a day       No current [facility]-ordered facility-administered medications on file.       Past medical history, social history and family history were reviewed from chart today    Review of systems: Per HPI.    All others negative.     Exam:/80 (BP Location: Left arm, Patient Position: Sitting, BP Cuff Size: Adult)   Pulse 60   Temp 36.3 °C (97.3 °F) (Temporal)   Resp 12   SpO2 98%     Gen: Well-developed. No signs of distress.  HEENT: Grossly normal.  Bilateral ear canals were obstructed with gray/yellow cerumen.  After removal both canals were patent.  Tympanic membranes were normal in appearance with normal landmarks.        Diagnosis:  1. Elevated liver function tests  HEPATIC FUNCTION PANEL    GAMMA GT (GGT)   2. Abnormal serum enzyme level, unspecified   GAMMA GT (GGT)   3. Bilateral impacted cerumen         Cerumen removal.  No complication.  Post care instructions given above.  Repeat liver function test.    My total time spent caring for the patient on the day of the encounter was  greater than 20 minutes.   This includes obtaining history, reviewing chart, physical exam, patient education, cerumen extraction., placing orders, interpreting tests and coordinating care.

## 2022-04-19 RX ORDER — CELECOXIB 200 MG/1
200 CAPSULE ORAL
Qty: 30 CAPSULE | Refills: 0 | Status: SHIPPED | OUTPATIENT
Start: 2022-04-19 | End: 2022-06-24 | Stop reason: SDUPTHER

## 2022-04-22 RX ORDER — AMOXICILLIN AND CLAVULANATE POTASSIUM 875; 125 MG/1; MG/1
1 TABLET, FILM COATED ORAL 2 TIMES DAILY
Qty: 20 TABLET | Refills: 0 | Status: SHIPPED | OUTPATIENT
Start: 2022-04-22 | End: 2022-05-31

## 2022-05-31 ENCOUNTER — OFFICE VISIT (OUTPATIENT)
Dept: CARDIOLOGY | Facility: MEDICAL CENTER | Age: 82
End: 2022-05-31
Payer: MEDICARE

## 2022-05-31 VITALS
WEIGHT: 159 LBS | SYSTOLIC BLOOD PRESSURE: 138 MMHG | OXYGEN SATURATION: 98 % | RESPIRATION RATE: 14 BRPM | DIASTOLIC BLOOD PRESSURE: 82 MMHG | BODY MASS INDEX: 23.55 KG/M2 | HEART RATE: 60 BPM | HEIGHT: 69 IN

## 2022-05-31 DIAGNOSIS — E78.5 DYSLIPIDEMIA: ICD-10-CM

## 2022-05-31 DIAGNOSIS — I43 CARDIAC AMYLOIDOSIS (HCC): ICD-10-CM

## 2022-05-31 DIAGNOSIS — I25.10 ATHEROSCLEROSIS OF NATIVE CORONARY ARTERY OF NATIVE HEART WITHOUT ANGINA PECTORIS: ICD-10-CM

## 2022-05-31 DIAGNOSIS — E85.4 CARDIAC AMYLOIDOSIS (HCC): ICD-10-CM

## 2022-05-31 DIAGNOSIS — R74.8 ABNORMAL LEVELS OF OTHER SERUM ENZYMES: ICD-10-CM

## 2022-05-31 DIAGNOSIS — I50.32 CHRONIC DIASTOLIC CONGESTIVE HEART FAILURE (HCC): ICD-10-CM

## 2022-05-31 DIAGNOSIS — I47.19 AVNRT (AV NODAL RE-ENTRY TACHYCARDIA) (HCC): ICD-10-CM

## 2022-05-31 DIAGNOSIS — I47.10 SVT (SUPRAVENTRICULAR TACHYCARDIA) (HCC): ICD-10-CM

## 2022-05-31 DIAGNOSIS — R79.89 ELEVATED LIVER FUNCTION TESTS: ICD-10-CM

## 2022-05-31 DIAGNOSIS — R93.1 AGATSTON CAC SCORE, >400: ICD-10-CM

## 2022-05-31 DIAGNOSIS — D69.6 THROMBOCYTOPENIA (HCC): ICD-10-CM

## 2022-05-31 DIAGNOSIS — R74.8 ELEVATED ALKALINE PHOSPHATASE LEVEL: ICD-10-CM

## 2022-05-31 DIAGNOSIS — E78.00 PURE HYPERCHOLESTEROLEMIA: ICD-10-CM

## 2022-05-31 PROCEDURE — 99214 OFFICE O/P EST MOD 30 MIN: CPT | Performed by: INTERNAL MEDICINE

## 2022-05-31 RX ORDER — FAMOTIDINE 40 MG/1
40 TABLET, FILM COATED ORAL
COMMUNITY
Start: 2022-04-26 | End: 2022-05-31

## 2022-05-31 RX ORDER — TORSEMIDE 5 MG/1
5 TABLET ORAL
Qty: 90 TABLET | Refills: 3 | Status: SHIPPED | OUTPATIENT
Start: 2022-05-31 | End: 2022-06-20

## 2022-05-31 RX ORDER — PREDNISONE 5 MG/1
TABLET ORAL
COMMUNITY
Start: 2022-04-25 | End: 2022-05-31

## 2022-05-31 RX ORDER — SODIUM FLUORIDE 6 MG/ML
PASTE, DENTIFRICE DENTAL
COMMUNITY
Start: 2022-04-20

## 2022-05-31 RX ORDER — ATORVASTATIN CALCIUM 40 MG/1
40 TABLET, FILM COATED ORAL
Qty: 90 TABLET | Refills: 3 | Status: SHIPPED | OUTPATIENT
Start: 2022-05-31 | End: 2022-06-21

## 2022-05-31 RX ORDER — AZELASTINE 1 MG/ML
1 SPRAY, METERED NASAL 2 TIMES DAILY
COMMUNITY
Start: 2022-04-26 | End: 2023-04-22 | Stop reason: SDUPTHER

## 2022-05-31 RX ORDER — MONTELUKAST SODIUM 10 MG/1
10 TABLET ORAL
COMMUNITY
Start: 2022-04-27 | End: 2022-05-31

## 2022-05-31 ASSESSMENT — FIBROSIS 4 INDEX: FIB4 SCORE: 4.92

## 2022-05-31 NOTE — PROGRESS NOTES
"    Cardiology Follow-up Consultation Note    Date of note:  5/31/2022  Primary Care Provider: Azar Cavazos M.D.  Referring Provider: Jose Holman M.D.     Patient Name: Andre Martinez   YOB: 1940  MRN:              [id number]    Chief Complaint: Cardiac Amyloidosis.     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 Seen by Dr. Pierson at [place] in [place], and diagnosed with TTR amyloid with fat pad biopsy confirmation.   I have included her summary below:    \"The patient is a delightful 78-year-old who was diagnosed with transthyretin amyloidosis based on a positive PYP scan done at [facility] in January of 2019. He has mildly abnormal serum free light chains, currently kappa 2.83, lambda 1.35 mg/dL with a ratio of 2.10, but there was no monoclonal protein in the serum or in the urine. Of note, he had laser enucleation of the prostate performed 11/13/2018 at [facility], and we will request that that tissue be reviewed for amyloid. He also had a fat aspirate done yesterday. Subsequent to our visit, fat aspirate results returned and are positive for amyloid.     He came to attention due to a preoperative evaluation for prostate surgery. He had a CT calcium score done due to a family history of possible coronary artery disease (sudden death in his father at age 38) in 2013 with a score in the 600s; he was asymptomatic at that time. October 2017, had a stress echocardiogram and underwent almost 12 minutes without any evidence of ischemia. It was noted that he had " increased left ventricular wall thicknesses at that time. A repeat coronary calcium score November 28 showed left main 0, , left circumflex 165, right coronary artery 2093 with a total score of 3091. His cardiologist at that time recommended a PET perfusion study which was done 12/28/2018. The ejection fraction at rest was 30% and increased to 47% (however, his echocardiogram clearly shows normal resting ejection fraction). Perfusion was normal. He did have an episode that night while he was exercising where he developed chest discomfort and rapid palpitations. Emergency room evaluation showed junctional tachycardia which resolved spontaneously. The troponin was slightly elevated with a troponin I of 0.07 ng/L (normal less than 0.04). It was felt that this episode of tachycardia may have been related to the earlier stress test.     He saw Dr. Schreiber in January 2019 for preoperative evaluation prior to surgical intervention for urethral stricture. A transthoracic echocardiogram was performed primarily to reassess the left ventricular ejection fraction which was felt to possibly have been incorrect on the PET scan. The echocardiogram was interpreted as consistent with possible cardiac amyloidosis, left ventricular size was normal, ejection fraction calculated at 54%, but to my review is in the range of 60% to 65%. There are no regional wall motion abnormalities. Moderately increased left ventricular wall thicknesses, the basal septum and posterior wall measured 16/14 mm. The left ventricular mass index was elevated at 158 g/m2. Strain was abnormal with an apical sparing pattern and an overall value of -14%, mildly thickened aortic valve with mild regurgitation, mild dilatation of the sinuses of Valsalva (37 mm). The diastolic filling pattern suggested elevated filling pressure. Left atrial volume was moderately enlarged at 42 mL/m2. The right atrium was described as severely enlarged, to my review I would  "consider it to be moderately enlarged. Right ventricular size is normal with definite increase in right ventricular wall thickness. Right ventricular systolic function appeared normal, although tissue Doppler was slightly low. The inferior vena cava was of normal size with normal inspiratory collapse. The stroke volume index was 35 mL/m2. Cardiac index was reduced at 1.81, but the heart rate was 52 beats per minute.     He had a PYP scan performed on 2019. I reviewed the images and agree with the report. The H/CL ratio which is 1.8, consistent with TTR amyloid. The SPECT images were not available to me, but gave a myocardial score of 2 which is consistent with moderate uptake.     He has never had any significant symptoms. He exercises 1-2 hours per day with aerobic activity with no limiting dyspnea. No orthopnea, paroxysmal nocturnal dyspnea, no edema. No syncope. No palpitations other than the event noted after his PET stress test. No chest pain. As mentioned, his father  suddenly at age 38 of a presumed cardiac event. There is no known family history otherwise of unexplained heart failure or of peripheral neuropathy.     He has had 2 trigger finger releases on the left within the past 10 years. No known carpal tunnel syndrome and no history of spinal stenosis. He does have a history of multiple orthopedic issues as outlined below. This includes the unexpected finding of a right biceps tendon rupture at the time of rotator cuff surgery several years ago.   \"    Continues to exercise aerobically a couple hours a day without symptoms.       At our visit, 2019:  In terms of diastolic heart failure, now off torsemide due to urinary frequency.  Weight up 4-5 pounds. Exercising without difficulty, ran up three flights of stairs today no problem.     In terms of TTR amyloid, prostate biopsy and genetic testing confirmed wild type aTTR amyloid.  He was recently seen at [place] by Dr. Herrera 2019. Prior " "to that visit, he was started on tafamidis by [facility]. Unfortunately unable to qualify for further experimental trials due to thrombocytopenia based on a recent evaluation at [place] as well.     In terms of hypertension, mostly not checking BP at home. Well controlled. Sometimes in the 130s.     At our visit, 3/10/2020:  In terms of CHF, taking torsemide 5mg PO daily. Took 1 week snow-shoeing in the [place] without symptoms.     For his amyloid, he remains on tafamidis. He is followed by Dr. Herrera at [place] and is being consider for other studies as well if his NT-proBNP becomes elevated.     At our visit,  11/17/2020:  Seen At [facility] in September, starting a trial of Vutrisiran, (an RNA silencer drug). Helios-B study.     At our visit, 5/19/2021:  Walking frequently without symptoms, 5-6 miles a day.     Remains in Helios-B study.     At our visit, 11/9/2021:  Did have a \"pause\" for a couple minutes in the middle of hiking where he had no energy. Stopped and this resolved. Did not feel it was his heart racing.     Labs reviewed from [place], NT proBNP is 406, stable.     Interim Events:  In terms of SVT, no further palpitations or fast heart beats.     In terms of hypertension, well controlled.     In terms of heart failure, stable symptoms, no LE swelling or orthopnea.     Finished a [place] trip for a month.       ROS  Constitution: Negative for chills, fever and night sweats.   HENT: Negative for nosebleeds.    Eyes: Negative for vision loss in left eye and vision loss in right eye.   Respiratory: Negative for hemoptysis.    Gastrointestinal: Negative for hematemesis, hematochezia and melena.   Genitourinary: Negative for hematuria.   Neurological: Negative for focal weakness, numbness and paresthesias.     All other systems reviewed and discussed using a comprehensive questionnaire and are negative.       Past Medical History:   Diagnosis Date   • Amyloidosis (HCC)     TTR, type pending.  "   • BPH (benign prostatic hyperplasia)     s/p laser encleation of the prostate   • Cardiac amyloidosis (HCC)    • Chickenpox    • Colon polyps    • Coronary atherosclerosis of native coronary artery 05/15/2013    Coronary calcium score in the 3000s, negative PET scan and no symptoms so treated medically.    • Coronary heart disease    • Dyslipidemia 08/23/2016   • H/O urethral stricture     s/p surgical repair 1/15/2019, [place]   • Kidney cysts    • Meniscus tear    • Pancreas cyst    • Scarlet fever    • Sinusitis, chronic    • Spinal stenosis, lumbar    • SVT (supraventricular tachycardia) ([facility]) 10/23/2018   • Thrombocytopenia ([facility]) 7/24/2017       Past Surgical History:   Procedure Laterality Date   • KNEE RECONSTRUCTION  2012    left, partial. multiple previous surgeries.    • ROTATOR CUFF REPAIR  2010    c/b right biceps tendon rupture, presumably repaired.    • ANKLE FUSION Left    • ARTHROSCOPY, KNEE     • HEMORRHOIDECTOMY     • SINUSCOPE     • SINUSOTOMIES      Dr. Guerrier, total of 4 surgeries   • TRIGGER FINGER RELEASE      x 2         Current Outpatient Medications   Medication Sig Dispense Refill   • azelastine (ASTELIN) 137 MCG/SPRAY nasal spray 1 Spray 2 times a day.     • celecoxib (CELEBREX) 200 MG Cap Take 1 Capsule by mouth 1 time a day as needed. 30 Capsule 0   • vitamin D3 (CHOLECALCIFEROL) 1000 Unit (25 mcg) Tab Take 2 Tablets by mouth every day. 100 Tablet 3   • VYNDAMAX 61 MG Cap TAKE 1 CAPSULE BY MOUTH  DAILY 90 Capsule 1   • aspirin 81 MG EC tablet Take 81 mg by mouth.     • torsemide (DEMADEX) 5 MG Tab Take 1 tablet by mouth every day. 90 tablet 3   • atorvastatin (LIPITOR) 40 MG Tab Take 1 tablet by mouth at bedtime. 90 tablet 3   • vitamin A 3 mg (87825 units) capsule Take 10,000 Units by mouth every day.     • Glucosamine-Chondroitin 750-600 MG Tab Take 1 Tab by mouth 2 Times a Day. 60 Tab 0   • Probiotic Product (ALIGN) Cap Take 1 Cap by mouth every day. 90 Cap 0   • VITAMIN B COMPLEX-C  PO Take  by mouth.     • Multiple Vitamins-Minerals (CENTRUM SILVER PO) Take  by mouth.     • Omega-3 Fatty Acids (SALMON OIL PO) Take 2 Caps by mouth every day.     • coenzyme Q-10 30 MG capsule Take 30 mg by mouth every day. Twice a day     • PREVIDENT 5000 BOOSTER PLUS 1.1 % Paste USE AS DIRECTED       No current facility-administered medications for this visit.         Allergies   Allergen Reactions   • Avelox [Moxifloxacin Hcl] Rash     Chest, face, genitals   • Demerol Unspecified     Vasovagal reaction (drop in BP & pulse)   • Azithromycin Itching   • Cefdinir Rash     Rash chest, under arm and scalp     • Ceftin [Cefuroxime Axetil] Rash     Chest, under arm & scalp   • Oxycodone Itching     Full body itching   • Scopolamine Unspecified     disorientation         Family History   Problem Relation Age of Onset   • Heart Attack Father 38        doctor in 1946 diagnosed him with MI at home,  before going to the hospital   • Psychiatric Illness Mother         Alzheimers   • Hypertension Brother    • Stroke Brother    • Seizures Brother    • Sleep Apnea Neg Hx          Social History     Socioeconomic History   • Marital status:      Spouse name: Not on file   • Number of children: Not on file   • Years of education: Not on file   • Highest education level: Not on file   Occupational History   • Not on file   Tobacco Use   • Smoking status: Never Smoker   • Smokeless tobacco: Never Used   Vaping Use   • Vaping Use: Never used   Substance and Sexual Activity   • Alcohol use: Yes     Comment: Social    • Drug use: No   • Sexual activity: Not on file   Other Topics Concern   • Not on file   Social History Narrative    Retired from real estate      Social Determinants of Health     Financial Resource Strain: Not on file   Food Insecurity: Not on file   Transportation Needs: Not on file   Physical Activity: Not on file   Stress: Not on file   Social Connections: Not on file   Intimate Partner Violence: Not  "on file   Housing Stability: Not on file         Physical Exam:  Ambulatory Vitals  /82 (BP Location: Left arm, Patient Position: Sitting, BP Cuff Size: Adult)   Pulse 60   Resp 14   Ht 1.753 m (5' 9\")   Wt 72.1 kg (159 lb)   SpO2 98%    Oxygen Therapy:  Pulse Oximetry: 98 %  BP Readings from Last 4 Encounters:   05/31/22 138/82   04/18/22 130/80   03/31/22 124/74   02/18/22 124/70       Weight/BMI: Body mass index is 23.48 kg/m².  Wt Readings from Last 4 Encounters:   05/31/22 72.1 kg (159 lb)   03/31/22 72.1 kg (159 lb)   02/10/22 73.9 kg (163 lb)   11/09/21 71.7 kg (158 lb)       General: No apparent distress  Eyes: nl conjunctiva  ENT: OP covered by mask.   Neck: JVP 4-5 cm H2O, no carotid bruits  Lungs: normal respiratory effort, CTAB  Heart: RRR, + S4, no murmurs, no rubs, 1+ edema bilateral lower extremities. No LV/RV heave on cardiac palpatation. Sustained diffuse, displaced PMI. 2+ bilateral radial pulses.    Abdomen: soft, non tender, non distended, no masses, normal bowel sounds.  No HSM.  Extremities/MSK: no clubbing, no cyanosis  Neurological: No focal sensory deficits  Psychiatric: Appropriate affect, A/O x 3, intact judgement and insight  Skin: Warm extremities    Exam repeated in full and unchanged except for as noted above.        Lab Data Review:  Lab Results   Component Value Date/Time    CHOLSTRLTOT 134 02/22/2022 08:40 AM    LDL 49 02/22/2022 08:40 AM    HDL 79 02/22/2022 08:40 AM    TRIGLYCERIDE 30 02/22/2022 08:40 AM       Lab Results   Component Value Date/Time    SODIUM 140 02/22/2022 08:40 AM    POTASSIUM 5.2 02/22/2022 08:40 AM    CHLORIDE 105 02/22/2022 08:40 AM    CO2 27 02/22/2022 08:40 AM    GLUCOSE 77 02/22/2022 08:40 AM    BUN 20 02/22/2022 08:40 AM    CREATININE 1.07 02/22/2022 08:40 AM     Lab Results   Component Value Date/Time    ALKPHOSPHAT 102 (H) 04/18/2022 08:45 AM    ASTSGOT 48 (H) 04/18/2022 08:45 AM    ALTSGPT 32 04/18/2022 08:45 AM    TBILIRUBIN 0.6 04/18/2022 " "08:45 AM      Lab Results   Component Value Date/Time    WBC 8.7 03/31/2022 10:00 AM     No components found for: HBGA1C  No components found for: TROPONIN  No results found for: BNP      Cardiac Imaging and Procedures Review:    Exercise stress echocardiogram, 10/24/2017: Patient did 11 minutes 59 seconds corresponding with 12.8 metabolic equivalents on the Kael protocol achieving 92% of his age-predicted maximum heart rate with normal systolic augmentation regionally, negative for ischemia.  He also had a stress echocardiogram in 2013 that also was negative for ischemia.     Echo 3/24/2022:    Final Impressions   1. Research Transthoracic Echocardiogram performed per protocol IRB#[id number].   2. Moderately increased concentric left ventricular wall thickness.   3. Calculated 2-D linear left ventricular ejection fraction 57%.   4. Global averaged left ventricular longitudinal peak systolic strain is abnormal at -14% (normal = more negative than -18%).   5. Normal right ventricular chamber size.   6. Mildly reduced right ventricular systolic function.   7. Averaged right ventricular free wall longitudinal peak systolic strain is -20% (normal = more negative than -24%).   8. Estimated right ventricular systolic pressure 33 mmHg (systolic blood pressure 157 mmHg).   9. Mild-moderate mitral valve regurgitation. 2 jets.   10. Trivial tricuspid valve regurgitation.   11. Normal inferior vena cava size with normal inspiratory collapse (>50%).   12. Tiny circumferential pericardial effusion.        CT coronary calcium scan, 11/16/2018:  \"Coronary calcification:  LMA - 0.0  LCX - 165.1  LAD - 833.2  RCA - 2093.2  PDA - 0.0  Calcium score:  3091.5\"     PET myocardial perfusion imaging, 12/20/2018, images and report reviewed, my interpretation: Demonstrates systolic dysfunction with a resting ejection fraction of 36% and no territorial ischemia nor transient ischemic dilation.  Left ventricle was borderline dilated.     EKG, " 12/20/2018, tracings and report reviewed, mitral rotation: Documents supraventricular tachycardia with a fairly pronounced pseudo-R prime in V1 consistent with AVNRT       TTE 6/2020 [facility]:      Radiology test Review:  CXR: 12/2018  No pulmonary infiltrates or consolidations are noted.  No pleural effusion. No pneumothorax.  Normal cardiopericardial silhouette.       Medical Decision Making:  # Wild Type TTR Amyloid with Cardiac involvement - asymptomatic with NYHA Class I symptoms. He was started by Dr. Pierson at [facility] on tafamadis, and has seen Dr. Herrera from [place] as well as a cardiologist at [place]. Started Helios-B study in 10/2020 with Dr. Pierson. He is euvolemic today.   -continue torsemide 5mg PO daily. Weight stable at 157 pounds (at home).   -f/u at [place] as planned  -continue tafamidis.  Will check CBC and NT-proBNP at least Q6 months, and close to Q3 months as this might change his management options.   -continue Helios-B study.     # Agatston CAC score, >400  Asymptomatic with excellent exercise tolerance and multiple negative stress tests. Normal LVEF based on last echo performed at [place].   -continue aspirin 81mg PO daily  -lipitor 40mg PO Daily  -discussed ED precautions    #  AVNRT (AV billy re-entry tachycardia) (HCC)  No evidence of atrial fibrillation/flutter at this time although certainly high risk.  He will let me know if any symptoms develop, and he may be a candidate for empiric anticoagulation given his high CVA risk.     # Pure hypercholesterolemia  Continue lipitor.    # Thrombocytopenia. - mild. monitored by study. Has seen hematology, no need for biopsy at this time.     # Leukocytosis - has resolved.     Return in about 6 months (around 11/30/2022).      Jaime Raman MD, [facility]  [facility]  [address] [place] [address], [address] [address]  Phone: [phone]  Fax: [phone]

## 2022-06-01 ENCOUNTER — NON-PROVIDER VISIT (OUTPATIENT)
Dept: INTERNAL MEDICINE | Facility: IMAGING CENTER | Age: 82
End: 2022-06-01
Payer: MEDICARE

## 2022-06-01 ENCOUNTER — APPOINTMENT (RX ONLY)
Dept: URBAN - METROPOLITAN AREA CLINIC 4 | Facility: CLINIC | Age: 82
Setting detail: DERMATOLOGY
End: 2022-06-01

## 2022-06-01 ENCOUNTER — HOSPITAL ENCOUNTER (OUTPATIENT)
Facility: MEDICAL CENTER | Age: 82
End: 2022-06-01
Attending: INTERNAL MEDICINE
Payer: MEDICARE

## 2022-06-01 DIAGNOSIS — L82.1 OTHER SEBORRHEIC KERATOSIS: ICD-10-CM

## 2022-06-01 DIAGNOSIS — R79.89 ELEVATED LIVER FUNCTION TESTS: ICD-10-CM

## 2022-06-01 DIAGNOSIS — D18.0 HEMANGIOMA: ICD-10-CM

## 2022-06-01 DIAGNOSIS — L81.4 OTHER MELANIN HYPERPIGMENTATION: ICD-10-CM

## 2022-06-01 DIAGNOSIS — R74.8 ABNORMAL LEVELS OF OTHER SERUM ENZYMES: ICD-10-CM

## 2022-06-01 DIAGNOSIS — R74.8 ELEVATED ALKALINE PHOSPHATASE LEVEL: ICD-10-CM

## 2022-06-01 PROBLEM — D18.01 HEMANGIOMA OF SKIN AND SUBCUTANEOUS TISSUE: Status: ACTIVE | Noted: 2022-06-01

## 2022-06-01 PROCEDURE — 99213 OFFICE O/P EST LOW 20 MIN: CPT

## 2022-06-01 PROCEDURE — 84080 ASSAY ALKALINE PHOSPHATASES: CPT

## 2022-06-01 PROCEDURE — 82977 ASSAY OF GGT: CPT

## 2022-06-01 PROCEDURE — 80076 HEPATIC FUNCTION PANEL: CPT

## 2022-06-01 PROCEDURE — ? COUNSELING

## 2022-06-01 PROCEDURE — 84075 ASSAY ALKALINE PHOSPHATASE: CPT

## 2022-06-01 ASSESSMENT — LOCATION SIMPLE DESCRIPTION DERM
LOCATION SIMPLE: RIGHT UPPER BACK
LOCATION SIMPLE: LEFT UPPER BACK
LOCATION SIMPLE: LEFT LOWER BACK

## 2022-06-01 ASSESSMENT — LOCATION DETAILED DESCRIPTION DERM
LOCATION DETAILED: LEFT MEDIAL UPPER BACK
LOCATION DETAILED: RIGHT SUPERIOR UPPER BACK
LOCATION DETAILED: LEFT SUPERIOR MEDIAL MIDBACK

## 2022-06-01 ASSESSMENT — LOCATION ZONE DERM: LOCATION ZONE: TRUNK

## 2022-06-02 LAB
ALBUMIN SERPL BCP-MCNC: 4.4 G/DL (ref 3.2–4.9)
ALP SERPL-CCNC: 94 U/L (ref 30–99)
ALT SERPL-CCNC: 26 U/L (ref 2–50)
AST SERPL-CCNC: 43 U/L (ref 12–45)
BILIRUB CONJ SERPL-MCNC: <0.2 MG/DL (ref 0.1–0.5)
BILIRUB INDIRECT SERPL-MCNC: NORMAL MG/DL (ref 0–1)
BILIRUB SERPL-MCNC: 1 MG/DL (ref 0.1–1.5)
GGT SERPL-CCNC: 66 U/L (ref 7–51)
PROT SERPL-MCNC: 7.3 G/DL (ref 6–8.2)

## 2022-06-03 LAB
ALP BONE SERPL-CCNC: 43 U/L (ref 0–55)
ALP ISOS SERPL HS-CCNC: 0 U/L
ALP LIVER SERPL-CCNC: 56 U/L (ref 0–94)
ALP SERPL-CCNC: 99 U/L (ref 40–120)

## 2022-06-12 DIAGNOSIS — G89.29 CHRONIC LOW BACK PAIN WITHOUT SCIATICA, UNSPECIFIED BACK PAIN LATERALITY: ICD-10-CM

## 2022-06-12 DIAGNOSIS — M54.50 CHRONIC LOW BACK PAIN WITHOUT SCIATICA, UNSPECIFIED BACK PAIN LATERALITY: ICD-10-CM

## 2022-06-20 DIAGNOSIS — E78.5 DYSLIPIDEMIA: ICD-10-CM

## 2022-06-20 DIAGNOSIS — I25.10 ATHEROSCLEROSIS OF NATIVE CORONARY ARTERY OF NATIVE HEART WITHOUT ANGINA PECTORIS: ICD-10-CM

## 2022-06-20 DIAGNOSIS — E78.00 PURE HYPERCHOLESTEROLEMIA: ICD-10-CM

## 2022-06-20 DIAGNOSIS — I47.10 SVT (SUPRAVENTRICULAR TACHYCARDIA) (HCC): ICD-10-CM

## 2022-06-21 RX ORDER — TORSEMIDE 5 MG/1
5 TABLET ORAL
Qty: 90 TABLET | Refills: 3 | Status: SHIPPED | OUTPATIENT
Start: 2022-06-21 | End: 2022-12-01

## 2022-06-21 RX ORDER — ATORVASTATIN CALCIUM 40 MG/1
TABLET, FILM COATED ORAL
Qty: 90 TABLET | Refills: 3 | Status: SHIPPED | OUTPATIENT
Start: 2022-06-21 | End: 2022-12-01

## 2022-06-24 ENCOUNTER — PATIENT MESSAGE (OUTPATIENT)
Dept: INTERNAL MEDICINE | Facility: IMAGING CENTER | Age: 82
End: 2022-06-24
Payer: MEDICARE

## 2022-06-24 DIAGNOSIS — M25.50 ARTHRALGIA, UNSPECIFIED JOINT: ICD-10-CM

## 2022-06-24 RX ORDER — CELECOXIB 200 MG/1
200 CAPSULE ORAL
Qty: 30 CAPSULE | Refills: 0 | Status: SHIPPED | OUTPATIENT
Start: 2022-06-24 | End: 2022-08-09 | Stop reason: SDUPTHER

## 2022-06-24 RX ORDER — AMOXICILLIN 500 MG/1
CAPSULE ORAL
Qty: 20 CAPSULE | Refills: 0 | Status: SHIPPED | OUTPATIENT
Start: 2022-06-24 | End: 2023-02-03

## 2022-06-27 RX ORDER — TAFAMIDIS 61 MG/1
CAPSULE, LIQUID FILLED ORAL
Qty: 30 CAPSULE | Refills: 2 | Status: SHIPPED | OUTPATIENT
Start: 2022-06-27 | End: 2022-09-16

## 2022-08-09 DIAGNOSIS — M25.50 ARTHRALGIA, UNSPECIFIED JOINT: ICD-10-CM

## 2022-08-09 RX ORDER — CELECOXIB 200 MG/1
200 CAPSULE ORAL
Qty: 30 CAPSULE | Refills: 0 | Status: SHIPPED | OUTPATIENT
Start: 2022-08-09 | End: 2023-02-03 | Stop reason: SDUPTHER

## 2022-09-16 RX ORDER — TAFAMIDIS 61 MG/1
CAPSULE, LIQUID FILLED ORAL
Qty: 30 CAPSULE | Refills: 2 | Status: SHIPPED | OUTPATIENT
Start: 2022-09-16 | End: 2022-12-14

## 2022-10-15 ENCOUNTER — HOSPITAL ENCOUNTER (OUTPATIENT)
Dept: RADIOLOGY | Facility: MEDICAL CENTER | Age: 82
End: 2022-10-15
Attending: NEUROLOGICAL SURGERY
Payer: MEDICARE

## 2022-10-15 DIAGNOSIS — M79.621 PAIN OF RIGHT UPPER ARM: ICD-10-CM

## 2022-10-15 DIAGNOSIS — R20.0 NUMBNESS OF UPPER LIMB: ICD-10-CM

## 2022-10-15 PROCEDURE — A9576 INJ PROHANCE MULTIPACK: HCPCS | Performed by: NEUROLOGICAL SURGERY

## 2022-10-15 PROCEDURE — 700117 HCHG RX CONTRAST REV CODE 255: Performed by: NEUROLOGICAL SURGERY

## 2022-10-15 PROCEDURE — 72156 MRI NECK SPINE W/O & W/DYE: CPT

## 2022-10-15 RX ADMIN — GADOTERIDOL 15 ML: 279.3 INJECTION, SOLUTION INTRAVENOUS at 14:00

## 2022-10-17 ENCOUNTER — HOSPITAL ENCOUNTER (OUTPATIENT)
Facility: MEDICAL CENTER | Age: 82
End: 2022-10-17
Attending: INTERNAL MEDICINE
Payer: MEDICARE

## 2022-10-17 ENCOUNTER — NON-PROVIDER VISIT (OUTPATIENT)
Dept: INTERNAL MEDICINE | Facility: IMAGING CENTER | Age: 82
End: 2022-10-17
Payer: MEDICARE

## 2022-10-17 DIAGNOSIS — R35.1 NOCTURIA: ICD-10-CM

## 2022-10-17 DIAGNOSIS — R79.89 ELEVATED LIVER FUNCTION TESTS: ICD-10-CM

## 2022-10-17 DIAGNOSIS — E85.4 CARDIAC AMYLOIDOSIS (HCC): ICD-10-CM

## 2022-10-17 DIAGNOSIS — N40.1 BENIGN PROSTATIC HYPERPLASIA WITH URINARY OBSTRUCTION: ICD-10-CM

## 2022-10-17 DIAGNOSIS — D69.6 THROMBOCYTOPENIA (HCC): ICD-10-CM

## 2022-10-17 DIAGNOSIS — E78.5 HYPERLIPIDEMIA, UNSPECIFIED HYPERLIPIDEMIA TYPE: ICD-10-CM

## 2022-10-17 DIAGNOSIS — R73.01 ELEVATED FASTING GLUCOSE: ICD-10-CM

## 2022-10-17 DIAGNOSIS — I43 CARDIAC AMYLOIDOSIS (HCC): ICD-10-CM

## 2022-10-17 DIAGNOSIS — N13.8 BENIGN PROSTATIC HYPERPLASIA WITH URINARY OBSTRUCTION: ICD-10-CM

## 2022-10-17 DIAGNOSIS — Z23 NEED FOR INFLUENZA VACCINATION: ICD-10-CM

## 2022-10-17 DIAGNOSIS — R79.89 ABNORMAL CBC: ICD-10-CM

## 2022-10-17 LAB
APPEARANCE UR: CLEAR
BILIRUB UR QL STRIP.AUTO: NEGATIVE
COLOR UR: YELLOW
GLUCOSE UR STRIP.AUTO-MCNC: NEGATIVE MG/DL
KETONES UR STRIP.AUTO-MCNC: NEGATIVE MG/DL
LEUKOCYTE ESTERASE UR QL STRIP.AUTO: NEGATIVE
MICRO URNS: NORMAL
NITRITE UR QL STRIP.AUTO: NEGATIVE
PH UR STRIP.AUTO: 7.5 [PH] (ref 5–8)
PROT UR QL STRIP: NEGATIVE MG/DL
RBC UR QL AUTO: NEGATIVE
SP GR UR STRIP.AUTO: 1.02
UROBILINOGEN UR STRIP.AUTO-MCNC: 0.2 MG/DL

## 2022-10-17 PROCEDURE — 80053 COMPREHEN METABOLIC PANEL: CPT

## 2022-10-17 PROCEDURE — 84153 ASSAY OF PSA TOTAL: CPT

## 2022-10-17 PROCEDURE — 81003 URINALYSIS AUTO W/O SCOPE: CPT

## 2022-10-17 PROCEDURE — 83036 HEMOGLOBIN GLYCOSYLATED A1C: CPT

## 2022-10-17 PROCEDURE — G0008 ADMIN INFLUENZA VIRUS VAC: HCPCS | Performed by: INTERNAL MEDICINE

## 2022-10-17 PROCEDURE — 90662 IIV NO PRSV INCREASED AG IM: CPT | Performed by: INTERNAL MEDICINE

## 2022-10-17 PROCEDURE — 85025 COMPLETE CBC W/AUTO DIFF WBC: CPT

## 2022-10-17 PROCEDURE — 80061 LIPID PANEL: CPT

## 2022-10-18 LAB
ALBUMIN SERPL BCP-MCNC: 4.3 G/DL (ref 3.2–4.9)
ALBUMIN/GLOB SERPL: 1.7 G/DL
ALP SERPL-CCNC: 111 U/L (ref 30–99)
ALT SERPL-CCNC: 35 U/L (ref 2–50)
ANION GAP SERPL CALC-SCNC: 9 MMOL/L (ref 7–16)
AST SERPL-CCNC: 40 U/L (ref 12–45)
BASOPHILS # BLD AUTO: 0.5 % (ref 0–1.8)
BASOPHILS # BLD: 0.05 K/UL (ref 0–0.12)
BILIRUB SERPL-MCNC: 1 MG/DL (ref 0.1–1.5)
BUN SERPL-MCNC: 20 MG/DL (ref 8–22)
CALCIUM SERPL-MCNC: 9.9 MG/DL (ref 8.5–10.5)
CHLORIDE SERPL-SCNC: 103 MMOL/L (ref 96–112)
CHOLEST SERPL-MCNC: 145 MG/DL (ref 100–199)
CO2 SERPL-SCNC: 29 MMOL/L (ref 20–33)
CREAT SERPL-MCNC: 1.04 MG/DL (ref 0.5–1.4)
EOSINOPHIL # BLD AUTO: 0.41 K/UL (ref 0–0.51)
EOSINOPHIL NFR BLD: 4.4 % (ref 0–6.9)
ERYTHROCYTE [DISTWIDTH] IN BLOOD BY AUTOMATED COUNT: 51.6 FL (ref 35.9–50)
EST. AVERAGE GLUCOSE BLD GHB EST-MCNC: 123 MG/DL
GFR SERPLBLD CREATININE-BSD FMLA CKD-EPI: 72 ML/MIN/1.73 M 2
GLOBULIN SER CALC-MCNC: 2.5 G/DL (ref 1.9–3.5)
GLUCOSE SERPL-MCNC: 90 MG/DL (ref 65–99)
HBA1C MFR BLD: 5.9 % (ref 4–5.6)
HCT VFR BLD AUTO: 45.7 % (ref 42–52)
HDLC SERPL-MCNC: 87 MG/DL
HGB BLD-MCNC: 15.5 G/DL (ref 14–18)
IMM GRANULOCYTES # BLD AUTO: 0.02 K/UL (ref 0–0.11)
IMM GRANULOCYTES NFR BLD AUTO: 0.2 % (ref 0–0.9)
LDLC SERPL CALC-MCNC: 51 MG/DL
LYMPHOCYTES # BLD AUTO: 4.3 K/UL (ref 1–4.8)
LYMPHOCYTES NFR BLD: 46.5 % (ref 22–41)
MCH RBC QN AUTO: 34.1 PG (ref 27–33)
MCHC RBC AUTO-ENTMCNC: 33.9 G/DL (ref 33.7–35.3)
MCV RBC AUTO: 100.7 FL (ref 81.4–97.8)
MONOCYTES # BLD AUTO: 0.62 K/UL (ref 0–0.85)
MONOCYTES NFR BLD AUTO: 6.7 % (ref 0–13.4)
NEUTROPHILS # BLD AUTO: 3.84 K/UL (ref 1.82–7.42)
NEUTROPHILS NFR BLD: 41.7 % (ref 44–72)
NRBC # BLD AUTO: 0 K/UL
NRBC BLD-RTO: 0 /100 WBC
PLATELET # BLD AUTO: 158 K/UL (ref 164–446)
PMV BLD AUTO: 12 FL (ref 9–12.9)
POTASSIUM SERPL-SCNC: 4.7 MMOL/L (ref 3.6–5.5)
PROT SERPL-MCNC: 6.8 G/DL (ref 6–8.2)
PSA SERPL-MCNC: 0.69 NG/ML (ref 0–4)
RBC # BLD AUTO: 4.54 M/UL (ref 4.7–6.1)
SODIUM SERPL-SCNC: 141 MMOL/L (ref 135–145)
TRIGL SERPL-MCNC: 37 MG/DL (ref 0–149)
WBC # BLD AUTO: 9.2 K/UL (ref 4.8–10.8)

## 2022-10-20 ENCOUNTER — OFFICE VISIT (OUTPATIENT)
Dept: INTERNAL MEDICINE | Facility: IMAGING CENTER | Age: 82
End: 2022-10-20
Payer: MEDICARE

## 2022-10-20 VITALS
SYSTOLIC BLOOD PRESSURE: 120 MMHG | BODY MASS INDEX: 23.33 KG/M2 | RESPIRATION RATE: 12 BRPM | WEIGHT: 158 LBS | DIASTOLIC BLOOD PRESSURE: 70 MMHG | TEMPERATURE: 97.9 F | HEART RATE: 78 BPM | OXYGEN SATURATION: 97 %

## 2022-10-20 DIAGNOSIS — I50.32 CHRONIC DIASTOLIC CONGESTIVE HEART FAILURE (HCC): ICD-10-CM

## 2022-10-20 DIAGNOSIS — N40.1 BENIGN PROSTATIC HYPERPLASIA WITH URINARY OBSTRUCTION: ICD-10-CM

## 2022-10-20 DIAGNOSIS — R93.1 AGATSTON CAC SCORE, >400: ICD-10-CM

## 2022-10-20 DIAGNOSIS — D69.6 THROMBOCYTOPENIA (HCC): ICD-10-CM

## 2022-10-20 DIAGNOSIS — E85.4 CARDIAC AMYLOIDOSIS (HCC): ICD-10-CM

## 2022-10-20 DIAGNOSIS — N13.8 BENIGN PROSTATIC HYPERPLASIA WITH URINARY OBSTRUCTION: ICD-10-CM

## 2022-10-20 DIAGNOSIS — E78.5 HYPERLIPIDEMIA, UNSPECIFIED HYPERLIPIDEMIA TYPE: ICD-10-CM

## 2022-10-20 DIAGNOSIS — I43 CARDIAC AMYLOIDOSIS (HCC): ICD-10-CM

## 2022-10-20 PROCEDURE — 99215 OFFICE O/P EST HI 40 MIN: CPT | Performed by: INTERNAL MEDICINE

## 2022-10-20 RX ORDER — ATORVASTATIN CALCIUM 40 MG/1
1 TABLET, FILM COATED ORAL
COMMUNITY
Start: 2022-05-31 | End: 2022-12-01

## 2022-10-20 ASSESSMENT — FIBROSIS 4 INDEX: FIB4 SCORE: 3.47

## 2022-10-28 DIAGNOSIS — S16.1XXA STRAIN OF NECK MUSCLE, INITIAL ENCOUNTER: ICD-10-CM

## 2022-11-04 ENCOUNTER — PATIENT MESSAGE (OUTPATIENT)
Dept: HEALTH INFORMATION MANAGEMENT | Facility: OTHER | Age: 82
End: 2022-11-04

## 2022-11-06 NOTE — PROGRESS NOTES
Chief Complaint   Patient presents with    Heart Problem     Amyloid    Benign Prostatic Hypertrophy       HISTORY OF THE PRESENT ILLNESS: Patient is a 82 y.o. male.     Patient comes in for 6-month follow-up on his chronic issues.  He has been doing well.  He has been traveling.  He has been hiking extensively without issue.  Reports that his low back issues have been stable and his right knee pain is improved.  He is taking Celebrex as needed.    He has a history of wild-type amyloid cardiac disease as well as chronic diastolic dysfunction.  Patient reports that he has been doing well.  No edema.  No dyspnea with exertion (including with strenuous hiking), orthopnea or PND.  No lower extremity edema.  He remains on Vyndamax.  He is also on clinical trial medication.  He reports the trial is soon coming to an end.    He has a history of thrombocytopenia.  This is been stable.  This is presumably chronic idiopathic.    He has a history of BPH.  He has ongoing symptoms despite intervention.  Overall he is improved.  He is currently on no medications.  He previously did not respond.  His primary symptom is nocturia.    Lipids are stable on moderate/high dose Lipitor.  Lab Results   Component Value Date/Time    CHOLSTRLTOT 145 10/17/2022 08:30 AM    LDL 51 10/17/2022 08:30 AM    HDL 87 10/17/2022 08:30 AM    TRIGLYCERIDE 37 10/17/2022 08:30 AM         Allergies: Avelox [moxifloxacin hcl], Demerol, Levofloxacin, Azithromycin, Cefdinir, Ceftin [cefuroxime axetil], Oxycodone, and Scopolamine    Current Outpatient Medications Ordered in Epic   Medication Sig Dispense Refill    vitamin A 3 mg (92155 units) capsule Take 10,000 Units by mouth every day.      atorvastatin (LIPITOR) 40 MG Tab Take 1 Tablet by mouth.      VYNDAMAX 61 MG Cap TAKE 1 CAPSULE BY MOUTH  DAILY 30 Capsule 2    celecoxib (CELEBREX) 200 MG Cap Take 1 Capsule by mouth 1 time a day as needed for Moderate Pain. 30 Capsule 0    amoxicillin (AMOXIL) 500 MG  Cap Take 4 capsules 1 hour before dental procedure 20 Capsule 0    atorvastatin (LIPITOR) 40 MG Tab TAKE 1 TABLET BY MOUTH EVERYDAY AT BEDTIME 90 Tablet 3    torsemide (DEMADEX) 5 MG Tab TAKE 1 TABLET BY MOUTH EVERY DAY 90 Tablet 3    azelastine (ASTELIN) 137 MCG/SPRAY nasal spray 1 Spray 2 times a day.      PREVIDENT 5000 BOOSTER PLUS 1.1 % Paste USE AS DIRECTED      vitamin D3 (CHOLECALCIFEROL) 1000 Unit (25 mcg) Tab Take 2 Tablets by mouth every day. 100 Tablet 3    aspirin 81 MG EC tablet Take 81 mg by mouth.      vitamin A 3 mg (78794 units) capsule Take 10,000 Units by mouth every day.      Glucosamine-Chondroitin 750-600 MG Tab Take 1 Tab by mouth 2 Times a Day. 60 Tab 0    Probiotic Product (ALIGN) Cap Take 1 Cap by mouth every day. 90 Cap 0    VITAMIN B COMPLEX-C PO Take  by mouth.      Multiple Vitamins-Minerals (CENTRUM SILVER PO) Take  by mouth.      Omega-3 Fatty Acids (SALMON OIL PO) Take 2 Caps by mouth every day.      coenzyme Q-10 30 MG capsule Take 30 mg by mouth every day. Twice a day       No current [facility]-ordered facility-administered medications on file.       Past medical history, social history and family history were reviewed from chart today    Review of systems: Per HPI.    All others negative.     Exam: /70 (BP Location: Left arm, Patient Position: Sitting, BP Cuff Size: Adult)   Pulse 78   Temp 36.6 °C (97.9 °F) (Temporal)   Resp 12   Wt 71.7 kg (158 lb)   SpO2 97%   General: Well-appearing. Well-developed. No signs of distress.  HEENT: Grossly normal. Oral cavity is pink and moist.   Neck: Supple without JVD or bruit.  Pulmonary: Clear with good breath sounds. Normal effort.  Cardiovascular: Regular. Carotid and radial pulses are intact.  Abdomen: Soft, nontender, nondistended. Spleen and liver are not enlarged.  Neurologic: Cranial nerves II through XII are grossly normal, alert and oriented x3      Diagnosis:  1. Cardiac amyloidosis (HCC)        2. Thrombocytopenia (HCC)         3. Benign prostatic hyperplasia with urinary obstruction        4. Hyperlipidemia, unspecified hyperlipidemia type        5. Agatston CAC score, >400        6. Chronic diastolic congestive heart failure (HCC)            Patient is doing very well.  Chronic issues are stable.  Discussed [place] cancer screening test.  No change in medications.  Routine follow-up with cardiology as scheduled.    My total time spent caring for the patient on the day of the encounter was  greater than 40+ minutes.   This includes obtaining history, reviewing chart, physical exam, patient education, reviewing outside records, placing orders, interpreting tests and coordinating care.

## 2022-11-30 ENCOUNTER — APPOINTMENT (RX ONLY)
Dept: URBAN - METROPOLITAN AREA CLINIC 4 | Facility: CLINIC | Age: 82
Setting detail: DERMATOLOGY
End: 2022-11-30

## 2022-11-30 DIAGNOSIS — L82.1 OTHER SEBORRHEIC KERATOSIS: ICD-10-CM

## 2022-11-30 DIAGNOSIS — D18.0 HEMANGIOMA: ICD-10-CM

## 2022-11-30 DIAGNOSIS — L81.4 OTHER MELANIN HYPERPIGMENTATION: ICD-10-CM

## 2022-11-30 DIAGNOSIS — L30.9 DERMATITIS, UNSPECIFIED: ICD-10-CM | Status: RESOLVING

## 2022-11-30 PROBLEM — D18.01 HEMANGIOMA OF SKIN AND SUBCUTANEOUS TISSUE: Status: ACTIVE | Noted: 2022-11-30

## 2022-11-30 PROCEDURE — 99213 OFFICE O/P EST LOW 20 MIN: CPT

## 2022-11-30 PROCEDURE — ? TREATMENT REGIMEN

## 2022-11-30 PROCEDURE — ? COUNSELING

## 2022-11-30 ASSESSMENT — LOCATION DETAILED DESCRIPTION DERM
LOCATION DETAILED: SACRAL SPINE
LOCATION DETAILED: LEFT SUPERIOR MEDIAL MIDBACK
LOCATION DETAILED: RIGHT SUPERIOR UPPER BACK
LOCATION DETAILED: LEFT MEDIAL UPPER BACK

## 2022-11-30 ASSESSMENT — LOCATION SIMPLE DESCRIPTION DERM
LOCATION SIMPLE: LEFT LOWER BACK
LOCATION SIMPLE: LEFT UPPER BACK
LOCATION SIMPLE: LOWER BACK
LOCATION SIMPLE: RIGHT UPPER BACK

## 2022-11-30 ASSESSMENT — LOCATION ZONE DERM: LOCATION ZONE: TRUNK

## 2022-11-30 NOTE — PROCEDURE: TREATMENT REGIMEN
Plan: Patient has been applying cortisone cream he has at home. Rash appears to be resolving recommended for patient to call office if rash flares again.
Continue Regimen: Cortisone cream bid for 2 weeks/ prn.
Detail Level: Zone

## 2022-12-01 ENCOUNTER — OFFICE VISIT (OUTPATIENT)
Dept: CARDIOLOGY | Facility: MEDICAL CENTER | Age: 82
End: 2022-12-01
Payer: MEDICARE

## 2022-12-01 VITALS
WEIGHT: 157 LBS | SYSTOLIC BLOOD PRESSURE: 120 MMHG | HEART RATE: 58 BPM | HEIGHT: 69 IN | RESPIRATION RATE: 14 BRPM | OXYGEN SATURATION: 96 % | BODY MASS INDEX: 23.25 KG/M2 | DIASTOLIC BLOOD PRESSURE: 70 MMHG

## 2022-12-01 DIAGNOSIS — I47.19 AVNRT (AV NODAL RE-ENTRY TACHYCARDIA) (HCC): ICD-10-CM

## 2022-12-01 DIAGNOSIS — R93.1 AGATSTON CAC SCORE, >400: ICD-10-CM

## 2022-12-01 DIAGNOSIS — I43 CARDIAC AMYLOIDOSIS (HCC): ICD-10-CM

## 2022-12-01 DIAGNOSIS — I47.10 SVT (SUPRAVENTRICULAR TACHYCARDIA) (HCC): ICD-10-CM

## 2022-12-01 DIAGNOSIS — I25.10 ATHEROSCLEROSIS OF NATIVE CORONARY ARTERY OF NATIVE HEART WITHOUT ANGINA PECTORIS: ICD-10-CM

## 2022-12-01 DIAGNOSIS — E78.00 PURE HYPERCHOLESTEROLEMIA: ICD-10-CM

## 2022-12-01 DIAGNOSIS — I50.32 CHRONIC DIASTOLIC CONGESTIVE HEART FAILURE (HCC): ICD-10-CM

## 2022-12-01 DIAGNOSIS — D69.6 THROMBOCYTOPENIA (HCC): ICD-10-CM

## 2022-12-01 DIAGNOSIS — E78.5 DYSLIPIDEMIA: ICD-10-CM

## 2022-12-01 DIAGNOSIS — E85.4 CARDIAC AMYLOIDOSIS (HCC): ICD-10-CM

## 2022-12-01 PROCEDURE — 99214 OFFICE O/P EST MOD 30 MIN: CPT | Performed by: INTERNAL MEDICINE

## 2022-12-01 RX ORDER — TORSEMIDE 5 MG/1
5 TABLET ORAL
Qty: 100 TABLET | Refills: 3 | Status: SHIPPED | OUTPATIENT
Start: 2022-12-01 | End: 2023-04-26

## 2022-12-01 RX ORDER — ATORVASTATIN CALCIUM 40 MG/1
40 TABLET, FILM COATED ORAL
Qty: 100 TABLET | Refills: 3 | Status: SHIPPED | OUTPATIENT
Start: 2022-12-01 | End: 2023-08-15

## 2022-12-01 ASSESSMENT — FIBROSIS 4 INDEX: FIB4 SCORE: 3.47

## 2022-12-01 NOTE — PROGRESS NOTES
"    Cardiology Follow-up Consultation Note    Date of note:  12/1/2022  Primary Care Provider: Azar Cavazos M.D.  Referring Provider: Jose Holman M.D.     Patient Name: Andre Martinez   YOB: 1940  MRN:              [id number]    Chief Complaint: Cardiac Amyloidosis.     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 based on strain pattern. Seen by Dr. Pierson at [facility] in [place], and diagnosed with TTR amyloid with fat pad biopsy confirmation.   I have included her summary below:    \"The patient is a delightful 78-year-old who was diagnosed with transthyretin amyloidosis based on a positive PYP scan done at [facility] in January of 2019. He has mildly abnormal serum free light chains, currently kappa 2.83, lambda 1.35 mg/dL with a ratio of 2.10, but there was no monoclonal protein in the serum or in the urine. Of note, he had laser enucleation of the prostate performed 11/13/2018 at [facility], and we will request that that tissue be reviewed for amyloid. He also had a fat aspirate done yesterday. Subsequent to our visit, fat aspirate results returned and are positive for amyloid.     He came to attention due to a preoperative evaluation for prostate surgery. He had a CT calcium score done due to a family history of possible coronary artery disease (sudden death in his father at age 38) in 2013 with a score in the 600s; he was asymptomatic at that time. October 2017, had a stress echocardiogram and underwent almost 12 minutes without any evidence of ischemia. It was noted that he had " increased left ventricular wall thicknesses at that time. A repeat coronary calcium score November 28 showed left main 0, , left circumflex 165, right coronary artery 2093 with a total score of 3091. His cardiologist at that time recommended a PET perfusion study which was done 12/28/2018. The ejection fraction at rest was 30% and increased to 47% (however, his echocardiogram clearly shows normal resting ejection fraction). Perfusion was normal. He did have an episode that night while he was exercising where he developed chest discomfort and rapid palpitations. Emergency room evaluation showed junctional tachycardia which resolved spontaneously. The troponin was slightly elevated with a troponin I of 0.07 ng/L (normal less than 0.04). It was felt that this episode of tachycardia may have been related to the earlier stress test.     He saw Dr. Schreiber in January 2019 for preoperative evaluation prior to surgical intervention for urethral stricture. A transthoracic echocardiogram was performed primarily to reassess the left ventricular ejection fraction which was felt to possibly have been incorrect on the PET scan. The echocardiogram was interpreted as consistent with possible cardiac amyloidosis, left ventricular size was normal, ejection fraction calculated at 54%, but to my review is in the range of 60% to 65%. There are no regional wall motion abnormalities. Moderately increased left ventricular wall thicknesses, the basal septum and posterior wall measured 16/14 mm. The left ventricular mass index was elevated at 158 g/m2. Strain was abnormal with an apical sparing pattern and an overall value of -14%, mildly thickened aortic valve with mild regurgitation, mild dilatation of the sinuses of Valsalva (37 mm). The diastolic filling pattern suggested elevated filling pressure. Left atrial volume was moderately enlarged at 42 mL/m2. The right atrium was described as severely enlarged, to my review I would  "consider it to be moderately enlarged. Right ventricular size is normal with definite increase in right ventricular wall thickness. Right ventricular systolic function appeared normal, although tissue Doppler was slightly low. The inferior vena cava was of normal size with normal inspiratory collapse. The stroke volume index was 35 mL/m2. Cardiac index was reduced at 1.81, but the heart rate was 52 beats per minute.     He had a PYP scan performed on 2019. I reviewed the images and agree with the report. The H/CL ratio which is 1.8, consistent with TTR amyloid. The SPECT images were not available to me, but gave a myocardial score of 2 which is consistent with moderate uptake.     He has never had any significant symptoms. He exercises 1-2 hours per day with aerobic activity with no limiting dyspnea. No orthopnea, paroxysmal nocturnal dyspnea, no edema. No syncope. No palpitations other than the event noted after his PET stress test. No chest pain. As mentioned, his father  suddenly at age 38 of a presumed cardiac event. There is no known family history otherwise of unexplained heart failure or of peripheral neuropathy.     He has had 2 trigger finger releases on the left within the past 10 years. No known carpal tunnel syndrome and no history of spinal stenosis. He does have a history of multiple orthopedic issues as outlined below. This includes the unexpected finding of a right biceps tendon rupture at the time of rotator cuff surgery several years ago.   \"    Continues to exercise aerobically a couple hours a day without symptoms.       At our visit, 2019:  In terms of diastolic heart failure, now off torsemide due to urinary frequency.  Weight up 4-5 pounds. Exercising without difficulty, ran up three flights of stairs today no problem.     In terms of TTR amyloid, prostate biopsy and genetic testing confirmed wild type aTTR amyloid.  He was recently seen at [facility] by Dr. Herrera 2019. Prior " "to that visit, he was started on tafamidis by [facility]. Unfortunately unable to qualify for further experimental trials due to thrombocytopenia based on a recent evaluation at [place] as well.     In terms of hypertension, mostly not checking BP at home. Well controlled. Sometimes in the 130s.     At our visit, 3/10/2020:  In terms of CHF, taking torsemide 5mg PO daily. Took 1 week snow-shoeing in the [place] without symptoms.     For his amyloid, he remains on tafamidis. He is followed by Dr. Herrera at [place] and is being consider for other studies as well if his NT-proBNP becomes elevated.     At our visit,  11/17/2020:  Seen At [facility] in September, starting a trial of Vutrisiran, (an RNA silencer drug). Helios-B study.     At our visit, 5/19/2021:  Walking frequently without symptoms, 5-6 miles a day.     Remains in Helios-B study.     At our visit, 11/9/2021:  Did have a \"pause\" for a couple minutes in the middle of hiking where he had no energy. Stopped and this resolved. Did not feel it was his heart racing.     Labs reviewed from [place], NT proBNP is 406, stable.     At our visit, 5/31/2022:      Finished a [employer] trip for a month.     Interim Events:  In terms of SVT, no further palpitations or fast heart beats.     In terms of hypertension, well controlled.     In terms of heart failure, stable symptoms, no LE swelling or orthopnea.     Hiking frequently without symptoms.     Was at [facility] earlier this week.     Patient denies chest pain, palpitations, dyspnea on exertion, pre-syncope, syncope, lower extremity swelling, orthopnea, PND or recent weight gain.     ROS  Constitution: Negative for chills, fever and night sweats.   HENT: Negative for nosebleeds.    Eyes: Negative for vision loss in left eye and vision loss in right eye.   Respiratory: Negative for hemoptysis.    Gastrointestinal: Negative for hematemesis, hematochezia and melena.   Genitourinary: Negative for hematuria. "   Neurological: Negative for focal weakness, numbness and paresthesias.       Past Medical History:   Diagnosis Date    Amyloidosis (HCC)     TTR, type pending.     BPH (benign prostatic hyperplasia)     s/p laser encleation of the prostate    Cardiac amyloidosis (HCC)     Chickenpox     Colon polyps     Coronary atherosclerosis of native coronary artery 05/15/2013    Coronary calcium score in the 3000s, negative PET scan and no symptoms so treated medically.     Coronary heart disease     Dyslipidemia 08/23/2016    H/O urethral stricture     s/p surgical repair 1/15/2019, [facility]    Kidney cysts     Meniscus tear     Pancreas cyst     Scarlet fever     Sinusitis, chronic     Spinal stenosis, lumbar     SVT (supraventricular tachycardia) ([facility]) 10/23/2018    Thrombocytopenia ([facility]) 7/24/2017       Past Surgical History:   Procedure Laterality Date    KNEE RECONSTRUCTION  2012    left, partial. multiple previous surgeries.     ROTATOR CUFF REPAIR  2010    c/b right biceps tendon rupture, presumably repaired.     ANKLE FUSION Left     ARTHROSCOPY, KNEE      HEMORRHOIDECTOMY      SINUSCOPE      SINUSOTOMIES      Dr. Guerrier, total of 4 surgeries    TRIGGER FINGER RELEASE      x 2         Current Outpatient Medications   Medication Sig Dispense Refill    VYNDAMAX 61 MG Cap TAKE 1 CAPSULE BY MOUTH  DAILY 30 Capsule 2    celecoxib (CELEBREX) 200 MG Cap Take 1 Capsule by mouth 1 time a day as needed for Moderate Pain. 30 Capsule 0    amoxicillin (AMOXIL) 500 MG Cap Take 4 capsules 1 hour before dental procedure 20 Capsule 0    atorvastatin (LIPITOR) 40 MG Tab TAKE 1 TABLET BY MOUTH EVERYDAY AT BEDTIME 90 Tablet 3    torsemide (DEMADEX) 5 MG Tab TAKE 1 TABLET BY MOUTH EVERY DAY 90 Tablet 3    azelastine (ASTELIN) 137 MCG/SPRAY nasal spray 1 Spray 2 times a day.      PREVIDENT 5000 BOOSTER PLUS 1.1 % Paste USE AS DIRECTED      vitamin D3 (CHOLECALCIFEROL) 1000 Unit (25 mcg) Tab Take 2 Tablets by mouth every day. 100 Tablet 3     aspirin 81 MG EC tablet Take 81 mg by mouth.      vitamin A 3 mg (57604 units) capsule Take 10,000 Units by mouth every day.      Glucosamine-Chondroitin 750-600 MG Tab Take 1 Tab by mouth 2 Times a Day. 60 Tab 0    VITAMIN B COMPLEX-C PO Take  by mouth.      Multiple Vitamins-Minerals (CENTRUM SILVER PO) Take  by mouth.      Omega-3 Fatty Acids (SALMON OIL PO) Take 2 Caps by mouth every day.      coenzyme Q-10 30 MG capsule Take 30 mg by mouth every day. Twice a day      atorvastatin (LIPITOR) 40 MG Tab Take 1 Tablet by mouth. (Patient not taking: Reported on 2022)       No current facility-administered medications for this visit.         Allergies   Allergen Reactions    Avelox [Moxifloxacin Hcl] Rash     Chest, face, genitals    Demerol Unspecified     Vasovagal reaction (drop in BP & pulse)    Levofloxacin     Azithromycin Itching    Cefdinir Rash     Rash chest, under arm and scalp      Ceftin [Cefuroxime Axetil] Rash     Chest, under arm & scalp    Oxycodone Itching     Full body itching    Scopolamine Unspecified     disorientation         Family History   Problem Relation Age of Onset    Heart Attack Father 38        doctor in 1946 diagnosed him with MI at home,  before going to the hospital    Psychiatric Illness Mother         Alzheimers    Hypertension Brother     Stroke Brother     Seizures Brother     Sleep Apnea Neg Hx          Social History     Socioeconomic History    Marital status:      Spouse name: Not on file    Number of children: Not on file    Years of education: Not on file    Highest education level: Not on file   Occupational History    Not on file   Tobacco Use    Smoking status: Never    Smokeless tobacco: Never   Vaping Use    Vaping Use: Never used   Substance and Sexual Activity    Alcohol use: Yes     Comment: Social     Drug use: No    Sexual activity: Not on file   Other Topics Concern    Not on file   Social History Narrative    Retired from real estate      Social  "Determinants of Health     Financial Resource Strain: Not on file   Food Insecurity: Not on file   Transportation Needs: Not on file   Physical Activity: Not on file   Stress: Not on file   Social Connections: Not on file   Intimate Partner Violence: Not on file   Housing Stability: Not on file         Physical Exam:  Ambulatory Vitals  /70 (BP Location: Left arm, Patient Position: Sitting, BP Cuff Size: Adult)   Pulse (!) 58   Resp 14   Ht 1.753 m (5' 9\")   Wt 71.2 kg (157 lb)   SpO2 96%    Oxygen Therapy:  Pulse Oximetry: 96 %  BP Readings from Last 4 Encounters:   12/01/22 120/70   10/20/22 120/70   05/31/22 138/82   04/18/22 130/80       Weight/BMI: Body mass index is 23.18 kg/m².  Wt Readings from Last 4 Encounters:   12/01/22 71.2 kg (157 lb)   10/20/22 71.7 kg (158 lb)   05/31/22 72.1 kg (159 lb)   03/31/22 72.1 kg (159 lb)       General: No apparent distress  Eyes: nl conjunctiva  ENT: OP covered by mask.   Neck: JVP 5-6 cm H2O, no carotid bruits  Lungs: normal respiratory effort, CTAB  Heart: RRR, + S4, no murmurs, no rubs, 1-2+ edema bilateral lower extremities. No LV/RV heave on cardiac palpatation. Sustained diffuse, displaced PMI. 2+ bilateral radial pulses.    Abdomen: soft, non tender, non distended, no masses, normal bowel sounds.  No HSM.  Extremities/MSK: no clubbing, no cyanosis  Neurological: No focal sensory deficits  Psychiatric: Appropriate affect, A/O x 3, intact judgement and insight  Skin: Warm extremities    Exam repeated in full and unchanged except for as noted above.    Lab Data Review:  Lab Results   Component Value Date/Time    CHOLSTRLTOT 145 10/17/2022 08:30 AM    LDL 51 10/17/2022 08:30 AM    HDL 87 10/17/2022 08:30 AM    TRIGLYCERIDE 37 10/17/2022 08:30 AM       Lab Results   Component Value Date/Time    SODIUM 141 10/17/2022 08:30 AM    POTASSIUM 4.7 10/17/2022 08:30 AM    CHLORIDE 103 10/17/2022 08:30 AM    CO2 29 10/17/2022 08:30 AM    GLUCOSE 90 10/17/2022 08:30 AM "    BUN 20 10/17/2022 08:30 AM    CREATININE 1.04 10/17/2022 08:30 AM     Lab Results   Component Value Date/Time    ALKPHOSPHAT 111 (H) 10/17/2022 08:30 AM    ASTSGOT 40 10/17/2022 08:30 AM    ALTSGPT 35 10/17/2022 08:30 AM    TBILIRUBIN 1.0 10/17/2022 08:30 AM      Lab Results   Component Value Date/Time    WBC 9.2 10/17/2022 08:30 AM     No components found for: HBGA1C  No components found for: TROPONIN  No results found for: BNP      Cardiac Imaging and Procedures Review:    Exercise stress echocardiogram, 10/24/2017: Patient did 11 minutes 59 seconds corresponding with 12.8 metabolic equivalents on the Kael protocol achieving 92% of his age-predicted maximum heart rate with normal systolic augmentation regionally, negative for ischemia.  He also had a stress echocardiogram in 2013 that also was negative for ischemia.     Echo 3/24/2022:    Final Impressions   1. Research Transthoracic Echocardiogram performed per protocol IRB#[id number].   2. Moderately increased concentric left ventricular wall thickness.   3. Calculated 2-D linear left ventricular ejection fraction 57%.   4. Global averaged left ventricular longitudinal peak systolic strain is abnormal at -14% (normal = more negative than -18%).   5. Normal right ventricular chamber size.   6. Mildly reduced right ventricular systolic function.   7. Averaged right ventricular free wall longitudinal peak systolic strain is -20% (normal = more negative than -24%).   8. Estimated right ventricular systolic pressure 33 mmHg (systolic blood pressure 157 mmHg).   9. Mild-moderate mitral valve regurgitation. 2 jets.   10. Trivial tricuspid valve regurgitation.   11. Normal inferior vena cava size with normal inspiratory collapse (>50%).   12. Tiny circumferential pericardial effusion.      Echo 11/2022:  1. Research Transthoracic Echocardiogram performed per protocol IRB#[id number].  2. Findings consistent with cardiac amyloidosis.  3. Moderately increased concentric  "left ventricular wall thickness. Increased right ventricular wall thickness.  4. Normal left ventricular chamber size. Calculated biplane left ventricular ejection fraction 64%. No regional wall motion abnormalities.  5. Global averaged left ventricular longitudinal peak systolic strain is abnormal at -13% (normal = more negative than -18%) with a pattern of  relative apical sparing.  6. Mildly enlarged right ventricular chamber size with mildly reduced systolic function. Unable to estimate right ventricular systolic pressure due to  insufficient degree of tricuspid valve regurgitation.  7. Averaged right ventricular free wall longitudinal peak systolic strain is -17% (normal = more negative than -24%).  8. Thickened mitral valve with mild-moderate mitral valve regurgitation (multiple jets).  9. Mildly thickened aortic valve with mild central aortic valve regurgitation.  10. Normal inferior vena cava size with normal inspiratory collapse (>50%). Tiny localized pericardial effusion along the right ventricle.    CT coronary calcium scan, 11/16/2018:  \"Coronary calcification:  LMA - 0.0  LCX - 165.1  LAD - 833.2  RCA - 2093.2  PDA - 0.0  Calcium score:  3091.5\"     PET myocardial perfusion imaging, 12/20/2018, images and report reviewed, my interpretation: Demonstrates systolic dysfunction with a resting ejection fraction of 36% and no territorial ischemia nor transient ischemic dilation.  Left ventricle was borderline dilated.     EKG, 12/20/2018, tracings and report reviewed, mitral rotation: Documents supraventricular tachycardia with a fairly pronounced pseudo-R prime in V1 consistent with AVNRT       TTE 6/2020 [facility]:      Radiology test Review:  CXR: 12/2018  No pulmonary infiltrates or consolidations are noted.  No pleural effusion. No pneumothorax.  Normal cardiopericardial silhouette.       Medical Decision Making:  # Wild Type TTR Amyloid with Cardiac involvement - asymptomatic with NYHA Class I " symptoms. He was started by Dr. Pierson at [facility] on tafamadis, and has seen Dr. Herrera from [facility] as well as a cardiologist at [place]. Started Helios-B study in 10/2020 with Dr. Pierson. He is euvolemic today.   -continue torsemide 5mg PO daily. Weight stable at 157 pounds (at home).   -f/u at [place] as planned  -continue tafamidis.  Will check CBC and NT-proBNP at least Q6 months, and close to Q3 months as this might change his management options.   -continue Helios-B study.     # Agatston CAC score, >400  Asymptomatic with excellent exercise tolerance and multiple negative stress tests. Normal LVEF based on last echo performed at [place].   -continue aspirin 81mg PO daily  -lipitor 40mg PO Daily  -discussed ED precautions    #  AVNRT (AV billy re-entry tachycardia) (HCC)  No evidence of atrial fibrillation/flutter at this time although certainly high risk.  He will let me know if any symptoms develop, and he may be a candidate for empiric anticoagulation given his high CVA risk.     # Pure hypercholesterolemia  Continue lipitor.    # Thrombocytopenia. - mild. monitored by study. Has seen hematology, no need for biopsy at this time.     # Leukocytosis - has resolved.     Return in about 6 months (around 6/1/2023).      Jaime Raman MD, [facility]  [facility], [address]  [address] [address]  Phone: [phone]  Fax: [phone]

## 2022-12-14 RX ORDER — TAFAMIDIS 61 MG/1
CAPSULE, LIQUID FILLED ORAL
Qty: 90 CAPSULE | Refills: 3 | Status: SHIPPED | OUTPATIENT
Start: 2022-12-14 | End: 2023-11-14

## 2022-12-22 ENCOUNTER — OFFICE VISIT (OUTPATIENT)
Dept: INTERNAL MEDICINE | Facility: IMAGING CENTER | Age: 82
End: 2022-12-22
Payer: MEDICARE

## 2022-12-22 VITALS
DIASTOLIC BLOOD PRESSURE: 70 MMHG | SYSTOLIC BLOOD PRESSURE: 124 MMHG | RESPIRATION RATE: 12 BRPM | OXYGEN SATURATION: 99 % | HEART RATE: 63 BPM | TEMPERATURE: 97 F

## 2022-12-22 DIAGNOSIS — R59.1 LYMPHADENOPATHY OF HEAD AND NECK: ICD-10-CM

## 2022-12-22 DIAGNOSIS — S05.90XA EYE TRAUMA: ICD-10-CM

## 2022-12-22 PROCEDURE — 99213 OFFICE O/P EST LOW 20 MIN: CPT | Performed by: INTERNAL MEDICINE

## 2022-12-22 NOTE — PROGRESS NOTES
Chief Complaint   Patient presents with    Eye Injury       HISTORY OF THE PRESENT ILLNESS: Patient is a 82 y.o. male.     Patient comes in for evaluation of trauma to the left eye.  2 days ago he walked into a branch.  He feels that he got a scratch on the lateral part of the eye.  He felt it was swollen yesterday.  No visual change.  No discharge.  It is doing much better today.    We also discussed a lymph node on his left posterior neck.  This was noticed when he was at [facility].  Ultrasound confirmed a lymph node.  At that time he reports it was larger and tender.  It has decreased in size and is no longer tender.  No obvious source of infection or inflammation.      Allergies: Avelox [moxifloxacin hcl], Demerol, Levofloxacin, Azithromycin, Cefdinir, Ceftin [cefuroxime axetil], Oxycodone, and Scopolamine    Current Outpatient Medications Ordered in Epic   Medication Sig Dispense Refill    metroNIDAZOLE (FLAGYL) 500 MG Tab Take 1 Tablet by mouth 3 times a day. 30 Tablet 0    VYNDAMAX 61 MG Cap TAKE 1 CAPSULE BY MOUTH  DAILY 90 Capsule 3    torsemide (DEMADEX) 5 MG Tab Take 1 Tablet by mouth every day. 100 Tablet 3    atorvastatin (LIPITOR) 40 MG Tab Take 1 Tablet by mouth at bedtime. 100 Tablet 3    celecoxib (CELEBREX) 200 MG Cap Take 1 Capsule by mouth 1 time a day as needed for Moderate Pain. 30 Capsule 0    amoxicillin (AMOXIL) 500 MG Cap Take 4 capsules 1 hour before dental procedure 20 Capsule 0    azelastine (ASTELIN) 137 MCG/SPRAY nasal spray 1 Spray 2 times a day.      PREVIDENT 5000 BOOSTER PLUS 1.1 % Paste USE AS DIRECTED      vitamin D3 (CHOLECALCIFEROL) 1000 Unit (25 mcg) Tab Take 2 Tablets by mouth every day. 100 Tablet 3    aspirin 81 MG EC tablet Take 81 mg by mouth.      vitamin A 3 mg (06280 units) capsule Take 10,000 Units by mouth every day.      Glucosamine-Chondroitin 750-600 MG Tab Take 1 Tab by mouth 2 Times a Day. 60 Tab 0    VITAMIN B COMPLEX-C PO Take  by mouth.      Multiple  Vitamins-Minerals (CENTRUM SILVER PO) Take  by mouth.      Omega-3 Fatty Acids (SALMON OIL PO) Take 2 Caps by mouth every day.      coenzyme Q-10 30 MG capsule Take 30 mg by mouth every day. Twice a day       No current [facility]-ordered facility-administered medications on file.       Past medical history, social history and family history were reviewed from chart today    Review of systems: Per HPI.    All others negative.     Exam: /70 (BP Location: Left arm, Patient Position: Sitting, BP Cuff Size: Adult)   Pulse 63   Temp 36.1 °C (97 °F) (Temporal)   Resp 12   SpO2 99%   General: Well-appearing. Well-developed. No signs of distress.  HEENT: Grossly normal. Oral cavity is pink and moist.  Conjunctiva, sclera, iris and pupil are grossly normal.  Funduscopic examination is grossly normal.  Extraocular muscles are intact.  Neck: Normal appearance.  Trachea is midline.  Good carotid upstroke.  No murmur/bruit.  Left posterior neck with 2 palpable masses.  1 is soft, nonmobile and measures approximately 2 cm in the posterior left neck.  Immediately next to it is a similar mass measuring approximately 1 cm.  Pulmonary: Clear with good breath sounds. Normal effort.  Cardiovascular: Regular. Carotid and radial pulses are intact.  Abdomen: Soft, nontender, nondistended. Spleen and liver are not enlarged.  Neurologic: Cranial nerves II through XII are grossly normal, alert and oriented x3      Diagnosis:  1. Eye trauma        2. Lymphadenopathy of head and neck          Assessment/plan:    Eye trauma secondary to branch.  No abnormalities on exam.  He is doing better.  I recommend surveillance only.  If things should change then he should contact the office for possible antibiotic or referral to ophthalmology.    Patient with 2 enlarged lymph nodes in the left posterior neck.  One measures approximately 2 cm and the lymph node immediately below is approximately 1 cm.  Both are mobile, soft and nontender.  At this  time I recommend monitoring only.  If they persist in enlargement and I recommend CT of the neck with contrast    My total time spent caring for the patient on the day of the encounter was  greater than 20 minutes.   This includes obtaining history, reviewing chart, physical exam, patient education, reviewing outside records, placing orders, interpreting tests and coordinating care.

## 2023-01-03 ENCOUNTER — APPOINTMENT (OUTPATIENT)
Dept: INTERNAL MEDICINE | Facility: IMAGING CENTER | Age: 83
End: 2023-01-03
Payer: MEDICARE

## 2023-01-05 RX ORDER — METRONIDAZOLE 500 MG/1
500 TABLET ORAL 3 TIMES DAILY
Qty: 30 TABLET | Refills: 0 | Status: SHIPPED | OUTPATIENT
Start: 2023-01-05 | End: 2023-02-03

## 2023-01-09 ENCOUNTER — HOSPITAL ENCOUNTER (OUTPATIENT)
Facility: MEDICAL CENTER | Age: 83
End: 2023-01-09
Attending: INTERNAL MEDICINE
Payer: MEDICARE

## 2023-01-09 ENCOUNTER — NON-PROVIDER VISIT (OUTPATIENT)
Dept: INTERNAL MEDICINE | Facility: IMAGING CENTER | Age: 83
End: 2023-01-09
Payer: MEDICARE

## 2023-01-09 DIAGNOSIS — Z03.818 ENCOUNTER FOR OBSERVATION FOR SUSPECTED EXPOSURE TO OTHER BIOLOGICAL AGENTS RULED OUT: ICD-10-CM

## 2023-01-09 PROCEDURE — U0003 INFECTIOUS AGENT DETECTION BY NUCLEIC ACID (DNA OR RNA); SEVERE ACUTE RESPIRATORY SYNDROME CORONAVIRUS 2 (SARS-COV-2) (CORONAVIRUS DISEASE [COVID-19]), AMPLIFIED PROBE TECHNIQUE, MAKING USE OF HIGH THROUGHPUT TECHNOLOGIES AS DESCRIBED BY CMS-2020-01-R: HCPCS

## 2023-01-09 PROCEDURE — U0005 INFEC AGEN DETEC AMPLI PROBE: HCPCS

## 2023-01-10 LAB
SARS-COV-2 RNA RESP QL NAA+PROBE: NOTDETECTED
SPECIMEN SOURCE: NORMAL

## 2023-02-03 DIAGNOSIS — M25.50 ARTHRALGIA, UNSPECIFIED JOINT: ICD-10-CM

## 2023-02-03 RX ORDER — AMOXICILLIN AND CLAVULANATE POTASSIUM 875; 125 MG/1; MG/1
1 TABLET, FILM COATED ORAL 2 TIMES DAILY
Qty: 20 TABLET | Refills: 0 | Status: SHIPPED | OUTPATIENT
Start: 2023-02-03 | End: 2023-02-12

## 2023-02-05 RX ORDER — CELECOXIB 200 MG/1
200 CAPSULE ORAL
Qty: 30 CAPSULE | Refills: 0 | Status: SHIPPED | OUTPATIENT
Start: 2023-02-05 | End: 2023-02-08 | Stop reason: SDUPTHER

## 2023-02-06 ENCOUNTER — NON-PROVIDER VISIT (OUTPATIENT)
Dept: INTERNAL MEDICINE | Facility: IMAGING CENTER | Age: 83
End: 2023-02-06
Payer: MEDICARE

## 2023-02-06 DIAGNOSIS — R73.09 ELEVATED GLYCOHEMOGLOBIN: ICD-10-CM

## 2023-02-06 LAB
HBA1C MFR BLD: 5.5 % (ref ?–5.8)
POCT INT CON NEG: NEGATIVE
POCT INT CON POS: POSITIVE

## 2023-02-06 PROCEDURE — 83036 HEMOGLOBIN GLYCOSYLATED A1C: CPT | Performed by: INTERNAL MEDICINE

## 2023-02-08 DIAGNOSIS — M25.50 ARTHRALGIA, UNSPECIFIED JOINT: ICD-10-CM

## 2023-02-08 RX ORDER — CELECOXIB 200 MG/1
200 CAPSULE ORAL
Qty: 30 CAPSULE | Refills: 0 | Status: SHIPPED | OUTPATIENT
Start: 2023-02-08 | End: 2023-08-28 | Stop reason: SDUPTHER

## 2023-02-12 RX ORDER — SULFAMETHOXAZOLE AND TRIMETHOPRIM 800; 160 MG/1; MG/1
1 TABLET ORAL 2 TIMES DAILY
Qty: 14 TABLET | Refills: 0 | Status: SHIPPED | OUTPATIENT
Start: 2023-02-12 | End: 2023-04-19 | Stop reason: SDUPTHER

## 2023-04-15 ENCOUNTER — PATIENT MESSAGE (OUTPATIENT)
Dept: CARDIOLOGY | Facility: MEDICAL CENTER | Age: 83
End: 2023-04-15
Payer: MEDICARE

## 2023-04-19 ENCOUNTER — NON-PROVIDER VISIT (OUTPATIENT)
Dept: OCCUPATIONAL MEDICINE | Facility: CLINIC | Age: 83
End: 2023-04-19
Payer: MEDICARE

## 2023-04-19 RX ORDER — SULFAMETHOXAZOLE AND TRIMETHOPRIM 800; 160 MG/1; MG/1
1 TABLET ORAL 2 TIMES DAILY
Qty: 14 TABLET | Refills: 0 | Status: SHIPPED | OUTPATIENT
Start: 2023-04-19 | End: 2023-10-28 | Stop reason: SDUPTHER

## 2023-04-19 NOTE — PROGRESS NOTES
Travel dates: 5/12-6/14/23  Countries to be visited:  [place] and [facility]  Reason for travel:  pleasure. Will be traveling with his spouse across [facility], [place]. At this time the itinerary is unknown for [facility].    Rural travel: yes  High altitude travel: no    Accommodations:  Hotel: yes   Hostel:  Camping: yes, [facility]  Cruise:  With family:  Other:    Vaccines in past 30 days? No  Sick today? Feeling under the weather- body aches  Allergies: None    The screening intake form was reviewed with the traveler. Health risks associated with their travel plans have been reviewed and discussed with the traveler. The traveler has been provided with vaccine information statements for the vaccines that are recommended and given the opportunity to discuss risks and benefits of vaccination and or medications. The traveler has received education on the travel itinerary provided and on the following topics.    Personal safety precautions: Yes  Food and water precautions: Yes  Management of traveler's diarrhea: Yes  Mosquito/insect bite prevention:Yes  Animal bites/Rabies prevention: No  High altitude precautions: No      RN comments:       The patient stated that he is feeling under the weather with congestion and body aches. He will return next week for vaccinations.     Malaria prophylaxis recommended. Patient is aware of risks and benefits but declines medication at this time.  Pt states they will see their PCP for Rx.        Physician consultation required: no

## 2023-04-20 ENCOUNTER — PATIENT MESSAGE (OUTPATIENT)
Dept: CARDIOLOGY | Facility: MEDICAL CENTER | Age: 83
End: 2023-04-20
Payer: MEDICARE

## 2023-04-22 RX ORDER — AZELASTINE 1 MG/ML
1 SPRAY, METERED NASAL 2 TIMES DAILY
Qty: 30 ML | Refills: 6 | Status: SHIPPED | OUTPATIENT
Start: 2023-04-22 | End: 2023-05-15

## 2023-04-24 ENCOUNTER — NON-PROVIDER VISIT (OUTPATIENT)
Dept: INTERNAL MEDICINE | Facility: IMAGING CENTER | Age: 83
End: 2023-04-24
Payer: MEDICARE

## 2023-04-24 ENCOUNTER — HOSPITAL ENCOUNTER (OUTPATIENT)
Facility: MEDICAL CENTER | Age: 83
End: 2023-04-24
Attending: INTERNAL MEDICINE
Payer: MEDICARE

## 2023-04-24 DIAGNOSIS — E78.5 HYPERLIPIDEMIA, UNSPECIFIED HYPERLIPIDEMIA TYPE: ICD-10-CM

## 2023-04-24 DIAGNOSIS — N40.1 BENIGN PROSTATIC HYPERPLASIA WITH URINARY OBSTRUCTION: ICD-10-CM

## 2023-04-24 DIAGNOSIS — N13.8 BENIGN PROSTATIC HYPERPLASIA WITH URINARY OBSTRUCTION: ICD-10-CM

## 2023-04-24 DIAGNOSIS — R73.09 ELEVATED GLYCOHEMOGLOBIN: ICD-10-CM

## 2023-04-24 DIAGNOSIS — E55.9 VITAMIN D DEFICIENCY: ICD-10-CM

## 2023-04-24 DIAGNOSIS — D69.6 THROMBOCYTOPENIA (HCC): ICD-10-CM

## 2023-04-24 LAB
25(OH)D3 SERPL-MCNC: 40 NG/ML (ref 30–100)
ALBUMIN SERPL BCP-MCNC: 4.2 G/DL (ref 3.2–4.9)
ALBUMIN/GLOB SERPL: 1.6 G/DL
ALP SERPL-CCNC: 100 U/L (ref 30–99)
ALT SERPL-CCNC: 25 U/L (ref 2–50)
ANION GAP SERPL CALC-SCNC: 10 MMOL/L (ref 7–16)
ANISOCYTOSIS BLD QL SMEAR: ABNORMAL
APPEARANCE UR: CLEAR
AST SERPL-CCNC: 36 U/L (ref 12–45)
BASOPHILS # BLD AUTO: 0 % (ref 0–1.8)
BASOPHILS # BLD: 0 K/UL (ref 0–0.12)
BILIRUB SERPL-MCNC: 0.6 MG/DL (ref 0.1–1.5)
BILIRUB UR QL STRIP.AUTO: NEGATIVE
BUN SERPL-MCNC: 23 MG/DL (ref 8–22)
BURR CELLS BLD QL SMEAR: NORMAL
CALCIUM ALBUM COR SERPL-MCNC: 9.4 MG/DL (ref 8.5–10.5)
CALCIUM SERPL-MCNC: 9.6 MG/DL (ref 8.5–10.5)
CHLORIDE SERPL-SCNC: 104 MMOL/L (ref 96–112)
CHOLEST SERPL-MCNC: 143 MG/DL (ref 100–199)
CO2 SERPL-SCNC: 27 MMOL/L (ref 20–33)
COLOR UR: YELLOW
CREAT SERPL-MCNC: 1.34 MG/DL (ref 0.5–1.4)
EOSINOPHIL # BLD AUTO: 0.24 K/UL (ref 0–0.51)
EOSINOPHIL NFR BLD: 2.4 % (ref 0–6.9)
ERYTHROCYTE [DISTWIDTH] IN BLOOD BY AUTOMATED COUNT: 54.3 FL (ref 35.9–50)
GFR SERPLBLD CREATININE-BSD FMLA CKD-EPI: 53 ML/MIN/1.73 M 2
GLOBULIN SER CALC-MCNC: 2.7 G/DL (ref 1.9–3.5)
GLUCOSE SERPL-MCNC: 85 MG/DL (ref 65–99)
GLUCOSE UR STRIP.AUTO-MCNC: NEGATIVE MG/DL
HCT VFR BLD AUTO: 45 % (ref 42–52)
HDLC SERPL-MCNC: 88 MG/DL
HGB BLD-MCNC: 15.2 G/DL (ref 14–18)
KETONES UR STRIP.AUTO-MCNC: NEGATIVE MG/DL
LDLC SERPL CALC-MCNC: 45 MG/DL
LEUKOCYTE ESTERASE UR QL STRIP.AUTO: NEGATIVE
LYMPHOCYTES # BLD AUTO: 5.45 K/UL (ref 1–4.8)
LYMPHOCYTES NFR BLD: 55.6 % (ref 22–41)
MACROCYTES BLD QL SMEAR: ABNORMAL
MANUAL DIFF BLD: NORMAL
MCH RBC QN AUTO: 33.6 PG (ref 27–33)
MCHC RBC AUTO-ENTMCNC: 33.8 G/DL (ref 33.7–35.3)
MCV RBC AUTO: 99.3 FL (ref 81.4–97.8)
MICRO URNS: NORMAL
MONOCYTES # BLD AUTO: 0.87 K/UL (ref 0–0.85)
MONOCYTES NFR BLD AUTO: 8.9 % (ref 0–13.4)
MORPHOLOGY BLD-IMP: NORMAL
NEUTROPHILS # BLD AUTO: 3.24 K/UL (ref 1.82–7.42)
NEUTROPHILS NFR BLD: 33.1 % (ref 44–72)
NITRITE UR QL STRIP.AUTO: NEGATIVE
NRBC # BLD AUTO: 0 K/UL
NRBC BLD-RTO: 0 /100 WBC
PH UR STRIP.AUTO: 7 [PH] (ref 5–8)
PLATELET # BLD AUTO: 127 K/UL (ref 164–446)
PLATELET BLD QL SMEAR: NORMAL
PMV BLD AUTO: 13.3 FL (ref 9–12.9)
POIKILOCYTOSIS BLD QL SMEAR: NORMAL
POTASSIUM SERPL-SCNC: 5 MMOL/L (ref 3.6–5.5)
PROT SERPL-MCNC: 6.9 G/DL (ref 6–8.2)
PROT UR QL STRIP: NEGATIVE MG/DL
PSA SERPL-MCNC: 0.77 NG/ML (ref 0–4)
RBC # BLD AUTO: 4.53 M/UL (ref 4.7–6.1)
RBC BLD AUTO: PRESENT
RBC UR QL AUTO: NEGATIVE
SMUDGE CELLS BLD QL SMEAR: NORMAL
SODIUM SERPL-SCNC: 141 MMOL/L (ref 135–145)
SP GR UR STRIP.AUTO: 1.01
TRIGL SERPL-MCNC: 50 MG/DL (ref 0–149)
UROBILINOGEN UR STRIP.AUTO-MCNC: 0.2 MG/DL
VARIANT LYMPHS BLD QL SMEAR: NORMAL
WBC # BLD AUTO: 9.8 K/UL (ref 4.8–10.8)

## 2023-04-24 PROCEDURE — 81003 URINALYSIS AUTO W/O SCOPE: CPT

## 2023-04-24 PROCEDURE — 80061 LIPID PANEL: CPT

## 2023-04-24 PROCEDURE — 85007 BL SMEAR W/DIFF WBC COUNT: CPT

## 2023-04-24 PROCEDURE — 82306 VITAMIN D 25 HYDROXY: CPT

## 2023-04-24 PROCEDURE — 80053 COMPREHEN METABOLIC PANEL: CPT

## 2023-04-24 PROCEDURE — 84153 ASSAY OF PSA TOTAL: CPT

## 2023-04-24 PROCEDURE — 85025 COMPLETE CBC W/AUTO DIFF WBC: CPT

## 2023-04-24 NOTE — PROGRESS NOTES
Andre Martinez is a 82 y.o. male here for a non-provider visit for lab draw    If abnormal was an in office provider notified today (if so, indicate provider)? No    Routed to PCP? No

## 2023-04-25 ENCOUNTER — PATIENT MESSAGE (OUTPATIENT)
Dept: CARDIOLOGY | Facility: MEDICAL CENTER | Age: 83
End: 2023-04-25
Payer: MEDICARE

## 2023-04-25 DIAGNOSIS — E78.5 DYSLIPIDEMIA: ICD-10-CM

## 2023-04-25 DIAGNOSIS — I25.10 ATHEROSCLEROSIS OF NATIVE CORONARY ARTERY OF NATIVE HEART WITHOUT ANGINA PECTORIS: ICD-10-CM

## 2023-04-25 DIAGNOSIS — I47.10 SVT (SUPRAVENTRICULAR TACHYCARDIA) (HCC): ICD-10-CM

## 2023-04-25 NOTE — PATIENT COMMUNICATION
Phone Number Called: [phone]    Call outcome:  [facility] [address]    Message:   Spoke to Divya, pharmacy tech    [facility] on [facility]/15 due to having multiple prescriptions there. Divya states that there is no current active prescription available to pt that would allow him to have any further refills.     Will route to LAURENT to see if this medication is still appropriate based off of recent CMP done 4/24 (creatinine WNL, but increased above 0.2 from previous 6 mos ago per protocol). No recent magnesium level. LOV 12/01/2022, FU 08/15/2023.    To JM: please advise if toresemide 5 mg daily is still appropriate for pt based off of recent CMP with creatinine WNL but increased  to 1.34 from 1.04 (increased above 0.2 per protocol). Pt will need a new Rx, as he has no active Rx at pharmacy. Also, pt has not had a magnesium level done recently per protocol. Thank you!

## 2023-04-26 ENCOUNTER — NON-PROVIDER VISIT (OUTPATIENT)
Dept: OCCUPATIONAL MEDICINE | Facility: CLINIC | Age: 83
End: 2023-04-26

## 2023-04-26 ENCOUNTER — PATIENT MESSAGE (OUTPATIENT)
Dept: CARDIOLOGY | Facility: MEDICAL CENTER | Age: 83
End: 2023-04-26

## 2023-04-26 DIAGNOSIS — Z23 ENCOUNTER FOR IMMUNIZATION: ICD-10-CM

## 2023-04-26 PROCEDURE — 90691 TYPHOID VACCINE IM: CPT | Performed by: PREVENTIVE MEDICINE

## 2023-04-26 PROCEDURE — 90471 IMMUNIZATION ADMIN: CPT | Performed by: PREVENTIVE MEDICINE

## 2023-04-26 RX ORDER — TORSEMIDE 5 MG/1
5 TABLET ORAL
Qty: 90 TABLET | Refills: 2 | Status: SHIPPED | OUTPATIENT
Start: 2023-04-26 | End: 2023-08-15

## 2023-04-26 NOTE — PATIENT COMMUNICATION
"Per JM note for last OV on 12/01/22  Pt to \"continue torsemide 5mg PO daily\"  Prescription cancelled and refilled for 90 days w/ 2 refills    MyCMedikidzt message sent updating patient  "

## 2023-04-28 RX ORDER — ATOVAQUONE AND PROGUANIL HYDROCHLORIDE 250; 100 MG/1; MG/1
TABLET, FILM COATED ORAL
Qty: 20 TABLET | Refills: 0 | Status: SHIPPED | OUTPATIENT
Start: 2023-04-28 | End: 2023-08-15

## 2023-04-28 RX ORDER — METRONIDAZOLE 500 MG/1
500 TABLET ORAL 3 TIMES DAILY
Qty: 21 TABLET | Refills: 0 | Status: SHIPPED | OUTPATIENT
Start: 2023-04-28 | End: 2023-08-15

## 2023-05-08 ENCOUNTER — OFFICE VISIT (OUTPATIENT)
Dept: INTERNAL MEDICINE | Facility: IMAGING CENTER | Age: 83
End: 2023-05-08
Payer: MEDICARE

## 2023-05-08 ENCOUNTER — APPOINTMENT (OUTPATIENT)
Dept: INTERNAL MEDICINE | Facility: IMAGING CENTER | Age: 83
End: 2023-05-08
Payer: MEDICARE

## 2023-05-08 VITALS
BODY MASS INDEX: 22.76 KG/M2 | TEMPERATURE: 97.8 F | HEART RATE: 67 BPM | HEIGHT: 70 IN | RESPIRATION RATE: 16 BRPM | OXYGEN SATURATION: 96 % | WEIGHT: 159 LBS | DIASTOLIC BLOOD PRESSURE: 70 MMHG | SYSTOLIC BLOOD PRESSURE: 122 MMHG

## 2023-05-08 DIAGNOSIS — N18.31 STAGE 3A CHRONIC KIDNEY DISEASE: ICD-10-CM

## 2023-05-08 DIAGNOSIS — I43 CARDIAC AMYLOIDOSIS (HCC): ICD-10-CM

## 2023-05-08 DIAGNOSIS — K86.2 PANCREAS CYST: ICD-10-CM

## 2023-05-08 DIAGNOSIS — Z23 NEED FOR VACCINATION AGAINST STREPTOCOCCUS PNEUMONIAE: ICD-10-CM

## 2023-05-08 DIAGNOSIS — R73.09 ELEVATED GLYCOHEMOGLOBIN: ICD-10-CM

## 2023-05-08 DIAGNOSIS — Z00.00 MEDICARE ANNUAL WELLNESS VISIT, SUBSEQUENT: ICD-10-CM

## 2023-05-08 DIAGNOSIS — D72.829 LEUKOCYTOSIS, UNSPECIFIED TYPE: ICD-10-CM

## 2023-05-08 DIAGNOSIS — K08.89 PAIN IN A TOOTH OR TEETH: ICD-10-CM

## 2023-05-08 DIAGNOSIS — E78.5 HYPERLIPIDEMIA, UNSPECIFIED HYPERLIPIDEMIA TYPE: ICD-10-CM

## 2023-05-08 DIAGNOSIS — I25.10 ATHEROSCLEROSIS OF NATIVE CORONARY ARTERY OF NATIVE HEART WITHOUT ANGINA PECTORIS: ICD-10-CM

## 2023-05-08 DIAGNOSIS — E85.4 CARDIAC AMYLOIDOSIS (HCC): ICD-10-CM

## 2023-05-08 DIAGNOSIS — D69.6 THROMBOCYTOPENIA (HCC): ICD-10-CM

## 2023-05-08 LAB
HBA1C MFR BLD: 5.7 % (ref ?–5.8)
POCT INT CON NEG: NEGATIVE
POCT INT CON POS: POSITIVE

## 2023-05-08 PROCEDURE — G0439 PPPS, SUBSEQ VISIT: HCPCS | Mod: 25 | Performed by: INTERNAL MEDICINE

## 2023-05-08 PROCEDURE — G0009 ADMIN PNEUMOCOCCAL VACCINE: HCPCS | Performed by: INTERNAL MEDICINE

## 2023-05-08 PROCEDURE — 3074F SYST BP LT 130 MM HG: CPT | Performed by: INTERNAL MEDICINE

## 2023-05-08 PROCEDURE — 83036 HEMOGLOBIN GLYCOSYLATED A1C: CPT | Performed by: INTERNAL MEDICINE

## 2023-05-08 PROCEDURE — 3078F DIAST BP <80 MM HG: CPT | Performed by: INTERNAL MEDICINE

## 2023-05-08 PROCEDURE — 90677 PCV20 VACCINE IM: CPT | Performed by: INTERNAL MEDICINE

## 2023-05-08 RX ORDER — AMOXICILLIN 500 MG/1
CAPSULE ORAL
Qty: 20 CAPSULE | Refills: 0 | Status: SHIPPED | OUTPATIENT
Start: 2023-05-08

## 2023-05-08 ASSESSMENT — PATIENT HEALTH QUESTIONNAIRE - PHQ9: CLINICAL INTERPRETATION OF PHQ2 SCORE: 0

## 2023-05-08 ASSESSMENT — FIBROSIS 4 INDEX: FIB4 SCORE: 4.59

## 2023-05-08 ASSESSMENT — ENCOUNTER SYMPTOMS: GENERAL WELL-BEING: EXCELLENT

## 2023-05-08 ASSESSMENT — ACTIVITIES OF DAILY LIVING (ADL): BATHING_REQUIRES_ASSISTANCE: 0

## 2023-05-08 NOTE — PROGRESS NOTES
82 y.o. male presents for the followin. Medicare annual wellness visit, subsequent  Patient comes in for annual health risk assessment, physical review laboratory. Patient considers himself to be in good health. He exercises radially. Diet is excellent. No depression. No balance issues. No cognitive issues.    2. Cardiac amyloidosis (HCC)  Stable. Patient is followed by cardiology. He remains on Vyndamax.    3. Thrombocytopenia (HCC)  Chronic. Stable. Patient had recent evaluation with hematology. 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    4. Atherosclerosis of native coronary artery of native heart without angina pectoris  Stable. No chest pain, angina or equivalent. Patient is followed by cardiology.Lipids are at goal. Blood pressure is at goal. Patient remains on moderate dose statin therapy. He is also on aspirin.    5. Leukocytosis, unspecified type  Stable. Total white blood cell count is normal. His absolute site count has been elevated but stable. Absolute neutrophil count is stable. He is followed by hematology.    6. Elevated glycohemoglobin  Stable. Her hemoglobin is variable between 5.5-5.9. He is currently 5.7.    7. Hyperlipidemia, unspecified hyperlipidemia type  Stable. Patient remains on moderate dose statin therapy.   Lab Results   Component Value Date/Time    CHOLSTRLTOT 143 2023 08:10 AM    LDL 45 2023 08:10 AM    HDL 88 2023 08:10 AM    TRIGLYCERIDE 50 2023 08:10 AM         8. Pancreas cyst  Stable. Patient has 2 year follow-up with GSULEMA jurado.    9. Pain in a tooth or teeth  New. Patient with pain in his left upper teeth. Patient is seen multiple specialist including to endodontics into dentist. He is tentatively scheduled for partial removal.    10. Need for vaccination against Streptococcus pneumoniae  Do for Prevnar 20      Annual Wellness Visit/Health Risk Assessment:    Past medical:  Past Medical History:   Diagnosis Date    Amyloidosis (HCC)     TTR, type pending.      BPH (benign prostatic hyperplasia)     s/p laser encleation of the prostate    Cardiac amyloidosis (HCC)     Chickenpox     Colon polyps     Coronary atherosclerosis of native coronary artery 05/15/2013    Coronary calcium score in the 3000s, negative PET scan and no symptoms so treated medically.     Coronary heart disease     Dyslipidemia 2016    H/O urethral stricture     s/p surgical repair 1/15/2019, [place]    Kidney cysts     Meniscus tear     Pancreas cyst     Scarlet fever     Sinusitis, chronic     Spinal stenosis, lumbar     SVT (supraventricular tachycardia) (HCC) 10/23/2018    Thrombocytopenia (HCC) 2017       Past surgical:  Past Surgical History:   Procedure Laterality Date    KNEE RECONSTRUCTION  2012    left, partial. multiple previous surgeries.     ROTATOR CUFF REPAIR      c/b right biceps tendon rupture, presumably repaired.     ANKLE FUSION Left     ARTHROSCOPY, KNEE      HEMORRHOIDECTOMY      SINUSCOPE      SINUSOTOMIES      Dr. Guerrier, total of 4 surgeries    TRIGGER FINGER RELEASE      x 2       Family history: relating to possible risk factors for your patient  Family History   Problem Relation Age of Onset    Heart Attack Father 38        doctor in 1946 diagnosed him with MI at home,  before going to the hospital    Psychiatric Illness Mother         Alzheimers    Hypertension Brother     Stroke Brother     Seizures Brother     Sleep Apnea Neg Hx        Current Providers (including home care/DME’s):   Colonoscopy 22 [place] (Dr Domingo) repeat in 5 yrs Dexa 20 osteopenia PSA  23  0.77  [facility]    Patient Care Team:  Azar Cavazos M.D. as PCP - General (Internal Medicine)  Sanchez Fonseca M.D. (Urology)  [facility]  as Respiratory Therapist  Jaime Raman M.D. as Cardiologist (Cardiovascular Disease (Cardiology))  Marleny Esteves R.N.      Medications:   Current Outpatient Medications Ordered in Epic   Medication  Sig Dispense Refill    amoxicillin (AMOXIL) 500 MG Cap Take 4 capsules 1 hour before dental procedure 20 Capsule 0    metroNIDAZOLE (FLAGYL) 500 MG Tab Take 1 Tablet by mouth 3 times a day. 21 Tablet 0    torsemide (DEMADEX) 5 MG Tab Take 1 Tablet by mouth every day. 90 Tablet 2    azelastine (ASTELIN) 137 MCG/SPRAY nasal spray Administer 1 Spray into affected nostril(S) 2 times a day. 30 mL 6    celecoxib (CELEBREX) 200 MG Cap Take 1 Capsule by mouth 1 time a day as needed for Moderate Pain. 30 Capsule 0    VYNDAMAX 61 MG Cap TAKE 1 CAPSULE BY MOUTH  DAILY 90 Capsule 3    atorvastatin (LIPITOR) 40 MG Tab Take 1 Tablet by mouth at bedtime. 100 Tablet 3    vitamin D3 (CHOLECALCIFEROL) 1000 Unit (25 mcg) Tab Take 2 Tablets by mouth every day. 100 Tablet 3    aspirin 81 MG EC tablet Take 81 mg by mouth.      vitamin A 3 mg (31294 units) capsule Take 10,000 Units by mouth every day.      Glucosamine-Chondroitin 750-600 MG Tab Take 1 Tab by mouth 2 Times a Day. 60 Tab 0    VITAMIN B COMPLEX-C PO Take  by mouth.      Multiple Vitamins-Minerals (CENTRUM SILVER PO) Take  by mouth.      Omega-3 Fatty Acids (SALMON OIL PO) Take 2 Caps by mouth every day.      coenzyme Q-10 30 MG capsule Take 30 mg by mouth every day. Twice a day      Vutrisiran Sodium 25 MG/0.5ML Solution Prefilled Syringe Inject 25 mg under the skin.      atovaquone-proguanil (MALARONE) 250-100 MG per tablet Take one tab daily. Start 1 days before entering malaria area and continue 7 days after leaving 20 Tablet 0    PREVIDENT 5000 BOOSTER PLUS 1.1 % Paste USE AS DIRECTED       No current Epic-ordered facility-administered medications on file.       Supplements (calcium/vitamins): if not lisited in medications    Chief Complaint   Patient presents with    Medicare Annual Wellness         HPI:  Andre Martinez is a 82 y.o. 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    Agatston CAC score, >400 12/06/2018    Enlarged prostate with lower urinary tract symptoms (LUTS) 11/12/2018    Calcific Achilles tendinitis 11/05/2018    Thrombocytopenia ([facility]) 07/24/2017    Pure hypercholesterolemia 08/23/2016    Spinal stenosis, lumbar     Colon polyps     Kidney cysts     Pancreas cyst     Atherosclerosis of native coronary artery of native heart without angina pectoris 05/15/2013       Current Outpatient Medications   Medication Sig Dispense Refill    amoxicillin (AMOXIL) 500 MG Cap Take 4 capsules 1 hour before dental procedure 20 Capsule 0    metroNIDAZOLE (FLAGYL) 500 MG Tab Take 1 Tablet by mouth 3 times a day. 21 Tablet 0    torsemide (DEMADEX) 5 MG Tab Take 1 Tablet by mouth every day. 90 Tablet 2    azelastine (ASTELIN) 137 MCG/SPRAY nasal spray Administer 1 Spray into affected nostril(S) 2 times a day. 30 mL 6    celecoxib (CELEBREX) 200 MG Cap Take 1 Capsule by mouth 1 time a day as needed for Moderate Pain. 30 Capsule 0    VYNDAMAX 61 MG Cap TAKE 1 CAPSULE BY MOUTH  DAILY 90 Capsule 3    atorvastatin (LIPITOR) 40 MG Tab Take 1 Tablet by mouth at bedtime. 100 Tablet 3    vitamin D3 (CHOLECALCIFEROL) 1000 Unit (25 mcg) Tab Take 2 Tablets by mouth every day. 100 Tablet 3    aspirin 81 MG EC tablet Take 81 mg by mouth.      vitamin A 3 mg (99635 units) capsule Take 10,000 Units by mouth every day.      Glucosamine-Chondroitin 750-600 MG Tab Take 1 Tab by mouth 2 Times a Day. 60 Tab 0    VITAMIN B COMPLEX-C PO Take  by mouth.      Multiple Vitamins-Minerals (CENTRUM SILVER PO) Take  by mouth.      Omega-3 Fatty Acids (SALMON OIL PO) Take 2 Caps by mouth every day.      coenzyme Q-10 30 MG capsule Take 30 mg by mouth every day. Twice a day      Vutrisiran Sodium 25 MG/0.5ML Solution Prefilled Syringe Inject 25 mg under the skin.       atovaquone-proguanil (MALARONE) 250-100 MG per tablet Take one tab daily. Start 1 days before entering malaria area and continue 7 days after leaving 20 Tablet 0    PREVIDENT 5000 BOOSTER PLUS 1.1 % Paste USE AS DIRECTED       No current facility-administered medications for this visit.            Current supplements as per medication list.       Allergies: Avelox [moxifloxacin hcl], Demerol, Levofloxacin, Azithromycin, Cefdinir, Ceftin [cefuroxime axetil], Oxycodone, and Scopolamine    Current social contact/activities:  Social with friends and family.    He  reports that he has never smoked. He has never used smokeless tobacco. He reports current alcohol use. He reports that he does not use drugs.  Counseling given: Not Answered        DPA/Advanced Directive:  Completed. Not available.      ROS:    Gait: Uses : None  Ostomy: No  Other tubes: no   Amputations: no   Chronic oxygen use: no   Last eye exam: Lesson one year ago   : Denies any urinary leakage during the last 6 months incontinence.       Screening:  Colonoscopy 2/1/22 [place] (Dr Domingo) repeat in 5 yrs Dexa 12/17/20 osteopenia PSA  4/24/23  0.77  [facility]    Depression Screening  Little interest or pleasure in doing things?  0 - not at all  Feeling down, depressed , or hopeless? 0 - not at all  Patient Health Questionnaire Score: 0     If depressive symptoms identified deferred to follow up visit unless specifically addressed in assessment and plan.    Interpretation of PHQ-9 Total Score   Score Severity   1-4 No Depression   5-9 Mild Depression   10-14 Moderate Depression   15-19 Moderately Severe Depression   20-27 Severe Depression    Screening for Cognitive Impairment  Three Minute Recall (daughter, heaven, megha) 1/3    Marcelo clock face with all 12 numbers and set the hands to show 10 past 11.  Yes    Cognitive concerns identified deferred for follow up unless specifically addressed in assessment and plan.    Fall  Risk Assessment  Has the patient had two or more falls in the last year or any fall with injury in the last year?  No    Safety Assessment  Throw rugs on floor.  No  Handrails on all stairs.  No  Good lighting in all hallways.  Yes  Difficulty hearing.  No  Patient counseled about all safety risks that were identified.    Functional Assessment ADLs  Are there any barriers preventing you from cooking for yourself or meeting nutritional needs?  No.    Are there any barriers preventing you from driving safely or obtaining transportation?  No.    Are there any barriers preventing you from using a telephone or calling for help?  No.    Are there any barriers preventing you from shopping?  No.    Are there any barriers preventing you from taking care of your own finances?  No.    Are there any barriers preventing you from managing your medications?  No.    Are there any barriers preventing you from showering, bathing or dressing yourself?  No.    Are you currently engaging in any exercise or physical activity?  Yes.  walking  What is your perception of your health?  Excellent    Advance Care Planning  Do you have an Advance Directive, Living Will, Durable Power of , or POLST? Yes  Advance Directive       is on file      Health Maintenance Summary            Annual Wellness Visit (Every 366 Days) Next due on 5/8/2024 05/08/2023  Visit Dx: Medicare annual wellness visit, subsequent    03/31/2022  Visit Dx: Medicare annual wellness visit, initial    12/11/2020  Visit Dx: Medicare annual wellness visit, subsequent    11/14/2019  Subsequent Annual Wellness Visit - Includes PPPS ()    11/14/2019  Visit Dx: Medicare annual wellness visit, subsequent    Only the first 5 history entries have been loaded, but more history exists.              IMM DTaP/Tdap/Td Vaccine (3 - Td or Tdap) Next due on 3/18/2031      03/18/2021  Imm Admin: Tdap Vaccine    08/06/2014  Imm Admin: Tdap Vaccine    06/02/2010  Imm Admin: TD  Vaccine    12/10/1999  Imm Admin: TD Vaccine              IMM MENINGOCOCCAL ACWY VACCINE (Series Information) Aged Out      08/06/2014  Imm Admin: Meningococcal Conjugate Vaccine MCV4 (Menactra)    08/06/2014  Imm Admin: Meningococcal Polysaccharide Vaccine MPSV4 - HISTORICAL DATA    09/23/2004  Imm Admin: Meningococcal Polysaccharide Vaccine MPSV4 - HISTORICAL DATA    12/10/1999  Imm Admin: Meningococcal Polysaccharide Vaccine MPSV4 - HISTORICAL DATA              IMM PNEUMOCOCCAL VACCINE: 65+ Years (Series Information) Completed      02/04/2015  Imm Admin: Pneumococcal Conjugate Vaccine (Prevnar/PCV-13)    01/17/2013  Imm Admin: Pneumococcal polysaccharide vaccine (PPSV-23)    07/25/2006  Imm Admin: Pneumococcal polysaccharide vaccine (PPSV-23)              IMM ZOSTER VACCINES (Series Information) Completed      07/31/2018  Imm Admin: Zoster Vaccine Recombinant (RZV) (SHINGRIX)    04/09/2018  Imm Admin: Zoster Vaccine Recombinant (RZV) (SHINGRIX)    11/14/2006  Imm Admin: Zoster Vaccine Live (ZVL) (Zostavax) - HISTORICAL DATA    11/14/2005  Imm Admin: Zoster Vaccine Live (ZVL) (Zostavax) - HISTORICAL DATA              COVID-19 Vaccine (Series Information) Completed      09/08/2022  Imm Admin: PFIZER BIVALENT BOOSTER SARS-COV-2 VACCINE (12+)    04/25/2022  Imm Admin: PFIZER PURPLE CAP SARS-COV-2 VACCINATION (12+)    09/16/2021  Imm Admin: PFIZER PURPLE CAP SARS-COV-2 VACCINATION (12+)    02/05/2021  Imm Admin: PFIZER PURPLE CAP SARS-COV-2 VACCINATION (12+)    01/14/2021  Imm Admin: PFIZER PURPLE CAP SARS-COV-2 VACCINATION (12+)              IMM INFLUENZA (Series Information) Completed      10/17/2022  Imm Admin: Influenza Vaccine Adult HD    10/14/2021  Imm Admin: Influenza Vaccine Adult HD    11/02/2020  Imm Admin: Influenza Vaccine Adult HD    09/04/2019  Imm Admin: Influenza Vaccine Adult HD    08/31/2018  Imm Admin: Influenza Vaccine Adult HD    Only the first 5 history entries have been loaded, but more history  exists.              Discontinued - COLORECTAL CANCER SCREENING  Discontinued        Frequency changed to Never automatically (Topic No Longer Applies)    2022  COLONOSCOPY (Done)    2017  COLONOSCOPY (Done)    2017  REFERRAL TO GI FOR COLONOSCOPY    2012  COLONOSCOPY (Done)              Discontinued - IMM HEP B VACCINE  Discontinued      2000  Imm Admin: Hepatitis B Vaccine Adolescent/Pediatric    2000  Imm Admin: Hepatitis B Vaccine (Adol/Adult)    2000  Imm Admin: Hepatitis B Vaccine Adolescent/Pediatric    2000  Imm Admin: Hepatitis B Vaccine (Adol/Adult)    1999  Imm Admin: Hepatitis B Vaccine Adolescent/Pediatric    Only the first 5 history entries have been loaded, but more history exists.                    Patient Care Team:  Azar Cavazos M.D. as PCP - General (Internal Medicine)  Sanchez Fonseca M.D. (Urology)  [facility]  as Respiratory Therapist  Jaime Raman M.D. as Cardiologist (Cardiovascular Disease (Cardiology))  Marleny Esteves RJUNIOR        Social History     Tobacco Use    Smoking status: Never    Smokeless tobacco: Never   Vaping Use    Vaping Use: Never used   Substance Use Topics    Alcohol use: Yes     Comment: Social     Drug use: No     Family History   Problem Relation Age of Onset    Heart Attack Father 38        doctor in 1946 diagnosed him with MI at home,  before going to the hospital    Psychiatric Illness Mother         Alzheimers    Hypertension Brother     Stroke Brother     Seizures Brother     Sleep Apnea Neg Hx      He  has a past medical history of Amyloidosis (HCC), BPH (benign prostatic hyperplasia), Cardiac amyloidosis (HCC), Chickenpox, Colon polyps, Coronary atherosclerosis of native coronary artery (05/15/2013), Coronary heart disease, Dyslipidemia (2016), H/O urethral stricture, Kidney cysts, Meniscus tear, Pancreas cyst, Scarlet fever, Sinusitis, chronic, Spinal stenosis, lumbar, SVT  "(supraventricular tachycardia) (HCC) (10/23/2018), and Thrombocytopenia (HCC) (7/24/2017).   Past Surgical History:   Procedure Laterality Date    KNEE RECONSTRUCTION  2012    left, partial. multiple previous surgeries.     ROTATOR CUFF REPAIR  2010    c/b right biceps tendon rupture, presumably repaired.     ANKLE FUSION Left     ARTHROSCOPY, KNEE      HEMORRHOIDECTOMY      SINUSCOPE      SINUSOTOMIES      Dr. Guerrier, total of 4 surgeries    TRIGGER FINGER RELEASE      x 2       Exam:     /70 (BP Location: Left arm, Patient Position: Sitting, BP Cuff Size: Adult)   Pulse 67   Temp 36.6 °C (97.8 °F) (Temporal)   Resp 16   Ht 1.778 m (5' 10\")   Wt 72.1 kg (159 lb)   SpO2 96%  Body mass index is 22.81 kg/m².    Hearing good.    Dentition good. Dental pain as discussed above  Alert, oriented in no acute distress.  Eye contact is good, speech goal directed, affect calm  General: Appropriate weight. Normal musculature. No distress.  Normal appearing.  HEENT: Pupils are equal.  Conjunctiva is normal.  Head is normal appearing.  Ears, canals and tympanic membranes are normal.  Oral cavity is pink and moist without lesion.  Neck: Supple without JVD or bruit.  Thyroid is not enlarged.  Pulmonary: Clear with good breath sounds.  Cardiovascular regular rate and rhythm.  No murmur auscultated.  Carotid, radial and pedal pulses are intact.  Abdomen: Soft, nontender, nondistended.  Normal bowel sounds.  Organs are not enlarged.  Neurologic: Cranial nerves intact.  Strength and sensation are normal.  Normal patellar reflex.  Skin: No obvious lesions  Lymph: No cervical, supraclavicular, axillary, abdominal or inguinal adenopathy noted.    Assessment and Plan. The following treatment and monitoring plan is recommended:    1. Medicare annual wellness visit, subsequent        2. Cardiac amyloidosis (HCC)        3. Thrombocytopenia (HCC)        4. Atherosclerosis of native coronary artery of native heart without angina " pectoris        5. Leukocytosis, unspecified type        6. Elevated glycohemoglobin  POCT  A1C      7. Hyperlipidemia, unspecified hyperlipidemia type        8. Pancreas cyst        9. Pain in a tooth or teeth        10. Need for vaccination against Streptococcus pneumoniae  Pneumococcal Conjugate Vaccine 20-Valent (19 yrs+)      11. Stage 3a chronic kidney disease (HCC)              Healthy 82-year-old male. He remains physically active. No cognitive issues. No depression. No physical limitations.    Cardiac issues including amyloidosis and coronary artery disease are stable. These are followed by cardiology. He is on appropriate medication. His lipids are at goal.atient also has history of diastolic Dysfunction is scheduled for echocardiogram    Patient has abnormalities in his CBC. He has workup ongoing but possibly diagnosed with early CLL.    BPH issues are stable.    Pancreatic cyst has follow-up with ANSHUL scheduled.    Unclear on tooth pain. Discussed possible sinusitis but he reports complete imaging. He has partial tooth removal and possible nerve block scheduled.    Prevnar 20 given today    Creatinine is up slightly on recent labs. Recommend monitoring only.    Services suggested: No services required at this time  Health Care Screening: Age-appropriate preventive services Medicare covers discussed today and ordered if indicated.  Referrals offered: Community-based lifestyle interventions to reduce health risks and promote self-management and wellness, fall prevention, nutrition, physical activity, tobacco-use cessation, weight loss, and mental health services as per orders if indicated.    Discussion today about general wellness and lifestyle habits:    Prevent falls and reduce trip hazards; Cautioned about securing or removing rugs.  Have a working fire alarm and carbon monoxide detector;   Engage in regular physical activity and social activities       Follow-up: six months

## 2023-05-15 RX ORDER — AZELASTINE HYDROCHLORIDE 137 UG/1
SPRAY, METERED NASAL
Qty: 30 ML | Refills: 6 | Status: SHIPPED | OUTPATIENT
Start: 2023-05-15 | End: 2023-10-28 | Stop reason: SDUPTHER

## 2023-06-15 DIAGNOSIS — S46.912A SHOULDER STRAIN, LEFT, INITIAL ENCOUNTER: ICD-10-CM

## 2023-07-08 ENCOUNTER — HOSPITAL ENCOUNTER (OUTPATIENT)
Dept: RADIOLOGY | Facility: MEDICAL CENTER | Age: 83
End: 2023-07-08
Attending: PHYSICIAN ASSISTANT
Payer: MEDICARE

## 2023-07-08 DIAGNOSIS — S46.102A UNSPECIFIED INJURY OF MUSCLE, FASCIA AND TENDON OF LONG HEAD OF BICEPS, LEFT ARM, INITIAL ENCOUNTER: ICD-10-CM

## 2023-07-08 PROCEDURE — 73221 MRI JOINT UPR EXTREM W/O DYE: CPT | Mod: LT

## 2023-08-14 ENCOUNTER — APPOINTMENT (RX ONLY)
Dept: URBAN - METROPOLITAN AREA CLINIC 4 | Facility: CLINIC | Age: 83
Setting detail: DERMATOLOGY
End: 2023-08-14

## 2023-08-14 DIAGNOSIS — D18.0 HEMANGIOMA: ICD-10-CM

## 2023-08-14 DIAGNOSIS — L81.4 OTHER MELANIN HYPERPIGMENTATION: ICD-10-CM

## 2023-08-14 DIAGNOSIS — L82.1 OTHER SEBORRHEIC KERATOSIS: ICD-10-CM

## 2023-08-14 DIAGNOSIS — L44.8 OTHER SPECIFIED PAPULOSQUAMOUS DISORDERS: ICD-10-CM

## 2023-08-14 DIAGNOSIS — D22 MELANOCYTIC NEVI: ICD-10-CM

## 2023-08-14 PROBLEM — D22.5 MELANOCYTIC NEVI OF TRUNK: Status: ACTIVE | Noted: 2023-08-14

## 2023-08-14 PROBLEM — D18.01 HEMANGIOMA OF SKIN AND SUBCUTANEOUS TISSUE: Status: ACTIVE | Noted: 2023-08-14

## 2023-08-14 PROCEDURE — 99213 OFFICE O/P EST LOW 20 MIN: CPT

## 2023-08-14 PROCEDURE — ? COUNSELING

## 2023-08-14 ASSESSMENT — LOCATION SIMPLE DESCRIPTION DERM
LOCATION SIMPLE: RIGHT THIGH
LOCATION SIMPLE: RIGHT LOWER BACK
LOCATION SIMPLE: LEFT UPPER BACK
LOCATION SIMPLE: LEFT CALF
LOCATION SIMPLE: LEFT THIGH
LOCATION SIMPLE: LEFT PRETIBIAL REGION
LOCATION SIMPLE: RIGHT PRETIBIAL REGION
LOCATION SIMPLE: UPPER BACK
LOCATION SIMPLE: CHEST
LOCATION SIMPLE: RIGHT UPPER BACK
LOCATION SIMPLE: ABDOMEN

## 2023-08-14 ASSESSMENT — LOCATION ZONE DERM
LOCATION ZONE: TRUNK
LOCATION ZONE: LEG

## 2023-08-14 ASSESSMENT — LOCATION DETAILED DESCRIPTION DERM
LOCATION DETAILED: RIGHT ANTERIOR DISTAL THIGH
LOCATION DETAILED: LEFT DISTAL CALF
LOCATION DETAILED: LEFT PROXIMAL PRETIBIAL REGION
LOCATION DETAILED: PERIUMBILICAL SKIN
LOCATION DETAILED: RIGHT SUPERIOR UPPER BACK
LOCATION DETAILED: STERNUM
LOCATION DETAILED: RIGHT INFERIOR MEDIAL UPPER BACK
LOCATION DETAILED: RIGHT LATERAL SUPERIOR CHEST
LOCATION DETAILED: RIGHT MEDIAL INFERIOR CHEST
LOCATION DETAILED: LEFT SUPERIOR UPPER BACK
LOCATION DETAILED: LEFT ANTERIOR PROXIMAL THIGH
LOCATION DETAILED: EPIGASTRIC SKIN
LOCATION DETAILED: RIGHT PROXIMAL PRETIBIAL REGION
LOCATION DETAILED: INFERIOR THORACIC SPINE
LOCATION DETAILED: LEFT DISTAL PRETIBIAL REGION
LOCATION DETAILED: RIGHT INFERIOR MEDIAL MIDBACK

## 2023-08-15 ENCOUNTER — OFFICE VISIT (OUTPATIENT)
Dept: CARDIOLOGY | Facility: MEDICAL CENTER | Age: 83
End: 2023-08-15
Attending: INTERNAL MEDICINE
Payer: MEDICARE

## 2023-08-15 VITALS
DIASTOLIC BLOOD PRESSURE: 78 MMHG | WEIGHT: 160 LBS | RESPIRATION RATE: 16 BRPM | OXYGEN SATURATION: 98 % | HEART RATE: 54 BPM | BODY MASS INDEX: 22.9 KG/M2 | SYSTOLIC BLOOD PRESSURE: 122 MMHG | HEIGHT: 70 IN

## 2023-08-15 DIAGNOSIS — I43 CARDIAC AMYLOIDOSIS (HCC): ICD-10-CM

## 2023-08-15 DIAGNOSIS — E78.5 DYSLIPIDEMIA: ICD-10-CM

## 2023-08-15 DIAGNOSIS — E78.00 PURE HYPERCHOLESTEROLEMIA: ICD-10-CM

## 2023-08-15 DIAGNOSIS — E85.4 CARDIAC AMYLOIDOSIS (HCC): ICD-10-CM

## 2023-08-15 DIAGNOSIS — I47.10 SVT (SUPRAVENTRICULAR TACHYCARDIA) (HCC): ICD-10-CM

## 2023-08-15 DIAGNOSIS — I50.32 CHRONIC DIASTOLIC CONGESTIVE HEART FAILURE (HCC): ICD-10-CM

## 2023-08-15 DIAGNOSIS — R93.1 AGATSTON CAC SCORE, >400: ICD-10-CM

## 2023-08-15 DIAGNOSIS — I47.10 PAROXYSMAL SUPRAVENTRICULAR TACHYCARDIA (HCC): ICD-10-CM

## 2023-08-15 PROCEDURE — 99214 OFFICE O/P EST MOD 30 MIN: CPT | Performed by: INTERNAL MEDICINE

## 2023-08-15 PROCEDURE — 3078F DIAST BP <80 MM HG: CPT | Performed by: INTERNAL MEDICINE

## 2023-08-15 PROCEDURE — 99213 OFFICE O/P EST LOW 20 MIN: CPT | Performed by: INTERNAL MEDICINE

## 2023-08-15 PROCEDURE — 3074F SYST BP LT 130 MM HG: CPT | Performed by: INTERNAL MEDICINE

## 2023-08-15 RX ORDER — TORSEMIDE 5 MG/1
5 TABLET ORAL
Qty: 100 TABLET | Refills: 3 | Status: SHIPPED | OUTPATIENT
Start: 2023-08-15 | End: 2024-03-22 | Stop reason: SDUPTHER

## 2023-08-15 RX ORDER — ATORVASTATIN CALCIUM 40 MG/1
40 TABLET, FILM COATED ORAL
Qty: 100 TABLET | Refills: 3 | Status: SHIPPED | OUTPATIENT
Start: 2023-08-15 | End: 2024-03-22 | Stop reason: SDUPTHER

## 2023-08-15 ASSESSMENT — FIBROSIS 4 INDEX: FIB4 SCORE: 4.65

## 2023-08-15 NOTE — PROGRESS NOTES
"    Cardiology Follow-up Consultation Note    Date of note:  8/15/2023  Primary Care Provider: Azar Cavazos M.D.  Referring Provider: Jose Holman M.D.     Patient Name: Andre Martinez   YOB: 1940  MRN:              [id number]    Chief Complaint: cardiac amyloid.     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 Seen by Dr. Pierson at [facility] in [place], and diagnosed with TTR amyloid with fat pad biopsy confirmation.   I have included her summary below:    \"The patient is a delightful 78-year-old who was diagnosed with transthyretin amyloidosis based on a positive PYP scan done at [facility] in January of 2019. He has mildly abnormal serum free light chains, currently kappa 2.83, lambda 1.35 mg/dL with a ratio of 2.10, but there was no monoclonal protein in the serum or in the urine. Of note, he had laser enucleation of the prostate performed 11/13/2018 at [facility], and we will request that that tissue be reviewed for amyloid. He also had a fat aspirate done yesterday. Subsequent to our visit, fat aspirate results returned and are positive for amyloid.     He came to attention due to a preoperative evaluation for prostate surgery. He had a CT calcium score done due to a family history of possible coronary artery disease (sudden death in his father at age 38) in 2013 with a score in the 600s; he was asymptomatic at that time. October 2017, had a stress echocardiogram and underwent almost 12 minutes without any evidence of ischemia. It was noted that he had increased " left ventricular wall thicknesses at that time. A repeat coronary calcium score November 28 showed left main 0, , left circumflex 165, right coronary artery 2093 with a total score of 3091. His cardiologist at that time recommended a PET perfusion study which was done 12/28/2018. The ejection fraction at rest was 30% and increased to 47% (however, his echocardiogram clearly shows normal resting ejection fraction). Perfusion was normal. He did have an episode that night while he was exercising where he developed chest discomfort and rapid palpitations. Emergency room evaluation showed junctional tachycardia which resolved spontaneously. The troponin was slightly elevated with a troponin I of 0.07 ng/L (normal less than 0.04). It was felt that this episode of tachycardia may have been related to the earlier stress test.     He saw Dr. Schreiber in January 2019 for preoperative evaluation prior to surgical intervention for urethral stricture. A transthoracic echocardiogram was performed primarily to reassess the left ventricular ejection fraction which was felt to possibly have been incorrect on the PET scan. The echocardiogram was interpreted as consistent with possible cardiac amyloidosis, left ventricular size was normal, ejection fraction calculated at 54%, but to my review is in the range of 60% to 65%. There are no regional wall motion abnormalities. Moderately increased left ventricular wall thicknesses, the basal septum and posterior wall measured 16/14 mm. The left ventricular mass index was elevated at 158 g/m2. Strain was abnormal with an apical sparing pattern and an overall value of -14%, mildly thickened aortic valve with mild regurgitation, mild dilatation of the sinuses of Valsalva (37 mm). The diastolic filling pattern suggested elevated filling pressure. Left atrial volume was moderately enlarged at 42 mL/m2. The right atrium was described as severely enlarged, to my review I would consider it to  "be moderately enlarged. Right ventricular size is normal with definite increase in right ventricular wall thickness. Right ventricular systolic function appeared normal, although tissue Doppler was slightly low. The inferior vena cava was of normal size with normal inspiratory collapse. The stroke volume index was 35 mL/m2. Cardiac index was reduced at 1.81, but the heart rate was 52 beats per minute.     He had a PYP scan performed on 2019. I reviewed the images and agree with the report. The H/CL ratio which is 1.8, consistent with TTR amyloid. The SPECT images were not available to me, but gave a myocardial score of 2 which is consistent with moderate uptake.     He has never had any significant symptoms. He exercises 1-2 hours per day with aerobic activity with no limiting dyspnea. No orthopnea, paroxysmal nocturnal dyspnea, no edema. No syncope. No palpitations other than the event noted after his PET stress test. No chest pain. As mentioned, his father  suddenly at age 38 of a presumed cardiac event. There is no known family history otherwise of unexplained heart failure or of peripheral neuropathy.     He has had 2 trigger finger releases on the left within the past 10 years. No known carpal tunnel syndrome and no history of spinal stenosis. He does have a history of multiple orthopedic issues as outlined below. This includes the unexpected finding of a right biceps tendon rupture at the time of rotator cuff surgery several years ago.   \"    Continues to exercise aerobically a couple hours a day without symptoms.       At our visit, 2019:  In terms of diastolic heart failure, now off torsemide due to urinary frequency.  Weight up 4-5 pounds. Exercising without difficulty, ran up three flights of stairs today no problem.     In terms of TTR amyloid, prostate biopsy and genetic testing confirmed wild type aTTR amyloid.  He was recently seen at [place] by Dr. Herrera 2019. Prior to that visit, " "he was started on tafamidis by [facility]. Unfortunately unable to qualify for further experimental trials due to thrombocytopenia based on a recent evaluation at [place] as well.     In terms of hypertension, mostly not checking BP at home. Well controlled. Sometimes in the 130s.     At our visit, 3/10/2020:  In terms of CHF, taking torsemide 5mg PO daily. Took 1 week snow-shoeing in the [place] without symptoms.     For his amyloid, he remains on tafamidis. He is followed by Dr. Herrera at [place] and is being consider for other studies as well if his NT-proBNP becomes elevated.     At our visit,  11/17/2020:  Seen At [facility] in September, starting a trial of Vutrisiran, (an RNA silencer drug). Helios-B study.     At our visit, 5/19/2021:  Walking frequently without symptoms, 5-6 miles a day.     Remains in Helios-B study.     At our visit, 11/9/2021:  Did have a \"pause\" for a couple minutes in the middle of hiking where he had no energy. Stopped and this resolved. Did not feel it was his heart racing.     Labs reviewed from [place], NT proBNP is 406, stable.     At our visit, 5/31/2022:      Finished a [email or web address] trip for a month.       At our visit, 12/1/2022:  In terms of SVT, no further palpitations or fast heart beats.     In terms of hypertension, well controlled.     In terms of heart failure, stable symptoms, no LE swelling or orthopnea.     Hiking frequently without symptoms.     Was at [facility] earlier this week.       Interim Events:  Patient denies chest pain, palpitations, dyspnea on exertion, pre-syncope, syncope, lower extremity swelling, orthopnea, PND or recent weight gain.     BP well controlled    Weight stable around 158 on home scale.     [place].         ROS  Constitution: Negative for chills, fever and night sweats.   HENT: Negative for nosebleeds.    Eyes: Negative for vision loss in left eye and vision loss in right eye.   Respiratory: Negative for " hemoptysis.    Gastrointestinal: Negative for hematemesis, hematochezia and melena.   Genitourinary: Negative for hematuria.   Neurological: Negative for focal weakness, numbness and paresthesias.     All other systems reviewed and discussed using a comprehensive questionnaire and are negative.         Past Medical History:   Diagnosis Date    Amyloidosis (HCC)     TTR, type pending.     BPH (benign prostatic hyperplasia)     s/p laser encleation of the prostate    Cardiac amyloidosis (HCC)     Chickenpox     Colon polyps     Coronary atherosclerosis of native coronary artery 05/15/2013    Coronary calcium score in the 3000s, negative PET scan and no symptoms so treated medically.     Coronary heart disease     Dyslipidemia 08/23/2016    H/O urethral stricture     s/p surgical repair 1/15/2019, [place]    Kidney cysts     Meniscus tear     Pancreas cyst     Scarlet fever     Sinusitis, chronic     Spinal stenosis, lumbar     SVT (supraventricular tachycardia) ([facility]) 10/23/2018    Thrombocytopenia ([facility]) 7/24/2017       Past Surgical History:   Procedure Laterality Date    KNEE RECONSTRUCTION  2012    left, partial. multiple previous surgeries.     ROTATOR CUFF REPAIR  2010    c/b right biceps tendon rupture, presumably repaired.     ANKLE FUSION Left     ARTHROSCOPY, KNEE      HEMORRHOIDECTOMY      SINUSCOPE      SINUSOTOMIES      Dr. Guerrier, total of 4 surgeries    TRIGGER FINGER RELEASE      x 2         Current Outpatient Medications   Medication Sig Dispense Refill    Azelastine HCl 137 MCG/SPRAY Solution ADMINISTER 1 SPRAY INTO AFFECTED NOSTRIL(S) 2 TIMES A DAY. 30 mL 6    amoxicillin (AMOXIL) 500 MG Cap Take 4 capsules 1 hour before dental procedure 20 Capsule 0    Vutrisiran Sodium 25 MG/0.5ML Solution Prefilled Syringe Inject 25 mg under the skin.      torsemide (DEMADEX) 5 MG Tab Take 1 Tablet by mouth every day. 90 Tablet 2    celecoxib (CELEBREX) 200 MG Cap Take 1 Capsule by mouth 1 time a day as needed for  Moderate Pain. 30 Capsule 0    VYNDAMAX 61 MG Cap TAKE 1 CAPSULE BY MOUTH  DAILY 90 Capsule 3    atorvastatin (LIPITOR) 40 MG Tab Take 1 Tablet by mouth at bedtime. 100 Tablet 3    PREVIDENT 5000 BOOSTER PLUS 1.1 % Paste USE AS DIRECTED      vitamin D3 (CHOLECALCIFEROL) 1000 Unit (25 mcg) Tab Take 2 Tablets by mouth every day. 100 Tablet 3    aspirin 81 MG EC tablet Take 81 mg by mouth.      vitamin A 3 mg (37237 units) capsule Take 10,000 Units by mouth every day.      Glucosamine-Chondroitin 750-600 MG Tab Take 1 Tab by mouth 2 Times a Day. 60 Tab 0    VITAMIN B COMPLEX-C PO Take  by mouth.      Multiple Vitamins-Minerals (CENTRUM SILVER PO) Take  by mouth.      Omega-3 Fatty Acids (SALMON OIL PO) Take 2 Caps by mouth every day.      coenzyme Q-10 30 MG capsule Take 30 mg by mouth every day. Twice a day       No current facility-administered medications for this visit.         Allergies   Allergen Reactions    Avelox [Moxifloxacin Hcl] Rash     Chest, face, genitals    Demerol Unspecified     Vasovagal reaction (drop in BP & pulse)    Levofloxacin     Azithromycin Itching    Cefdinir Rash     Rash chest, under arm and scalp      Ceftin [Cefuroxime Axetil] Rash     Chest, under arm & scalp    Oxycodone Itching     Full body itching    Scopolamine Unspecified     disorientation         Family History   Problem Relation Age of Onset    Heart Attack Father 38        doctor in 1946 diagnosed him with MI at home,  before going to the hospital    Psychiatric Illness Mother         Alzheimers    Hypertension Brother     Stroke Brother     Seizures Brother     Sleep Apnea Neg Hx          Social History     Socioeconomic History    Marital status:      Spouse name: Not on file    Number of children: Not on file    Years of education: Not on file    Highest education level: Not on file   Occupational History    Not on file   Tobacco Use    Smoking status: Never    Smokeless tobacco: Never   Vaping Use    Vaping  "Use: Never used   Substance and Sexual Activity    Alcohol use: Yes     Comment: Social     Drug use: No    Sexual activity: Not on file   Other Topics Concern    Not on file   Social History Narrative    Retired from real estate      Social Determinants of Health     Financial Resource Strain: Not on file   Food Insecurity: Not on file   Transportation Needs: Not on file   Physical Activity: Not on file   Stress: Not on file   Social Connections: Not on file   Intimate Partner Violence: Not on file   Housing Stability: Not on file         Physical Exam:  Ambulatory Vitals  /78 (BP Location: Left arm, Patient Position: Sitting, BP Cuff Size: Adult)   Pulse (!) 54   Resp 16   Ht 1.778 m (5' 10\")   Wt 72.6 kg (160 lb)   SpO2 98%    Oxygen Therapy:  Pulse Oximetry: 98 %  BP Readings from Last 4 Encounters:   08/15/23 122/78   05/08/23 122/70   12/22/22 124/70   12/01/22 120/70       Weight/BMI: Body mass index is 22.96 kg/m².  Wt Readings from Last 4 Encounters:   08/15/23 72.6 kg (160 lb)   05/08/23 72.1 kg (159 lb)   01/05/23 74.8 kg (165 lb)   12/16/22 74.8 kg (165 lb)       General: No apparent distress  Eyes: nl conjunctiva  Neck: JVP 4-5 cm H2O, no carotid bruits  Lungs: normal respiratory effort, CTAB  Heart: RRR, + S4, no murmurs, no rubs, 1+ edema bilateral lower extremities. No LV/RV heave on cardiac palpatation. Sustained diffuse, displaced PMI. 2+ bilateral radial pulses.    Abdomen: soft, non tender, non distended, no masses, normal bowel sounds.  No HSM.  Extremities/MSK: no clubbing, no cyanosis  Neurological: No focal sensory deficits  Psychiatric: Appropriate affect, A/O x 3, intact judgement and insight  Skin: Warm extremities    Exam repeated in full and unchanged except for as noted above.    Lab Data Review:  Lab Results   Component Value Date/Time    CHOLSTRLTOT 143 04/24/2023 08:10 AM    LDL 45 04/24/2023 08:10 AM    HDL 88 04/24/2023 08:10 AM    TRIGLYCERIDE 50 04/24/2023 08:10 AM     "   Lab Results   Component Value Date/Time    SODIUM 141 04/24/2023 08:10 AM    POTASSIUM 5.0 04/24/2023 08:10 AM    CHLORIDE 104 04/24/2023 08:10 AM    CO2 27 04/24/2023 08:10 AM    GLUCOSE 85 04/24/2023 08:10 AM    BUN 23 (H) 04/24/2023 08:10 AM    CREATININE 1.34 04/24/2023 08:10 AM     Lab Results   Component Value Date/Time    ALKPHOSPHAT 100 (H) 04/24/2023 08:10 AM    ASTSGOT 36 04/24/2023 08:10 AM    ALTSGPT 25 04/24/2023 08:10 AM    TBILIRUBIN 0.6 04/24/2023 08:10 AM      Lab Results   Component Value Date/Time    WBC 9.8 04/24/2023 08:10 AM     No components found for: HBGA1C  No components found for: TROPONIN  No results found for: BNP      Cardiac Imaging and Procedures Review:    Exercise stress echocardiogram, 10/24/2017: Patient did 11 minutes 59 seconds corresponding with 12.8 metabolic equivalents on the Kael protocol achieving 92% of his age-predicted maximum heart rate with normal systolic augmentation regionally, negative for ischemia.  He also had a stress echocardiogram in 2013 that also was negative for ischemia.    Echo dated 5/2023:  05/13/2023 9:27 AM CDT    consistent with  cardiac amyloidosis.  3. Normal left ventricular chamber size; calculated ejection fraction 60%.  4. Moderate concentric increase in left ventricular wall thickness.  No regional wall motion abnormalities.  5. Indeterminate left ventricular diastolic function.  6. Global averaged left ventricular LPS strain is abnormal at -13% (normal = strain > 18%),  7. .  .  with a pattern of apical sparing consistent with amyloid heart disease.  8. Mildly thickened aortic valve; mild aortic valve regurgitation.  9. Mild-moderate mitral valve regurgitation; severe left atrial enlargement.  10. Mildly enlarged right ventricle; mildly reduced systolic function; estimated RV systolic pressure 27 mmHg.  11. Averaged right ventricular free wall LPS strain is borderline at -24% (normal = strain > 24%).  12. Trivial tricuspid valve  regurgitation; severe right atrial enlargement.      Echo 3/24/2022:    Final Impressions   1. Research Transthoracic Echocardiogram performed per protocol IRB#[id number].   2. Moderately increased concentric left ventricular wall thickness.   3. Calculated 2-D linear left ventricular ejection fraction 57%.   4. Global averaged left ventricular longitudinal peak systolic strain is abnormal at -14% (normal = more negative than -18%).   5. Normal right ventricular chamber size.   6. Mildly reduced right ventricular systolic function.   7. Averaged right ventricular free wall longitudinal peak systolic strain is -20% (normal = more negative than -24%).   8. Estimated right ventricular systolic pressure 33 mmHg (systolic blood pressure 157 mmHg).   9. Mild-moderate mitral valve regurgitation. 2 jets.   10. Trivial tricuspid valve regurgitation.   11. Normal inferior vena cava size with normal inspiratory collapse (>50%).   12. Tiny circumferential pericardial effusion.      Echo 11/2022:  1. Research Transthoracic Echocardiogram performed per protocol IRB#[id number].  2. Findings consistent with cardiac amyloidosis.  3. Moderately increased concentric left ventricular wall thickness. Increased right ventricular wall thickness.  4. Normal left ventricular chamber size. Calculated biplane left ventricular ejection fraction 64%. No regional wall motion abnormalities.  5. Global averaged left ventricular longitudinal peak systolic strain is abnormal at -13% (normal = more negative than -18%) with a pattern of  relative apical sparing.  6. Mildly enlarged right ventricular chamber size with mildly reduced systolic function. Unable to estimate right ventricular systolic pressure due to  insufficient degree of tricuspid valve regurgitation.  7. Averaged right ventricular free wall longitudinal peak systolic strain is -17% (normal = more negative than -24%).  8. Thickened mitral valve with mild-moderate mitral valve regurgitation  "(multiple jets).  9. Mildly thickened aortic valve with mild central aortic valve regurgitation.  10. Normal inferior vena cava size with normal inspiratory collapse (>50%). Tiny localized pericardial effusion along the right ventricle.    CT coronary calcium scan, 11/16/2018:  \"Coronary calcification:  LMA - 0.0  LCX - 165.1  LAD - 833.2  RCA - 2093.2  PDA - 0.0  Calcium score:  3091.5\"     PET myocardial perfusion imaging, 12/20/2018, images and report reviewed, my interpretation: Demonstrates systolic dysfunction with a resting ejection fraction of 36% and no territorial ischemia nor transient ischemic dilation.  Left ventricle was borderline dilated.     EKG, 12/20/2018, tracings and report reviewed, mitral rotation: Documents supraventricular tachycardia with a fairly pronounced pseudo-R prime in V1 consistent with AVNRT       TTE 6/2020 [facility]:      Radiology test Review:  CXR: 12/2018  No pulmonary infiltrates or consolidations are noted.  No pleural effusion. No pneumothorax.  Normal cardiopericardial silhouette.       Medical Decision Making:  # Wild Type TTR Amyloid with Cardiac involvement - asymptomatic with NYHA Class I symptoms. He was started by Dr. Pierson at [facility] on tafamadis, and has seen Dr. Herrera from [place] as well as a cardiologist at [place]. Started Helios-B study in 10/2020 with Dr. Pierson. He is euvolemic today. Ending 10/2023, but starting another trial after this.   -continue torsemide 5mg PO daily. Weight stable at 157 pounds (at home).   -f/u at [place] as planned  -continue tafamidis.  Will check CBC and NT-proBNP at least Q6 months, and close to Q3 months as this might change his management options.   -continue Helios-B study.     # Agatston CAC score, >400  Asymptomatic with excellent exercise tolerance and multiple negative stress tests. Normal LVEF based on last echo performed at [place].   -continue aspirin 81mg PO daily  -lipitor 40mg PO Daily  -discussed ED " precautions    #  AVNRT (AV billy re-entry tachycardia) (HCC)  No evidence of atrial fibrillation/flutter at this time although certainly high risk.  He will let me know if any symptoms develop, and he may be a candidate for empiric anticoagulation given his high CVA risk.     # Pure hypercholesterolemia  Continue lipitor.    # Thrombocytopenia. - mild. monitored by study. Has seen hematology, no need for biopsy at this time.     # Leukocytosis - has resolved.     # Pre-operative evaluation   Based on RCRI risk scoring, the patient is moderate risk of cardiovascular complications for low to moderate risk surgery or procedures.  he is medically optimized based on my last in person assessment and if he has no new symptoms, surgery should proceed without further cardiac work-up.  Aspirin can be stopped raji-operatively as he has no known clinical atherosclerosis.      Return in about 1 year (around 8/15/2024).      Jaime Raman MD, [facility]  [facility], [address]  [address] [address]  Phone: [phone]  Fax: [phone]

## 2023-08-28 DIAGNOSIS — M25.50 ARTHRALGIA, UNSPECIFIED JOINT: ICD-10-CM

## 2023-08-28 RX ORDER — CELECOXIB 200 MG/1
200 CAPSULE ORAL
Qty: 30 CAPSULE | Refills: 0 | Status: SHIPPED | OUTPATIENT
Start: 2023-08-28

## 2023-09-18 ENCOUNTER — HOSPITAL ENCOUNTER (OUTPATIENT)
Dept: RADIOLOGY | Facility: MEDICAL CENTER | Age: 83
End: 2023-09-18
Attending: INTERNAL MEDICINE
Payer: MEDICARE

## 2023-09-18 DIAGNOSIS — K86.2 PANCREATIC CYST: ICD-10-CM

## 2023-09-18 PROCEDURE — 74183 MRI ABD W/O CNTR FLWD CNTR: CPT

## 2023-09-18 PROCEDURE — A9579 GAD-BASE MR CONTRAST NOS,1ML: HCPCS | Performed by: INTERNAL MEDICINE

## 2023-09-18 PROCEDURE — 700117 HCHG RX CONTRAST REV CODE 255: Performed by: INTERNAL MEDICINE

## 2023-09-18 RX ADMIN — GADOTERIDOL 15 ML: 279.3 INJECTION, SOLUTION INTRAVENOUS at 09:41

## 2023-09-20 ENCOUNTER — TELEPHONE (OUTPATIENT)
Dept: CARDIOLOGY | Facility: MEDICAL CENTER | Age: 83
End: 2023-09-20
Payer: MEDICARE

## 2023-09-20 NOTE — TELEPHONE ENCOUNTER
Last OV: 08.15.2023  Proposed Surgery: shoulder surgery   Surgery Date: TBD  Requesting Office Name: [facility]   Fax Number: [phone]  Preference of Location (default is surgery center unless specified by Cardiologist or [facility])  Prior Clearance Addressed: No      Anticoags/Antiplatelets: Aspirin  Outstanding Cardiac Imaging : No  Stent, Cardiac Devices, or Catheterization: No  Ablation, TAVR/Valve (including open heart), Cardioversion: No  Recent Cardiac Hospitalization: No            When: N/A  History (cardiac history):   Past Medical History:   Diagnosis Date    Amyloidosis (HCC)     TTR, type pending.     BPH (benign prostatic hyperplasia)     s/p laser encleation of the prostate    Cardiac amyloidosis (HCC)     Chickenpox     Colon polyps     Coronary atherosclerosis of native coronary artery 05/15/2013    Coronary calcium score in the 3000s, negative PET scan and no symptoms so treated medically.     Coronary heart disease     Dyslipidemia 08/23/2016    H/O urethral stricture     s/p surgical repair 1/15/2019, [place]    Kidney cysts     Meniscus tear     Pancreas cyst     Scarlet fever     Sinusitis, chronic     Spinal stenosis, lumbar     SVT (supraventricular tachycardia) ([facility]) 10/23/2018    Thrombocytopenia (HCC) 7/24/2017             Surgical Clearance Letter Sent: YES   **Scan clearance request letter into [facility].**

## 2023-10-04 DIAGNOSIS — M54.42 ACUTE MIDLINE LOW BACK PAIN WITH LEFT-SIDED SCIATICA: ICD-10-CM

## 2023-10-28 RX ORDER — SULFAMETHOXAZOLE AND TRIMETHOPRIM 800; 160 MG/1; MG/1
1 TABLET ORAL 2 TIMES DAILY
Qty: 20 TABLET | Refills: 0 | Status: SHIPPED | OUTPATIENT
Start: 2023-10-28 | End: 2023-12-11 | Stop reason: SDUPTHER

## 2023-10-28 RX ORDER — AZELASTINE HYDROCHLORIDE 137 UG/1
1 SPRAY, METERED NASAL 2 TIMES DAILY
Qty: 30 ML | Refills: 6 | Status: SHIPPED | OUTPATIENT
Start: 2023-10-28 | End: 2023-10-30 | Stop reason: SDUPTHER

## 2023-10-29 ENCOUNTER — PATIENT MESSAGE (OUTPATIENT)
Dept: CARDIOLOGY | Facility: MEDICAL CENTER | Age: 83
End: 2023-10-29
Payer: MEDICARE

## 2023-10-30 ENCOUNTER — PATIENT MESSAGE (OUTPATIENT)
Dept: CARDIOLOGY | Facility: MEDICAL CENTER | Age: 83
End: 2023-10-30
Payer: MEDICARE

## 2023-10-30 RX ORDER — AZELASTINE HYDROCHLORIDE 137 UG/1
1 SPRAY, METERED NASAL 2 TIMES DAILY
Qty: 90 ML | Refills: 3 | Status: SHIPPED | OUTPATIENT
Start: 2023-10-30

## 2023-10-31 NOTE — PATIENT COMMUNICATION
Recommendations from JM last office visit were drafted in Letter and electronically sent to fax number provided by patient.

## 2023-11-01 ENCOUNTER — PATIENT MESSAGE (OUTPATIENT)
Dept: CARDIOLOGY | Facility: MEDICAL CENTER | Age: 83
End: 2023-11-01
Payer: MEDICARE

## 2023-11-14 RX ORDER — TAFAMIDIS 61 MG/1
CAPSULE, LIQUID FILLED ORAL
Qty: 30 CAPSULE | Refills: 5 | Status: SHIPPED | OUTPATIENT
Start: 2023-11-14

## 2023-11-20 ENCOUNTER — NON-PROVIDER VISIT (OUTPATIENT)
Dept: INTERNAL MEDICINE | Facility: IMAGING CENTER | Age: 83
End: 2023-11-20
Payer: MEDICARE

## 2023-11-20 ENCOUNTER — HOSPITAL ENCOUNTER (OUTPATIENT)
Facility: MEDICAL CENTER | Age: 83
End: 2023-11-20
Attending: INTERNAL MEDICINE
Payer: MEDICARE

## 2023-11-20 DIAGNOSIS — N40.1 BENIGN PROSTATIC HYPERPLASIA WITH URINARY OBSTRUCTION: ICD-10-CM

## 2023-11-20 DIAGNOSIS — E78.5 HYPERLIPIDEMIA, UNSPECIFIED HYPERLIPIDEMIA TYPE: ICD-10-CM

## 2023-11-20 DIAGNOSIS — N18.31 STAGE 3A CHRONIC KIDNEY DISEASE: ICD-10-CM

## 2023-11-20 DIAGNOSIS — E85.4 CARDIAC AMYLOIDOSIS (HCC): ICD-10-CM

## 2023-11-20 DIAGNOSIS — N13.8 BENIGN PROSTATIC HYPERPLASIA WITH URINARY OBSTRUCTION: ICD-10-CM

## 2023-11-20 DIAGNOSIS — R73.09 ELEVATED GLYCOHEMOGLOBIN: ICD-10-CM

## 2023-11-20 DIAGNOSIS — E55.9 VITAMIN D DEFICIENCY: ICD-10-CM

## 2023-11-20 DIAGNOSIS — I43 CARDIAC AMYLOIDOSIS (HCC): ICD-10-CM

## 2023-11-20 DIAGNOSIS — D69.6 THROMBOCYTOPENIA (HCC): ICD-10-CM

## 2023-11-20 LAB
25(OH)D3 SERPL-MCNC: 41 NG/ML (ref 30–100)
ALBUMIN SERPL BCP-MCNC: 4 G/DL (ref 3.2–4.9)
ALBUMIN/GLOB SERPL: 1.5 G/DL
ALP SERPL-CCNC: 151 U/L (ref 30–99)
ALT SERPL-CCNC: 46 U/L (ref 2–50)
ANION GAP SERPL CALC-SCNC: 8 MMOL/L (ref 7–16)
ANISOCYTOSIS BLD QL SMEAR: ABNORMAL
APPEARANCE UR: CLEAR
AST SERPL-CCNC: 54 U/L (ref 12–45)
BASOPHILS # BLD AUTO: 0 % (ref 0–1.8)
BASOPHILS # BLD: 0 K/UL (ref 0–0.12)
BILIRUB SERPL-MCNC: 0.9 MG/DL (ref 0.1–1.5)
BILIRUB UR QL STRIP.AUTO: NEGATIVE
BUN SERPL-MCNC: 21 MG/DL (ref 8–22)
CALCIUM ALBUM COR SERPL-MCNC: 9.4 MG/DL (ref 8.5–10.5)
CALCIUM SERPL-MCNC: 9.4 MG/DL (ref 8.5–10.5)
CHLORIDE SERPL-SCNC: 103 MMOL/L (ref 96–112)
CHOLEST SERPL-MCNC: 123 MG/DL (ref 100–199)
CO2 SERPL-SCNC: 27 MMOL/L (ref 20–33)
COLOR UR: YELLOW
COMMENT NL1176: NORMAL
CREAT SERPL-MCNC: 0.97 MG/DL (ref 0.5–1.4)
CREAT UR-MCNC: 97.59 MG/DL
EOSINOPHIL # BLD AUTO: 0.45 K/UL (ref 0–0.51)
EOSINOPHIL NFR BLD: 4.8 % (ref 0–6.9)
ERYTHROCYTE [DISTWIDTH] IN BLOOD BY AUTOMATED COUNT: 49.7 FL (ref 35.9–50)
EST. AVERAGE GLUCOSE BLD GHB EST-MCNC: 120 MG/DL
GFR SERPLBLD CREATININE-BSD FMLA CKD-EPI: 78 ML/MIN/1.73 M 2
GLOBULIN SER CALC-MCNC: 2.6 G/DL (ref 1.9–3.5)
GLUCOSE SERPL-MCNC: 78 MG/DL (ref 65–99)
GLUCOSE UR STRIP.AUTO-MCNC: NEGATIVE MG/DL
HBA1C MFR BLD: 5.8 % (ref 4–5.6)
HCT VFR BLD AUTO: 40.9 % (ref 42–52)
HDLC SERPL-MCNC: 72 MG/DL
HGB BLD-MCNC: 14.1 G/DL (ref 14–18)
KETONES UR STRIP.AUTO-MCNC: NEGATIVE MG/DL
LDLC SERPL CALC-MCNC: 45 MG/DL
LEUKOCYTE ESTERASE UR QL STRIP.AUTO: NEGATIVE
LYMPHOCYTES # BLD AUTO: 4.47 K/UL (ref 1–4.8)
LYMPHOCYTES NFR BLD: 47.6 % (ref 22–41)
MACROCYTES BLD QL SMEAR: ABNORMAL
MANUAL DIFF BLD: NORMAL
MCH RBC QN AUTO: 33.6 PG (ref 27–33)
MCHC RBC AUTO-ENTMCNC: 34.5 G/DL (ref 32.3–36.5)
MCV RBC AUTO: 97.4 FL (ref 81.4–97.8)
MICRO URNS: NORMAL
MICROALBUMIN UR-MCNC: <1.2 MG/DL
MICROALBUMIN/CREAT UR: NORMAL MG/G (ref 0–30)
MONOCYTES # BLD AUTO: 0.61 K/UL (ref 0–0.85)
MONOCYTES NFR BLD AUTO: 6.5 % (ref 0–13.4)
MORPHOLOGY BLD-IMP: NORMAL
NEUTROPHILS # BLD AUTO: 3.86 K/UL (ref 1.82–7.42)
NEUTROPHILS NFR BLD: 41.1 % (ref 44–72)
NITRITE UR QL STRIP.AUTO: NEGATIVE
NRBC # BLD AUTO: 0 K/UL
NRBC BLD-RTO: 0 /100 WBC (ref 0–0.2)
OVALOCYTES BLD QL SMEAR: NORMAL
PH UR STRIP.AUTO: 7 [PH] (ref 5–8)
PLATELET # BLD AUTO: 111 K/UL (ref 164–446)
PLATELET BLD QL SMEAR: NORMAL
PMV BLD AUTO: 12.6 FL (ref 9–12.9)
POIKILOCYTOSIS BLD QL SMEAR: NORMAL
POTASSIUM SERPL-SCNC: 4.7 MMOL/L (ref 3.6–5.5)
PROT SERPL-MCNC: 6.6 G/DL (ref 6–8.2)
PROT UR QL STRIP: NEGATIVE MG/DL
PSA SERPL-MCNC: 0.76 NG/ML (ref 0–4)
RBC # BLD AUTO: 4.2 M/UL (ref 4.7–6.1)
RBC BLD AUTO: PRESENT
RBC UR QL AUTO: NEGATIVE
SMUDGE CELLS BLD QL SMEAR: NORMAL
SODIUM SERPL-SCNC: 138 MMOL/L (ref 135–145)
SP GR UR STRIP.AUTO: 1.02
TRIGL SERPL-MCNC: 32 MG/DL (ref 0–149)
UROBILINOGEN UR STRIP.AUTO-MCNC: 1 MG/DL
WBC # BLD AUTO: 9.4 K/UL (ref 4.8–10.8)

## 2023-11-20 PROCEDURE — 85027 COMPLETE CBC AUTOMATED: CPT

## 2023-11-20 PROCEDURE — 82043 UR ALBUMIN QUANTITATIVE: CPT

## 2023-11-20 PROCEDURE — 83036 HEMOGLOBIN GLYCOSYLATED A1C: CPT

## 2023-11-20 PROCEDURE — 80061 LIPID PANEL: CPT

## 2023-11-20 PROCEDURE — 85007 BL SMEAR W/DIFF WBC COUNT: CPT

## 2023-11-20 PROCEDURE — 82570 ASSAY OF URINE CREATININE: CPT

## 2023-11-20 PROCEDURE — 80053 COMPREHEN METABOLIC PANEL: CPT

## 2023-11-20 PROCEDURE — 84153 ASSAY OF PSA TOTAL: CPT

## 2023-11-20 PROCEDURE — 82306 VITAMIN D 25 HYDROXY: CPT

## 2023-11-20 PROCEDURE — 81003 URINALYSIS AUTO W/O SCOPE: CPT

## 2023-11-22 ENCOUNTER — OFFICE VISIT (OUTPATIENT)
Dept: INTERNAL MEDICINE | Facility: IMAGING CENTER | Age: 83
End: 2023-11-22
Payer: MEDICARE

## 2023-11-22 VITALS
DIASTOLIC BLOOD PRESSURE: 66 MMHG | BODY MASS INDEX: 23.85 KG/M2 | SYSTOLIC BLOOD PRESSURE: 138 MMHG | TEMPERATURE: 97.7 F | OXYGEN SATURATION: 98 % | HEIGHT: 69 IN | RESPIRATION RATE: 14 BRPM | HEART RATE: 57 BPM | WEIGHT: 161 LBS

## 2023-11-22 DIAGNOSIS — D69.6 THROMBOCYTOPENIA (HCC): ICD-10-CM

## 2023-11-22 DIAGNOSIS — N18.31 STAGE 3A CHRONIC KIDNEY DISEASE: ICD-10-CM

## 2023-11-22 DIAGNOSIS — M48.061 SPINAL STENOSIS OF LUMBAR REGION, UNSPECIFIED WHETHER NEUROGENIC CLAUDICATION PRESENT: ICD-10-CM

## 2023-11-22 DIAGNOSIS — K86.2 PANCREATIC CYST: ICD-10-CM

## 2023-11-22 DIAGNOSIS — S46.219A BICEPS TENDON TEAR: ICD-10-CM

## 2023-11-22 DIAGNOSIS — R73.09 ELEVATED GLYCOHEMOGLOBIN: ICD-10-CM

## 2023-11-22 DIAGNOSIS — I43 CARDIAC AMYLOIDOSIS (HCC): ICD-10-CM

## 2023-11-22 DIAGNOSIS — M75.110 INCOMPLETE TEAR OF ROTATOR CUFF, UNSPECIFIED LATERALITY, UNSPECIFIED WHETHER TRAUMATIC: ICD-10-CM

## 2023-11-22 DIAGNOSIS — D72.820 MONOCLONAL B-CELL LYMPHOCYTOSIS: ICD-10-CM

## 2023-11-22 DIAGNOSIS — R35.1 BENIGN PROSTATIC HYPERPLASIA WITH NOCTURIA: ICD-10-CM

## 2023-11-22 DIAGNOSIS — E85.4 CARDIAC AMYLOIDOSIS (HCC): ICD-10-CM

## 2023-11-22 DIAGNOSIS — R79.89 ELEVATED LFTS: ICD-10-CM

## 2023-11-22 DIAGNOSIS — E78.5 HYPERLIPIDEMIA, UNSPECIFIED HYPERLIPIDEMIA TYPE: ICD-10-CM

## 2023-11-22 DIAGNOSIS — N40.1 BENIGN PROSTATIC HYPERPLASIA WITH NOCTURIA: ICD-10-CM

## 2023-11-22 PROCEDURE — 3078F DIAST BP <80 MM HG: CPT | Performed by: INTERNAL MEDICINE

## 2023-11-22 PROCEDURE — 99215 OFFICE O/P EST HI 40 MIN: CPT | Performed by: INTERNAL MEDICINE

## 2023-11-22 PROCEDURE — 3075F SYST BP GE 130 - 139MM HG: CPT | Performed by: INTERNAL MEDICINE

## 2023-11-22 ASSESSMENT — FIBROSIS 4 INDEX: FIB4 SCORE: 5.88

## 2023-11-22 NOTE — PROGRESS NOTES
Chief Complaint   Patient presents with    Follow-Up    Lab Results       HISTORY OF THE PRESENT ILLNESS: Patient is a 83 y.o. male.     Patient comes in for follow-up on chronic issues.  He has generally been feeling well.  He had recent labs which were reviewed in detail and outlined below.  He is tentatively scheduled for surgery to repair rotator cuff and bicep tendon tear.  He also reports increase in urinary symptoms.  History of BPH post enucleation procedure done at [facility].    Chronic issues:    1. Monoclonal B-cell lymphocytosis  Stable.  Absolute lymphocyte count has been stable.  He is showing more abnormal RBC.  His RBC count remains slightly low but hemoglobin is normal.  He is followed by [facility]    2. Thrombocytopenia (HCC)  Stable.  Platelet count ranges 100,000-150,000.    3. Cardiac amyloidosis (HCC)  Stable.  Patient remains on Vyndamax.  No peripheral edema or other signs of heart failure.  He is followed closely by cardiology.    4. Elevated glycohemoglobin  Worse.  A1c is 5.8.    5. Hyperlipidemia, unspecified hyperlipidemia type  Stable.  Lipids are at goal.  He is on Lipitor.  Lab Results   Component Value Date/Time    CHOLSTRLTOT 123 11/20/2023 08:00 AM    LDL 45 11/20/2023 08:00 AM    HDL 72 11/20/2023 08:00 AM    TRIGLYCERIDE 32 11/20/2023 08:00 AM       6. Elevated LFTs  Elevation in AST/alkaline phosphatase.  This seems likely related to treatment for his amyloid.  He has had episodes of this in the past which may correlate with receiving injections.  MRI of abdomen in September 2023 showed liver to be normal.    7. Stage 3a chronic kidney disease (HCC)  Resolved on recent labs.  Creatinine is down to 0.97    8. Spinal stenosis of lumbar region, unspecified whether neurogenic claudication present  Stable/improved.  He saw a local chiropractor who has alleviated his pain.  He is scheduled for epidural next week.    9. Benign prostatic hyperplasia with nocturia  Worse.   Increase in symptoms as discussed above.  BPH diagnosis in the past post surgical intervention at [facility].  He is interested in additional treatment options.    10. Incomplete tear of rotator cuff, unspecified laterality, unspecified whether traumatic  Stable.  Scheduled for surgical repair.    11. Biceps tendon tear  Stable.  Scheduled for surgical repair.      Allergies: Avelox [moxifloxacin hcl], Demerol, Levofloxacin, Azithromycin, Cefdinir, Ceftin [cefuroxime axetil], Oxycodone, and Scopolamine    Current Outpatient Medications Ordered in Epic   Medication Sig Dispense Refill    VYNDAMAX 61 MG Cap TAKE 1 CAPSULE BY MOUTH DAILY 30 Capsule 5    Azelastine (ASTELIN) 137 MCG/SPRAY Solution Administer 1 Spray into affected nostril(S) 2 times a day. 90 mL 3    celecoxib (CELEBREX) 200 MG Cap Take 1 Capsule by mouth 1 time a day as needed for Moderate Pain. 30 Capsule 0    atorvastatin (LIPITOR) 40 MG Tab Take 1 Tablet by mouth at bedtime. 100 Tablet 3    torsemide (DEMADEX) 5 MG Tab Take 1 Tablet by mouth every day. 100 Tablet 3    amoxicillin (AMOXIL) 500 MG Cap Take 4 capsules 1 hour before dental procedure 20 Capsule 0    Vutrisiran Sodium 25 MG/0.5ML Solution Prefilled Syringe Inject 25 mg under the skin.      PREVIDENT 5000 BOOSTER PLUS 1.1 % Paste USE AS DIRECTED      vitamin D3 (CHOLECALCIFEROL) 1000 Unit (25 mcg) Tab Take 2 Tablets by mouth every day. 100 Tablet 3    aspirin 81 MG EC tablet Take 81 mg by mouth.      vitamin A 3 mg (21916 units) capsule Take 10,000 Units by mouth every day.      Glucosamine-Chondroitin 750-600 MG Tab Take 1 Tab by mouth 2 Times a Day. 60 Tab 0    VITAMIN B COMPLEX-C PO Take  by mouth.      Multiple Vitamins-Minerals (CENTRUM SILVER PO) Take  by mouth.      Omega-3 Fatty Acids (SALMON OIL PO) Take 2 Caps by mouth every day.      coenzyme Q-10 30 MG capsule Take 30 mg by mouth every day. Twice a day       No current [facility]-ordered facility-administered medications on file.       Past  "medical history, social history and family history were reviewed from chart today    Review of systems: Per HPI.    Patient had trauma to his left side after recent mechanical fall.  He has tenderness along the rib cage which could represent fracture and left lower abdomen.  We discussed possible splenic involvement.  Since he is improving I recommended monitoring only.    Exam: /66 (BP Location: Left arm, Patient Position: Sitting, BP Cuff Size: Adult)   Pulse (!) 57   Temp 36.5 °C (97.7 °F) (Temporal)   Resp 14   Ht 1.753 m (5' 9\")   Wt 73 kg (161 lb)   SpO2 98%   General: Well-appearing. Well-developed. No signs of distress.  HEENT: Grossly normal. Oral cavity is pink and moist.   Neck: Supple without JVD or bruit.  Pulmonary: Clear with good breath sounds. Normal effort.  Thorax: Tenderness along the left lateral seventh/eighth rib  Cardiovascular: Regular. Carotid and radial pulses are intact.  Abdomen: Normal in appearance, soft.  Normal bowel sounds.  He is tender over the left upper quadrant from recent trauma.  No bruising.  Neurologic: Cranial nerves II through XII are grossly normal, alert and oriented x3      Diagnosis:  1. Monoclonal B-cell lymphocytosis        2. Thrombocytopenia (HCC)        3. Cardiac amyloidosis (HCC)        4. Elevated glycohemoglobin        5. Hyperlipidemia, unspecified hyperlipidemia type        6. Elevated LFTs        7. Stage 3a chronic kidney disease (HCC)        8. Spinal stenosis of lumbar region, unspecified whether neurogenic claudication present        9. Benign prostatic hyperplasia with nocturia  Referral to Urology      10. Incomplete tear of rotator cuff, unspecified laterality, unspecified whether traumatic        11. Biceps tendon tear        12. Pancreatic cyst              Healthy 83-year-old male.  Ongoing CBC abnormalities.  He has been diagnosed with \"pre-CLL\".  His absolute lymphocyte count has improved but he has multiple abnormal RBC that would " be consistent with CLL.  He is followed closely by hematology at [facility].    Cardiac issues are stable.  No sign of heart failure.  No change in treatment.    Lipids are at goal.  No change in treatment.    Abnormal LFTs are likely related to medication.  Normal imaging earlier this year.  He has had this issue in the past.  At this point I recommend no further workup.    Spinal stenosis is improved.  I recommended canceling epidural if he is pain-free.    Ongoing BPH symptoms.  He would like to see a local urologist.  I recommended Dr. Valerio in [place].    Scheduled for surgical repair of rotator cuff and bicep tendon.    Pancreatic cyst. MRI September 2023:  IMPRESSION:  1.  Slight interval increase in size of the pancreatic cysts predominantly in the body and head of the pancreas with other subcentimeter cysts again noted. These are most consistent with IPMN's. No suspicious or worrisome features are identified.  2.  Minimal gallbladder sludge without significant biliary dilatation.  3.  Cardiomegaly again noted.    My total time spent caring for the patient on the day of the encounter was  greater than 40+ minutes.   This includes obtaining history, reviewing chart, physical exam, patient education, reviewing outside records, placing orders, interpreting tests and coordinating care.

## 2023-11-29 ENCOUNTER — PATIENT MESSAGE (OUTPATIENT)
Dept: HEALTH INFORMATION MANAGEMENT | Facility: OTHER | Age: 83
End: 2023-11-29

## 2023-12-11 RX ORDER — SULFAMETHOXAZOLE AND TRIMETHOPRIM 800; 160 MG/1; MG/1
1 TABLET ORAL 2 TIMES DAILY
Qty: 20 TABLET | Refills: 0 | Status: SHIPPED | OUTPATIENT
Start: 2023-12-11 | End: 2023-12-30 | Stop reason: SDUPTHER

## 2023-12-30 RX ORDER — SULFAMETHOXAZOLE AND TRIMETHOPRIM 800; 160 MG/1; MG/1
1 TABLET ORAL 2 TIMES DAILY
Qty: 28 TABLET | Refills: 0 | Status: SHIPPED | OUTPATIENT
Start: 2023-12-30 | End: 2023-12-30 | Stop reason: SDUPTHER

## 2023-12-30 RX ORDER — SULFAMETHOXAZOLE AND TRIMETHOPRIM 800; 160 MG/1; MG/1
1 TABLET ORAL 2 TIMES DAILY
Qty: 28 TABLET | Refills: 0 | Status: SHIPPED | OUTPATIENT
Start: 2023-12-30 | End: 2024-01-09

## 2024-02-29 ENCOUNTER — RESEARCH ENCOUNTER (OUTPATIENT)
Dept: RESEARCH | Facility: MEDICAL CENTER | Age: 84
End: 2024-02-29
Payer: MEDICARE

## 2024-02-29 NOTE — RESEARCH NOTE
Patient responded to [facility] and was sent consent forms and scheduled following Cardiologist appointment. Patient canceled [facility] appointment. Closing research encounter.

## 2024-03-22 ENCOUNTER — OFFICE VISIT (OUTPATIENT)
Dept: CARDIOLOGY | Facility: MEDICAL CENTER | Age: 84
End: 2024-03-22
Attending: INTERNAL MEDICINE
Payer: MEDICARE

## 2024-03-22 VITALS
HEIGHT: 69 IN | BODY MASS INDEX: 23.7 KG/M2 | SYSTOLIC BLOOD PRESSURE: 120 MMHG | DIASTOLIC BLOOD PRESSURE: 58 MMHG | RESPIRATION RATE: 16 BRPM | HEART RATE: 61 BPM | OXYGEN SATURATION: 97 % | WEIGHT: 160 LBS

## 2024-03-22 DIAGNOSIS — E85.4 CARDIAC AMYLOIDOSIS (HCC): ICD-10-CM

## 2024-03-22 DIAGNOSIS — R93.1 AGATSTON CAC SCORE, >400: ICD-10-CM

## 2024-03-22 DIAGNOSIS — I50.32 CHRONIC DIASTOLIC CONGESTIVE HEART FAILURE (HCC): ICD-10-CM

## 2024-03-22 DIAGNOSIS — I47.10 SVT (SUPRAVENTRICULAR TACHYCARDIA) (HCC): ICD-10-CM

## 2024-03-22 DIAGNOSIS — I43 CARDIAC AMYLOIDOSIS (HCC): ICD-10-CM

## 2024-03-22 DIAGNOSIS — E78.5 DYSLIPIDEMIA: ICD-10-CM

## 2024-03-22 DIAGNOSIS — M54.42 ACUTE MIDLINE LOW BACK PAIN WITH LEFT-SIDED SCIATICA: ICD-10-CM

## 2024-03-22 DIAGNOSIS — I47.10 PAROXYSMAL SUPRAVENTRICULAR TACHYCARDIA (HCC): ICD-10-CM

## 2024-03-22 DIAGNOSIS — E78.00 PURE HYPERCHOLESTEROLEMIA: ICD-10-CM

## 2024-03-22 PROCEDURE — 99214 OFFICE O/P EST MOD 30 MIN: CPT | Performed by: INTERNAL MEDICINE

## 2024-03-22 PROCEDURE — 99213 OFFICE O/P EST LOW 20 MIN: CPT | Performed by: INTERNAL MEDICINE

## 2024-03-22 PROCEDURE — 3074F SYST BP LT 130 MM HG: CPT | Performed by: INTERNAL MEDICINE

## 2024-03-22 PROCEDURE — 3078F DIAST BP <80 MM HG: CPT | Performed by: INTERNAL MEDICINE

## 2024-03-22 RX ORDER — ATORVASTATIN CALCIUM 40 MG/1
40 TABLET, FILM COATED ORAL
Qty: 90 TABLET | Refills: 3 | Status: SHIPPED | OUTPATIENT
Start: 2024-03-22

## 2024-03-22 RX ORDER — TORSEMIDE 5 MG/1
5 TABLET ORAL
Qty: 90 TABLET | Refills: 3 | Status: SHIPPED | OUTPATIENT
Start: 2024-03-22

## 2024-03-22 ASSESSMENT — FIBROSIS 4 INDEX: FIB4 SCORE: 5.88

## 2024-03-22 NOTE — PROGRESS NOTES
Chief Complaint   Patient presents with    Hyperlipidemia     F/V dx: Pure hypercholesterolemia    Premature Ventricular Contractions (PVCs)    Supraventricular Tachycardia (SVT)     F/V dx: Paroxysmal supraventricular tachycardia       Subjective     Andre Martinez is a 83 y.o. male who presents today with a history of presumed coronary atherosclerosis related to heavily calcified coronary arteries in the past though with negative serial stress echocardiograms, and asymptomatic short runs of SVT and wide-complex tachycardia     Doing well     Sees [facility] in [place]    Had shoulder surgery in [place]    Past Medical History:   Diagnosis Date    Amyloidosis (HCC)     TTR, type pending.     BPH (benign prostatic hyperplasia)     s/p laser encleation of the prostate    Cardiac amyloidosis (HCC)     Chickenpox     Colon polyps     Coronary atherosclerosis of native coronary artery 05/15/2013    Coronary calcium score in the 3000s, negative PET scan and no symptoms so treated medically.     Coronary heart disease     Dyslipidemia 2016    H/O urethral stricture     s/p surgical repair 1/15/2019, [facility]    Kidney cysts     Meniscus tear     Pancreas cyst     Scarlet fever     Sinusitis, chronic     Spinal stenosis, lumbar     SVT (supraventricular tachycardia) 10/23/2018    Thrombocytopenia (HCC) 2017     Past Surgical History:   Procedure Laterality Date    KNEE RECONSTRUCTION      left, partial. multiple previous surgeries.     ROTATOR CUFF REPAIR      c/b right biceps tendon rupture, presumably repaired.     ANKLE FUSION Left     ARTHROSCOPY, KNEE      HEMORRHOIDECTOMY      SINUSCOPE      SINUSOTOMIES      Dr. Guerrier, total of 4 surgeries    TRIGGER FINGER RELEASE      x 2     Family History   Problem Relation Age of Onset    Heart Attack Father 38        doctor in 1946 diagnosed him with MI at home,  before going to the hospital    Psychiatric Illness Mother         Philip     Hypertension Brother     Stroke Brother     Seizures Brother     Sleep Apnea Neg Hx      Social History     Socioeconomic History    Marital status:      Spouse name: Not on file    Number of children: Not on file    Years of education: Not on file    Highest education level: Not on file   Occupational History    Not on file   Tobacco Use    Smoking status: Never    Smokeless tobacco: Never   Vaping Use    Vaping Use: Never used   Substance and Sexual Activity    Alcohol use: Yes     Comment: Social     Drug use: No    Sexual activity: Not on file   Other Topics Concern    Not on file   Social History Narrative    Retired from real estate      Social Determinants of Health     Financial Resource Strain: Not on file   Food Insecurity: Not on file   Transportation Needs: Not on file   Physical Activity: Not on file   Stress: Not on file   Social Connections: Not on file   Intimate Partner Violence: Not on file   Housing Stability: Not on file     Allergies   Allergen Reactions    Avelox [Moxifloxacin Hcl] Rash     Chest, face, genitals    Demerol Unspecified     Vasovagal reaction (drop in BP & pulse)    Levofloxacin     Azithromycin Itching    Cefdinir Rash     Rash chest, under arm and scalp      Ceftin [Cefuroxime Axetil] Rash     Chest, under arm & scalp    Oxycodone Itching     Full body itching    Scopolamine Unspecified     disorientation     Outpatient Encounter Medications as of 3/22/2024   Medication Sig Dispense Refill    VYNDAMAX 61 MG Cap TAKE 1 CAPSULE BY MOUTH DAILY 30 Capsule 5    Azelastine (ASTELIN) 137 MCG/SPRAY Solution Administer 1 Spray into affected nostril(S) 2 times a day. 90 mL 3    celecoxib (CELEBREX) 200 MG Cap Take 1 Capsule by mouth 1 time a day as needed for Moderate Pain. 30 Capsule 0    atorvastatin (LIPITOR) 40 MG Tab Take 1 Tablet by mouth at bedtime. 100 Tablet 3    torsemide (DEMADEX) 5 MG Tab Take 1 Tablet by mouth every day. 100 Tablet 3    amoxicillin (AMOXIL) 500 MG Cap  "Take 4 capsules 1 hour before dental procedure 20 Capsule 0    Vutrisiran Sodium 25 MG/0.5ML Solution Prefilled Syringe Inject 25 mg under the skin.      PREVIDENT 5000 BOOSTER PLUS 1.1 % Paste USE AS DIRECTED      vitamin D3 (CHOLECALCIFEROL) 1000 Unit (25 mcg) Tab Take 2 Tablets by mouth every day. 100 Tablet 3    aspirin 81 MG EC tablet Take 81 mg by mouth.      vitamin A 3 mg (42654 units) capsule Take 10,000 Units by mouth every day.      Glucosamine-Chondroitin 750-600 MG Tab Take 1 Tab by mouth 2 Times a Day. 60 Tab 0    VITAMIN B COMPLEX-C PO Take  by mouth.      Multiple Vitamins-Minerals (CENTRUM SILVER PO) Take  by mouth.      Omega-3 Fatty Acids (SALMON OIL PO) Take 2 Caps by mouth every day.      coenzyme Q-10 30 MG capsule Take 30 mg by mouth every day. Twice a day       No facility-administered encounter medications on file as of 3/22/2024.     ROS           Objective     /58 (BP Location: Left arm, Patient Position: Sitting, BP Cuff Size: Adult)   Pulse 61   Resp 16   Ht 1.753 m (5' 9\")   Wt 72.6 kg (160 lb)   SpO2 97%   BMI 23.63 kg/m²     Physical Exam  Constitutional:       General: He is not in acute distress.     Appearance: He is not diaphoretic.   Eyes:      General: No scleral icterus.  Neck:      Vascular: No JVD.   Cardiovascular:      Rate and Rhythm: Normal rate.      Heart sounds: Normal heart sounds. No murmur heard.     No friction rub. No gallop.   Pulmonary:      Effort: No respiratory distress.      Breath sounds: No wheezing or rales.   Abdominal:      General: Bowel sounds are normal.      Palpations: Abdomen is soft.   Musculoskeletal:      Right lower leg: No edema.      Left lower leg: No edema.   Skin:     Findings: No rash.   Neurological:      Mental Status: He is alert. Mental status is at baseline.   Psychiatric:         Mood and Affect: Mood normal.            We reviewed in person the most recent labs  Recent Results (from the past 5040 hour(s))   CBC WITH " DIFFERENTIAL    Collection Time: 11/20/23  8:00 AM   Result Value Ref Range    WBC 9.4 4.8 - 10.8 K/uL    RBC 4.20 (L) 4.70 - 6.10 M/uL    Hemoglobin 14.1 14.0 - 18.0 g/dL    Hematocrit 40.9 (L) 42.0 - 52.0 %    MCV 97.4 81.4 - 97.8 fL    MCH 33.6 (H) 27.0 - 33.0 pg    MCHC 34.5 32.3 - 36.5 g/dL    RDW 49.7 35.9 - 50.0 fL    Platelet Count 111 (L) 164 - 446 K/uL    MPV 12.6 9.0 - 12.9 fL    Neutrophils-Polys 41.10 (L) 44.00 - 72.00 %    Lymphocytes 47.60 (H) 22.00 - 41.00 %    Monocytes 6.50 0.00 - 13.40 %    Eosinophils 4.80 0.00 - 6.90 %    Basophils 0.00 0.00 - 1.80 %    Nucleated RBC 0.00 0.00 - 0.20 /100 WBC    Neutrophils (Absolute) 3.86 1.82 - 7.42 K/uL    Lymphs (Absolute) 4.47 1.00 - 4.80 K/uL    Monos (Absolute) 0.61 0.00 - 0.85 K/uL    Eos (Absolute) 0.45 0.00 - 0.51 K/uL    Baso (Absolute) 0.00 0.00 - 0.12 K/uL    NRBC (Absolute) 0.00 K/uL    Anisocytosis 1+     Macrocytosis 1+    Comp Metabolic Panel    Collection Time: 11/20/23  8:00 AM   Result Value Ref Range    Sodium 138 135 - 145 mmol/L    Potassium 4.7 3.6 - 5.5 mmol/L    Chloride 103 96 - 112 mmol/L    Co2 27 20 - 33 mmol/L    Anion Gap 8.0 7.0 - 16.0    Glucose 78 65 - 99 mg/dL    Bun 21 8 - 22 mg/dL    Creatinine 0.97 0.50 - 1.40 mg/dL    Calcium 9.4 8.5 - 10.5 mg/dL    Correct Calcium 9.4 8.5 - 10.5 mg/dL    AST(SGOT) 54 (H) 12 - 45 U/L    ALT(SGPT) 46 2 - 50 U/L    Alkaline Phosphatase 151 (H) 30 - 99 U/L    Total Bilirubin 0.9 0.1 - 1.5 mg/dL    Albumin 4.0 3.2 - 4.9 g/dL    Total Protein 6.6 6.0 - 8.2 g/dL    Globulin 2.6 1.9 - 3.5 g/dL    A-G Ratio 1.5 g/dL   PROSTATE SPECIFIC AG DIAGNOSTIC    Collection Time: 11/20/23  8:00 AM   Result Value Ref Range    Prostatic Specific Antigen Tot 0.76 0.00 - 4.00 ng/mL   Lipid Profile    Collection Time: 11/20/23  8:00 AM   Result Value Ref Range    Cholesterol,Tot 123 100 - 199 mg/dL    Triglycerides 32 0 - 149 mg/dL    HDL 72 >=40 mg/dL    LDL 45 <100 mg/dL   URINALYSIS,CULTURE IF INDICATED     Collection Time: 11/20/23  8:00 AM    Specimen: Urine   Result Value Ref Range    Color Yellow     Character Clear     Specific Gravity 1.016 <1.035    Ph 7.0 5.0 - 8.0    Glucose Negative Negative mg/dL    Ketones Negative Negative mg/dL    Protein Negative Negative mg/dL    Bilirubin Negative Negative    Urobilinogen, Urine 1.0 Negative    Nitrite Negative Negative    Leukocyte Esterase Negative Negative    Occult Blood Negative Negative    Micro Urine Req see below    MICROALBUMIN CREAT RATIO URINE    Collection Time: 11/20/23  8:00 AM   Result Value Ref Range    Creatinine, Urine 97.59 mg/dL    Microalbumin, Urine Random <1.2 mg/dL    Micro Alb Creat Ratio see below 0 - 30 mg/g   HEMOGLOBIN A1C    Collection Time: 11/20/23  8:00 AM   Result Value Ref Range    Glycohemoglobin 5.8 (H) 4.0 - 5.6 %    Est Avg Glucose 120 mg/dL   VITAMIN D,25 HYDROXY (DEFICIENCY)    Collection Time: 11/20/23  8:00 AM   Result Value Ref Range    25-Hydroxy   Vitamin D 25 41 30 - 100 ng/mL   ESTIMATED GFR    Collection Time: 11/20/23  8:00 AM   Result Value Ref Range    GFR (CKD-EPI) 78 >60 mL/min/1.73 m 2   DIFFERENTIAL MANUAL    Collection Time: 11/20/23  8:00 AM   Result Value Ref Range    Manual Diff Status PERFORMED     Comment See Comment    PERIPHERAL SMEAR REVIEW    Collection Time: 11/20/23  8:00 AM   Result Value Ref Range    Peripheral Smear Review see below    PLATELET ESTIMATE    Collection Time: 11/20/23  8:00 AM   Result Value Ref Range    Plt Estimation Decreased    MORPHOLOGY    Collection Time: 11/20/23  8:00 AM   Result Value Ref Range    RBC Morphology Present     Poikilocytosis 1+     Ovalocytes 1+     Smudge Cells Few          Assessment & Plan     1. Cardiac amyloidosis (HCC)        2. Pure hypercholesterolemia        3. Agatston CAC score, >400        4. Chronic diastolic congestive heart failure (HCC)        5. Paroxysmal supraventricular tachycardia            Medical Decision Making: Today's  Assessment/Status/Plan:      It was my pleasure to meet with Mr. Martinez.    We addressed the management of hypertension at today's visit. Blood pressure is well controlled.  We specifically assessed the labs on hypertension treatment    We addressed the management of dyslipidemia and atherosclerosis at today's visit. He is on appropriate statin.    We addressed the management of congestive heart failure.  He is on proper mineralacorticoid, angiotensin/ace inhibition with neprilysin inhibition, SGLTs inhibitor and beta-blockers as appropriate.  We addressed the potential side effects and regular laboratory follow-up for these medications.    I will see Mr. Martinez back in 1 year time and encouraged him to follow up with us over the phone or electronically using my [employer] [facility] as issues arise.    It is my pleasure to participate in the care of Mr. Martinez.  Please do not hesitate to contact me with questions or concerns.    [facility] MD PhD [facility]  Cardiologist [facility]    Please note that this dictation was created using voice recognition software. There may be errors I did not discover before finalizing the note.     () Today's E/M visit is associated with medical care services that serve as the continuing focal point for all needed health care services and/or with medical care services that  are part of ongoing care related to a patient's single, serious condition, or a complex condition: This includes  furnishing services to patients on an ongoing basis that result in care that is personalized  to the patient. The services result in a comprehensive, longitudinal, and continuous  relationship with the patient and involve delivery of team-based care that is accessible, coordinated with other practitioners and providers, and integrated with the broader health  care landscape.

## 2024-04-24 DIAGNOSIS — H25.813 COMBINED FORMS OF AGE-RELATED CATARACT OF BOTH EYES: ICD-10-CM

## 2024-04-24 DIAGNOSIS — H52.223 REGULAR ASTIGMATISM OF BOTH EYES: ICD-10-CM

## 2024-05-20 RX ORDER — TAFAMIDIS 61 MG/1
CAPSULE, LIQUID FILLED ORAL
Qty: 30 CAPSULE | Refills: 5 | Status: SHIPPED | OUTPATIENT
Start: 2024-05-20

## 2024-06-07 RX ORDER — SULFAMETHOXAZOLE AND TRIMETHOPRIM 800; 160 MG/1; MG/1
1 TABLET ORAL 2 TIMES DAILY
Qty: 28 TABLET | Refills: 0 | Status: SHIPPED | OUTPATIENT
Start: 2024-06-07 | End: 2024-06-07 | Stop reason: SDUPTHER

## 2024-06-07 RX ORDER — SULFAMETHOXAZOLE AND TRIMETHOPRIM 800; 160 MG/1; MG/1
1 TABLET ORAL 2 TIMES DAILY
Qty: 28 TABLET | Refills: 0 | Status: SHIPPED | OUTPATIENT
Start: 2024-06-07 | End: 2024-06-14 | Stop reason: SDUPTHER

## 2024-06-14 RX ORDER — SULFAMETHOXAZOLE AND TRIMETHOPRIM 800; 160 MG/1; MG/1
1 TABLET ORAL 2 TIMES DAILY
Qty: 28 TABLET | Refills: 0 | Status: SHIPPED | OUTPATIENT
Start: 2024-06-14 | End: 2024-06-26

## 2024-06-17 ENCOUNTER — OFFICE VISIT (OUTPATIENT)
Dept: INTERNAL MEDICINE | Facility: IMAGING CENTER | Age: 84
End: 2024-06-17
Payer: MEDICARE

## 2024-06-17 ENCOUNTER — HOSPITAL ENCOUNTER (OUTPATIENT)
Facility: MEDICAL CENTER | Age: 84
End: 2024-06-17
Attending: INTERNAL MEDICINE
Payer: MEDICARE

## 2024-06-17 VITALS
SYSTOLIC BLOOD PRESSURE: 120 MMHG | HEART RATE: 64 BPM | RESPIRATION RATE: 16 BRPM | TEMPERATURE: 98.2 F | OXYGEN SATURATION: 99 % | DIASTOLIC BLOOD PRESSURE: 70 MMHG

## 2024-06-17 DIAGNOSIS — R52 COMPLAINTS OF TOTAL BODY PAIN: ICD-10-CM

## 2024-06-17 DIAGNOSIS — R69 ILLNESS: ICD-10-CM

## 2024-06-17 DIAGNOSIS — R50.9 FEVER, UNSPECIFIED FEVER CAUSE: ICD-10-CM

## 2024-06-17 LAB
ALBUMIN SERPL BCP-MCNC: 4.3 G/DL (ref 3.2–4.9)
ALBUMIN/GLOB SERPL: 1.7 G/DL
ALP SERPL-CCNC: 102 U/L (ref 30–99)
ALT SERPL-CCNC: 27 U/L (ref 2–50)
ANION GAP SERPL CALC-SCNC: 9 MMOL/L (ref 7–16)
APPEARANCE UR: CLEAR
AST SERPL-CCNC: 44 U/L (ref 12–45)
BILIRUB SERPL-MCNC: 0.4 MG/DL (ref 0.1–1.5)
BILIRUB UR QL STRIP.AUTO: NEGATIVE
BUN SERPL-MCNC: 21 MG/DL (ref 8–22)
CALCIUM ALBUM COR SERPL-MCNC: 9.8 MG/DL (ref 8.5–10.5)
CALCIUM SERPL-MCNC: 10 MG/DL (ref 8.5–10.5)
CHLORIDE SERPL-SCNC: 100 MMOL/L (ref 96–112)
CK SERPL-CCNC: 95 U/L (ref 0–154)
CO2 SERPL-SCNC: 28 MMOL/L (ref 20–33)
COLOR UR: YELLOW
CREAT SERPL-MCNC: 1.48 MG/DL (ref 0.5–1.4)
ERYTHROCYTE [SEDIMENTATION RATE] IN BLOOD BY WESTERGREN METHOD: 5 MM/HOUR (ref 0–20)
GFR SERPLBLD CREATININE-BSD FMLA CKD-EPI: 47 ML/MIN/1.73 M 2
GLOBULIN SER CALC-MCNC: 2.5 G/DL (ref 1.9–3.5)
GLUCOSE SERPL-MCNC: 87 MG/DL (ref 65–99)
GLUCOSE UR STRIP.AUTO-MCNC: NEGATIVE MG/DL
KETONES UR STRIP.AUTO-MCNC: NEGATIVE MG/DL
LEUKOCYTE ESTERASE UR QL STRIP.AUTO: NEGATIVE
MICRO URNS: NORMAL
NITRITE UR QL STRIP.AUTO: NEGATIVE
PH UR STRIP.AUTO: 7.5 [PH] (ref 5–8)
POTASSIUM SERPL-SCNC: 5.5 MMOL/L (ref 3.6–5.5)
PROT SERPL-MCNC: 6.8 G/DL (ref 6–8.2)
PROT UR QL STRIP: NEGATIVE MG/DL
RBC UR QL AUTO: NEGATIVE
SODIUM SERPL-SCNC: 137 MMOL/L (ref 135–145)
SP GR UR STRIP.AUTO: 1.01
UROBILINOGEN UR STRIP.AUTO-MCNC: 0.2 MG/DL

## 2024-06-17 PROCEDURE — 81003 URINALYSIS AUTO W/O SCOPE: CPT

## 2024-06-17 PROCEDURE — 85652 RBC SED RATE AUTOMATED: CPT

## 2024-06-17 PROCEDURE — 99214 OFFICE O/P EST MOD 30 MIN: CPT | Performed by: INTERNAL MEDICINE

## 2024-06-17 PROCEDURE — 85007 BL SMEAR W/DIFF WBC COUNT: CPT

## 2024-06-17 PROCEDURE — 3078F DIAST BP <80 MM HG: CPT | Performed by: INTERNAL MEDICINE

## 2024-06-17 PROCEDURE — 3074F SYST BP LT 130 MM HG: CPT | Performed by: INTERNAL MEDICINE

## 2024-06-17 PROCEDURE — 82550 ASSAY OF CK (CPK): CPT

## 2024-06-17 PROCEDURE — 80053 COMPREHEN METABOLIC PANEL: CPT

## 2024-06-17 PROCEDURE — 85027 COMPLETE CBC AUTOMATED: CPT

## 2024-06-17 RX ORDER — VIBEGRON 75 MG/1
75 TABLET, FILM COATED ORAL DAILY
Status: SHIPPED
Start: 2024-06-17 | End: 2024-06-26

## 2024-06-17 NOTE — PROGRESS NOTES
"Chief Complaint   Patient presents with    Illness    Pain     Total body        HISTORY OF THE PRESENT ILLNESS: Patient is a 84 y.o. male.     Patient comes in with complaint of recent illness complicated by total body pain.  Symptoms began approximately June 4 when he was returning from [place].  He developed symptoms consistent with previous sinus infections.  He was prescribed Bactrim.  He reports that his discharge changed from thick, dark yellow to light yellow and eventually clear.  He had ongoing malaise and head congestion.  A second course of antibiotic was prescribed.  2 days ago he reports that he had difficulty sleeping due to \"total body pain\".  He reports it was severe and almost went to the hospital.  No other associated symptoms.  Yesterday he reports that he felt mostly back to normal.  Last night he had body pain but less severe.  Symptoms responded somewhat to Tylenol.  Today, he reports that he does not feel well.  Denies cough.  Denies chest pain.  No GI symptoms including constipation or diarrhea.  Specific joint issues.    We also discussed his urinary urgency symptoms.  He was placed on vibegron from urology.  He thinks that this has been helpful.  He reports he has some episodes at night where he feels he needs to urinate but is unable to produce any urine.  He reports that prostate workup was negative.  He began this medication approximately 6 weeks ago.    Allergies: Avelox [moxifloxacin hcl], Demerol, Levofloxacin, Azithromycin, Cefdinir, Ceftin [cefuroxime axetil], Oxycodone, and Scopolamine    Current Outpatient Medications Ordered in Epic   Medication Sig Dispense Refill    vibegron (GEMTESA) 75 MG tablet Take 1 Tablet by mouth every day.      sulfamethoxazole-trimethoprim (BACTRIM DS) 800-160 MG tablet Take 1 Tablet by mouth 2 times a day for 14 days. 28 Tablet 0    VYNDAMAX 61 MG Cap TAKE 1 CAPSULE BY MOUTH DAILY 30 Capsule 5    atorvastatin (LIPITOR) 40 MG Tab Take 1 Tablet by mouth " at bedtime. 90 Tablet 3    torsemide (DEMADEX) 5 MG Tab Take 1 Tablet by mouth every day. 90 Tablet 3    Azelastine (ASTELIN) 137 MCG/SPRAY Solution Administer 1 Spray into affected nostril(S) 2 times a day. 90 mL 3    celecoxib (CELEBREX) 200 MG Cap Take 1 Capsule by mouth 1 time a day as needed for Moderate Pain. 30 Capsule 0    amoxicillin (AMOXIL) 500 MG Cap Take 4 capsules 1 hour before dental procedure 20 Capsule 0    Vutrisiran Sodium 25 MG/0.5ML Solution Prefilled Syringe Inject 25 mg under the skin.      PREVIDENT 5000 BOOSTER PLUS 1.1 % Paste USE AS DIRECTED      vitamin D3 (CHOLECALCIFEROL) 1000 Unit (25 mcg) Tab Take 2 Tablets by mouth every day. 100 Tablet 3    aspirin 81 MG EC tablet Take 81 mg by mouth.      vitamin A 3 mg (25086 units) capsule Take 10,000 Units by mouth every day.      Glucosamine-Chondroitin 750-600 MG Tab Take 1 Tab by mouth 2 Times a Day. 60 Tab 0    VITAMIN B COMPLEX-C PO Take  by mouth.      Multiple Vitamins-Minerals (CENTRUM SILVER PO) Take  by mouth.      Omega-3 Fatty Acids (SALMON OIL PO) Take 2 Caps by mouth every day.      coenzyme Q-10 30 MG capsule Take 30 mg by mouth every day. Twice a day       No current [facility]-ordered facility-administered medications on file.       Past medical history, social history and family history were reviewed from chart today    Review of systems: Per HPI.    All others negative.     Exam: /70 (BP Location: Left arm, Patient Position: Sitting, BP Cuff Size: Adult)   Pulse 64   Temp 36.8 °C (98.2 °F) (Temporal)   Resp 16   SpO2 99%   General: Well-appearing. Well-developed. No signs of distress.  HEENT: Grossly normal. Oral cavity is pink and moist.  Nasal cavities are patent.  Clear and without significant discharge.  Neck: Supple without JVD or bruit.  Pulmonary: Clear with good breath sounds. Normal effort.  Cardiovascular: Regular. Carotid and radial pulses are intact.  Abdomen: Soft, nontender, nondistended. Spleen and liver are  not enlarged.  Neurologic: Cranial nerves II through XII are grossly normal, alert and oriented x3  MSK: No synovitis.  Muscles are nontender to palpate.  This includes upper extremity and lower extremity    Diagnosis:  1. Complaints of total body pain  Comp Metabolic Panel    CBC WITH DIFFERENTIAL    URINALYSIS,CULTURE IF INDICATED    Sed Rate    CREATINE KINASE      2. Illness  Comp Metabolic Panel    CBC WITH DIFFERENTIAL    URINALYSIS,CULTURE IF INDICATED    Sed Rate    CREATINE KINASE      3. Fever, unspecified fever cause  Comp Metabolic Panel    CBC WITH DIFFERENTIAL    URINALYSIS,CULTURE IF INDICATED    Sed Rate        Patient with multiple symptoms of unclear etiology.  We discussed that he could have an going infection including sinusitis or possibly genitourinary such as urinary tract infection or prostatitis.  We also discussed this could be medication side effect from Bactrim or possibly Gemtesa.    Exam is unremarkable.    This point I recommended laboratory as above.  Further workup as indicated.  No further antibiotics at this time.  If symptoms persist consider repeat sinus CT.    My total time spent caring for the patient on the day of the encounter was  greater than 30 minutes.   This includes obtaining history, reviewing chart, physical exam, patient education, reviewing outside records, placing orders, interpreting tests and coordinating care.    Portions of this note were completed using voice recognition software (Dragon Naturally speaking software) . Occasional transcription errors may have escaped proof reading. I have made every reasonable attempt to correct obvious errors, but I expect that there are errors of grammar and possibly content that I did not discover before finalizing the note.

## 2024-06-18 ENCOUNTER — APPOINTMENT (OUTPATIENT)
Dept: INTERNAL MEDICINE | Facility: IMAGING CENTER | Age: 84
End: 2024-06-18
Payer: MEDICARE

## 2024-06-18 ENCOUNTER — HOSPITAL ENCOUNTER (OUTPATIENT)
Dept: RADIOLOGY | Facility: MEDICAL CENTER | Age: 84
End: 2024-06-18
Attending: INTERNAL MEDICINE
Payer: MEDICARE

## 2024-06-18 DIAGNOSIS — J32.4 CHRONIC PANSINUSITIS: ICD-10-CM

## 2024-06-18 DIAGNOSIS — R79.89 ABNORMAL CBC: ICD-10-CM

## 2024-06-18 LAB
ANISOCYTOSIS BLD QL SMEAR: ABNORMAL
BASOPHILS # BLD AUTO: 0 % (ref 0–1.8)
BASOPHILS # BLD: 0 K/UL (ref 0–0.12)
BURR CELLS BLD QL SMEAR: NORMAL
COMMENT NL1176: NORMAL
EOSINOPHIL # BLD AUTO: 0.16 K/UL (ref 0–0.51)
EOSINOPHIL NFR BLD: 1.7 % (ref 0–6.9)
ERYTHROCYTE [DISTWIDTH] IN BLOOD BY AUTOMATED COUNT: 52.8 FL (ref 35.9–50)
HCT VFR BLD AUTO: 45.1 % (ref 42–52)
HGB BLD-MCNC: 15.1 G/DL (ref 14–18)
LYMPHOCYTES # BLD AUTO: 4.56 K/UL (ref 1–4.8)
LYMPHOCYTES NFR BLD: 47.5 % (ref 22–41)
MACROCYTES BLD QL SMEAR: ABNORMAL
MANUAL DIFF BLD: NORMAL
MCH RBC QN AUTO: 33.6 PG (ref 27–33)
MCHC RBC AUTO-ENTMCNC: 33.5 G/DL (ref 32.3–36.5)
MCV RBC AUTO: 100.2 FL (ref 81.4–97.8)
MONOCYTES # BLD AUTO: 1.28 K/UL (ref 0–0.85)
MONOCYTES NFR BLD AUTO: 13.3 % (ref 0–13.4)
MORPHOLOGY BLD-IMP: NORMAL
NEUTROPHILS # BLD AUTO: 3.6 K/UL (ref 1.82–7.42)
NEUTROPHILS NFR BLD: 37.5 % (ref 44–72)
NRBC # BLD AUTO: 0 K/UL
NRBC BLD-RTO: 0 /100 WBC (ref 0–0.2)
OVALOCYTES BLD QL SMEAR: NORMAL
PLATELET # BLD AUTO: 149 K/UL (ref 164–446)
PLATELET BLD QL SMEAR: NORMAL
PMV BLD AUTO: 12.2 FL (ref 9–12.9)
POIKILOCYTOSIS BLD QL SMEAR: NORMAL
RBC # BLD AUTO: 4.5 M/UL (ref 4.7–6.1)
RBC BLD AUTO: PRESENT
SMUDGE CELLS BLD QL SMEAR: NORMAL
VARIANT LYMPHS BLD QL SMEAR: NORMAL
WBC # BLD AUTO: 9.6 K/UL (ref 4.8–10.8)

## 2024-06-18 PROCEDURE — 70486 CT MAXILLOFACIAL W/O DYE: CPT

## 2024-06-19 ENCOUNTER — HOSPITAL ENCOUNTER (OUTPATIENT)
Facility: MEDICAL CENTER | Age: 84
End: 2024-06-19
Attending: INTERNAL MEDICINE
Payer: MEDICARE

## 2024-06-19 ENCOUNTER — NON-PROVIDER VISIT (OUTPATIENT)
Dept: INTERNAL MEDICINE | Facility: IMAGING CENTER | Age: 84
End: 2024-06-19
Payer: MEDICARE

## 2024-06-19 DIAGNOSIS — R79.89 ABNORMAL CBC: ICD-10-CM

## 2024-06-19 LAB — LDH SERPL L TO P-CCNC: 244 U/L (ref 107–266)

## 2024-06-19 PROCEDURE — 83615 LACTATE (LD) (LDH) ENZYME: CPT

## 2024-06-19 PROCEDURE — 82232 ASSAY OF BETA-2 PROTEIN: CPT

## 2024-06-19 PROCEDURE — 86355 B CELLS TOTAL COUNT: CPT

## 2024-06-19 PROCEDURE — 86360 T CELL ABSOLUTE COUNT/RATIO: CPT

## 2024-06-19 PROCEDURE — 86357 NK CELLS TOTAL COUNT: CPT

## 2024-06-19 PROCEDURE — 86359 T CELLS TOTAL COUNT: CPT

## 2024-06-19 NOTE — PROGRESS NOTES
Andre Martinez is a 84 y.o. male here for a non-provider visit for a lab draw on 6/19/2024 at 2:32 PM.    Procedure performed:  Venipuncture     Anatomical site:  Right Antecubital Area    Equipment used:  21 g vacutainer     Labs drawn:  follow up labs    Ordering provider:  Azar Cavazos MD    Lab draw completed by:  Marleny Esteves R.N.

## 2024-06-21 LAB — B2 MICROGLOB SERPL-MCNC: 2.1 MG/L

## 2024-06-26 ENCOUNTER — APPOINTMENT (OUTPATIENT)
Dept: INTERNAL MEDICINE | Facility: IMAGING CENTER | Age: 84
End: 2024-06-26
Payer: MEDICARE

## 2024-06-26 VITALS
DIASTOLIC BLOOD PRESSURE: 70 MMHG | HEIGHT: 70 IN | WEIGHT: 155 LBS | RESPIRATION RATE: 12 BRPM | SYSTOLIC BLOOD PRESSURE: 112 MMHG | HEART RATE: 65 BPM | BODY MASS INDEX: 22.19 KG/M2 | OXYGEN SATURATION: 98 % | TEMPERATURE: 98.2 F

## 2024-06-26 DIAGNOSIS — M53.3 SACROILIAC JOINT PAIN: ICD-10-CM

## 2024-06-26 DIAGNOSIS — M48.061 SPINAL STENOSIS OF LUMBAR REGION, UNSPECIFIED WHETHER NEUROGENIC CLAUDICATION PRESENT: ICD-10-CM

## 2024-06-26 DIAGNOSIS — Z12.5 SCREENING PSA (PROSTATE SPECIFIC ANTIGEN): ICD-10-CM

## 2024-06-26 DIAGNOSIS — E85.4 CARDIAC AMYLOIDOSIS (HCC): ICD-10-CM

## 2024-06-26 DIAGNOSIS — R79.89 ABNORMAL CBC: ICD-10-CM

## 2024-06-26 DIAGNOSIS — R93.1 AGATSTON CAC SCORE, >400: ICD-10-CM

## 2024-06-26 DIAGNOSIS — D69.6 THROMBOCYTOPENIA (HCC): ICD-10-CM

## 2024-06-26 DIAGNOSIS — I43 CARDIAC AMYLOIDOSIS (HCC): ICD-10-CM

## 2024-06-26 DIAGNOSIS — N32.81 OAB (OVERACTIVE BLADDER): ICD-10-CM

## 2024-06-26 DIAGNOSIS — Z00.00 MEDICARE ANNUAL WELLNESS VISIT, SUBSEQUENT: ICD-10-CM

## 2024-06-26 DIAGNOSIS — K86.2 PANCREAS CYST: ICD-10-CM

## 2024-06-26 PROCEDURE — 3074F SYST BP LT 130 MM HG: CPT | Performed by: INTERNAL MEDICINE

## 2024-06-26 PROCEDURE — 3078F DIAST BP <80 MM HG: CPT | Performed by: INTERNAL MEDICINE

## 2024-06-26 PROCEDURE — G0439 PPPS, SUBSEQ VISIT: HCPCS | Performed by: INTERNAL MEDICINE

## 2024-06-26 ASSESSMENT — PATIENT HEALTH QUESTIONNAIRE - PHQ9: CLINICAL INTERPRETATION OF PHQ2 SCORE: 0

## 2024-06-26 ASSESSMENT — ACTIVITIES OF DAILY LIVING (ADL): BATHING_REQUIRES_ASSISTANCE: 0

## 2024-06-26 ASSESSMENT — ENCOUNTER SYMPTOMS: GENERAL WELL-BEING: GOOD

## 2024-06-26 ASSESSMENT — FIBROSIS 4 INDEX: FIB4 SCORE: 4.72

## 2024-06-26 NOTE — PROGRESS NOTES
84 y.o. male presents for the following:    Patient comes in for health risk assessment, physical and review laboratory.  Considers himself in good health.  No depression.  No cognitive issues.  No balance issues.    History of Present Illness  The patient presents for evaluation of multiple medical concerns.    The patient reports a significant improvement in his health status following resolution of a viral infection and sinus infection approximately one week ago. He has resumed physical activities, including hiking boots, and utilizing a StairMaster for approximately an hour at a speed of 6 mph. His heart rate remains above 130 beats per minute for a few minutes. He continues to take Vyndamax 25 mg every 3 months, atorvastatin, and aspirin. His last echocardiogram was conducted in 05/2023.    The patient reports experiencing high frequency urination, characterized by intense demand of urination. He consulted with Dr. Valerio, who initially suspected a prostate issue. An examination revealed clear urethra and 22 g prostate, indicating no enlarged prostate. Bladder cancer was ruled out. He was prescribed Gemtesa, which alleviated his urinary urgency within the first week. However, after approximately 5 to 6 weeks, he experienced nocturia and occasional urinary retention. He was advised to discontinue Gemtesa and undergo a bladder study. He discontinued Gemtesa 10 days ago, but continues to experience a residual drip, albeit less severe than 10 days ago. He has previously tried Myrbetriq and Myrbetriq, but experienced side effects. He has also consulted with Urology at the [facility] and inquired about alternative treatments, including a tibial nerve stimulator and Botox.    The patient experiences occasional allergies, for which he uses azelastine as needed. He also reports mild eye swelling today.    The patient reports significant left knee pain, which he attributes to a 3-week hiking trip in 05/2023. He  consulted with Dr. Galeas's office in [place], where an x-ray and MRI were performed. The PA suspected the pain could be due to medial meniscus or ACL issues, but the MRI did not reveal any abnormalities. The pain has since resolved.    Supplemental Information  He has some cell irregularity because of at least too many leukocytes. There was some concern that he might be developing a form of leukemia. He goes to [facility] every 6 months and they are still drawing blood there. He went to his annual optometrist visit here and he saw something in the back of his eye that he saw might be glaucoma. When he was in [place], he went to an eye clinic in [place] and they saw some degree of swelling bilateral. He saw a retinologist and she indicated that it may have been there for a long time. He is supposed to see her again in about 4 to 5 months to see if it is progressing. She told him that nothing should be done at this point in time. She told him that it is foretunate because the swelling is near the optic nerve. He went back to his optometrist and asked him to take a look at last year's exam and he told him that it does not seem to be as much as he saw this year. It does not affect his vision. He saw a glaucoma specialist who told him that there is no impact on his optical nerve. He had a Cologuard test because he was having a lot of blockage. Dr. Brooks told him that it was constipation. He ran a Cologuard test and the chest just came back and said no problem. He is not on immunizations recently because he has been going to all places. He is supposed to go to his media in [place] in 09/2023 and that is why he needs to check his vaccinations again. He has lost a little bit of weight. He is trying to eat a little bit more to get on during the time he was sick. He did a hiking trip in 05/2023. He tried to eat, but his system is used to eating less. He should be eating on a trip.        Annual Wellness  Visit/Health Risk Assessment:    Past medical:  Past Medical History:   Diagnosis Date    Amyloidosis (HCC)     TTR, type pending.     BPH (benign prostatic hyperplasia)     s/p laser encleation of the prostate    Cardiac amyloidosis (HCC)     Chickenpox     Colon polyps     Coronary atherosclerosis of native coronary artery 05/15/2013    Coronary calcium score in the 3000s, negative PET scan and no symptoms so treated medically.     Coronary heart disease     Dyslipidemia 2016    H/O urethral stricture     s/p surgical repair 1/15/2019, [place]    Kidney cysts     Meniscus tear     Pancreas cyst     Scarlet fever     Sinusitis, chronic     Spinal stenosis, lumbar     SVT (supraventricular tachycardia) (HCC) 10/23/2018    Thrombocytopenia (HCC) 2017       Past surgical:  Past Surgical History:   Procedure Laterality Date    KNEE RECONSTRUCTION      left, partial. multiple previous surgeries.     ROTATOR CUFF REPAIR      c/b right biceps tendon rupture, presumably repaired.     ANKLE FUSION Left     ARTHROSCOPY, KNEE      HEMORRHOIDECTOMY      SINUSCOPE      SINUSOTOMIES      Dr. Guerrier, total of 4 surgeries    TRIGGER FINGER RELEASE      x 2       Family history: relating to possible risk factors for your patient  Family History   Problem Relation Age of Onset    Heart Attack Father 38        doctor in 1946 diagnosed him with MI at home,  before going to the hospital    Psychiatric Illness Mother         Alzheimers    Hypertension Brother     Stroke Brother     Seizures Brother     Sleep Apnea Neg Hx        Current Providers (including home care/DME’s):   Colonoscopy 22 [place] (Dr Domingo) repeat in 5 yrs Dexa 20 osteopenia PSA  10/20/23  0.76  [facility]    Patient Care Team:  Azar Cavazos M.D. as PCP - General (Internal Medicine)  Sanchez Fonseca M.D. (Urology)  [facility]  as Respiratory Therapist  Jaime Raman M.D. as Cardiologist  (Cardiovascular Disease (Cardiology))  Marleny Esteves R.N.      Medications:   Current Outpatient Medications Ordered in Epic   Medication Sig Dispense Refill    VYNDAMAX 61 MG Cap TAKE 1 CAPSULE BY MOUTH DAILY 30 Capsule 5    atorvastatin (LIPITOR) 40 MG Tab Take 1 Tablet by mouth at bedtime. 90 Tablet 3    torsemide (DEMADEX) 5 MG Tab Take 1 Tablet by mouth every day. 90 Tablet 3    Azelastine (ASTELIN) 137 MCG/SPRAY Solution Administer 1 Spray into affected nostril(S) 2 times a day. 90 mL 3    celecoxib (CELEBREX) 200 MG Cap Take 1 Capsule by mouth 1 time a day as needed for Moderate Pain. 30 Capsule 0    amoxicillin (AMOXIL) 500 MG Cap Take 4 capsules 1 hour before dental procedure 20 Capsule 0    Vutrisiran Sodium 25 MG/0.5ML Solution Prefilled Syringe Inject 25 mg under the skin.      PREVIDENT 5000 BOOSTER PLUS 1.1 % Paste USE AS DIRECTED      vitamin D3 (CHOLECALCIFEROL) 1000 Unit (25 mcg) Tab Take 2 Tablets by mouth every day. 100 Tablet 3    aspirin 81 MG EC tablet Take 81 mg by mouth.      vitamin A 3 mg (70260 units) capsule Take 10,000 Units by mouth every day.      Glucosamine-Chondroitin 750-600 MG Tab Take 1 Tab by mouth 2 Times a Day. 60 Tab 0    VITAMIN B COMPLEX-C PO Take  by mouth.      Multiple Vitamins-Minerals (CENTRUM SILVER PO) Take  by mouth.      Omega-3 Fatty Acids (SALMON OIL PO) Take 2 Caps by mouth every day.      coenzyme Q-10 30 MG capsule Take 30 mg by mouth every day. Twice a day       No current [facility]-ordered facility-administered medications on file.       Supplements (calcium/vitamins): if not lisited in medications    Chief Complaint   Patient presents with    Medicare Annual Wellness         HPI:  Andre Martinez is a 84 y.o. here for Medicare Annual Wellness Visit     Patient Active Problem List    Diagnosis Date Noted    OAB (overactive bladder) 06/26/2024    Paroxysmal supraventricular tachycardia (HCC) 08/15/2023    Sacroiliac joint pain 09/01/2021    Chronic diastolic  congestive heart failure (HCC) 06/04/2020    Cardiac amyloidosis ([facility]) 05/17/2019    Agatston CAC score, >400 12/06/2018    Enlarged prostate with lower urinary tract symptoms (LUTS) 11/12/2018    Calcific Achilles tendinitis 11/05/2018    Thrombocytopenia (HCC) 07/24/2017    Pure hypercholesterolemia 08/23/2016    Spinal stenosis, lumbar     Colon polyps     Kidney cysts     Pancreas cyst        Current Outpatient Medications   Medication Sig Dispense Refill    VYNDAMAX 61 MG Cap TAKE 1 CAPSULE BY MOUTH DAILY 30 Capsule 5    atorvastatin (LIPITOR) 40 MG Tab Take 1 Tablet by mouth at bedtime. 90 Tablet 3    torsemide (DEMADEX) 5 MG Tab Take 1 Tablet by mouth every day. 90 Tablet 3    Azelastine (ASTELIN) 137 MCG/SPRAY Solution Administer 1 Spray into affected nostril(S) 2 times a day. 90 mL 3    celecoxib (CELEBREX) 200 MG Cap Take 1 Capsule by mouth 1 time a day as needed for Moderate Pain. 30 Capsule 0    amoxicillin (AMOXIL) 500 MG Cap Take 4 capsules 1 hour before dental procedure 20 Capsule 0    Vutrisiran Sodium 25 MG/0.5ML Solution Prefilled Syringe Inject 25 mg under the skin.      PREVIDENT 5000 BOOSTER PLUS 1.1 % Paste USE AS DIRECTED      vitamin D3 (CHOLECALCIFEROL) 1000 Unit (25 mcg) Tab Take 2 Tablets by mouth every day. 100 Tablet 3    aspirin 81 MG EC tablet Take 81 mg by mouth.      vitamin A 3 mg (74174 units) capsule Take 10,000 Units by mouth every day.      Glucosamine-Chondroitin 750-600 MG Tab Take 1 Tab by mouth 2 Times a Day. 60 Tab 0    VITAMIN B COMPLEX-C PO Take  by mouth.      Multiple Vitamins-Minerals (CENTRUM SILVER PO) Take  by mouth.      Omega-3 Fatty Acids (SALMON OIL PO) Take 2 Caps by mouth every day.      coenzyme Q-10 30 MG capsule Take 30 mg by mouth every day. Twice a day       No current facility-administered medications for this visit.            Current supplements as per medication list.       Allergies: Avelox [moxifloxacin hcl], Demerol, Levofloxacin, Azithromycin,  Cefdinir, Ceftin [cefuroxime axetil], Oxycodone, and Scopolamine    Current social contact/activities:  Social with friends and family..    He  reports that he has never smoked. He has never used smokeless tobacco. He reports current alcohol use. He reports that he does not use drugs.  Counseling given: Not Answered        DPA/Advanced Directive:  in EPIC       ROS:    Gait: Uses : None  Ostomy: No  Other tubes: no   Amputations: no   Chronic oxygen use: no   Last eye exam: less than 3 months   : Denies any urinary leakage during the last 6 months incontinence.       Screening:  Colonoscopy 2/1/22 [place] (Dr Domingo) repeat in 5 yrs Dexa 12/17/20 osteopenia PSA  10/20/23  0.76  [facility]    Depression Screening  Little interest or pleasure in doing things?  0 - not at all  Feeling down, depressed , or hopeless? 0 - not at all  Patient Health Questionnaire Score: 0     If depressive symptoms identified deferred to follow up visit unless specifically addressed in assessment and plan.    Interpretation of PHQ-9 Total Score   Score Severity   1-4 No Depression   5-9 Mild Depression   10-14 Moderate Depression   15-19 Moderately Severe Depression   20-27 Severe Depression    Screening for Cognitive Impairment  Do you or any of your friends or family members have any concern about your memory? Yes  Three Minute Recall (Leader, Season, Table) 3/3    Marcelo clock face with all 12 numbers and set the hands to show 10 minutes after 11.  Yes    Cognitive concerns identified deferred for follow up unless specifically addressed in assessment and plan.    Fall Risk Assessment  Has the patient had two or more falls in the last year or any fall with injury in the last year?  No    Safety Assessment  Do you always wear your seatbelt?  Yes  Any changes to home needed to function safely? No  Difficulty hearing.  No  Patient counseled about all safety risks that were identified.    Functional Assessment  ADLs  Are there any barriers preventing you from cooking for yourself or meeting nutritional needs?  No.    Are there any barriers preventing you from driving safely or obtaining transportation?  No.    Are there any barriers preventing you from using a telephone or calling for help?  No    Are there any barriers preventing you from shopping?  No.    Are there any barriers preventing you from taking care of your own finances?  No    Are there any barriers preventing you from managing your medications?  No    Are there any barriers preventing you from showering, bathing or dressing yourself? No    Are there any barriers preventing you from doing housework or laundry? No  Are there any barriers preventing you from using the toilet?No  Are you currently engaging in any exercise or physical activity?  Yes. Cardio, stretching daily    Self-Assessment of Health  What is your perception of your health? Good    Do you sleep more than six hours a night? Yes    In the past 7 days, how much did pain keep you from doing your normal work? None    Do you spend quality time with family or friends (virtually or in person)? Yes    Do you usually eat a heart healthy diet that constists of a variety of fruits, vegetables, whole grains and fiber? Yes    Do you eat foods high in fat and/or Fast Food more than three times per week? No    How concerned are you that your medical conditions are not being well managed? Not at all    Are you worried that in the next 2 months, you may not have stable housing that you own, rent, or stay in as part of a household? Choose not to answer      Advance Care Planning  Do you have an Advance Directive, Living Will, Durable Power of , or POLST? Yes  Advance Directive       is on file      Health Maintenance Summary            Annual Wellness Visit (Yearly) Next due on 6/26/2025 06/26/2024  Visit Dx: Medicare annual wellness visit, subsequent    05/08/2023  Visit Dx: Medicare annual  wellness visit, subsequent    03/31/2022  Visit Dx: Medicare annual wellness visit, initial    12/11/2020  Visit Dx: Medicare annual wellness visit, subsequent    11/14/2019  Subsequent Annual Wellness Visit - Includes PPPS ()    Only the first 5 history entries have been loaded, but more history exists.              IMM DTaP/Tdap/Td Vaccine (3 - Td or Tdap) Next due on 3/18/2031      03/18/2021  Imm Admin: Tdap Vaccine    08/06/2014  Imm Admin: Tdap Vaccine    06/02/2010  Imm Admin: TD Vaccine    12/10/1999  Imm Admin: TD Vaccine              Hepatitis A Vaccine (Hep A) (Series Information) Aged Out      02/28/1997  Imm Admin: Hepatitis A Vaccine, Ped/Adol    02/28/1997  Imm Admin: Hepatitis A Vaccine, Adult    07/26/1996  Imm Admin: Hepatitis A Vaccine, Ped/Adol    07/26/1996  Imm Admin: Hepatitis A Vaccine, Adult              Meningococcal Immunization (Series Information) Aged Out      08/06/2014  Imm Admin: Meningococcal Conjugate Vaccine MCV4 (Menactra)    08/06/2014  Imm Admin: Meningococcal Polysaccharide Vaccine MPSV4 - HISTORICAL DATA    09/23/2004  Imm Admin: Meningococcal Polysaccharide Vaccine MPSV4 - HISTORICAL DATA    12/10/1999  Imm Admin: Meningococcal Polysaccharide Vaccine MPSV4 - HISTORICAL DATA              Polio Vaccine (Inactivated Polio) (Series Information) Completed      03/02/2016  Imm Admin: IPV    12/10/1999  Imm Admin: OPV TRIVALENT - HISTORICAL DATA (GIVEN PRIOR TO MAY 2016)    07/26/1996  Imm Admin: OPV TRIVALENT - HISTORICAL DATA (GIVEN PRIOR TO MAY 2016)              Zoster (Shingles) Vaccines (Series Information) Completed      07/31/2018  Imm Admin: Zoster Vaccine Recombinant (RZV) (SHINGRIX)    04/09/2018  Imm Admin: Zoster Vaccine Recombinant (RZV) (SHINGRIX)    11/14/2006  Imm Admin: Zoster Vaccine Live (ZVL) (Zostavax) - HISTORICAL DATA    11/14/2005  Imm Admin: Zoster Vaccine Live (ZVL) (Zostavax) - HISTORICAL DATA              Pneumococcal Vaccine: 65+ Years (Series  Information) Completed      05/08/2023  Imm Admin: Pneumococcal Conjugate Vaccine (PCV20)    02/04/2015  Imm Admin: Pneumococcal Conjugate Vaccine (Prevnar/PCV-13)    01/17/2013  Imm Admin: Pneumococcal polysaccharide vaccine (PPSV-23)    07/25/2006  Imm Admin: Pneumococcal polysaccharide vaccine (PPSV-23)              Influenza Vaccine (Series Information) Completed      09/20/2023  Imm Admin: Influenza Vaccine Adult HD    10/17/2022  Imm Admin: Influenza Vaccine Adult HD    10/14/2021  Imm Admin: Influenza Vaccine Adult HD    11/02/2020  Imm Admin: Influenza Vaccine Adult HD    09/04/2019  Imm Admin: Influenza Vaccine Adult HD    Only the first 5 history entries have been loaded, but more history exists.              COVID-19 Vaccine (Series Information) Completed      03/21/2024  Imm Admin: Covid-19 Mrna (Spikevax) Moderna 12+ Years    09/20/2023  Imm Admin: Comirnaty (Covid-19 Vaccine, Mrna, [id number] Formula)    09/08/2022  Imm Admin: PFIZER BIVALENT SARS-COV-2 VACCINE (12+)    04/25/2022  Imm Admin: PFIZER PURPLE CAP SARS-COV-2 VACCINATION (12+)    09/16/2021  Imm Admin: PFIZER PURPLE CAP SARS-COV-2 VACCINATION (12+)    Only the first 5 history entries have been loaded, but more history exists.              HPV Vaccines (Series Information) Aged Out      No completion history exists for this topic.              Discontinued - Colorectal Cancer Screening  Discontinued        Frequency changed to Never automatically (Topic No Longer Applies)    06/10/2024  COLOGUARD COLON CANCER SCREENING    02/01/2022  Colonoscopy (Done)    03/21/2017  Colonoscopy (Done)    03/21/2017  REFERRAL TO GI FOR COLONOSCOPY    Only the first 5 history entries have been loaded, but more history exists.              Discontinued - Hepatitis B Vaccine (Hep B)  Discontinued      07/18/2000  Imm Admin: Hepatitis B Vaccine Adolescent/Pediatric    07/18/2000  Imm Admin: Hepatitis B Vaccine (Adol/Adult)    01/13/2000  Imm Admin: Hepatitis B  Vaccine Adolescent/Pediatric    2000  Imm Admin: Hepatitis B Vaccine (Adol/Adult)    1999  Imm Admin: Hepatitis B Vaccine Adolescent/Pediatric    Only the first 5 history entries have been loaded, but more history exists.                    Patient Care Team:  Azar Cavazos M.D. as PCP - General (Internal Medicine)  Sanchez Fonseca M.D. (Urology)  [facility]  as Respiratory Therapist  Jaime Raman M.D. as Cardiologist (Cardiovascular Disease (Cardiology))  Marleny Esteves R.N.        Social History     Tobacco Use    Smoking status: Never    Smokeless tobacco: Never   Vaping Use    Vaping status: Never Used   Substance Use Topics    Alcohol use: Yes     Comment: Social     Drug use: No     Family History   Problem Relation Age of Onset    Heart Attack Father 38        doctor in 1946 diagnosed him with MI at home,  before going to the hospital    Psychiatric Illness Mother         Alzheimers    Hypertension Brother     Stroke Brother     Seizures Brother     Sleep Apnea Neg Hx      He  has a past medical history of Amyloidosis (HCC), BPH (benign prostatic hyperplasia), Cardiac amyloidosis (HCC), Chickenpox, Colon polyps, Coronary atherosclerosis of native coronary artery (05/15/2013), Coronary heart disease, Dyslipidemia (2016), H/O urethral stricture, Kidney cysts, Meniscus tear, Pancreas cyst, Scarlet fever, Sinusitis, chronic, Spinal stenosis, lumbar, SVT (supraventricular tachycardia) (HCC) (10/23/2018), and Thrombocytopenia (HCC) (2017).   Past Surgical History:   Procedure Laterality Date    KNEE RECONSTRUCTION      left, partial. multiple previous surgeries.     ROTATOR CUFF REPAIR      c/b right biceps tendon rupture, presumably repaired.     ANKLE FUSION Left     ARTHROSCOPY, KNEE      HEMORRHOIDECTOMY      SINUSCOPE      SINUSOTOMIES      Dr. Guerrier, total of 4 surgeries    TRIGGER FINGER RELEASE      x 2       Exam:     /70 (BP Location: Left arm,  "Patient Position: Sitting, BP Cuff Size: Adult)   Pulse 65   Temp 36.8 °C (98.2 °F) (Temporal)   Resp 12   Ht 1.778 m (5' 10\")   Wt 70.3 kg (155 lb)   SpO2 98%  Body mass index is 22.24 kg/m².    Hearing good.    Dentition good  Alert, oriented in no acute distress.  Eye contact is good, speech goal directed, affect calm  General: Normal  I actually say we flat at 60 supplies she like to stay 5 AM and 5 AM I am back late Tuesday night so I will be back on calling you could say midnight Tuesday no distress.  Normal appearing.  HEENT: Pupils are equal.  Conjunctiva is normal.  Head is normal appearing.  Ears, canals and tympanic membranes are normal.  Oral cavity is pink and moist without lesion.  Neck: Supple without JVD or bruit.  Thyroid is not enlarged.  Pulmonary: Clear with good breath sounds.  Cardiovascular regular rate and rhythm.  No murmur auscultated.  Carotid, radial and pedal pulses are intact.  Abdomen: Soft, nontender, nondistended.  Normal bowel sounds.  Organs are not enlarged.  Neurologic: Cranial nerves intact.  Strength and sensation are normal.  Normal patellar reflex.  Skin: Diffuse seborrheic keratosis.  Sebaceous cyst on right shoulder.  Sebaceous material removed.  Area of abnormal skin texture on right upper shoulder appears to be follicular hyperkeratosis  Lymph: No cervical, supraclavicular, axillary, abdominal or inguinal adenopathy noted.      Assessment and Plan. The following treatment and monitoring plan is recommended:    1. Medicare annual wellness visit, subsequent        2. Thrombocytopenia (HCC)        3. Cardiac amyloidosis (HCC)        4. Agatston CAC score, >400        5. OAB (overactive bladder)        6. Sacroiliac joint pain        7. Spinal stenosis of lumbar region, unspecified whether neurogenic claudication present        8. Pancreas cyst        9. Abnormal CBC            Overall patient is in great health.  Up-to-date on screenings and immunizations.  Will " follow-up labs on his abnormal CBC.  He will also be due for PSA.  Patient is followed by multiple subspecialists.  Overall chronic health issues including cardiac and bladder have been stable.  Discussed treatment options for overactive bladder.      Services suggested: No services required at this time  Health Care Screening: Age-appropriate preventive services Medicare covers discussed today and ordered if indicated.  Referrals offered: Community-based lifestyle interventions to reduce health risks and promote self-management and wellness, fall prevention, nutrition, physical activity, tobacco-use cessation, weight loss, and mental health services as per orders if indicated.    Discussion today about general wellness and lifestyle habits:    Prevent falls and reduce trip hazards; Cautioned about securing or removing rugs.  Have a working fire alarm and carbon monoxide detector;   Engage in regular physical activity and social activities       Follow-up: 6 months

## 2024-07-07 DIAGNOSIS — R19.7 DIARRHEA OF PRESUMED INFECTIOUS ORIGIN: ICD-10-CM

## 2024-07-07 RX ORDER — METRONIDAZOLE 500 MG/1
500 TABLET ORAL EVERY 8 HOURS
Qty: 30 TABLET | Refills: 0 | Status: SHIPPED | OUTPATIENT
Start: 2024-07-07

## 2024-07-08 ENCOUNTER — HOSPITAL ENCOUNTER (OUTPATIENT)
Facility: MEDICAL CENTER | Age: 84
End: 2024-07-08
Attending: INTERNAL MEDICINE
Payer: MEDICARE

## 2024-07-08 DIAGNOSIS — R19.7 DIARRHEA OF PRESUMED INFECTIOUS ORIGIN: ICD-10-CM

## 2024-07-08 LAB
C DIFF DNA SPEC QL NAA+PROBE: NEGATIVE
C DIFF TOX GENS STL QL NAA+PROBE: NEGATIVE
LACTOFERRIN STL QL IA: NEGATIVE

## 2024-07-08 PROCEDURE — 87077 CULTURE AEROBIC IDENTIFY: CPT | Mod: 91

## 2024-07-08 PROCEDURE — 87899 AGENT NOS ASSAY W/OPTIC: CPT

## 2024-07-08 PROCEDURE — 83630 LACTOFERRIN FECAL (QUAL): CPT

## 2024-07-08 PROCEDURE — 87045 FECES CULTURE AEROBIC BACT: CPT

## 2024-07-08 PROCEDURE — 87493 C DIFF AMPLIFIED PROBE: CPT

## 2024-07-08 PROCEDURE — 87046 STOOL CULTR AEROBIC BACT EA: CPT

## 2024-07-09 DIAGNOSIS — N50.811 PAIN IN RIGHT TESTICLE: ICD-10-CM

## 2024-07-09 LAB
E COLI SXT1+2 STL IA: NORMAL
SIGNIFICANT IND 70042: NORMAL
SITE SITE: NORMAL
SOURCE SOURCE: NORMAL

## 2024-07-11 LAB
BACTERIA STL CULT: NORMAL
E COLI SXT1+2 STL IA: NORMAL
SIGNIFICANT IND 70042: NORMAL
SITE SITE: NORMAL
SOURCE SOURCE: NORMAL

## 2024-07-23 ENCOUNTER — HOSPITAL ENCOUNTER (OUTPATIENT)
Dept: RADIOLOGY | Facility: MEDICAL CENTER | Age: 84
End: 2024-07-23
Attending: INTERNAL MEDICINE
Payer: MEDICARE

## 2024-07-23 DIAGNOSIS — N50.811 PAIN IN RIGHT TESTICLE: ICD-10-CM

## 2024-07-23 PROCEDURE — 76870 US EXAM SCROTUM: CPT

## 2024-07-24 ENCOUNTER — HOSPITAL ENCOUNTER (OUTPATIENT)
Facility: MEDICAL CENTER | Age: 84
End: 2024-07-24
Attending: INTERNAL MEDICINE
Payer: MEDICARE

## 2024-07-24 ENCOUNTER — NON-PROVIDER VISIT (OUTPATIENT)
Dept: INTERNAL MEDICINE | Facility: IMAGING CENTER | Age: 84
End: 2024-07-24
Payer: MEDICARE

## 2024-07-24 DIAGNOSIS — I43 CARDIAC AMYLOIDOSIS (HCC): ICD-10-CM

## 2024-07-24 DIAGNOSIS — E78.5 HYPERLIPIDEMIA, UNSPECIFIED HYPERLIPIDEMIA TYPE: ICD-10-CM

## 2024-07-24 DIAGNOSIS — D72.820 MONOCLONAL B-CELL LYMPHOCYTOSIS: ICD-10-CM

## 2024-07-24 DIAGNOSIS — D69.6 THROMBOCYTOPENIA (HCC): ICD-10-CM

## 2024-07-24 DIAGNOSIS — N40.1 BENIGN PROSTATIC HYPERPLASIA WITH URINARY FREQUENCY: ICD-10-CM

## 2024-07-24 DIAGNOSIS — R35.0 BENIGN PROSTATIC HYPERPLASIA WITH URINARY FREQUENCY: ICD-10-CM

## 2024-07-24 DIAGNOSIS — E85.4 CARDIAC AMYLOIDOSIS (HCC): ICD-10-CM

## 2024-07-24 DIAGNOSIS — R73.09 ELEVATED GLYCOHEMOGLOBIN: ICD-10-CM

## 2024-07-24 LAB
ALBUMIN SERPL BCP-MCNC: 4.1 G/DL (ref 3.2–4.9)
ALBUMIN/GLOB SERPL: 1.8 G/DL
ALP SERPL-CCNC: 130 U/L (ref 30–99)
ALT SERPL-CCNC: 45 U/L (ref 2–50)
ANION GAP SERPL CALC-SCNC: 7 MMOL/L (ref 7–16)
APPEARANCE UR: CLEAR
AST SERPL-CCNC: 38 U/L (ref 12–45)
BASOPHILS # BLD AUTO: 0.8 % (ref 0–1.8)
BASOPHILS # BLD: 0.08 K/UL (ref 0–0.12)
BILIRUB SERPL-MCNC: 1 MG/DL (ref 0.1–1.5)
BILIRUB UR QL STRIP.AUTO: NEGATIVE
BUN SERPL-MCNC: 19 MG/DL (ref 8–22)
CALCIUM ALBUM COR SERPL-MCNC: 9.8 MG/DL (ref 8.5–10.5)
CALCIUM SERPL-MCNC: 9.9 MG/DL (ref 8.5–10.5)
CHLORIDE SERPL-SCNC: 103 MMOL/L (ref 96–112)
CHOLEST SERPL-MCNC: 143 MG/DL (ref 100–199)
CO2 SERPL-SCNC: 29 MMOL/L (ref 20–33)
COLOR UR: YELLOW
COMMENT NL1176: NORMAL
CREAT SERPL-MCNC: 0.93 MG/DL (ref 0.5–1.4)
EOSINOPHIL # BLD AUTO: 0.09 K/UL (ref 0–0.51)
EOSINOPHIL NFR BLD: 0.9 % (ref 0–6.9)
ERYTHROCYTE [DISTWIDTH] IN BLOOD BY AUTOMATED COUNT: 53.1 FL (ref 35.9–50)
EST. AVERAGE GLUCOSE BLD GHB EST-MCNC: 117 MG/DL
GFR SERPLBLD CREATININE-BSD FMLA CKD-EPI: 81 ML/MIN/1.73 M 2
GLOBULIN SER CALC-MCNC: 2.3 G/DL (ref 1.9–3.5)
GLUCOSE SERPL-MCNC: 87 MG/DL (ref 65–99)
GLUCOSE UR STRIP.AUTO-MCNC: NEGATIVE MG/DL
HBA1C MFR BLD: 5.7 % (ref 4–5.6)
HCT VFR BLD AUTO: 46.7 % (ref 42–52)
HDLC SERPL-MCNC: 76 MG/DL
HGB BLD-MCNC: 15.4 G/DL (ref 14–18)
KETONES UR STRIP.AUTO-MCNC: NEGATIVE MG/DL
LDH SERPL L TO P-CCNC: 247 U/L (ref 107–266)
LDLC SERPL CALC-MCNC: 56 MG/DL
LEUKOCYTE ESTERASE UR QL STRIP.AUTO: NEGATIVE
LYMPHOCYTES # BLD AUTO: 5.47 K/UL (ref 1–4.8)
LYMPHOCYTES NFR BLD: 54.2 % (ref 22–41)
MACROCYTES BLD QL SMEAR: ABNORMAL
MANUAL DIFF BLD: NORMAL
MCH RBC QN AUTO: 33.6 PG (ref 27–33)
MCHC RBC AUTO-ENTMCNC: 33 G/DL (ref 32.3–36.5)
MCV RBC AUTO: 102 FL (ref 81.4–97.8)
MICRO URNS: NORMAL
MONOCYTES # BLD AUTO: 0.86 K/UL (ref 0–0.85)
MONOCYTES NFR BLD AUTO: 8.5 % (ref 0–13.4)
MORPHOLOGY BLD-IMP: NORMAL
NEUTROPHILS # BLD AUTO: 3.6 K/UL (ref 1.82–7.42)
NEUTROPHILS NFR BLD: 35.6 % (ref 44–72)
NITRITE UR QL STRIP.AUTO: NEGATIVE
NRBC # BLD AUTO: 0 K/UL
NRBC BLD-RTO: 0 /100 WBC (ref 0–0.2)
OVALOCYTES BLD QL SMEAR: NORMAL
PH UR STRIP.AUTO: 7.5 [PH] (ref 5–8)
PLATELET # BLD AUTO: 152 K/UL (ref 164–446)
PLATELET BLD QL SMEAR: NORMAL
PMV BLD AUTO: 11.8 FL (ref 9–12.9)
POIKILOCYTOSIS BLD QL SMEAR: NORMAL
POTASSIUM SERPL-SCNC: 4.6 MMOL/L (ref 3.6–5.5)
PROT SERPL-MCNC: 6.4 G/DL (ref 6–8.2)
PROT UR QL STRIP: NEGATIVE MG/DL
PSA SERPL-MCNC: 0.74 NG/ML (ref 0–4)
RBC # BLD AUTO: 4.58 M/UL (ref 4.7–6.1)
RBC BLD AUTO: PRESENT
RBC UR QL AUTO: NEGATIVE
SMUDGE CELLS BLD QL SMEAR: NORMAL
SODIUM SERPL-SCNC: 139 MMOL/L (ref 135–145)
SP GR UR STRIP.AUTO: 1.01
TRIGL SERPL-MCNC: 53 MG/DL (ref 0–149)
UROBILINOGEN UR STRIP.AUTO-MCNC: 0.2 MG/DL
VARIANT LYMPHS BLD QL SMEAR: NORMAL
WBC # BLD AUTO: 10.1 K/UL (ref 4.8–10.8)

## 2024-07-24 PROCEDURE — 83036 HEMOGLOBIN GLYCOSYLATED A1C: CPT

## 2024-07-24 PROCEDURE — 86355 B CELLS TOTAL COUNT: CPT

## 2024-07-24 PROCEDURE — 99999 PR NO CHARGE: CPT

## 2024-07-24 PROCEDURE — 80061 LIPID PANEL: CPT

## 2024-07-24 PROCEDURE — 85007 BL SMEAR W/DIFF WBC COUNT: CPT

## 2024-07-24 PROCEDURE — 86357 NK CELLS TOTAL COUNT: CPT

## 2024-07-24 PROCEDURE — 80053 COMPREHEN METABOLIC PANEL: CPT

## 2024-07-24 PROCEDURE — 86359 T CELLS TOTAL COUNT: CPT

## 2024-07-24 PROCEDURE — 84153 ASSAY OF PSA TOTAL: CPT

## 2024-07-24 PROCEDURE — 83615 LACTATE (LD) (LDH) ENZYME: CPT

## 2024-07-24 PROCEDURE — 86360 T CELL ABSOLUTE COUNT/RATIO: CPT

## 2024-07-24 PROCEDURE — 85027 COMPLETE CBC AUTOMATED: CPT

## 2024-07-24 PROCEDURE — 81003 URINALYSIS AUTO W/O SCOPE: CPT

## 2024-07-27 LAB
ANNOTATION COMMENT IMP: ABNORMAL
CD19 CELLS NFR SPEC: 67 % (ref 5–21)
CD3 CELLS # BLD: 1504 CELLS/UL (ref 660–2200)
CD3 CELLS NFR SPEC: 27 % (ref 62–89)
CD3+CD4+ CELLS # BLD: 958 CELLS/UL (ref 490–1600)
CD3+CD4+ CELLS NFR BLD: 17 % (ref 35–68)
CD3+CD4+ CELLS/CD3+CD8+ CLL BLD: 4.25 RATIO (ref 0.8–6.17)
CD3+CD8+ CELLS # BLD: 239 CELLS/UL (ref 150–1050)
CD3+CD8+ CELLS NFR SPEC: 4 % (ref 10–46)
CD3-CD16+CD56+ CELLS # SPEC: 259 CELLS/UL (ref 74–620)
CD3-CD16+CD56+ CELLS NFR SPEC: 5 % (ref 5–28)
CELLS.CD3-CD19+ [#/VOLUME] IN BLOOD: 3759 CELLS/UL (ref 74–510)

## 2024-07-31 ENCOUNTER — APPOINTMENT (OUTPATIENT)
Dept: RADIOLOGY | Facility: MEDICAL CENTER | Age: 84
End: 2024-07-31
Attending: INTERNAL MEDICINE
Payer: MEDICARE

## 2024-08-06 RX ORDER — MIRABEGRON 25 MG/1
25 TABLET, FILM COATED, EXTENDED RELEASE ORAL DAILY
Qty: 90 TABLET | Refills: 3 | Status: SHIPPED | OUTPATIENT
Start: 2024-08-06 | End: 2024-08-19

## 2024-08-19 RX ORDER — MIRABEGRON 25 MG/1
25 TABLET, FILM COATED, EXTENDED RELEASE ORAL DAILY
Qty: 90 TABLET | Refills: 3 | Status: SHIPPED | OUTPATIENT
Start: 2024-08-19 | End: 2024-08-21

## 2024-08-21 RX ORDER — MIRABEGRON 50 MG/1
50 TABLET, FILM COATED, EXTENDED RELEASE ORAL DAILY
Qty: 90 TABLET | Refills: 3 | Status: SHIPPED | OUTPATIENT
Start: 2024-08-21 | End: 2024-08-25

## 2024-08-25 RX ORDER — DOXYCYCLINE HYCLATE 100 MG
100 TABLET ORAL 2 TIMES DAILY
Qty: 20 TABLET | Refills: 0 | Status: SHIPPED | OUTPATIENT
Start: 2024-08-25

## 2024-08-25 RX ORDER — MIRABEGRON 25 MG/1
25 TABLET, FILM COATED, EXTENDED RELEASE ORAL DAILY
Qty: 90 TABLET | Refills: 3 | Status: SHIPPED | OUTPATIENT
Start: 2024-08-25

## 2024-09-06 DIAGNOSIS — H92.03 OTALGIA OF BOTH EARS: ICD-10-CM

## 2024-09-11 DIAGNOSIS — M53.3 SACROILIAC JOINT PAIN: ICD-10-CM

## 2024-09-11 DIAGNOSIS — M48.061 SPINAL STENOSIS OF LUMBAR REGION, UNSPECIFIED WHETHER NEUROGENIC CLAUDICATION PRESENT: ICD-10-CM

## 2024-10-18 ENCOUNTER — HOSPITAL ENCOUNTER (OUTPATIENT)
Dept: RADIOLOGY | Facility: MEDICAL CENTER | Age: 84
End: 2024-10-18
Payer: MEDICARE

## 2024-10-18 DIAGNOSIS — R20.0 NUMBNESS OF UPPER LIMB: ICD-10-CM

## 2024-10-18 DIAGNOSIS — M79.621 PAIN OF RIGHT UPPER ARM: ICD-10-CM

## 2024-10-18 PROCEDURE — 72156 MRI NECK SPINE W/O & W/DYE: CPT

## 2024-10-18 PROCEDURE — A9579 GAD-BASE MR CONTRAST NOS,1ML: HCPCS | Mod: JZ

## 2024-10-18 PROCEDURE — 700117 HCHG RX CONTRAST REV CODE 255: Mod: JZ

## 2024-10-18 RX ADMIN — GADOTERIDOL 15 ML: 279.3 INJECTION, SOLUTION INTRAVENOUS at 14:55

## 2024-10-28 RX ORDER — TAFAMIDIS 61 MG/1
CAPSULE, LIQUID FILLED ORAL
Qty: 30 CAPSULE | Refills: 5 | Status: SHIPPED | OUTPATIENT
Start: 2024-10-28

## 2024-11-07 ENCOUNTER — APPOINTMENT (RX ONLY)
Dept: URBAN - METROPOLITAN AREA CLINIC 15 | Facility: CLINIC | Age: 84
Setting detail: DERMATOLOGY
End: 2024-11-07

## 2024-11-07 DIAGNOSIS — L70.8 OTHER ACNE: ICD-10-CM

## 2024-11-07 DIAGNOSIS — D485 NEOPLASM OF UNCERTAIN BEHAVIOR OF SKIN: ICD-10-CM

## 2024-11-07 DIAGNOSIS — L82.1 OTHER SEBORRHEIC KERATOSIS: ICD-10-CM

## 2024-11-07 DIAGNOSIS — L81.4 OTHER MELANIN HYPERPIGMENTATION: ICD-10-CM

## 2024-11-07 PROBLEM — D48.5 NEOPLASM OF UNCERTAIN BEHAVIOR OF SKIN: Status: ACTIVE | Noted: 2024-11-07

## 2024-11-07 PROCEDURE — 99213 OFFICE O/P EST LOW 20 MIN: CPT | Mod: 25

## 2024-11-07 PROCEDURE — ? BIOPSY BY PUNCH METHOD

## 2024-11-07 PROCEDURE — ? EXTRACTIONS

## 2024-11-07 PROCEDURE — ? COUNSELING

## 2024-11-07 PROCEDURE — 11104 PUNCH BX SKIN SINGLE LESION: CPT

## 2024-11-07 ASSESSMENT — LOCATION SIMPLE DESCRIPTION DERM
LOCATION SIMPLE: LEFT UPPER BACK
LOCATION SIMPLE: ABDOMEN
LOCATION SIMPLE: RIGHT LOWER BACK
LOCATION SIMPLE: RIGHT SHOULDER
LOCATION SIMPLE: CHEST

## 2024-11-07 ASSESSMENT — LOCATION DETAILED DESCRIPTION DERM
LOCATION DETAILED: RIGHT POSTERIOR SHOULDER
LOCATION DETAILED: PERIUMBILICAL SKIN
LOCATION DETAILED: RIGHT LATERAL SUPERIOR CHEST
LOCATION DETAILED: RIGHT INFERIOR MEDIAL MIDBACK
LOCATION DETAILED: LEFT SUPERIOR UPPER BACK

## 2024-11-07 ASSESSMENT — LOCATION ZONE DERM
LOCATION ZONE: ARM
LOCATION ZONE: TRUNK

## 2024-11-07 NOTE — PROCEDURE: BIOPSY BY PUNCH METHOD

## 2024-11-07 NOTE — PROCEDURE: EXTRACTIONS
Extraction Method: 30 gauge needle and comedo extractor
Detail Level: Detailed
Render Post-Care Instructions In Note?: no
Acne Type: Comedonal Lesions
Post-Care Instructions: I reviewed with the patient in detail post-care instructions. Patient is to keep the treatment areas dry overnight, and then apply bacitracin twice daily until healed. Patient may apply hydrogen peroxide soaks to remove any crusting.
Consent was obtained and risks were reviewed including but not limited to scarring, infection, bleeding, scabbing, incomplete removal, and allergy to anesthesia.
Prep Text (Optional): Prior to removal the treatment areas were prepped in the usual fashion.

## 2024-11-08 ENCOUNTER — APPOINTMENT (RX ONLY)
Dept: URBAN - METROPOLITAN AREA CLINIC 4 | Facility: CLINIC | Age: 84
Setting detail: DERMATOLOGY
End: 2024-11-08

## 2024-11-08 DIAGNOSIS — Z48.00 ENCOUNTER FOR CHANGE OR REMOVAL OF NONSURGICAL WOUND DRESSING: ICD-10-CM

## 2024-11-08 PROCEDURE — ? ADDITIONAL NOTES

## 2024-11-08 ASSESSMENT — LOCATION SIMPLE DESCRIPTION DERM: LOCATION SIMPLE: RIGHT UPPER BACK

## 2024-11-08 ASSESSMENT — LOCATION ZONE DERM: LOCATION ZONE: TRUNK

## 2024-11-08 ASSESSMENT — LOCATION DETAILED DESCRIPTION DERM: LOCATION DETAILED: RIGHT SUPERIOR LATERAL UPPER BACK

## 2024-11-08 NOTE — PROCEDURE: ADDITIONAL NOTES
Render Risk Assessment In Note?: no
Detail Level: Simple
Additional Notes: Patient’s bandage was replaced, and a thin layer of bacitracin was applied. Patient given detailed instructions to maintain the dressing until suture removal (1 week).

## 2024-11-08 NOTE — HPI: OTHER
Condition:: Bandage change
Please Describe Your Condition:: is an established patient who is being seen for a chief complaint of Bandage change . Patient presents for a dressing change following a punch biopsy and suture placement, which took place yesterday.

## 2024-11-14 ENCOUNTER — APPOINTMENT (RX ONLY)
Dept: URBAN - METROPOLITAN AREA CLINIC 15 | Facility: CLINIC | Age: 84
Setting detail: DERMATOLOGY
End: 2024-11-14

## 2024-11-14 DIAGNOSIS — B00.1 HERPESVIRAL VESICULAR DERMATITIS: ICD-10-CM | Status: RESOLVING

## 2024-11-14 DIAGNOSIS — Z48.02 ENCOUNTER FOR REMOVAL OF SUTURES: ICD-10-CM

## 2024-11-14 PROCEDURE — ? PRESCRIPTION

## 2024-11-14 PROCEDURE — 99213 OFFICE O/P EST LOW 20 MIN: CPT

## 2024-11-14 PROCEDURE — ? COUNSELING

## 2024-11-14 PROCEDURE — ? RECOMMENDATIONS

## 2024-11-14 PROCEDURE — ? SUTURE REMOVAL (GLOBAL PERIOD)

## 2024-11-14 RX ORDER — MUPIROCIN 20 MG/G
OINTMENT TOPICAL
Qty: 22 | Refills: 1 | Status: ERX | COMMUNITY
Start: 2024-11-14

## 2024-11-14 RX ADMIN — MUPIROCIN: 20 OINTMENT TOPICAL at 00:00

## 2024-11-14 ASSESSMENT — LOCATION ZONE DERM
LOCATION ZONE: LIP
LOCATION ZONE: ARM

## 2024-11-14 ASSESSMENT — LOCATION DETAILED DESCRIPTION DERM
LOCATION DETAILED: LEFT INFERIOR VERMILION LIP
LOCATION DETAILED: RIGHT POSTERIOR SHOULDER

## 2024-11-14 ASSESSMENT — LOCATION SIMPLE DESCRIPTION DERM
LOCATION SIMPLE: RIGHT SHOULDER
LOCATION SIMPLE: LEFT LIP

## 2024-11-14 NOTE — PROCEDURE: RECOMMENDATIONS
Detail Level: Zone
Render Risk Assessment In Note?: no
Recommendation Preamble: The following recommendations were made during the visit: OTC treatment

## 2024-11-14 NOTE — HPI: EVALUATION OF SKIN LESION(S)
Hpi Title: Evaluation of a Skin Lesion
Additional History: \\n\\n-Pt states he has a canker sore on this lip, and now his lip is swollen.

## 2024-11-14 NOTE — PROCEDURE: SUTURE REMOVAL (GLOBAL PERIOD)
Detail Level: Detailed
Add 44744 Cpt? (Important Note: In 2017 The Use Of 68205 Is Being Tracked By Cms To Determine Future Global Period Reimbursement For Global Periods): no
Suture Removal Completed By (Optional): Soumya AMARO

## 2024-12-11 ENCOUNTER — APPOINTMENT (OUTPATIENT)
Dept: URBAN - METROPOLITAN AREA CLINIC 4 | Facility: CLINIC | Age: 84
Setting detail: DERMATOLOGY
End: 2024-12-11

## 2024-12-11 DIAGNOSIS — L81.4 OTHER MELANIN HYPERPIGMENTATION: ICD-10-CM

## 2024-12-11 DIAGNOSIS — L72.8 OTHER FOLLICULAR CYSTS OF THE SKIN AND SUBCUTANEOUS TISSUE: ICD-10-CM

## 2024-12-11 DIAGNOSIS — Z48.817 ENCOUNTER FOR SURGICAL AFTERCARE FOLLOWING SURGERY ON THE SKIN AND SUBCUTANEOUS TISSUE: ICD-10-CM

## 2024-12-11 DIAGNOSIS — D18.0 HEMANGIOMA: ICD-10-CM

## 2024-12-11 DIAGNOSIS — L82.1 OTHER SEBORRHEIC KERATOSIS: ICD-10-CM

## 2024-12-11 PROBLEM — D18.01 HEMANGIOMA OF SKIN AND SUBCUTANEOUS TISSUE: Status: ACTIVE | Noted: 2024-12-11

## 2024-12-11 PROCEDURE — 99213 OFFICE O/P EST LOW 20 MIN: CPT

## 2024-12-11 PROCEDURE — ? COUNSELING

## 2024-12-11 PROCEDURE — ? POST-OP WOUND EVALUATION

## 2024-12-11 ASSESSMENT — LOCATION SIMPLE DESCRIPTION DERM
LOCATION SIMPLE: LEFT UPPER BACK
LOCATION SIMPLE: LEFT CLAVICULAR SKIN
LOCATION SIMPLE: RIGHT SHOULDER
LOCATION SIMPLE: LEFT LOWER BACK
LOCATION SIMPLE: RIGHT UPPER BACK

## 2024-12-11 ASSESSMENT — LOCATION ZONE DERM
LOCATION ZONE: ARM
LOCATION ZONE: TRUNK

## 2024-12-11 ASSESSMENT — LOCATION DETAILED DESCRIPTION DERM
LOCATION DETAILED: LEFT SUPERIOR MEDIAL MIDBACK
LOCATION DETAILED: RIGHT SUPERIOR UPPER BACK
LOCATION DETAILED: LEFT MEDIAL UPPER BACK
LOCATION DETAILED: LEFT CLAVICULAR SKIN
LOCATION DETAILED: RIGHT POSTERIOR SHOULDER

## 2024-12-11 NOTE — PROCEDURE: POST-OP WOUND EVALUATION
Detail Level: Detailed
Quality 355: Unplanned Reoperation Within The 30 Day Postoperative Period: No return to the operating room for a surgical procedure, for complications of the principal operative procedure, within 30 days of the principal operative procedure
Quality 357: Surgical Site Infection (Ssi): No surgical site infection
Wound Evaluated By (Optional): physician
Wound Diameter In Cm(Optional): 0
Wound Crusting?: crusted

## 2024-12-11 NOTE — PROCEDURE: REASSURANCE
Detail Level: Generalized
Hide Include Location In Plan Question?: No
Statement Selected
Include Location In Plan?: Yes
Detail Level: Zone

## 2024-12-31 DIAGNOSIS — E85.4 CARDIAC AMYLOIDOSIS (HCC): ICD-10-CM

## 2024-12-31 DIAGNOSIS — I43 CARDIAC AMYLOIDOSIS (HCC): ICD-10-CM

## 2025-03-12 ENCOUNTER — NON-PROVIDER VISIT (OUTPATIENT)
Dept: INTERNAL MEDICINE | Facility: IMAGING CENTER | Age: 85
End: 2025-03-12
Payer: MEDICARE

## 2025-03-12 ENCOUNTER — APPOINTMENT (OUTPATIENT)
Dept: URBAN - METROPOLITAN AREA CLINIC 6 | Facility: CLINIC | Age: 85
Setting detail: DERMATOLOGY
End: 2025-03-12

## 2025-03-12 ENCOUNTER — HOSPITAL ENCOUNTER (OUTPATIENT)
Facility: MEDICAL CENTER | Age: 85
End: 2025-03-12
Attending: INTERNAL MEDICINE
Payer: MEDICARE

## 2025-03-12 DIAGNOSIS — L81.4 OTHER MELANIN HYPERPIGMENTATION: ICD-10-CM

## 2025-03-12 DIAGNOSIS — N18.31 STAGE 3A CHRONIC KIDNEY DISEASE: ICD-10-CM

## 2025-03-12 DIAGNOSIS — N40.1 BENIGN PROSTATIC HYPERPLASIA WITH NOCTURIA: ICD-10-CM

## 2025-03-12 DIAGNOSIS — R35.1 BENIGN PROSTATIC HYPERPLASIA WITH NOCTURIA: ICD-10-CM

## 2025-03-12 DIAGNOSIS — K86.2 PANCREAS CYST: ICD-10-CM

## 2025-03-12 DIAGNOSIS — D22 MELANOCYTIC NEVI: ICD-10-CM

## 2025-03-12 DIAGNOSIS — Z71.89 OTHER SPECIFIED COUNSELING: ICD-10-CM

## 2025-03-12 DIAGNOSIS — L82.1 OTHER SEBORRHEIC KERATOSIS: ICD-10-CM

## 2025-03-12 DIAGNOSIS — E78.00 PURE HYPERCHOLESTEROLEMIA: ICD-10-CM

## 2025-03-12 DIAGNOSIS — D18.0 HEMANGIOMA: ICD-10-CM

## 2025-03-12 DIAGNOSIS — E55.9 VITAMIN D DEFICIENCY: ICD-10-CM

## 2025-03-12 PROBLEM — D22.5 MELANOCYTIC NEVI OF TRUNK: Status: ACTIVE | Noted: 2025-03-12

## 2025-03-12 PROBLEM — D18.01 HEMANGIOMA OF SKIN AND SUBCUTANEOUS TISSUE: Status: ACTIVE | Noted: 2025-03-12

## 2025-03-12 LAB
25(OH)D3 SERPL-MCNC: 39 NG/ML (ref 30–100)
APPEARANCE UR: CLEAR
BILIRUB UR QL STRIP.AUTO: NEGATIVE
CHOLEST SERPL-MCNC: 142 MG/DL (ref 100–199)
COLOR UR: YELLOW
CREAT UR-MCNC: 119 MG/DL
GLUCOSE UR STRIP.AUTO-MCNC: NEGATIVE MG/DL
HDLC SERPL-MCNC: 91 MG/DL
KETONES UR STRIP.AUTO-MCNC: NEGATIVE MG/DL
LDLC SERPL CALC-MCNC: 42 MG/DL
LEUKOCYTE ESTERASE UR QL STRIP.AUTO: NEGATIVE
MICRO URNS: NORMAL
MICROALBUMIN UR-MCNC: 2.7 MG/DL
MICROALBUMIN/CREAT UR: 23 MG/G (ref 0–30)
NITRITE UR QL STRIP.AUTO: NEGATIVE
PH UR STRIP.AUTO: 8 [PH] (ref 5–8)
PROT UR QL STRIP: NEGATIVE MG/DL
PSA SERPL DL<=0.01 NG/ML-MCNC: 0.65 NG/ML (ref 0–4)
RBC UR QL AUTO: NEGATIVE
SP GR UR STRIP.AUTO: 1.02
TRIGL SERPL-MCNC: 44 MG/DL (ref 0–149)
UROBILINOGEN UR STRIP.AUTO-MCNC: 1 EU/DL

## 2025-03-12 PROCEDURE — 80061 LIPID PANEL: CPT

## 2025-03-12 PROCEDURE — 99213 OFFICE O/P EST LOW 20 MIN: CPT

## 2025-03-12 PROCEDURE — 82570 ASSAY OF URINE CREATININE: CPT

## 2025-03-12 PROCEDURE — ? COUNSELING

## 2025-03-12 PROCEDURE — ? SUNSCREEN TREATMENT REGIMEN

## 2025-03-12 PROCEDURE — 81003 URINALYSIS AUTO W/O SCOPE: CPT

## 2025-03-12 PROCEDURE — 99999 PR NO CHARGE: CPT

## 2025-03-12 PROCEDURE — 84153 ASSAY OF PSA TOTAL: CPT

## 2025-03-12 PROCEDURE — 82043 UR ALBUMIN QUANTITATIVE: CPT

## 2025-03-12 PROCEDURE — 82306 VITAMIN D 25 HYDROXY: CPT

## 2025-03-12 ASSESSMENT — LOCATION SIMPLE DESCRIPTION DERM
LOCATION SIMPLE: LEFT CHEEK
LOCATION SIMPLE: LEFT HAND
LOCATION SIMPLE: ABDOMEN
LOCATION SIMPLE: CHEST
LOCATION SIMPLE: RIGHT HAND
LOCATION SIMPLE: LEFT FOREHEAD

## 2025-03-12 ASSESSMENT — LOCATION DETAILED DESCRIPTION DERM
LOCATION DETAILED: LEFT MEDIAL INFERIOR CHEST
LOCATION DETAILED: RIGHT RADIAL DORSAL HAND
LOCATION DETAILED: LEFT ULNAR DORSAL HAND
LOCATION DETAILED: EPIGASTRIC SKIN
LOCATION DETAILED: PERIUMBILICAL SKIN
LOCATION DETAILED: STERNUM
LOCATION DETAILED: LEFT INFERIOR CENTRAL MALAR CHEEK
LOCATION DETAILED: LEFT FOREHEAD

## 2025-03-12 ASSESSMENT — LOCATION ZONE DERM
LOCATION ZONE: TRUNK
LOCATION ZONE: FACE
LOCATION ZONE: HAND

## 2025-03-12 NOTE — PROGRESS NOTES
Andre Martinez is a 84 y.o. male here for a non-provider visit for a lab draw on 3/12/2025 at 8:26 AM.    Procedure performed:  Venipuncture     Anatomical site:  Left Antecubital Area    Equipment used:  21 g vacutainer     Labs drawn:  routine labs    Ordering provider:  Azar Cavazos MD    Lab draw completed by:  Marleny Esteves R.N.

## 2025-03-13 ENCOUNTER — OFFICE VISIT (OUTPATIENT)
Dept: CARDIOLOGY | Facility: MEDICAL CENTER | Age: 85
End: 2025-03-13
Attending: INTERNAL MEDICINE
Payer: MEDICARE

## 2025-03-13 VITALS
BODY MASS INDEX: 23.4 KG/M2 | DIASTOLIC BLOOD PRESSURE: 70 MMHG | WEIGHT: 158 LBS | OXYGEN SATURATION: 97 % | RESPIRATION RATE: 16 BRPM | HEIGHT: 69 IN | HEART RATE: 66 BPM | SYSTOLIC BLOOD PRESSURE: 130 MMHG

## 2025-03-13 DIAGNOSIS — E78.00 PURE HYPERCHOLESTEROLEMIA: ICD-10-CM

## 2025-03-13 DIAGNOSIS — I50.32 CHRONIC DIASTOLIC CONGESTIVE HEART FAILURE (HCC): ICD-10-CM

## 2025-03-13 DIAGNOSIS — I43 CARDIAC AMYLOIDOSIS (HCC): ICD-10-CM

## 2025-03-13 DIAGNOSIS — M25.50 ARTHRALGIA, UNSPECIFIED JOINT: ICD-10-CM

## 2025-03-13 DIAGNOSIS — I47.10 PAROXYSMAL SUPRAVENTRICULAR TACHYCARDIA (HCC): ICD-10-CM

## 2025-03-13 DIAGNOSIS — R93.1 AGATSTON CAC SCORE, >400: ICD-10-CM

## 2025-03-13 DIAGNOSIS — E85.4 CARDIAC AMYLOIDOSIS (HCC): ICD-10-CM

## 2025-03-13 PROCEDURE — 99213 OFFICE O/P EST LOW 20 MIN: CPT | Performed by: INTERNAL MEDICINE

## 2025-03-13 PROCEDURE — 3078F DIAST BP <80 MM HG: CPT | Performed by: INTERNAL MEDICINE

## 2025-03-13 PROCEDURE — 3075F SYST BP GE 130 - 139MM HG: CPT | Performed by: INTERNAL MEDICINE

## 2025-03-13 PROCEDURE — 99214 OFFICE O/P EST MOD 30 MIN: CPT | Performed by: INTERNAL MEDICINE

## 2025-03-13 PROCEDURE — G2211 COMPLEX E/M VISIT ADD ON: HCPCS | Performed by: INTERNAL MEDICINE

## 2025-03-13 RX ORDER — CELECOXIB 200 MG/1
200 CAPSULE ORAL
Qty: 30 CAPSULE | Refills: 0 | Status: SHIPPED | OUTPATIENT
Start: 2025-03-13

## 2025-03-13 ASSESSMENT — FIBROSIS 4 INDEX: FIB4 SCORE: 3.09

## 2025-03-13 NOTE — PROGRESS NOTES
Chief Complaint   Patient presents with    Hyperlipidemia    Aortic Atherosclerosis     F/v dx: Atherosclerosis of native coronary artery of native heart without angina pectoris    Paroxysmal Supraventricular Tachycardia (PSVT)       Subjective     Andre Martinez is a 84 y.o. male who presents today with a history of presumed coronary atherosclerosis related to heavily calcified coronary arteries in the past though with negative serial stress echocardiograms, and asymptomatic short runs of SVT and wide-complex tachycardia and TTTR amyloidosis    His clinica trial is likely ending    Has followed up with [place] likely to continue here locally    Past Medical History:   Diagnosis Date    Amyloidosis ([facility])     TTR, type pending.     BPH (benign prostatic hyperplasia)     s/p laser encleation of the prostate    Cardiac amyloidosis ([facility])     Chickenpox     Colon polyps     Coronary atherosclerosis of native coronary artery 05/15/2013    Coronary calcium score in the 3000s, negative PET scan and no symptoms so treated medically.     Coronary heart disease     Dyslipidemia 2016    H/O urethral stricture     s/p surgical repair 1/15/2019, [place]    Kidney cysts     Meniscus tear     Pancreas cyst     Scarlet fever     Sinusitis, chronic     Spinal stenosis, lumbar     SVT (supraventricular tachycardia) ([facility]) 10/23/2018    Thrombocytopenia ([facility]) 2017     Past Surgical History:   Procedure Laterality Date    KNEE RECONSTRUCTION      left, partial. multiple previous surgeries.     ROTATOR CUFF REPAIR      c/b right biceps tendon rupture, presumably repaired.     ANKLE FUSION Left     ARTHROSCOPY, KNEE      HEMORRHOIDECTOMY      SINUSCOPE      SINUSOTOMIES      Dr. Guerrier, total of 4 surgeries    TRIGGER FINGER RELEASE      x 2     Family History   Problem Relation Age of Onset    Heart Attack Father 38        doctor in 1946 diagnosed him with MI at home,  before going to the hospital    Psychiatric  Illness Mother         Alzheimers    Hypertension Brother     Stroke Brother     Seizures Brother     Sleep Apnea Neg Hx      Social History     Socioeconomic History    Marital status:      Spouse name: Not on file    Number of children: Not on file    Years of education: Not on file    Highest education level: Not on file   Occupational History    Not on file   Tobacco Use    Smoking status: Never    Smokeless tobacco: Never   Vaping Use    Vaping status: Never Used   Substance and Sexual Activity    Alcohol use: Yes     Comment: Social     Drug use: No    Sexual activity: Not on file   Other Topics Concern    Not on file   Social History Narrative    Retired from real estate      Social Drivers of Health     Financial Resource Strain: Low Risk  (5/25/2021)    Received from [facility]    Overall Financial Resource Strain (CARDIA)     Difficulty of Paying Living Expenses: Not hard at all   Food Insecurity: No Food Insecurity (3/5/2025)    Received from [facility]    Hunger Vital Sign     Worried About Running Out of Food in the Last Year: Never true     Ran Out of Food in the Last Year: Never true   Transportation Needs: No Transportation Needs (3/5/2025)    Received from [facility]    PRAPARE - Transportation     Lack of Transportation (Medical): No     Lack of Transportation (Non-Medical): No   Physical Activity: Sufficiently Active (3/5/2025)    Received from [facility]    Exercise Vital Sign     Days of Exercise per Week: 5 days     Minutes of Exercise per Session: 140 min   Stress: No Stress Concern Present (5/25/2021)    Received from [facility]    [place] [employer] - Occupational Stress Questionnaire     Feeling of Stress : Not at all   Social Connections: Moderately Isolated (5/25/2021)    Received from [facility]    Social Connection and Isolation Panel [NHANES]     Frequency of Communication with Friends and Family: More than three  times a week     Frequency of Social Gatherings with Friends and Family: Once a week     Attends Spiritism Services: Never     Active Member of Clubs or Organizations: No     Attends Club or Organization Meetings: Never     Marital Status:    Intimate Partner Violence: Not on file   Housing Stability: Low Risk  (3/5/2025)    Received from [facility]    Housing Stability     What is your living situation today?: I have a steady place to live     Allergies   Allergen Reactions    Avelox [Moxifloxacin Hcl] Rash     Chest, face, genitals    Demerol Unspecified     Vasovagal reaction (drop in BP & pulse)    Levofloxacin     Azithromycin Itching    Cefdinir Rash     Rash chest, under arm and scalp      Ceftin [Cefuroxime Axetil] Rash     Chest, under arm & scalp    Oxycodone Itching     Full body itching    Scopolamine Unspecified     disorientation     Outpatient Encounter Medications as of 3/13/2025   Medication Sig Dispense Refill    VYNDAMAX 61 MG Cap TAKE 1 CAPSULE BY MOUTH DAILY 30 Capsule 5    atorvastatin (LIPITOR) 40 MG Tab Take 1 Tablet by mouth at bedtime. 90 Tablet 3    torsemide (DEMADEX) 5 MG Tab Take 1 Tablet by mouth every day. 90 Tablet 3    Azelastine (ASTELIN) 137 MCG/SPRAY Solution Administer 1 Spray into affected nostril(S) 2 times a day. 90 mL 3    [DISCONTINUED] celecoxib (CELEBREX) 200 MG Cap Take 1 Capsule by mouth 1 time a day as needed for Moderate Pain. 30 Capsule 0    Vutrisiran Sodium 25 MG/0.5ML Solution Prefilled Syringe Inject 25 mg under the skin.      PREVIDENT 5000 BOOSTER PLUS 1.1 % Paste USE AS DIRECTED      vitamin D3 (CHOLECALCIFEROL) 1000 Unit (25 mcg) Tab Take 2 Tablets by mouth every day. 100 Tablet 3    aspirin 81 MG EC tablet Take 81 mg by mouth.      vitamin A 3 mg (83598 units) capsule Take 10,000 Units by mouth every day.      Glucosamine-Chondroitin 750-600 MG Tab Take 1 Tab by mouth 2 Times a Day. 60 Tab 0    VITAMIN B COMPLEX-C PO Take  by mouth.      Multiple  "Vitamins-Minerals (CENTRUM SILVER PO) Take  by mouth.      Omega-3 Fatty Acids (SALMON OIL PO) Take 2 Caps by mouth every day.      coenzyme Q-10 30 MG capsule Take 30 mg by mouth every day. Twice a day       No facility-administered encounter medications on file as of 3/13/2025.     ROS           Objective     /70 (BP Location: Left arm, Patient Position: Sitting, BP Cuff Size: Adult)   Pulse 66   Resp 16   Ht 1.753 m (5' 9\")   Wt 71.7 kg (158 lb)   SpO2 97%   BMI 23.33 kg/m²     Physical Exam  Constitutional:       General: He is not in acute distress.     Appearance: He is not diaphoretic.   Eyes:      General: No scleral icterus.  Neck:      Vascular: No JVD.   Cardiovascular:      Rate and Rhythm: Normal rate.      Heart sounds: Normal heart sounds. No murmur heard.     No friction rub. No gallop.   Pulmonary:      Effort: No respiratory distress.      Breath sounds: No wheezing or rales.   Abdominal:      General: Bowel sounds are normal.      Palpations: Abdomen is soft.   Musculoskeletal:      Right lower leg: No edema.      Left lower leg: No edema.   Skin:     Findings: No rash.   Neurological:      Mental Status: He is alert. Mental status is at baseline.   Psychiatric:         Mood and Affect: Mood normal.          We reviewed in person the most recent labs  Recent Results (from the past 30 weeks)   PROSTATE SPECIFIC AG DIAGNOSTIC    Collection Time: 03/12/25  8:20 AM   Result Value Ref Range    Prostatic Specific Antigen Tot 0.65 0.00 - 4.00 ng/mL   Lipid Profile    Collection Time: 03/12/25  8:20 AM   Result Value Ref Range    Cholesterol,Tot 142 100 - 199 mg/dL    Triglycerides 44 0 - 149 mg/dL    HDL 91 >=40 mg/dL    LDL 42 <100 mg/dL   URINALYSIS,CULTURE IF INDICATED    Collection Time: 03/12/25  8:20 AM    Specimen: Urine   Result Value Ref Range    Color Yellow     Character Clear     Specific Gravity 1.019 <1.035    Ph 8.0 5.0 - 8.0    Glucose Negative Negative mg/dL    Ketones " Negative Negative mg/dL    Protein Negative Negative mg/dL    Bilirubin Negative Negative    Urobilinogen, Urine 1.0 <=1.0 EU/dL    Nitrite Negative Negative    Leukocyte Esterase Negative Negative    Occult Blood Negative Negative    Micro Urine Req see below    VITAMIN D,25 HYDROXY (DEFICIENCY)    Collection Time: 03/12/25  8:20 AM   Result Value Ref Range    25-Hydroxy   Vitamin D 25 39 30 - 100 ng/mL   MICROALBUMIN CREAT RATIO URINE    Collection Time: 03/12/25  8:20 AM   Result Value Ref Range    Creatinine, Urine 119.00 mg/dL    Microalbumin, Urine Random 2.7 mg/dL    Micro Alb Creat Ratio 23 0 - 30 mg/g           Assessment & Plan     1. Agatston CAC score, >400        2. Chronic diastolic congestive heart failure (HCC)        3. Paroxysmal supraventricular tachycardia (HCC)        4. Pure hypercholesterolemia        5. Cardiac amyloidosis (HCC)            Medical Decision Making: Today's Assessment/Status/Plan:        It was my pleasure to meet with Mr. Martinez.    Blood pressure is well controlled.  He will continue to monitor and eat hearty healthy diet.    We addressed the management of dyslipidemia at today's visit. He is on appropriate lipid lowering medication.    Would be happy to coordinate amyloidosis treatment    I will see Mr. Martinez back in 1 year time and encouraged him to follow up with us over the phone or electronically using my MyChart as issues arise.    It is my pleasure to participate in the care of Mr. Martinez.  Please do not hesitate to contact me with questions or concerns.    [facility] MD PhD FAC  Cardiologist [facility]    Please note that this dictation was created using voice recognition software. There may be errors I did not discover before finalizing the note.       () Today's E/M visit is associated with medical care services that serve as the continuing focal point for all needed health care services and/or with medical care  services that  are part of ongoing care related to a patient's single, serious condition, or a complex condition: This includes  furnishing services to patients on an ongoing basis that result in care that is personalized  to the patient. The services result in a comprehensive, longitudinal, and continuous  relationship with the patient and involve delivery of team-based care that is accessible, coordinated with other practitioners and providers, and integrated with the broader health  care landscape.

## 2025-03-13 NOTE — PATIENT INSTRUCTIONS
Work on at least 2.5 - 5 hours a week of moderate exercise (typical brisk walking or similar activity)    If you have had a heart attack, stent, bypass or reduced heart strength (EF <35%): cardiac rehab may reduce your risk of dying by 13-24% and need to go to the hospital by 30% within the first year (1)    Please look into the following diets and incorporate them into your diet    CONSIDER CHECKING A CALCIUM SCORE TO UNDERSTAND YOUR RISK MORE    [email or web address]/[email or web address]-stokn-uotk-[email or web address]    LOW SALT DIET   KEEP YOUR SODIUM EQUAL TO CALORIES AND NO MORE THAN DOUBLE THE CALORIES FOR A LOW SALT DIET    Cardiosmart.org - great resource for American College of Cardiology on heart disease prevention and treatment    FOR TREATMENT OR PREVENTION OF CORONARY ARTERY DISEASE  These three programs are approved by Medicare/Insurers for those with heart disease  [place] [facility]  Dr. Ragsdale's [facility] for Reversing Heart Disease - Carl Joya's Cardiologist vegetarian-based  [facility] - [place]-based mind-body Program    Mediterranean Diet has been shown to be a hearty healthy diet.    This is a commonly referenced Program  Dr Esparza - [place] over [place] (book and documentary) - vegetarian-based    FOR TREATMENT OF BLOOD PRESSURE  DASH DIET - American Heart Association for treatment of HYPERTENSION    FOR TREATMENT OF BAD CHOLESTEROL/FATS  REDUCE PROCESSED SUGAR AS MUCH AS POSSIBLE  INCREASE WHOLE GRAINS/VEGETABLES  INCREASE FIBER    Lowering total cholesterol and LDL (bad) cholesterol:  - Eat leaner cuts of meat, or eliminate altogether if possible red meat, and frequently substitute fish or chicken.  - Limit saturated fat to no more than 7-10% of total calories no more than 10 g per day is recommended. Some sources of saturated fat include butter, animal fats, hydrogenated vegetable fats and oils, many desserts, whole  milk dairy products.  - Replaced saturated fats with polyunsaturated fats and monounsaturated fats. Foods high in monounsaturated fat include nuts, canola oil, avocados, and olives.  - Limit trans fat (processed foods) and replaced with fresh fruits and vegetables  - Recommend nonfat dairy products  - Increase substantially the amount of soluble fiber intake (legumes such as beans, fruit, whole grains).  - Consider nutritional supplements: plant sterile spreads such as Benecol, fish oil,  flaxseed oil, omega-3 acids capsules 1000 mg twice a day, or viscous fiber such as Metamucil  - Attain ideal weight and regular exercise (at least 30 minutes per day of moderate exercise)  ASK ABOUT STATIN OR NON STATIN MEDICATION TO REDUCE YOUR LDL AND HEART RISK    Lowering triglycerides:  - Reduce intake of simple sugar: Desserts, candy, pastries, honey, sodas, sugared cereals, yogurt, Gatorade, sports bars, canned fruit, smoothies, fruit juice, coffee drinks  - Reduced intake of refined starches: Refined Pasta, most bread  - Reduce or abstain from alcohol  - Increase omega-3 fatty acids: [place], Trout, Mackerel, [place]ring, [place] tuna and supplements  - Attain ideal weight and regular exercise (at least 30 minutes per day of moderate exercise)  ASK ABOUT PURIFIED OMEGA 3 EPA or FISH OIL TO REDUCE YOUR TG AND HEART RISK    Elevating HDL (good) cholesterol:  - Increase physical activity  - Increase omega-3 fatty acids and supplements as listed above  - Incorporating appropriate amounts of monounsaturated fats such as nuts, olive oil, canola oil, avocados, olives  - Stop smoking  - Attain ideal weight and regular exercise (at least 30 minutes per day of moderate exercise)

## 2025-03-14 ENCOUNTER — OFFICE VISIT (OUTPATIENT)
Dept: INTERNAL MEDICINE | Facility: IMAGING CENTER | Age: 85
End: 2025-03-14
Payer: MEDICARE

## 2025-03-14 VITALS
HEART RATE: 68 BPM | SYSTOLIC BLOOD PRESSURE: 124 MMHG | BODY MASS INDEX: 23.04 KG/M2 | RESPIRATION RATE: 12 BRPM | WEIGHT: 156 LBS | DIASTOLIC BLOOD PRESSURE: 82 MMHG | OXYGEN SATURATION: 96 % | TEMPERATURE: 98.1 F

## 2025-03-14 DIAGNOSIS — N40.1 BENIGN PROSTATIC HYPERPLASIA WITH NOCTURIA: ICD-10-CM

## 2025-03-14 DIAGNOSIS — E85.4 CARDIAC AMYLOIDOSIS (HCC): ICD-10-CM

## 2025-03-14 DIAGNOSIS — R93.1 AGATSTON CAC SCORE, >400: ICD-10-CM

## 2025-03-14 DIAGNOSIS — R35.1 BENIGN PROSTATIC HYPERPLASIA WITH NOCTURIA: ICD-10-CM

## 2025-03-14 DIAGNOSIS — I43 CARDIAC AMYLOIDOSIS (HCC): ICD-10-CM

## 2025-03-14 DIAGNOSIS — E78.00 PURE HYPERCHOLESTEROLEMIA: ICD-10-CM

## 2025-03-14 DIAGNOSIS — N18.31 STAGE 3A CHRONIC KIDNEY DISEASE: ICD-10-CM

## 2025-03-14 DIAGNOSIS — N32.81 OVERACTIVE BLADDER: ICD-10-CM

## 2025-03-14 RX ORDER — VITAMIN B COMPLEX
1 CAPSULE ORAL DAILY
COMMUNITY

## 2025-03-14 ASSESSMENT — FIBROSIS 4 INDEX: FIB4 SCORE: 3.09

## 2025-03-14 NOTE — PROGRESS NOTES
History of Present Illness  The patient is an 84-year-old male who comes in for an annual health risk assessment, physical, and review of laboratory results. He considers himself in good health with no depression, balance issues, or cognitive concerns. He is a complex patient, and the following issues were discussed.        Patient comes in for follow-up on his chronic issues.  He generally has been doing well.  He recently had surgery on his low back.  This was for back pain and radicular symptoms on the right.  He reports that he was moving luggage in an airplane when he developed back pain and symptoms on the left.  He is scheduled to see a surgeon at [facility] next week.    He has a history of monoclonal B-cell lymphocytosis.  He is followed by [facility] in [place].  He has chronic thrombocytopenia but his other findings have been stable.  His LDH is mildly elevated.    Patient has history of wild-type amyloidosis with cardiac involvement.  He remains on Vyndamax.  He was seen cardiology yesterday.  His ejection fraction has improved.  His cardiac status has been stable.    Hyperlipidemia-stable on statin.    Coronary atherosclerosis-based on cardiac CT.  He remains free of chest pain, angina or equivalent symptoms.  He is followed by cardiology here as well as cardiology at [facility].    BPH-ongoing symptoms.  For 5 episodes of nocturia.  He failed Gemtesa and Myrbetriq.  PSA is stable at 0.65    Recent labs are stable.  Platelet count is 131,000.    Patient completed Cologuard in June 2024.        Annual Wellness Visit/Health Risk Assessment:    Past medical:  Past Medical History:   Diagnosis Date    Amyloidosis (HCC)     TTR, type pending.     BPH (benign prostatic hyperplasia)     s/p laser encleation of the prostate    Cardiac amyloidosis (HCC)     Chickenpox     Colon polyps     Coronary atherosclerosis of native coronary artery 05/15/2013    Coronary calcium score in the 3000s, negative PET  scan and no symptoms so treated medically.     Coronary heart disease     Dyslipidemia 2016    H/O urethral stricture     s/p surgical repair 1/15/2019, [place]    Kidney cysts     Meniscus tear     Pancreas cyst     Scarlet fever     Sinusitis, chronic     Spinal stenosis, lumbar     SVT (supraventricular tachycardia) ([facility]) 10/23/2018    Thrombocytopenia ([facility]) 2017       Past surgical:  Past Surgical History:   Procedure Laterality Date    KNEE RECONSTRUCTION      left, partial. multiple previous surgeries.     ROTATOR CUFF REPAIR      c/b right biceps tendon rupture, presumably repaired.     ANKLE FUSION Left     ARTHROSCOPY, KNEE      HEMORRHOIDECTOMY      SINUSCOPE      SINUSOTOMIES      Dr. Guerrier, total of 4 surgeries    TRIGGER FINGER RELEASE      x 2       Family history: relating to possible risk factors for your patient  Family History   Problem Relation Age of Onset    Heart Attack Father 38        doctor in 1946 diagnosed him with MI at home,  before going to the hospital    Psychiatric Illness Mother         Alzheimers    Hypertension Brother     Stroke Brother     Seizures Brother     Sleep Apnea Neg Hx        Current Providers (including home care/DME’s):   No Patient Care Coordination Note on file.      Patient Care Team:  Azar Cavazos M.D. as PCP - General (Internal Medicine)  Sanchez Fonseca M.D. (Urology)  [facility]  as Respiratory Therapist  Jaime Raman M.D. as Cardiologist (Cardiovascular Disease (Cardiology))  Marleny Esteves R.N.      Medications:   Current Outpatient Medications Ordered in Epic   Medication Sig Dispense Refill    B Complex Cap Take 1 Capsule by mouth every day.      celecoxib (CELEBREX) 200 MG Cap Take 1 Capsule by mouth 1 time a day as needed for Moderate Pain. 30 Capsule 0    VYNDAMAX 61 MG Cap TAKE 1 CAPSULE BY MOUTH DAILY 30 Capsule 5    atorvastatin (LIPITOR) 40 MG Tab Take 1 Tablet by mouth at bedtime. 90 Tablet 3    torsemide  (DEMADEX) 5 MG Tab Take 1 Tablet by mouth every day. 90 Tablet 3    Azelastine (ASTELIN) 137 MCG/SPRAY Solution Administer 1 Spray into affected nostril(S) 2 times a day. 90 mL 3    Vutrisiran Sodium 25 MG/0.5ML Solution Prefilled Syringe Inject 25 mg under the skin.      PREVIDENT 5000 BOOSTER PLUS 1.1 % Paste USE AS DIRECTED      vitamin D3 (CHOLECALCIFEROL) 1000 Unit (25 mcg) Tab Take 2 Tablets by mouth every day. 100 Tablet 3    aspirin 81 MG EC tablet Take 81 mg by mouth.      vitamin A 3 mg (47777 units) capsule Take 10,000 Units by mouth every day.      Glucosamine-Chondroitin 750-600 MG Tab Take 1 Tab by mouth 2 Times a Day. 60 Tab 0    Omega-3 Fatty Acids (SALMON OIL PO) Take 2 Caps by mouth every day.      coenzyme Q-10 30 MG capsule Take 30 mg by mouth every day. Twice a day       No current Epic-ordered facility-administered medications on file.       Supplements (calcium/vitamins): if not lisited in medications    No chief complaint on file.        HPI:  Andre Martinez is a 84 y.o. here for Medicare Annual Wellness Visit     Patient Active Problem List    Diagnosis Date Noted    De Quervain's disease (tenosynovitis) 03/25/2025    OAB (overactive bladder) 06/26/2024    Paroxysmal supraventricular tachycardia (HCC) 08/15/2023    Sacroiliac joint pain 09/01/2021    Chronic diastolic congestive heart failure (HCC) 06/04/2020    Cardiac amyloidosis (HCC) 05/17/2019    Agatston CAC score, >400 12/06/2018    Enlarged prostate with lower urinary tract symptoms (LUTS) 11/12/2018    Calcific Achilles tendinitis 11/05/2018    Thrombocytopenia (HCC) 07/24/2017    Pure hypercholesterolemia 08/23/2016    Spinal stenosis, lumbar     Colon polyps     Kidney cysts     Pancreas cyst        Current Outpatient Medications   Medication Sig Dispense Refill    B Complex Cap Take 1 Capsule by mouth every day.      celecoxib (CELEBREX) 200 MG Cap Take 1 Capsule by mouth 1 time a day as needed for Moderate Pain. 30 Capsule 0     VYNDAMAX 61 MG Cap TAKE 1 CAPSULE BY MOUTH DAILY 30 Capsule 5    atorvastatin (LIPITOR) 40 MG Tab Take 1 Tablet by mouth at bedtime. 90 Tablet 3    torsemide (DEMADEX) 5 MG Tab Take 1 Tablet by mouth every day. 90 Tablet 3    Azelastine (ASTELIN) 137 MCG/SPRAY Solution Administer 1 Spray into affected nostril(S) 2 times a day. 90 mL 3    Vutrisiran Sodium 25 MG/0.5ML Solution Prefilled Syringe Inject 25 mg under the skin.      PREVIDENT 5000 BOOSTER PLUS 1.1 % Paste USE AS DIRECTED      vitamin D3 (CHOLECALCIFEROL) 1000 Unit (25 mcg) Tab Take 2 Tablets by mouth every day. 100 Tablet 3    aspirin 81 MG EC tablet Take 81 mg by mouth.      vitamin A 3 mg (64296 units) capsule Take 10,000 Units by mouth every day.      Glucosamine-Chondroitin 750-600 MG Tab Take 1 Tab by mouth 2 Times a Day. 60 Tab 0    Omega-3 Fatty Acids (SALMON OIL PO) Take 2 Caps by mouth every day.      coenzyme Q-10 30 MG capsule Take 30 mg by mouth every day. Twice a day       No current facility-administered medications for this visit.            Current supplements as per medication list.       Allergies: Avelox [moxifloxacin hcl], Demerol, Levofloxacin, Azithromycin, Cefdinir, Ceftin [cefuroxime axetil], Oxycodone, and Scopolamine    Current social contact/activities:  Social with friends and family.     He  reports that he has never smoked. He has never used smokeless tobacco. He reports current alcohol use. He reports that he does not use drugs.  Counseling given: Not Answered        DPA/Advanced Directive:  Completed       ROS:    Gait: Uses :None  Ostomy: No  Other tubes: no   Amputations: no   Chronic oxygen use: no   Last eye exam: < 1 year   : denies and incontinence.       Screening:  No Patient Care Coordination Note on file.      Depression Screening  Little interest or pleasure in doing things?     Feeling down, depressed , or hopeless?    Trouble falling or staying asleep, or sleeping too much?     Feeling tired or having  little energy?     Poor appetite or overeating?     Feeling bad about yourself - or that you are a failure or have let yourself or your family down?    Trouble concentrating on things, such as reading the newspaper or watching television?    Moving or speaking so slowly that other people could have noticed.  Or the opposite - being so fidgety or restless that you have been moving around a lot more than usual?     Thoughts that you would be better off dead, or of hurting yourself?     Patient Health Questionnaire Score:      If depressive symptoms identified deferred to follow up visit unless specifically addressed in assessment and plan.    Interpretation of PHQ-9 Total Score   Score Severity   1-4 No Depression   5-9 Mild Depression   10-14 Moderate Depression   15-19 Moderately Severe Depression   20-27 Severe Depression    Screening for Cognitive Impairment  Do you or any of your friends or family members have any concern about your memory?    Three Minute Recall (Village, Kitchen, Baby)  /3    [place] clock face with all 12 numbers and set the hands to show 10 minutes past 11.       Cognitive concerns identified deferred for follow up unless specifically addressed in assessment and plan.    Fall Risk Assessment  Has the patient had two or more falls in the last year or any fall with injury in the last year?       Safety Assessment  Do you always wear your seatbelt?     Any changes to home needed to function safely?    Difficulty hearing.     Patient counseled about all safety risks that were identified.    Functional Assessment ADLs  Are there any barriers preventing you from cooking for yourself or meeting nutritional needs?   .    Are there any barriers preventing you from driving safely or obtaining transportation?   .    Are there any barriers preventing you from using a telephone or calling for help?       Are there any barriers preventing you from shopping?   .    Are there any barriers preventing you from  taking care of your own finances?       Are there any barriers preventing you from managing your medications?       Are there any barriers preventing you from showering, bathing or dressing yourself?      Are there any barriers preventing you from doing housework or laundry?      Are there any barriers preventing you from using the toilet?     Are you currently engaging in any exercise or physical activity?   .      Self-Assessment of Health  What is your perception of your health?      Do you sleep more than six hours a night?      In the past 7 days, how much did pain keep you from doing your normal work?      Do you spend quality time with family or friends (virtually or in person)?      Do you usually eat a heart healthy diet that constists of a variety of fruits, vegetables, whole grains and fiber?      Do you eat foods high in fat and/or Fast Food more than three times per week?      How concerned are you that your medical conditions are not being well managed?      Are you worried that in the next 2 months, you may not have stable housing that you own, rent, or stay in as part of a household?          Advance Care Planning  Do you have an Advance Directive, Living Will, Durable Power of , or POLST?                   Health Maintenance Summary            Upcoming       Annual Wellness Visit (Yearly) Next due on 6/26/2025 06/26/2024  Visit Dx: Medicare annual wellness visit, subsequent    05/08/2023  Visit Dx: Medicare annual wellness visit, subsequent    03/31/2022  Visit Dx: Medicare annual wellness visit, initial    12/11/2020  Visit Dx: Medicare annual wellness visit, subsequent    11/14/2019  Subsequent Annual Wellness Visit - Includes PPPS ()     Only the first 5 history entries have been loaded, but more history exists.            IMM DTaP/Tdap/Td Vaccine (3 - Td or Tdap) Next due on 3/18/2031      03/18/2021  Imm Admin: Tdap Vaccine    08/06/2014  Imm Admin: Tdap Vaccine    06/02/2010   Imm Admin: TD Vaccine    12/10/1999  Imm Admin: TD Vaccine                      Completed or No Longer Recommended       Polio Vaccine (Inactivated Polio) (Series Information) Completed      03/02/2016  Imm Admin: IPV    12/10/1999  Imm Admin: OPV TRIVALENT - HISTORICAL DATA (GIVEN PRIOR TO MAY 2016)    07/26/1996  Imm Admin: OPV TRIVALENT - HISTORICAL DATA (GIVEN PRIOR TO MAY 2016)              Influenza Vaccine (Series Information) Completed      10/07/2024  Imm Admin: Influenza Vaccine Adult HD    09/20/2023  Imm Admin: Influenza Vaccine Adult HD    10/17/2022  Imm Admin: Influenza Vaccine Adult HD    10/14/2021  Imm Admin: Influenza Vaccine Adult HD    11/02/2020  Imm Admin: Influenza Vaccine Adult HD      Only the first 5 history entries have been loaded, but more history exists.              Zoster (Shingles) Vaccines (Series Information) Completed      07/31/2018  Imm Admin: Zoster Vaccine Recombinant (RZV) (SHINGRIX)    04/09/2018  Imm Admin: Zoster Vaccine Recombinant (RZV) (SHINGRIX)    11/14/2006  Imm Admin: Zoster Vaccine Live (ZVL) (Zostavax) - HISTORICAL DATA    11/14/2005  Imm Admin: Zoster Vaccine Live (ZVL) (Zostavax) - HISTORICAL DATA              COVID-19 Vaccine (Series Information) Completed      10/07/2024  Imm Admin: COVID-19 Vaccine, unspecified - HISTORICAL DATA    03/21/2024  Imm Admin: Covid-19 Mrna (Spikevax) Moderna 12+ Years    09/20/2023  Imm Admin: Comirnaty (Covid-19 Vaccine, Mrna, 3707-0999 Formula)    09/08/2022  Imm Admin: PFIZER BIVALENT SARS-COV-2 VACCINE (12+)    04/25/2022  Imm Admin: PFIZER PURPLE CAP SARS-COV-2 VACCINATION (12+)      Only the first 5 history entries have been loaded, but more history exists.              Pneumococcal Vaccine: 50+ Years (Series Information) Completed      05/08/2023  Imm Admin: Pneumococcal Conjugate Vaccine (PCV20)    02/04/2015  Imm Admin: Pneumococcal Conjugate Vaccine (Prevnar/PCV-13)    01/17/2013  Imm Admin: Pneumococcal polysaccharide  vaccine (PPSV-23)    07/25/2006  Imm Admin: Pneumococcal polysaccharide vaccine (PPSV-23)              Hepatitis A Vaccine (Hep A) (Series Information) Aged Out      02/28/1997  Imm Admin: Hepatitis A Vaccine, Adult    02/28/1997  Imm Admin: Hepatitis A Vaccine, Ped/Adol    07/26/1996  Imm Admin: Hepatitis A Vaccine, Adult    07/26/1996  Imm Admin: Hepatitis A Vaccine, Ped/Adol              HPV Vaccines (Series Information) Aged Out      No completion history exists for this topic.              Meningococcal Immunization (Series Information) Aged Out      08/06/2014  Imm Admin: Meningococcal Polysaccharide Vaccine MPSV4 - HISTORICAL DATA    08/06/2014  Imm Admin: Meningococcal Conjugate Vaccine MCV4 (Menactra)    09/23/2004  Imm Admin: Meningococcal Polysaccharide Vaccine MPSV4 - HISTORICAL DATA    12/10/1999  Imm Admin: Meningococcal Polysaccharide Vaccine MPSV4 - HISTORICAL DATA              Colorectal Cancer Screening  Discontinued        Frequency changed to Never automatically (Topic No Longer Applies)    06/10/2024  COLOGUARD RESULT component of COLOGUARD COLON CANCER SCREENING    02/01/2022  Colonoscopy (Done)    03/21/2017  REFERRAL TO GI FOR COLONOSCOPY    03/21/2017  Colonoscopy (Done)     Only the first 5 history entries have been loaded, but more history exists.            Hepatitis B Vaccine (Hep B)  Discontinued      07/18/2000  Imm Admin: Hepatitis B Vaccine (Adol/Adult)    07/18/2000  Imm Admin: Hepatitis B Vaccine Adolescent/Pediatric    01/13/2000  Imm Admin: Hepatitis B Vaccine (Adol/Adult)    01/13/2000  Imm Admin: Hepatitis B Vaccine Adolescent/Pediatric    12/16/1999  Imm Admin: Hepatitis B Vaccine Adolescent/Pediatric      Only the first 5 history entries have been loaded, but more history exists.                            Patient Care Team:  Azar Cavazos M.D. as PCP - General (Internal Medicine)  Sanchez Fonseca M.D. (Urology)  [facility]  as Respiratory Therapist  Jaime LAMB  OCTAVIA Raman. as Cardiologist (Cardiovascular Disease (Cardiology))  Marleny Esteves R.N.      Social History     Tobacco Use    Smoking status: Never    Smokeless tobacco: Never   Vaping Use    Vaping status: Never Used   Substance Use Topics    Alcohol use: Yes     Comment: Social     Drug use: No     Family History   Problem Relation Age of Onset    Heart Attack Father 38        doctor in 1946 diagnosed him with MI at home,  before going to the hospital    Psychiatric Illness Mother         Alzheimers    Hypertension Brother     Stroke Brother     Seizures Brother     Sleep Apnea Neg Hx      He  has a past medical history of Amyloidosis (HCC), BPH (benign prostatic hyperplasia), Cardiac amyloidosis (HCC), Chickenpox, Colon polyps, Coronary atherosclerosis of native coronary artery (05/15/2013), Coronary heart disease, Dyslipidemia (2016), H/O urethral stricture, Kidney cysts, Meniscus tear, Pancreas cyst, Scarlet fever, Sinusitis, chronic, Spinal stenosis, lumbar, SVT (supraventricular tachycardia) (HCC) (10/23/2018), and Thrombocytopenia (HCC) (2017).   Past Surgical History:   Procedure Laterality Date    KNEE RECONSTRUCTION      left, partial. multiple previous surgeries.     ROTATOR CUFF REPAIR      c/b right biceps tendon rupture, presumably repaired.     ANKLE FUSION Left     ARTHROSCOPY, KNEE      HEMORRHOIDECTOMY      SINUSCOPE      SINUSOTOMIES      Dr. Guerrier, total of 4 surgeries    TRIGGER FINGER RELEASE      x 2       Exam:     /82 (BP Location: Left arm, Patient Position: Sitting, BP Cuff Size: Adult)   Pulse 68   Temp 36.7 °C (98.1 °F) (Temporal)   Resp 12   Wt 70.8 kg (156 lb)   SpO2 96%  Body mass index is 23.04 kg/m².    Hearing good.    Dentition good  Alert, oriented in no acute distress.  Eye contact is good, speech goal directed, affect calm  General: Well-appearing. Well-developed. No signs of distress.  HEENT: Grossly normal. Oral cavity is pink and  moist.  Neck: Supple without JVD or bruit.  Pulmonary: Clear with good breath sounds. Normal effort.  Cardiovascular: Regular. Carotid and radial pulses are intact.  Abdomen: Soft, nontender, nondistended. Spleen and liver are not enlarged.  Neurologic: Cranial nerves II through XII are grossly normal, alert and oriented x3      Assessment and Plan. The following treatment and monitoring plan is recommended:    1. Cardiac amyloidosis (HCC)        2. Agatston CAC score, >400        3. Pure hypercholesterolemia        4. Benign prostatic hyperplasia with nocturia  Referral to Urology      5. Stage 3a chronic kidney disease        6. Overactive bladder  Referral to Urology          84-year-old male who remains in good health.  Chronic issues are stable on current medication.  Refer to urology due to ongoing BPH symptoms and previous diagnosis of overactive bladder.  He previously failed Myrbetriq and Gemtesa.  Discussed elevated glucose however he was not fasting at the time of the test.  Follow-up with subspecialty including cardiology and hematology/oncology.    Services suggested: No services required at this time  Health Care Screening: Age-appropriate preventive services Medicare covers discussed today and ordered if indicated.  Referrals offered: Community-based lifestyle interventions to reduce health risks and promote self-management and wellness, fall prevention, nutrition, physical activity, tobacco-use cessation, weight loss, and mental health services as per orders if indicated.    Discussion today about general wellness and lifestyle habits:    Prevent falls and reduce trip hazards; Cautioned about securing or removing rugs.  Have a working fire alarm and carbon monoxide detector;   Engage in regular physical activity and social activities       Follow-up: 1 year for HRA

## 2025-03-25 PROBLEM — M65.4 DE QUERVAIN'S DISEASE (TENOSYNOVITIS): Status: ACTIVE | Noted: 2025-03-25

## 2025-03-31 NOTE — Clinical Note
REFERRAL APPROVAL NOTICE         Sent on March 31, 2025                   Andre Martinez  [address]  [place] [address]                   Dear Mr. Martinez,    After a careful review of the medical information and benefit coverage, [facility] has processed your referral. See below for additional details.    If applicable, you must be actively enrolled with your insurance for coverage of the authorized service. If you have any questions regarding your coverage, please contact your insurance directly.    REFERRAL INFORMATION   Referral #:  [id number]  Referred-To Department    Referred-By Provider:  Urology    Azar Cavazos M.D.   [facility]      [address]  [place] [address]  [phone] [address]  [place] [id number] [phone]    Referral Start Date:  03/26/2025  Referral End Date:   03/26/2026             SCHEDULING  If you do not already have an appointment, please call [phone] to make an appointment.     MORE INFORMATION  If you do not already have a [facility] account, sign up at: [email or web address]  You can access your medical information, make appointments, see lab results, billing information, and more.  If you have questions regarding this referral, please contact  the [facility] Referrals department at:             [phone]. Monday - Friday 8:00AM - 5:00PM.     Sincerely,    [facility]

## 2025-04-12 DIAGNOSIS — I47.10 SVT (SUPRAVENTRICULAR TACHYCARDIA) (HCC): ICD-10-CM

## 2025-04-12 DIAGNOSIS — E78.5 DYSLIPIDEMIA: ICD-10-CM

## 2025-04-15 RX ORDER — TORSEMIDE 5 MG/1
5 TABLET ORAL
Qty: 90 TABLET | Refills: 3 | Status: SHIPPED | OUTPATIENT
Start: 2025-04-15

## 2025-04-15 RX ORDER — TORSEMIDE 5 MG/1
5 TABLET ORAL
Qty: 90 TABLET | Refills: 3 | OUTPATIENT
Start: 2025-04-15

## 2025-04-21 DIAGNOSIS — M25.50 ARTHRALGIA, UNSPECIFIED JOINT: ICD-10-CM

## 2025-04-21 RX ORDER — TAFAMIDIS 61 MG/1
1 CAPSULE, LIQUID FILLED ORAL DAILY
Qty: 30 CAPSULE | Refills: 5 | Status: SHIPPED | OUTPATIENT
Start: 2025-04-21

## 2025-04-21 RX ORDER — CELECOXIB 200 MG/1
200 CAPSULE ORAL
Qty: 30 CAPSULE | Refills: 0 | Status: SHIPPED | OUTPATIENT
Start: 2025-04-21

## 2025-05-12 ENCOUNTER — OFFICE VISIT (OUTPATIENT)
Dept: UROLOGY | Facility: MEDICAL CENTER | Age: 85
End: 2025-05-12
Payer: MEDICARE

## 2025-05-12 DIAGNOSIS — N32.81 OAB (OVERACTIVE BLADDER): ICD-10-CM

## 2025-05-12 DIAGNOSIS — R35.1 NOCTURIA: ICD-10-CM

## 2025-05-12 PROCEDURE — 99203 OFFICE O/P NEW LOW 30 MIN: CPT | Performed by: UROLOGY

## 2025-05-12 NOTE — PROGRESS NOTES
Chief Complaint: urinary frequency, nocturia    HPI: Andre Martinez is a pleasant 84 y.o. male with a history of amyloidosis, mild CHF treated with torsemide, thrombocytopenia, B-cell lymphocytosis, and BPH treated at [facility] with HoLEP of the median lobe in November 2018. Prior to that procedure he had obstructive urinary symptoms, and urodynamic studies consistent with this performed at [facility]. Following the HoLEP he developed a urethral stricture, and this has been dilated twice.     He is here today for an opinion on urinary frequency, urgency, and nocturia. He had a cystoscopy about a year ago that demonstrated no recurrence of urethral stricture, and an open prostatic fossa. He has no obstructive symptoms, and says he voids with a normal flow without straining. However, he has urinary frequency about every 45 minutes during the day, and wakes up with an urge to void about 5 times per night.     He had a trial of vibegron last year under the care of Dr. Valerio, and felt significant improvement in his symptoms. However, he stopped taking the medication after a couple nocturnal episodes of inability to urinate when he had an urge. He never had bladder pain, and the urge resolved after he went back to bed. No episodes of acute urinary retention.      Symptoms:  Frequency: yes  Urgency: yes  Nocturia: yes  Stress incontinence: no  Urge incontinence: no  Dysuria: no  Gross hematuria: no  Weakened stream: no  Strain to empty: no      Past Medical History:  Past Medical History:   Diagnosis Date    Amyloidosis (HCC)     TTR, type pending.     BPH (benign prostatic hyperplasia)     s/p laser encleation of the prostate    Cardiac amyloidosis (HCC)     Chickenpox     Colon polyps     Coronary atherosclerosis of native coronary artery 05/15/2013    Coronary calcium score in the 3000s, negative PET scan and no symptoms so treated medically.     Coronary heart disease     Dyslipidemia 08/23/2016    H/O  urethral stricture     s/p surgical repair 1/15/2019, [place]    Kidney cysts     Meniscus tear     Pancreas cyst     Scarlet fever     Sinusitis, chronic     Spinal stenosis, lumbar     SVT (supraventricular tachycardia) ([facility]) 10/23/2018    Thrombocytopenia ([facility]ly [facility]) 2017       Past Surgical History:  Past Surgical History:   Procedure Laterality Date    KNEE RECONSTRUCTION  2012    left, partial. multiple previous surgeries.     ROTATOR CUFF REPAIR      c/b right biceps tendon rupture, presumably repaired.     ANKLE FUSION Left     ARTHROSCOPY, KNEE      HEMORRHOIDECTOMY      SINUSCOPE      SINUSOTOMIES      Dr. Guerrier, total of 4 surgeries    TRIGGER FINGER RELEASE      x 2       Family History:  Family History   Problem Relation Age of Onset    Heart Attack Father 38        doctor in 1946 diagnosed him with MI at home,  before going to the hospital    Psychiatric Illness Mother         Alzheimers    Hypertension Brother     Stroke Brother     Seizures Brother     Sleep Apnea Neg Hx        Social History:  Social History     Socioeconomic History    Marital status:      Spouse name: Not on file    Number of children: Not on file    Years of education: Not on file    Highest education level: Not on file   Occupational History    Not on file   Tobacco Use    Smoking status: Never    Smokeless tobacco: Never   Vaping Use    Vaping status: Never Used   Substance and Sexual Activity    Alcohol use: Yes     Comment: Social     Drug use: No    Sexual activity: Not on file   Other Topics Concern    Not on file   Social History Narrative    Retired from real estate      Social Drivers of Health     Financial Resource Strain: Low Risk  (2021)    [facility]    Overall Financial Resource Strain (CARDIA)     Difficulty of Paying Living Expenses: Not hard at all   Food Insecurity: No Food Insecurity (3/5/2025)    Received from [facility]    Hunger Vital Sign     Worried About  Running Out of Food in the Last Year: Never true     Ran Out of Food in the Last Year: Never true   Transportation Needs: No Transportation Needs (3/5/2025)    Received from [facility]    PRAPARE - Transportation     Lack of Transportation (Medical): No     Lack of Transportation (Non-Medical): No   Physical Activity: Sufficiently Active (3/5/2025)    Received from [facility]    Exercise Vital Sign     Days of Exercise per Week: 5 days     Minutes of Exercise per Session: 140 min   Stress: No Stress Concern Present (5/25/2021)    [facility]    Citizen of [place] of Occupational Health - Occupational Stress Questionnaire     Feeling of Stress : Not at all   Social Connections: Moderately Isolated (5/25/2021)    [facility]    Social Connection and Isolation Panel [NHANES]     Frequency of Communication with Friends and Family: More than three times a week     Frequency of Social Gatherings with Friends and Family: Once a week     Attends Baptism Services: Never     Active Member of Clubs or Organizations: No     Attends Club or Organization Meetings: Never     Marital Status:    Intimate Partner Violence: Not on file   Housing Stability: Low Risk  (3/5/2025)    Received from [facility]    Housing Stability     What is your living situation today?: I have a steady place to live       Medications:  Current Outpatient Medications   Medication Sig Dispense Refill    vibegron (GEMTESA) 75 MG tablet Take 1 Tablet by mouth every day. 90 Tablet 3    VYNDAMAX 61 MG Cap TAKE 1 CAPSULE BY MOUTH DAILY 30 Capsule 5    celecoxib (CELEBREX) 200 MG Cap TAKE 1 CAPSULE BY MOUTH 1 TIME A DAY AS NEEDED FOR MODERATE PAIN 30 Capsule 0    torsemide (DEMADEX) 5 MG Tab TAKE 1 TABLET BY MOUTH EVERY DAY 90 Tablet 3    B Complex Cap Take 1 Capsule by mouth every day.      atorvastatin (LIPITOR) 40 MG Tab Take 1 Tablet by mouth at bedtime. 90 Tablet 3    Azelastine (ASTELIN) 137  MCG/SPRAY Solution Administer 1 Spray into affected nostril(S) 2 times a day. 90 mL 3    Vutrisiran Sodium 25 MG/0.5ML Solution Prefilled Syringe Inject 25 mg under the skin.      PREVIDENT 5000 BOOSTER PLUS 1.1 % Paste USE AS DIRECTED      vitamin D3 (CHOLECALCIFEROL) 1000 Unit (25 mcg) Tab Take 2 Tablets by mouth every day. 100 Tablet 3    aspirin 81 MG EC tablet Take 81 mg by mouth.      vitamin A 3 mg (69025 units) capsule Take 10,000 Units by mouth every day.      Glucosamine-Chondroitin 750-600 MG Tab Take 1 Tab by mouth 2 Times a Day. 60 Tab 0    Omega-3 Fatty Acids (SALMON OIL PO) Take 2 Caps by mouth every day.      coenzyme Q-10 30 MG capsule Take 30 mg by mouth every day. Twice a day       No current facility-administered medications for this visit.       Allergies:  Allergies   Allergen Reactions    Avelox [Moxifloxacin Hcl] Rash     Chest, face, genitals    Demerol Unspecified     Vasovagal reaction (drop in BP & pulse)    Levofloxacin     Azithromycin Itching    Cefdinir Rash     Rash chest, under arm and scalp      Ceftin [Cefuroxime Axetil] Rash     Chest, under arm & scalp    Oxycodone Itching     Full body itching    Scopolamine Unspecified     disorientation       Review of Systems:  Constitutional: Negative for fever, chills and malaise/fatigue.   HENT: Negative for congestion.    Eyes: Negative for pain.   Respiratory: Negative for cough and shortness of breath.    Cardiovascular: Negative for leg swelling.   Gastrointestinal: Negative for nausea, vomiting, abdominal pain and diarrhea.   Genitourinary: Negative for dysuria and hematuria.   Skin: Negative for rash.   Neurological: Negative for dizziness, focal weakness and headaches.   Endo/Heme/Allergies: Does not bruise/bleed easily.   Psychiatric/Behavioral: Negative for depression.  The patient is not nervous/anxious.        Physical Exam:  There were no vitals filed for this visit.    GENERAL: well appearing, well nourished, NAD  RESP:  respiratory effort normal  SKIN/LYMPH: normal coloration and turgor, no suspicious skin lesions noted  NEURO/PSYCH: alert, oriented, normal speech, no focal findings or movement disorder noted  EXTREMITIES: no pedal edema noted      Data Review:    Labs:  POCT UA   Lab Results   Component Value Date/Time    POCCOLOR yellow 06/25/2018 02:18 PM    POCAPPEAR clear 06/25/2018 02:18 PM    POCLEUKEST negative 06/25/2018 02:18 PM    POCNITRITE negative 06/25/2018 02:18 PM    POCUROBILIGE 0.2 06/25/2018 02:18 PM    POCPROTEIN negative 06/25/2018 02:18 PM    POCURPH 6.5 06/25/2018 02:18 PM    POCBLOOD negative 06/25/2018 02:18 PM    POCSPGRV 1.015 06/25/2018 02:18 PM    POCKETONES negative 06/25/2018 02:18 PM    POCBILIRUBIN negative 06/25/2018 02:18 PM    POCGLUCUA negative 06/25/2018 02:18 PM      CBC   Lab Results   Component Value Date/Time    WBC 10.1 07/24/2024 0815    RBC 4.58 (L) 07/24/2024 0815    HEMOGLOBIN 15.4 07/24/2024 0815    HEMATOCRIT 46.7 07/24/2024 0815    .0 (H) 07/24/2024 0815    MCH 33.6 (H) 07/24/2024 0815    MCHC 33.0 07/24/2024 0815    RDW 53.1 (H) 07/24/2024 0815    MPV 11.8 07/24/2024 0815    LYMPHOCYTES 54.20 (H) 07/24/2024 0815    LYMPHS 5.47 (H) 07/24/2024 0815    MONOCYTES 8.50 07/24/2024 0815    MONOS 0.86 (H) 07/24/2024 0815    EOSINOPHILS 0.90 07/24/2024 0815    EOS 0.09 07/24/2024 0815    BASOPHILS 0.80 07/24/2024 0815    BASO 0.08 07/24/2024 0815    NRBC 0.00 07/24/2024 0815       CMP   Lab Results   Component Value Date/Time    SODIUM 139 07/24/2024 0815    POTASSIUM 4.6 07/24/2024 0815    CHLORIDE 103 07/24/2024 0815    CO2 29 07/24/2024 0815    ANION 7.0 07/24/2024 0815    GLUCOSE 87 07/24/2024 0815    BUN 19 07/24/2024 0815    CREATININE 0.93 07/24/2024 0815    GFRCKD 81 07/24/2024 0815    CALCIUM 9.9 07/24/2024 0815    CORRCALC 9.8 07/24/2024 0815    ASTSGOT 38 07/24/2024 0815    ALTSGPT 45 07/24/2024 0815    ALKPHOSPHAT 130 (H) 07/24/2024 0815    TBILIRUBIN 1.0 07/24/2024 0815  "   ALBUMIN 4.1 07/24/2024 0815    TOTPROTEIN 6.4 07/24/2024 0815    GLOBULIN 2.3 07/24/2024 0815    AGRATIO 1.8 07/24/2024 0815     INFERTILITY No results found for: \"FSH\", \"LH\", \"PROLACT\", \"ESTRADL\", \"TESTOSTERONE\", \"FREETESTOST\", \"TESTLCMS\", \"SEXHORM\"  PSA   Lab Results   Component Value Date/Time    PSATOTAL 0.65 03/12/2025 0820         Assessment: 84 y.o. male with a history of bothersome symptoms of urinary frequency, urgency, and nocturia in the setting of prior BPH s/p HoLEP and urethral stricture treated with dilation x 2.     He had fairly recent cystoscopy (2024) demonstrating no recurrence of urethral stricture or obstructive prostatic hypertrophy, and has no obstructive symptoms, so at this time I do not recommend repeat cystoscopy.     We discussed first, second, and third line treatment options for overactive bladder in detail today. He already has few if any bladder irritants in his diet. Prior medical therapy with a beta-3 agonist had a significant response, and so we'll try that once again while ensuring he empties his bladder adequately well.     Plan:    -Resume vibegron 75 mg PO daily  -Follow up in about 2 months for repeat assessment of symptoms and PVR      Evan Rockwell MD  "

## 2025-05-20 DIAGNOSIS — M25.50 ARTHRALGIA, UNSPECIFIED JOINT: ICD-10-CM

## 2025-05-20 RX ORDER — CELECOXIB 200 MG/1
200 CAPSULE ORAL
Qty: 30 CAPSULE | Refills: 0 | Status: SHIPPED | OUTPATIENT
Start: 2025-05-20

## 2025-05-22 ENCOUNTER — NON-PROVIDER VISIT (OUTPATIENT)
Dept: INTERNAL MEDICINE | Facility: IMAGING CENTER | Age: 85
End: 2025-05-22
Payer: MEDICARE

## 2025-05-22 ENCOUNTER — RESULTS FOLLOW-UP (OUTPATIENT)
Dept: INTERNAL MEDICINE | Facility: IMAGING CENTER | Age: 85
End: 2025-05-22

## 2025-05-22 ENCOUNTER — HOSPITAL ENCOUNTER (OUTPATIENT)
Facility: MEDICAL CENTER | Age: 85
End: 2025-05-22
Attending: INTERNAL MEDICINE
Payer: MEDICARE

## 2025-05-22 DIAGNOSIS — N18.31 STAGE 3A CHRONIC KIDNEY DISEASE: Primary | ICD-10-CM

## 2025-05-22 DIAGNOSIS — N18.31 STAGE 3A CHRONIC KIDNEY DISEASE: ICD-10-CM

## 2025-05-22 LAB
ANION GAP SERPL CALC-SCNC: 8 MMOL/L (ref 7–16)
BUN SERPL-MCNC: 19 MG/DL (ref 8–22)
CALCIUM SERPL-MCNC: 9.5 MG/DL (ref 8.5–10.5)
CHLORIDE SERPL-SCNC: 102 MMOL/L (ref 96–112)
CO2 SERPL-SCNC: 27 MMOL/L (ref 20–33)
CREAT SERPL-MCNC: 0.98 MG/DL (ref 0.5–1.4)
GFR SERPLBLD CREATININE-BSD FMLA CKD-EPI: 76 ML/MIN/1.73 M 2
GLUCOSE SERPL-MCNC: 186 MG/DL (ref 65–99)
POTASSIUM SERPL-SCNC: 4.7 MMOL/L (ref 3.6–5.5)
SODIUM SERPL-SCNC: 137 MMOL/L (ref 135–145)

## 2025-05-22 PROCEDURE — 80048 BASIC METABOLIC PNL TOTAL CA: CPT

## 2025-05-22 PROCEDURE — 99999 PR NO CHARGE: CPT

## 2025-05-22 NOTE — PROGRESS NOTES
Andre Martinez is a 84 y.o. male here for a non-provider visit for a lab draw on 5/22/2025 at 9:13 AM.    Procedure performed:  Venipuncture     Anatomical site:  Left Antecubital Area    Equipment used:  21 g vacutainer     Labs drawn:  Basic Metabolic Panel     Ordering provider:  Azar Cavazos MD    Lab draw completed by:  Marleny Esteves R.N.

## 2025-05-30 ENCOUNTER — HOSPITAL ENCOUNTER (OUTPATIENT)
Dept: RADIOLOGY | Facility: MEDICAL CENTER | Age: 85
End: 2025-05-30
Attending: INTERNAL MEDICINE
Payer: MEDICARE

## 2025-05-30 DIAGNOSIS — M48.062 SPINAL STENOSIS, LUMBAR REGION, WITH NEUROGENIC CLAUDICATION: ICD-10-CM

## 2025-05-30 DIAGNOSIS — M43.17 SPONDYLOLISTHESIS OF LUMBOSACRAL REGION: ICD-10-CM

## 2025-06-02 ENCOUNTER — HOSPITAL ENCOUNTER (OUTPATIENT)
Dept: RADIOLOGY | Facility: MEDICAL CENTER | Age: 85
End: 2025-06-02
Payer: MEDICARE

## 2025-06-02 DIAGNOSIS — M43.17 SPONDYLOLISTHESIS OF LUMBOSACRAL REGION: ICD-10-CM

## 2025-06-02 PROCEDURE — A9503 TC99M MEDRONATE: HCPCS

## 2025-06-03 ENCOUNTER — HOSPITAL ENCOUNTER (OUTPATIENT)
Facility: MEDICAL CENTER | Age: 85
End: 2025-06-03
Attending: INTERNAL MEDICINE
Payer: MEDICARE

## 2025-06-03 ENCOUNTER — NON-PROVIDER VISIT (OUTPATIENT)
Dept: INTERNAL MEDICINE | Facility: IMAGING CENTER | Age: 85
End: 2025-06-03
Payer: MEDICARE

## 2025-06-03 DIAGNOSIS — Z79.899 HIGH RISK MEDICATION USE: ICD-10-CM

## 2025-06-03 DIAGNOSIS — Z01.89 ENCOUNTER FOR ROUTINE LABORATORY TESTING: Primary | ICD-10-CM

## 2025-06-03 LAB
ALBUMIN SERPL BCP-MCNC: 4.2 G/DL (ref 3.2–4.9)
ALP SERPL-CCNC: 96 U/L (ref 30–99)
ALT SERPL-CCNC: 26 U/L (ref 2–50)
AST SERPL-CCNC: 33 U/L (ref 12–45)
BILIRUB CONJ SERPL-MCNC: 0.3 MG/DL (ref 0.1–0.5)
BILIRUB INDIRECT SERPL-MCNC: 0.3 MG/DL (ref 0–1)
BILIRUB SERPL-MCNC: 0.6 MG/DL (ref 0.1–1.5)
PROT SERPL-MCNC: 7.2 G/DL (ref 6–8.2)

## 2025-06-03 PROCEDURE — 80076 HEPATIC FUNCTION PANEL: CPT

## 2025-06-06 DIAGNOSIS — E78.00 PURE HYPERCHOLESTEROLEMIA: ICD-10-CM

## 2025-06-06 RX ORDER — ATORVASTATIN CALCIUM 40 MG/1
40 TABLET, FILM COATED ORAL
Qty: 90 TABLET | Refills: 3 | Status: SHIPPED | OUTPATIENT
Start: 2025-06-06

## 2025-06-06 NOTE — TELEPHONE ENCOUNTER
Is the patient due for a refill? Yes    Was the patient seen the last 15 months? Yes    Date of last office visit: 3.13.2025    Does the patient have an upcoming appointment?  No       Provider to refill:CW    Does the patient have assisted Plus and need 100-day supply? (This applies to ALL medications) Patient does not have SCP

## 2025-06-19 ENCOUNTER — APPOINTMENT (OUTPATIENT)
Dept: INTERNAL MEDICINE | Facility: IMAGING CENTER | Age: 85
End: 2025-06-19
Payer: MEDICARE

## 2025-06-19 ENCOUNTER — NON-PROVIDER VISIT (OUTPATIENT)
Dept: INTERNAL MEDICINE | Facility: IMAGING CENTER | Age: 85
End: 2025-06-19
Payer: MEDICARE

## 2025-06-19 DIAGNOSIS — J06.9 UPPER RESPIRATORY TRACT INFECTION, UNSPECIFIED TYPE: Primary | ICD-10-CM

## 2025-06-19 LAB
FLUAV RNA SPEC QL NAA+PROBE: NEGATIVE
FLUBV RNA SPEC QL NAA+PROBE: NEGATIVE
RSV RNA SPEC QL NAA+PROBE: NEGATIVE
SARS-COV-2 RNA RESP QL NAA+PROBE: NEGATIVE

## 2025-06-19 PROCEDURE — 0241U POCT CEPHEID COV-2, FLU A/B, RSV - PCR: CPT

## 2025-06-20 RX ORDER — SULFAMETHOXAZOLE AND TRIMETHOPRIM 800; 160 MG/1; MG/1
1 TABLET ORAL 2 TIMES DAILY
Qty: 20 TABLET | Refills: 0 | Status: SHIPPED | OUTPATIENT
Start: 2025-06-20 | End: 2025-06-24

## 2025-06-24 ENCOUNTER — PATIENT MESSAGE (OUTPATIENT)
Dept: INTERNAL MEDICINE | Facility: IMAGING CENTER | Age: 85
End: 2025-06-24
Payer: MEDICARE

## 2025-06-24 RX ORDER — DOXYCYCLINE HYCLATE 100 MG
100 TABLET ORAL 2 TIMES DAILY
Qty: 16 TABLET | Refills: 0 | Status: SHIPPED | OUTPATIENT
Start: 2025-06-24 | End: 2025-07-02

## 2025-06-30 ENCOUNTER — APPOINTMENT (OUTPATIENT)
Dept: UROLOGY | Facility: MEDICAL CENTER | Age: 85
End: 2025-06-30
Payer: MEDICARE

## 2025-07-01 ENCOUNTER — APPOINTMENT (OUTPATIENT)
Dept: INTERNAL MEDICINE | Facility: IMAGING CENTER | Age: 85
End: 2025-07-01
Payer: MEDICARE

## 2025-07-01 VITALS
SYSTOLIC BLOOD PRESSURE: 128 MMHG | TEMPERATURE: 97.5 F | BODY MASS INDEX: 23.15 KG/M2 | OXYGEN SATURATION: 93 % | HEART RATE: 63 BPM | WEIGHT: 156.31 LBS | DIASTOLIC BLOOD PRESSURE: 70 MMHG | HEIGHT: 69 IN

## 2025-07-01 DIAGNOSIS — M47.816 SPONDYLOSIS OF LUMBAR REGION WITHOUT MYELOPATHY OR RADICULOPATHY: ICD-10-CM

## 2025-07-01 DIAGNOSIS — J32.9 RECURRENT SINUS INFECTIONS: Primary | ICD-10-CM

## 2025-07-01 PROCEDURE — 3078F DIAST BP <80 MM HG: CPT | Performed by: INTERNAL MEDICINE

## 2025-07-01 PROCEDURE — 3074F SYST BP LT 130 MM HG: CPT | Performed by: INTERNAL MEDICINE

## 2025-07-01 PROCEDURE — 99213 OFFICE O/P EST LOW 20 MIN: CPT | Performed by: INTERNAL MEDICINE

## 2025-07-01 RX ORDER — TERBINAFINE HYDROCHLORIDE 250 MG/1
TABLET ORAL
COMMUNITY
Start: 2025-05-15

## 2025-07-01 ASSESSMENT — FIBROSIS 4 INDEX: FIB4 SCORE: 3.58

## 2025-07-01 ASSESSMENT — PATIENT HEALTH QUESTIONNAIRE - PHQ9: CLINICAL INTERPRETATION OF PHQ2 SCORE: 0

## 2025-07-01 NOTE — PROGRESS NOTES
"Chief Complaint   Patient presents with    Other     Pt wants to know if he gets another sinus infection what could he take since he has a reaction to all of the other antibiotics       HISTORY OF THE PRESENT ILLNESS: Patient is a 85 y.o. male.     Patient comes in for follow-up on recent sinus infection.  He was treated with Bactrim.  He developed a rash after 5 doses.  This was treated with antihistamines.  It has resolved.  He is concerned because he has multiple other drug allergies.  He reports that his sinus symptoms have resolved.    We also discussed his ongoing back issues.  He has been diagnosed with degenerative disc disease and foraminal stenosis.  He has had 3 consultations with neurosurgery we have all recommended different interventions.    Allergies: Avelox [moxifloxacin hcl], Demerol, Levofloxacin, Azithromycin, Bactrim [sulfamethoxazole w-trimethoprim], Cefdinir, Ceftin [cefuroxime axetil], Oxycodone, and Scopolamine    Current Medications and Prescriptions Ordered in Epic[1]    Past medical history, social history and family history were reviewed from chart today    Review of systems: Per HPI.    All others negative.     Exam: /70 (BP Location: Left arm, Patient Position: Sitting, BP Cuff Size: Adult)   Pulse 63   Temp 36.4 °C (97.5 °F) (Temporal)   Ht 1.753 m (5' 9\")   Wt 70.9 kg (156 lb 5 oz)   SpO2 93%   General: Well-appearing. Well-developed. No signs of distress.  HEENT: Grossly normal. Oral cavity is pink and moist.   Neck: Supple without JVD or bruit.  Pulmonary: Clear with good breath sounds. Normal effort.  Cardiovascular: Regular. Carotid and radial pulses are intact.  Abdomen: Soft, nontender, nondistended. Spleen and liver are not enlarged.  Neurologic: Cranial nerves II through XII are grossly normal, alert and oriented x3      Diagnosis:  1. Recurrent sinus infections        2. Spondylosis of lumbar region without myelopathy or radiculopathy          Okay to discontinue " doxycycline  Discussed he has multiple antibiotic choices but that he should not take  Bactrim  Patient is leaning towards single level fusion as recommended by neurosurgeon in [place].  His son who is a pediatric neurosurgeon on the [facility] is reviewing his images with colleagues to give further opinion.    My total time spent caring for the patient on the day of the encounter was  greater than 20 minutes.   This includes obtaining history, reviewing chart, physical exam, patient education, reviewing outside records, placing orders, interpreting tests and coordinating care.    Portions of this note were completed using voice recognition software (Dragon Naturally speaking software) . Occasional transcription errors may have escaped proof reading. I have made every reasonable attempt to correct obvious errors, but I expect that there are errors of grammar and possibly content that I did not discover before finalizing the note.        [1]   Current Outpatient Medications Ordered in Epic   Medication Sig Dispense Refill    terbinafine (LAMISIL) 250 MG Tab       doxycycline (VIBRAMYCIN) 100 MG Tab Take 1 Tablet by mouth 2 times a day for 8 days. 16 Tablet 0    atorvastatin (LIPITOR) 40 MG Tab TAKE 1 TABLET BY MOUTH EVERYDAY AT BEDTIME 90 Tablet 3    celecoxib (CELEBREX) 200 MG Cap TAKE 1 CAPSULE BY MOUTH 1 TIME A DAY AS NEEDED FOR MODERATE PAIN 30 Capsule 0    VYNDAMAX 61 MG Cap TAKE 1 CAPSULE BY MOUTH DAILY 30 Capsule 5    torsemide (DEMADEX) 5 MG Tab TAKE 1 TABLET BY MOUTH EVERY DAY 90 Tablet 3    B Complex Cap Take 1 Capsule by mouth every day.      Azelastine (ASTELIN) 137 MCG/SPRAY Solution Administer 1 Spray into affected nostril(S) 2 times a day. 90 mL 3    Vutrisiran Sodium 25 MG/0.5ML Solution Prefilled Syringe Inject 25 mg under the skin.      PREVIDENT 5000 BOOSTER PLUS 1.1 % Paste USE AS DIRECTED      vitamin D3 (CHOLECALCIFEROL) 1000 Unit (25 mcg) Tab Take 2 Tablets by mouth every day. 100 Tablet 3     aspirin 81 MG EC tablet Take 81 mg by mouth.      vitamin A 3 mg (32024 units) capsule Take 10,000 Units by mouth every day.      Glucosamine-Chondroitin 750-600 MG Tab Take 1 Tab by mouth 2 Times a Day. 60 Tab 0    Omega-3 Fatty Acids (SALMON OIL PO) Take 2 Caps by mouth every day.      coenzyme Q-10 30 MG capsule Take 30 mg by mouth every day. Twice a day       No current [facility]-ordered facility-administered medications on file.

## 2025-07-02 ENCOUNTER — NON-PROVIDER VISIT (OUTPATIENT)
Dept: INTERNAL MEDICINE | Facility: IMAGING CENTER | Age: 85
End: 2025-07-02
Payer: MEDICARE

## 2025-07-02 ENCOUNTER — HOSPITAL ENCOUNTER (OUTPATIENT)
Facility: MEDICAL CENTER | Age: 85
End: 2025-07-02
Attending: INTERNAL MEDICINE
Payer: MEDICARE

## 2025-07-02 DIAGNOSIS — R35.0 BENIGN PROSTATIC HYPERPLASIA WITH URINARY FREQUENCY: ICD-10-CM

## 2025-07-02 DIAGNOSIS — K86.2 PANCREAS CYST: ICD-10-CM

## 2025-07-02 DIAGNOSIS — R63.4 WEIGHT LOSS: ICD-10-CM

## 2025-07-02 DIAGNOSIS — N40.1 BENIGN PROSTATIC HYPERPLASIA WITH URINARY FREQUENCY: ICD-10-CM

## 2025-07-02 DIAGNOSIS — J32.9 RECURRENT SINUS INFECTIONS: ICD-10-CM

## 2025-07-02 DIAGNOSIS — D72.820 MONOCLONAL B-CELL LYMPHOCYTOSIS: ICD-10-CM

## 2025-07-02 DIAGNOSIS — N18.31 STAGE 3A CHRONIC KIDNEY DISEASE: Primary | ICD-10-CM

## 2025-07-02 DIAGNOSIS — E78.00 PURE HYPERCHOLESTEROLEMIA: ICD-10-CM

## 2025-07-02 DIAGNOSIS — R97.8 ABNORMAL TUMOR MARKERS: ICD-10-CM

## 2025-07-02 DIAGNOSIS — R69 ILLNESS: ICD-10-CM

## 2025-07-02 DIAGNOSIS — E85.4 CARDIAC AMYLOIDOSIS (HCC): ICD-10-CM

## 2025-07-02 DIAGNOSIS — I43 CARDIAC AMYLOIDOSIS (HCC): ICD-10-CM

## 2025-07-02 DIAGNOSIS — N40.1 BENIGN PROSTATIC HYPERPLASIA WITH NOCTURIA: ICD-10-CM

## 2025-07-02 DIAGNOSIS — R73.01 ELEVATED FASTING GLUCOSE: ICD-10-CM

## 2025-07-02 DIAGNOSIS — R35.1 BENIGN PROSTATIC HYPERPLASIA WITH NOCTURIA: ICD-10-CM

## 2025-07-02 DIAGNOSIS — E55.9 VITAMIN D DEFICIENCY: ICD-10-CM

## 2025-07-02 DIAGNOSIS — Z12.11 SPECIAL SCREENING FOR MALIGNANT NEOPLASMS, COLON: ICD-10-CM

## 2025-07-02 DIAGNOSIS — N18.31 STAGE 3A CHRONIC KIDNEY DISEASE: ICD-10-CM

## 2025-07-02 LAB
ALBUMIN SERPL BCP-MCNC: 4.2 G/DL (ref 3.2–4.9)
ALBUMIN/GLOB SERPL: 1.5 G/DL
ALP SERPL-CCNC: 93 U/L (ref 30–99)
ALT SERPL-CCNC: 38 U/L (ref 2–50)
ANION GAP SERPL CALC-SCNC: 9 MMOL/L (ref 7–16)
ANISOCYTOSIS BLD QL SMEAR: ABNORMAL
APPEARANCE UR: CLEAR
AST SERPL-CCNC: 43 U/L (ref 12–45)
BASOPHILS # BLD AUTO: 2.5 % (ref 0–1.8)
BASOPHILS # BLD: 0.28 K/UL (ref 0–0.12)
BILIRUB SERPL-MCNC: 0.4 MG/DL (ref 0.1–1.5)
BILIRUB UR QL STRIP.AUTO: NEGATIVE
BUN SERPL-MCNC: 19 MG/DL (ref 8–22)
BURR CELLS BLD QL SMEAR: NORMAL
CALCIUM ALBUM COR SERPL-MCNC: 9.6 MG/DL (ref 8.5–10.5)
CALCIUM SERPL-MCNC: 9.8 MG/DL (ref 8.5–10.5)
CANCER AG19-9 SERPL-ACNC: 10.2 U/ML (ref 0–35)
CHLORIDE SERPL-SCNC: 102 MMOL/L (ref 96–112)
CHOLEST SERPL-MCNC: 126 MG/DL (ref 100–199)
CO2 SERPL-SCNC: 28 MMOL/L (ref 20–33)
COLOR UR: YELLOW
COMMENT NL1176: NORMAL
CREAT SERPL-MCNC: 0.93 MG/DL (ref 0.5–1.4)
CREAT UR-MCNC: 108 MG/DL
EOSINOPHIL # BLD AUTO: 0.19 K/UL (ref 0–0.51)
EOSINOPHIL NFR BLD: 1.7 % (ref 0–6.9)
ERYTHROCYTE [DISTWIDTH] IN BLOOD BY AUTOMATED COUNT: 46.5 FL (ref 35.9–50)
EST. AVERAGE GLUCOSE BLD GHB EST-MCNC: 131 MG/DL
GFR SERPLBLD CREATININE-BSD FMLA CKD-EPI: 81 ML/MIN/1.73 M 2
GLOBULIN SER CALC-MCNC: 2.8 G/DL (ref 1.9–3.5)
GLUCOSE SERPL-MCNC: 103 MG/DL (ref 65–99)
GLUCOSE UR STRIP.AUTO-MCNC: NEGATIVE MG/DL
HBA1C MFR BLD: 6.2 % (ref 4–5.6)
HCT VFR BLD AUTO: 46 % (ref 42–52)
HDLC SERPL-MCNC: 63 MG/DL
HGB BLD-MCNC: 15.6 G/DL (ref 14–18)
KETONES UR STRIP.AUTO-MCNC: NEGATIVE MG/DL
LDLC SERPL CALC-MCNC: 55 MG/DL
LEUKOCYTE ESTERASE UR QL STRIP.AUTO: NEGATIVE
LYMPHOCYTES # BLD AUTO: 6.99 K/UL (ref 1–4.8)
LYMPHOCYTES NFR BLD: 61.9 % (ref 22–41)
MACROCYTES BLD QL SMEAR: ABNORMAL
MANUAL DIFF BLD: NORMAL
MCH RBC QN AUTO: 32.9 PG (ref 27–33)
MCHC RBC AUTO-ENTMCNC: 33.9 G/DL (ref 32.3–36.5)
MCV RBC AUTO: 97 FL (ref 81.4–97.8)
MICRO URNS: NORMAL
MICROALBUMIN UR-MCNC: <1.2 MG/DL
MICROALBUMIN/CREAT UR: NORMAL MG/G (ref 0–30)
MONOCYTES # BLD AUTO: 0.9 K/UL (ref 0–0.85)
MONOCYTES NFR BLD AUTO: 7.6 % (ref 0–13.4)
MORPHOLOGY BLD-IMP: NORMAL
NEUTROPHILS # BLD AUTO: 2.97 K/UL (ref 1.82–7.42)
NEUTROPHILS NFR BLD: 26.3 % (ref 44–72)
NITRITE UR QL STRIP.AUTO: NEGATIVE
NRBC # BLD AUTO: 0 K/UL
NRBC BLD-RTO: 0 /100 WBC (ref 0–0.2)
OVALOCYTES BLD QL SMEAR: NORMAL
PH UR STRIP.AUTO: 7 [PH] (ref 5–8)
PLATELET # BLD AUTO: 153 K/UL (ref 164–446)
PLATELET BLD QL SMEAR: NORMAL
PMV BLD AUTO: 12.4 FL (ref 9–12.9)
POIKILOCYTOSIS BLD QL SMEAR: NORMAL
POTASSIUM SERPL-SCNC: 5 MMOL/L (ref 3.6–5.5)
PROT SERPL-MCNC: 7 G/DL (ref 6–8.2)
PROT UR QL STRIP: NEGATIVE MG/DL
RBC # BLD AUTO: 4.74 M/UL (ref 4.7–6.1)
RBC BLD AUTO: PRESENT
RBC UR QL AUTO: NEGATIVE
SMUDGE CELLS BLD QL SMEAR: NORMAL
SODIUM SERPL-SCNC: 139 MMOL/L (ref 135–145)
SP GR UR STRIP.AUTO: 1.02
TRIGL SERPL-MCNC: 41 MG/DL (ref 0–149)
UROBILINOGEN UR STRIP.AUTO-MCNC: 0.2 EU/DL
WBC # BLD AUTO: 11.3 K/UL (ref 4.8–10.8)

## 2025-07-02 PROCEDURE — 82570 ASSAY OF URINE CREATININE: CPT

## 2025-07-02 PROCEDURE — 83036 HEMOGLOBIN GLYCOSYLATED A1C: CPT

## 2025-07-02 PROCEDURE — 80053 COMPREHEN METABOLIC PANEL: CPT

## 2025-07-02 PROCEDURE — 86301 IMMUNOASSAY TUMOR CA 19-9: CPT

## 2025-07-02 PROCEDURE — 82784 ASSAY IGA/IGD/IGG/IGM EACH: CPT

## 2025-07-02 PROCEDURE — 85007 BL SMEAR W/DIFF WBC COUNT: CPT

## 2025-07-02 PROCEDURE — 81003 URINALYSIS AUTO W/O SCOPE: CPT

## 2025-07-02 PROCEDURE — 85027 COMPLETE CBC AUTOMATED: CPT

## 2025-07-02 PROCEDURE — 80061 LIPID PANEL: CPT

## 2025-07-02 PROCEDURE — 82043 UR ALBUMIN QUANTITATIVE: CPT

## 2025-07-05 DIAGNOSIS — M81.0 HIGH RISK FOR FRACTURE DUE TO OSTEOPOROSIS BY DEXA SCAN: Primary | ICD-10-CM

## 2025-07-05 LAB
IGA SERPL-MCNC: 136 MG/DL (ref 68–408)
IGG SERPL-MCNC: 1246 MG/DL (ref 768–1632)
IGM SERPL-MCNC: 74 MG/DL (ref 35–263)

## 2025-07-08 ENCOUNTER — OFFICE VISIT (OUTPATIENT)
Dept: INTERNAL MEDICINE | Facility: IMAGING CENTER | Age: 85
End: 2025-07-08
Payer: MEDICARE

## 2025-07-08 VITALS
WEIGHT: 157 LBS | HEART RATE: 60 BPM | OXYGEN SATURATION: 96 % | HEIGHT: 69 IN | TEMPERATURE: 97.7 F | DIASTOLIC BLOOD PRESSURE: 60 MMHG | SYSTOLIC BLOOD PRESSURE: 134 MMHG | RESPIRATION RATE: 14 BRPM | BODY MASS INDEX: 23.25 KG/M2

## 2025-07-08 DIAGNOSIS — R93.1 AGATSTON CAC SCORE, >400: ICD-10-CM

## 2025-07-08 DIAGNOSIS — N40.1 BENIGN PROSTATIC HYPERPLASIA WITH NOCTURIA: ICD-10-CM

## 2025-07-08 DIAGNOSIS — B35.1 ONYCHOMYCOSIS: ICD-10-CM

## 2025-07-08 DIAGNOSIS — D72.820 MONOCLONAL B-CELL LYMPHOCYTOSIS: ICD-10-CM

## 2025-07-08 DIAGNOSIS — I50.32 CHRONIC DIASTOLIC HEART FAILURE (HCC): ICD-10-CM

## 2025-07-08 DIAGNOSIS — Z00.00 MEDICARE ANNUAL WELLNESS VISIT, SUBSEQUENT: Primary | ICD-10-CM

## 2025-07-08 DIAGNOSIS — M48.061 SPINAL STENOSIS OF LUMBAR REGION, UNSPECIFIED WHETHER NEUROGENIC CLAUDICATION PRESENT: ICD-10-CM

## 2025-07-08 DIAGNOSIS — E85.4 CARDIAC AMYLOIDOSIS (HCC): ICD-10-CM

## 2025-07-08 DIAGNOSIS — N18.31 STAGE 3A CHRONIC KIDNEY DISEASE: ICD-10-CM

## 2025-07-08 DIAGNOSIS — D69.6 THROMBOCYTOPENIA (HCC): ICD-10-CM

## 2025-07-08 DIAGNOSIS — J32.9 RECURRENT SINUS INFECTIONS: ICD-10-CM

## 2025-07-08 DIAGNOSIS — K86.2 PANCREAS CYST: ICD-10-CM

## 2025-07-08 DIAGNOSIS — R35.1 BENIGN PROSTATIC HYPERPLASIA WITH NOCTURIA: ICD-10-CM

## 2025-07-08 DIAGNOSIS — I43 CARDIAC AMYLOIDOSIS (HCC): ICD-10-CM

## 2025-07-08 DIAGNOSIS — E78.00 PURE HYPERCHOLESTEROLEMIA: ICD-10-CM

## 2025-07-08 DIAGNOSIS — M65.4 DE QUERVAIN'S DISEASE (TENOSYNOVITIS): ICD-10-CM

## 2025-07-08 PROCEDURE — 3078F DIAST BP <80 MM HG: CPT | Performed by: INTERNAL MEDICINE

## 2025-07-08 PROCEDURE — 3075F SYST BP GE 130 - 139MM HG: CPT | Performed by: INTERNAL MEDICINE

## 2025-07-08 PROCEDURE — G0439 PPPS, SUBSEQ VISIT: HCPCS | Performed by: INTERNAL MEDICINE

## 2025-07-08 ASSESSMENT — PATIENT HEALTH QUESTIONNAIRE - PHQ9: CLINICAL INTERPRETATION OF PHQ2 SCORE: 0

## 2025-07-08 ASSESSMENT — ENCOUNTER SYMPTOMS: GENERAL WELL-BEING: GOOD

## 2025-07-08 ASSESSMENT — ACTIVITIES OF DAILY LIVING (ADL): BATHING_REQUIRES_ASSISTANCE: 0

## 2025-07-08 ASSESSMENT — FIBROSIS 4 INDEX: FIB4 SCORE: 3.83

## 2025-07-08 NOTE — PROGRESS NOTES
85 y.o. male presents for the following:    Patient comes in for annual health risk assessment, physical and review of laboratory.  Considers himself in good health.  No depression.  No balance issues.  No cognitive issues.    Patient has a complex cardiac history including diastolic heart failure, wild-type ATTR cardiac amyloidosis and CAD.  He is followed by [place] and locally by Dr. Heart.  He has no angina symptoms.  No heart failure symptoms including dyspnea, orthopnea or lower extremity edema.  He is currently enrolled in a study for treatment of the ATTR.  He reports that the study is soon coming to an end and they plan to transition him to local infusion of vutrisiran and continued oral minimax.    He also has a history of spinal stenosis.  He has seen 3 separate neurosurgeons, 1 in [place], 1 in [place] and 1 locally.  He has decided to go with the recommendation of the [place] physician.  This is a single level fusion.  He is previously undergone laminectomy with this same surgeon approximately 3 years ago.    He has a history of hyperlipidemia which is at goal on Lipitor 40 mg.  His most recent LDL cholesterol is 55.    Patient has a history of lymphocytosis.  He had previous evaluation with oncology including bone marrow biopsy.  He had an elevation in CD19 cells but no specific diagnosis of leukemia.  He also has thrombocytopenia.  His most recent labs showed ongoing elevation in his lymphocyte count.  His white blood cell count is also elevated but his absolute numbers are essentially unchanged.  His platelet count is improved.    We discussed his labs including elevation in A1c.  His A1c is now 6.2.  His baseline is approximately 5.7-5.9.  He denies any juice, soda or sweets.  He does have a diet that is moderately high in starch.  He also admits that he is not exercising due to his back issues.    Patient is currently on treatment for onychomycosis.  He has completed approximately 2 months of  Lamisil.  He thinks it has been some improvement.  He continues to see podiatry.    He has a history of pancreatic cyst.  Recent CA 19-9 was normal.    We discussed his recurrent sinus infections.  Currently he is doing well.  Recent labs showed normal immunoglobulins.    He also complains of pain in the right wrist.  He was seen by orthopedics who diagnosed him with de Quervain's tendinitis    Overactive bladder.  Patient was diagnosed with overactive bladder by urology.  He recently saw urology who recommended resuming Gemtesa.    Annual Wellness Visit/Health Risk Assessment:    Past medical:  Past Medical History[1]    Past surgical:  Past Surgical History[2]    Family history: relating to possible risk factors for your patient  Family History   Problem Relation Age of Onset    Heart Attack Father 38        doctor in 1946 diagnosed him with MI at home,  before going to the hospital    Psychiatric Illness Mother         Alzheimers    Hypertension Brother     Stroke Brother     Seizures Brother     Sleep Apnea Neg Hx        Current Providers (including home care/DME’s):   No Patient Care Coordination Note on file.      Patient Care Team:  Azar Cavazos M.D. as PCP - General (Internal Medicine)  Sanchez Fonseca M.D. (Urology)  [facility]  as Respiratory Therapist  Jaime Raman M.D. as Cardiologist (Cardiovascular Disease (Cardiology))  Carole Monroe R.N.      Medications: Current Medications and Prescriptions Ordered in Epic[3]    Supplements (calcium/vitamins): if not lisited in medications    Chief Complaint   Patient presents with    Annual Exam         HPI:  nAdre Martinez is a 85 y.o. here for Medicare Annual Wellness Visit     Patient Active Problem List    Diagnosis Date Noted    De Quervain's disease (tenosynovitis) 2025    OAB (overactive bladder) 2024    Paroxysmal supraventricular tachycardia (HCC) 08/15/2023    Sacroiliac joint pain 2021    Chronic  diastolic congestive heart failure (HCC) 06/04/2020    Cardiac amyloidosis (HCC) 05/17/2019    Agatston CAC score, >400 12/06/2018    Enlarged prostate with lower urinary tract symptoms (LUTS) 11/12/2018    Calcific Achilles tendinitis 11/05/2018    Thrombocytopenia (HCC) 07/24/2017    Pure hypercholesterolemia 08/23/2016    Spinal stenosis, lumbar     Colon polyps     Kidney cysts     Pancreas cyst        Current Medications[4]         Current supplements as per medication list.       Allergies: Avelox [moxifloxacin hcl], Demerol, Levofloxacin, Azithromycin, Bactrim [sulfamethoxazole w-trimethoprim], Cefdinir, Ceftin [cefuroxime axetil], Oxycodone, and Scopolamine    Current social contact/activities:  Social with friends and family.     He  reports that he has never smoked. He has never used smokeless tobacco. He reports current alcohol use. He reports that he does not use drugs.  Counseling given: Not Answered        DPA/Advanced Directive:  Completed       ROS:    Gait: Uses :None  Ostomy: No  Other tubes: no   Amputations: no   Chronic oxygen use: no   Last eye exam: < 1 year   : denies and incontinence.       Screening:  No Patient Care Coordination Note on file.      Depression Screening  Little interest or pleasure in doing things?  0 - not at all  Feeling down, depressed , or hopeless? 0 - not at all  Patient Health Questionnaire Score: 0     If depressive symptoms identified deferred to follow up visit unless specifically addressed in assessment and plan.    Interpretation of PHQ-9 Total Score   Score Severity   1-4 No Depression   5-9 Mild Depression   10-14 Moderate Depression   15-19 Moderately Severe Depression   20-27 Severe Depression    Screening for Cognitive Impairment  Do you or any of your friends or family members have any concern about your memory? No  Three Minute Recall (Village, Kitchen, Baby) 3/3    Marcelo clock face with all 12 numbers and set the hands to show 10 minutes past 11.  Yes     Cognitive concerns identified deferred for follow up unless specifically addressed in assessment and plan.    Fall Risk Assessment  Has the patient had two or more falls in the last year or any fall with injury in the last year?  No    Safety Assessment  Do you always wear your seatbelt?  Yes  Any changes to home needed to function safely? No  Difficulty hearing.  No  Patient counseled about all safety risks that were identified.    Functional Assessment ADLs  Are there any barriers preventing you from cooking for yourself or meeting nutritional needs?  No.    Are there any barriers preventing you from driving safely or obtaining transportation?  No.    Are there any barriers preventing you from using a telephone or calling for help?  No    Are there any barriers preventing you from shopping?  No.    Are there any barriers preventing you from taking care of your own finances?  No    Are there any barriers preventing you from managing your medications?  No    Are there any barriers preventing you from showering, bathing or dressing yourself? No    Are there any barriers preventing you from doing housework or laundry? No  Are there any barriers preventing you from using the toilet?No  Are you currently engaging in any exercise or physical activity?  Yes.      Self-Assessment of Health  What is your perception of your health? Good    Do you sleep more than six hours a night? Yes    In the past 7 days, how much did pain keep you from doing your normal work? Some    Do you spend quality time with family or friends (virtually or in person)? Yes    Do you usually eat a heart healthy diet that constists of a variety of fruits, vegetables, whole grains and fiber? Yes    Do you eat foods high in fat and/or Fast Food more than three times per week? No    How concerned are you that your medical conditions are not being well managed? Not at all    Are you worried that in the next 2 months, you may not have stable housing that  you own, rent, or stay in as part of a household? No      Advance Care Planning  Do you have an Advance Directive, Living Will, Durable Power of , or POLST? Yes                 Health Maintenance Summary            Upcoming       Influenza Vaccine (1) Next due on 9/1/2025      10/07/2024  Imm Admin: Influenza Vaccine Adult HD    09/20/2023  Imm Admin: Influenza Vaccine Adult HD    10/17/2022  Imm Admin: Influenza Vaccine Adult HD    10/14/2021  Imm Admin: Influenza Vaccine Adult HD    11/02/2020  Imm Admin: Influenza Vaccine Adult HD     Only the first 5 history entries have been loaded, but more history exists.            Annual Wellness Visit (Yearly) Next due on 7/8/2026 07/08/2025  Visit Dx: Medicare annual wellness visit, subsequent    06/26/2024  Visit Dx: Medicare annual wellness visit, subsequent    05/08/2023  Visit Dx: Medicare annual wellness visit, subsequent    03/31/2022  Visit Dx: Medicare annual wellness visit, initial    12/11/2020  Visit Dx: Medicare annual wellness visit, subsequent      Only the first 5 history entries have been loaded, but more history exists.              IMM DTaP/Tdap/Td Vaccine (3 - Td or Tdap) Next due on 3/18/2031      03/18/2021  Imm Admin: Tdap Vaccine    08/06/2014  Imm Admin: Tdap Vaccine    06/02/2010  Imm Admin: TD Vaccine    12/10/1999  Imm Admin: TD Vaccine                      Completed or No Longer Recommended       Polio Vaccine (Inactivated Polio) (Series Information) Completed      03/02/2016  Imm Admin: IPV    12/10/1999  Imm Admin: OPV TRIVALENT - HISTORICAL DATA (GIVEN PRIOR TO MAY 2016)    07/26/1996  Imm Admin: OPV TRIVALENT - HISTORICAL DATA (GIVEN PRIOR TO MAY 2016)              Zoster (Shingles) Vaccines (Series Information) Completed      07/31/2018  Imm Admin: Zoster Vaccine Recombinant (RZV) (SHINGRIX)    04/09/2018  Imm Admin: Zoster Vaccine Recombinant (RZV) (SHINGRIX)    11/14/2006  Imm Admin: Zoster Vaccine Live (ZVL) (Zostavax) -  HISTORICAL DATA    11/14/2005  Imm Admin: Zoster Vaccine Live (ZVL) (Zostavax) - HISTORICAL DATA              Pneumococcal Vaccine: 50+ Years (Series Information) Completed      05/08/2023  Imm Admin: Pneumococcal Conjugate Vaccine (PCV20)    02/04/2015  Imm Admin: Pneumococcal Conjugate Vaccine (Prevnar/PCV-13)    01/17/2013  Imm Admin: Pneumococcal polysaccharide vaccine (PPSV-23)    07/25/2006  Imm Admin: Pneumococcal polysaccharide vaccine (PPSV-23)              Hepatitis A Vaccine (Hep A) (Series Information) Aged Out      02/28/1997  Imm Admin: Hepatitis A Vaccine, Adult    02/28/1997  Imm Admin: Hepatitis A Vaccine, Ped/Adol    07/26/1996  Imm Admin: Hepatitis A Vaccine, Adult    07/26/1996  Imm Admin: Hepatitis A Vaccine, Ped/Adol              HPV Vaccines (Series Information) Aged Out      No completion history exists for this topic.              Meningococcal Immunization (Series Information) Aged Out      08/06/2014  Imm Admin: Meningococcal Polysaccharide Vaccine MPSV4 - HISTORICAL DATA    08/06/2014  Imm Admin: Meningococcal Conjugate Vaccine MCV4 (Menactra)    09/23/2004  Imm Admin: Meningococcal Polysaccharide Vaccine MPSV4 - HISTORICAL DATA    12/10/1999  Imm Admin: Meningococcal Polysaccharide Vaccine MPSV4 - HISTORICAL DATA              Meningococcal B Vaccine (Series Information) Aged Out     No completion history exists for this topic.              Colorectal Cancer Screening  Discontinued        Frequency changed to Never automatically (Topic No Longer Applies)    06/10/2024  COLOGUARD RESULT component of LAB COLOGUARD® COLON CANCER SCREEN    02/01/2022  Colonoscopy (Done)    03/21/2017  REFERRAL TO GI FOR COLONOSCOPY    03/21/2017  Colonoscopy (Done)     Only the first 5 history entries have been loaded, but more history exists.            Hepatitis B Vaccine (Hep B)  Discontinued      07/18/2000  Imm Admin: Hepatitis B Vaccine (Adol/Adult)    07/18/2000  Imm Admin: Hepatitis B Vaccine  Adolescent/Pediatric    2000  Imm Admin: Hepatitis B Vaccine (Adol/Adult)    2000  Imm Admin: Hepatitis B Vaccine Adolescent/Pediatric    1999  Imm Admin: Hepatitis B Vaccine Adolescent/Pediatric      Only the first 5 history entries have been loaded, but more history exists.              COVID-19 Vaccine  Discontinued      2025  Imm Admin: Pfizer-biontech Covid-19 Vaccine ([id number] Formula)    2025  Imm Admin: Pfizer-biontech Covid-19 Vaccine ([id number] Formula)    10/07/2024  Imm Admin: COVID-19 Vaccine, unspecified - HISTORICAL DATA    2024  Imm Admin: Covid-19 Mrna (Spikevax) Moderna 12+ Years    2023  Imm Admin: Comirnaty (Covid-19 Vaccine, Mrna, [id number] Formula)      Only the first 5 history entries have been loaded, but more history exists.                            Patient Care Team:  Azar Cavazos M.D. as PCP - General (Internal Medicine)  Sanchez Fonseca M.D. (Urology)  [facility]  as Respiratory Therapist  Jaime Raman M.D. as Cardiologist (Cardiovascular Disease (Cardiology))  Carole Monroe R.N.        Social History[5]  Family History   Problem Relation Age of Onset    Heart Attack Father 38        doctor in 1946 diagnosed him with MI at home,  before going to the hospital    Psychiatric Illness Mother         Alzheimers    Hypertension Brother     Stroke Brother     Seizures Brother     Sleep Apnea Neg Hx      He  has a past medical history of Amyloidosis (HCC), BPH (benign prostatic hyperplasia), Cardiac amyloidosis (HCC), Chickenpox, Colon polyps, Coronary atherosclerosis of native coronary artery (05/15/2013), Coronary heart disease, Dyslipidemia (2016), H/O urethral stricture, Kidney cysts, Meniscus tear, Pancreas cyst, Scarlet fever, Sinusitis, chronic, Spinal stenosis, lumbar, SVT (supraventricular tachycardia) (HCC) (10/23/2018), and Thrombocytopenia (HCC) (2017).   Past Surgical History[6]    Exam:     BP  "134/60 (BP Location: Left arm, Patient Position: Sitting, BP Cuff Size: Adult)   Pulse 60   Temp 36.5 °C (97.7 °F) (Temporal)   Resp 14   Ht 1.753 m (5' 9\")   Wt 71.2 kg (157 lb)   SpO2 96%  Body mass index is 23.18 kg/m².    Hearing good.    Dentition good  Alert, oriented in no acute distress.  Eye contact is good, speech goal directed, affect calm  General: Normal weight.  No distress.  Normal appearing.  HEENT: Pupils are equal.  Conjunctiva is normal.  Head is normal appearing.  Ears, canals and tympanic membranes are normal.  Oral cavity is pink and moist without lesion.  Neck: Supple without JVD or bruit.  Thyroid is not enlarged.  Pulmonary: Clear with good breath sounds.  Cardiovascular regular rate and rhythm.  No murmur auscultated.  Carotid, radial and pedal pulses are intact.  Abdomen: Soft, nontender, nondistended.  Normal bowel sounds.  Organs are not enlarged.  Neurologic: Cranial nerves intact.  Strength and sensation are normal.  Normal patellar reflex.  Skin: No obvious lesions.  Diffuse nonblanching, pink, poorly demarcated macules and papules from recent Bactrim reaction  Lymph: No cervical, supraclavicular, axillary, abdominal or inguinal adenopathy noted.  .  Extremities: Positive Finkelstein on the right    Assessment and Plan. The following treatment and monitoring plan is recommended:    1. Medicare annual wellness visit, subsequent        2. Cardiac amyloidosis (HCC)        3. Stage 3a chronic kidney disease        4. Pure hypercholesterolemia        5. Benign prostatic hyperplasia with nocturia        6. Pancreas cyst        7. Monoclonal B-cell lymphocytosis        8. Agatston CAC score, >400        9. Spinal stenosis of lumbar region, unspecified whether neurogenic claudication present        10. Thrombocytopenia (HCC)        11. Onychomycosis        12. Chronic diastolic heart failure (HCC)            85-year-old male who remains in excellent physical and cognitive " health.  Chronic issues are stable overall.  He has follow-up with oncology regarding the B-cell lymphocytosis and thrombocytopenia.  Heart issues are stable.  He plans to transition from [facility] to local cardiology.  No sign of heart failure currently.  Amyloid is stable/improved.  Lipids are at goal.  We discussed his thumb issue.  He has a positive Finkelstein test on exam.  Discussed splinting or trial of topical anti-inflammatory, Voltaren.  Continue surveillance of pancreatic cyst.  Tumor markers normal.  Patient is going to resume gym take his for overactive bladder issues.  Discussed that he may be experiencing some atonic bladder symptoms but he reports normal urinary flow studies within the last couple of years.  Blood sugars are above goal.  He is going to work on diet, primarily decreasing starch and he has resumed exercise, primarily stationary bike.  Up-to-date on screenings and immunizations.  We agreed upon getting a bone density due to upcoming back surgery.    Services suggested: No services required at this time  Health Care Screening: Age-appropriate preventive services Medicare covers discussed today and ordered if indicated.  Referrals offered: Community-based lifestyle interventions to reduce health risks and promote self-management and wellness, fall prevention, nutrition, physical activity, tobacco-use cessation, weight loss, and mental health services as per orders if indicated.    Discussion today about general wellness and lifestyle habits:    Prevent falls and reduce trip hazards; Cautioned about securing or removing rugs.  Have a working fire alarm and carbon monoxide detector;   Engage in regular physical activity and social activities       Follow-up: 1 year for HRA                   [1]   Past Medical History:  Diagnosis Date    Amyloidosis (HCC)     TTR, type pending.     BPH (benign prostatic hyperplasia)     s/p laser encleation of the prostate    Cardiac amyloidosis (HCC)      Chickenpox     Colon polyps     Coronary atherosclerosis of native coronary artery 05/15/2013    Coronary calcium score in the 3000s, negative PET scan and no symptoms so treated medically.     Coronary heart disease     Dyslipidemia 08/23/2016    H/O urethral stricture     s/p surgical repair 1/15/2019, [place]    Kidney cysts     Meniscus tear     Pancreas cyst     Scarlet fever     Sinusitis, chronic     Spinal stenosis, lumbar     SVT (supraventricular tachycardia) (HCC) 10/23/2018    Thrombocytopenia (HCC) 7/24/2017   [2]   Past Surgical History:  Procedure Laterality Date    KNEE RECONSTRUCTION  2012    left, partial. multiple previous surgeries.     ROTATOR CUFF REPAIR  2010    c/b right biceps tendon rupture, presumably repaired.     ANKLE FUSION Left     ARTHROSCOPY, KNEE      HEMORRHOIDECTOMY      KNEE ARTHROSCOPY  7/15/2021    right lateral implant    SHOULDER ARTHROSCOPY  2010    SINUSCOPE      SINUSOTOMIES      Dr. Guerrier, total of 4 surgeries    TRIGGER FINGER RELEASE      x 2   [3]   Current Outpatient Medications Ordered in Epic   Medication Sig Dispense Refill    terbinafine (LAMISIL) 250 MG Tab       atorvastatin (LIPITOR) 40 MG Tab TAKE 1 TABLET BY MOUTH EVERYDAY AT BEDTIME 90 Tablet 3    celecoxib (CELEBREX) 200 MG Cap TAKE 1 CAPSULE BY MOUTH 1 TIME A DAY AS NEEDED FOR MODERATE PAIN 30 Capsule 0    VYNDAMAX 61 MG Cap TAKE 1 CAPSULE BY MOUTH DAILY 30 Capsule 5    torsemide (DEMADEX) 5 MG Tab TAKE 1 TABLET BY MOUTH EVERY DAY 90 Tablet 3    B Complex Cap Take 1 Capsule by mouth every day.      Azelastine (ASTELIN) 137 MCG/SPRAY Solution Administer 1 Spray into affected nostril(S) 2 times a day. 90 mL 3    Vutrisiran Sodium 25 MG/0.5ML Solution Prefilled Syringe Inject 25 mg under the skin.      PREVIDENT 5000 BOOSTER PLUS 1.1 % Paste USE AS DIRECTED      vitamin D3 (CHOLECALCIFEROL) 1000 Unit (25 mcg) Tab Take 2 Tablets by mouth every day. 100 Tablet 3    aspirin 81 MG EC tablet Take 81 mg by mouth.       vitamin A 3 mg (31683 units) capsule Take 10,000 Units by mouth every day.      Glucosamine-Chondroitin 750-600 MG Tab Take 1 Tab by mouth 2 Times a Day. 60 Tab 0    Omega-3 Fatty Acids (SALMON OIL PO) Take 2 Caps by mouth every day.      coenzyme Q-10 30 MG capsule Take 30 mg by mouth every day. Twice a day       No current Epic-ordered facility-administered medications on file.   [4]   Current Outpatient Medications   Medication Sig Dispense Refill    terbinafine (LAMISIL) 250 MG Tab       atorvastatin (LIPITOR) 40 MG Tab TAKE 1 TABLET BY MOUTH EVERYDAY AT BEDTIME 90 Tablet 3    celecoxib (CELEBREX) 200 MG Cap TAKE 1 CAPSULE BY MOUTH 1 TIME A DAY AS NEEDED FOR MODERATE PAIN 30 Capsule 0    VYNDAMAX 61 MG Cap TAKE 1 CAPSULE BY MOUTH DAILY 30 Capsule 5    torsemide (DEMADEX) 5 MG Tab TAKE 1 TABLET BY MOUTH EVERY DAY 90 Tablet 3    B Complex Cap Take 1 Capsule by mouth every day.      Azelastine (ASTELIN) 137 MCG/SPRAY Solution Administer 1 Spray into affected nostril(S) 2 times a day. 90 mL 3    Vutrisiran Sodium 25 MG/0.5ML Solution Prefilled Syringe Inject 25 mg under the skin.      PREVIDENT 5000 BOOSTER PLUS 1.1 % Paste USE AS DIRECTED      vitamin D3 (CHOLECALCIFEROL) 1000 Unit (25 mcg) Tab Take 2 Tablets by mouth every day. 100 Tablet 3    aspirin 81 MG EC tablet Take 81 mg by mouth.      vitamin A 3 mg (15110 units) capsule Take 10,000 Units by mouth every day.      Glucosamine-Chondroitin 750-600 MG Tab Take 1 Tab by mouth 2 Times a Day. 60 Tab 0    Omega-3 Fatty Acids (SALMON OIL PO) Take 2 Caps by mouth every day.      coenzyme Q-10 30 MG capsule Take 30 mg by mouth every day. Twice a day       No current facility-administered medications for this visit.   [5]   Social History  Tobacco Use    Smoking status: Never    Smokeless tobacco: Never   Vaping Use    Vaping status: Never Used   Substance Use Topics    Alcohol use: Yes     Comment: Social     Drug use: No   [6]   Past Surgical History:  Procedure  Laterality Date    KNEE RECONSTRUCTION  2012    left, partial. multiple previous surgeries.     ROTATOR CUFF REPAIR  2010    c/b right biceps tendon rupture, presumably repaired.     ANKLE FUSION Left     ARTHROSCOPY, KNEE      HEMORRHOIDECTOMY      KNEE ARTHROSCOPY  7/15/2021    right lateral implant    SHOULDER ARTHROSCOPY  2010    SINUSCOPE      SINUSOTOMIES      Dr. Guerrier, total of 4 surgeries    TRIGGER FINGER RELEASE      x 2

## 2025-07-09 ENCOUNTER — PATIENT MESSAGE (OUTPATIENT)
Dept: CARDIOLOGY | Facility: MEDICAL CENTER | Age: 85
End: 2025-07-09
Payer: MEDICARE

## 2025-07-09 NOTE — PATIENT COMMUNICATION
Last OV: 3/13/25  Proposed Surgery: L3-4 BILATERAL HEMILAMINECTOMY,L4-5 RIGHT FAR LATERAL DISCECTOMY  Surgery Date: TBD  Requesting Office Name: [facility]   Fax Number: [phone]  Preference of Location (default is surgery center unless specified by Cardiologist or [facility])  Prior Clearance Addressed: No    Is this a general clearance? YES   Anticoags/Antiplatelets: Aspirin  Anticoags/Antiplatelet managed by Cardiology? YES    Outstanding Cardiac Imaging : No  Ablation, Cardioversion, Stent, Cardiac Devices, Catheterization, Watchman: No  TAVR/Valve, Mitral Clip, Watchman (including open heart),: N/A   Recent Cardiac Hospitalization: No            When: N/A  History (cardiac history): Past Medical History[1]        Is this a dental clearance? NO  Ablation, Cardioversion, Watchman, Stents, Cath, Devices within the last 3 months? No   If yes- Send dental wait letter, do not forward to provider for review.     TAVR / Valve, Mitral clip within the last 6 months? No  If yes- Send dental wait letter, do not forward to provider for review.     If completing a general clearance, continue per protocol.           Surgical Clearance Letter Sent: YES   **Scan clearance request letter into [facility].**         [1]   Past Medical History:  Diagnosis Date    Amyloidosis (HCC)     TTR, type pending.     BPH (benign prostatic hyperplasia)     s/p laser encleation of the prostate    Cardiac amyloidosis (HCC)     Chickenpox     Colon polyps     Coronary atherosclerosis of native coronary artery 05/15/2013    Coronary calcium score in the 3000s, negative PET scan and no symptoms so treated medically.     Coronary heart disease     Dyslipidemia 08/23/2016    H/O urethral stricture     s/p surgical repair 1/15/2019, [place]    Kidney cysts     Meniscus tear     Pancreas cyst     Scarlet fever     Sinusitis, chronic     Spinal stenosis, lumbar     SVT (supraventricular tachycardia) (HCC) 10/23/2018     Thrombocytopenia (HCC) 7/24/2017

## 2025-07-09 NOTE — LETTER
July 9, 2025        Andre Martinez        PROCEDURE/SURGERY CLEARANCE FORM    Date: 7/9/2025   Patient Name: Andre Martinez    Dear Surgeon or Proceduralist,        Thank you for your request for cardiac stratification of our mutual patient Andre Martinez 1940. We have reviewed their [facility] records; and to the best of our understanding this patient has not had stenting, ablation, watchman, cardiothoracic surgery or hospitalization for cardiovascular reasons in the past 6 months.  Andre Martinez has been seen within the past 15 months and is considered to have non-modifiable cardiac risk for this low-risk procedure/surgery. They may proceed from a cardiovascular standpoint and may hold their antiplatelet/anticoagulation as briefly as possible. Please have patient resume this medication when hemodynamically stable to do so.     Aspirin or Prasugrel   - hold 7 days prior to procedure/surgery, resume when hemodynamically stable      Clopidrogrel or Ticagrelor  - hold 7 days for all neurological procedures, hold 5 days prior to all other procedure/surgery,  resume when hemodynamically stable     Warfarin - hold 7 days for all neurological procedures, hold 5 days prior to all other procedure/surgery and coordinate with [facility] ([phone]) INR testing and dose management.      Pradaxa/Xarelto/Eliquis/Savesya - hold 1 day prior to procedure for low bleeding risk procedure, 2 days for high bleeding risk procedure, or consider holding 3 days or longer for patients with reduced kidney function (CrCl <30mL/min) or spinal/cranial surgeries/procedures.      If they have a mechanical heart valve, please coordinate with [facility] ([phone]) the proper management of their anticoagulant in the periprocedural or perioperative period.      Some patients have higher risk for cardiovascular complications or holding medication. If our patient has had prior complications of  holding antiplatelet or anticoagulants in the past and we have seen them after these events, we have addressed these concerns with the patient. They are at an unknown degree of increased risk for recurrent complication.  You may hold anticoagulation/antiplatelets for the procedure or surgery if the benefits of the procedure or surgery outweigh this nonmodifiable risk.      If Andre Martinez 1940 has new symptoms of heart failure decompensation, unstable arrythmia, or angina please reach out and we will assess the patient.      If you have other patient-specific concerns, please feel free to reach out to the patient's cardiologist directly at [phone].     Thank you,   [facility]    __ _Electronically signed________  Provider: [facility] M.D.

## 2025-07-10 ENCOUNTER — HOSPITAL ENCOUNTER (OUTPATIENT)
Dept: RADIOLOGY | Facility: MEDICAL CENTER | Age: 85
End: 2025-07-10
Attending: INTERNAL MEDICINE
Payer: MEDICARE

## 2025-07-10 DIAGNOSIS — M81.0 HIGH RISK FOR FRACTURE DUE TO OSTEOPOROSIS BY DEXA SCAN: ICD-10-CM

## 2025-07-10 PROCEDURE — 77080 DXA BONE DENSITY AXIAL: CPT

## 2025-07-14 NOTE — PROGRESS NOTES
He can proceed with the proposed procedure or surgery from a cardiac standpoint, no modifiable cardiovascular risk, no further cardiac testing required, hold antiplatelet as necessary, resume as soon as possible typically when patient is able to take oral medications.    It is my pleasure to participate in the care of Mr. Martinez.  Please do not hesitate to contact me with questions or concerns. [facility] is available 24/7 for consultative services at [phone] in the perioperative period.    Electronically Signed    Bismark Heart MD PhD FACC  Cardiologist [facility]

## 2025-07-15 ENCOUNTER — TELEPHONE (OUTPATIENT)
Dept: CARDIOLOGY | Facility: MEDICAL CENTER | Age: 85
End: 2025-07-15
Payer: MEDICARE

## 2025-07-15 NOTE — TELEPHONE ENCOUNTER
Called pt to schd f/v w/ CW or his APRN. If CW or AB not avail prior to 07/31 please schedule w/ another gen cards APRN.. Pt WCB when he is by his calendar. /cg 07/15/25

## 2025-07-22 ENCOUNTER — NON-PROVIDER VISIT (OUTPATIENT)
Dept: INTERNAL MEDICINE | Facility: IMAGING CENTER | Age: 85
End: 2025-07-22
Payer: MEDICARE

## 2025-07-22 ENCOUNTER — HOSPITAL ENCOUNTER (OUTPATIENT)
Facility: MEDICAL CENTER | Age: 85
End: 2025-07-22
Attending: ORTHOPAEDIC SURGERY
Payer: MEDICARE

## 2025-07-22 DIAGNOSIS — Z01.818 PRE-OP TESTING: Primary | ICD-10-CM

## 2025-07-22 LAB
ANION GAP SERPL CALC-SCNC: 8 MMOL/L (ref 7–16)
APPEARANCE UR: CLEAR
APTT PPP: 28.3 SEC (ref 24.7–36)
BASOPHILS # BLD AUTO: 0 % (ref 0–1.8)
BASOPHILS # BLD: 0 K/UL (ref 0–0.12)
BILIRUB UR QL STRIP.AUTO: NEGATIVE
BUN SERPL-MCNC: 19 MG/DL (ref 8–22)
CALCIUM SERPL-MCNC: 9.5 MG/DL (ref 8.5–10.5)
CHLORIDE SERPL-SCNC: 104 MMOL/L (ref 96–112)
CO2 SERPL-SCNC: 25 MMOL/L (ref 20–33)
COLOR UR: YELLOW
COMMENT NL1176: NORMAL
CREAT SERPL-MCNC: 1.06 MG/DL (ref 0.5–1.4)
EOSINOPHIL # BLD AUTO: 0.31 K/UL (ref 0–0.51)
EOSINOPHIL NFR BLD: 3.4 % (ref 0–6.9)
ERYTHROCYTE [DISTWIDTH] IN BLOOD BY AUTOMATED COUNT: 48.8 FL (ref 35.9–50)
GFR SERPLBLD CREATININE-BSD FMLA CKD-EPI: 69 ML/MIN/1.73 M 2
GLUCOSE SERPL-MCNC: 95 MG/DL (ref 65–99)
GLUCOSE UR STRIP.AUTO-MCNC: NEGATIVE MG/DL
HCT VFR BLD AUTO: 44.1 % (ref 42–52)
HGB BLD-MCNC: 15.4 G/DL (ref 14–18)
INR PPP: 1.15 (ref 0.87–1.13)
KETONES UR STRIP.AUTO-MCNC: NEGATIVE MG/DL
LEUKOCYTE ESTERASE UR QL STRIP.AUTO: NEGATIVE
LYMPHOCYTES # BLD AUTO: 4.91 K/UL (ref 1–4.8)
LYMPHOCYTES NFR BLD: 53.4 % (ref 22–41)
MACROCYTES BLD QL SMEAR: ABNORMAL
MANUAL DIFF BLD: NORMAL
MCH RBC QN AUTO: 33.6 PG (ref 27–33)
MCHC RBC AUTO-ENTMCNC: 34.9 G/DL (ref 32.3–36.5)
MCV RBC AUTO: 96.3 FL (ref 81.4–97.8)
MICRO URNS: NORMAL
MONOCYTES # BLD AUTO: 0.4 K/UL (ref 0–0.85)
MONOCYTES NFR BLD AUTO: 4.2 % (ref 0–13.4)
MORPHOLOGY BLD-IMP: NORMAL
NEUTROPHILS # BLD AUTO: 3.59 K/UL (ref 1.82–7.42)
NEUTROPHILS NFR BLD: 39 % (ref 44–72)
NITRITE UR QL STRIP.AUTO: NEGATIVE
NRBC # BLD AUTO: 0 K/UL
NRBC BLD-RTO: 0 /100 WBC (ref 0–0.2)
OVALOCYTES BLD QL SMEAR: NORMAL
PH UR STRIP.AUTO: 6 [PH] (ref 5–8)
PLATELET # BLD AUTO: 118 K/UL (ref 164–446)
PLATELET BLD QL SMEAR: NORMAL
PMV BLD AUTO: 12.6 FL (ref 9–12.9)
POIKILOCYTOSIS BLD QL SMEAR: NORMAL
POTASSIUM SERPL-SCNC: 4.8 MMOL/L (ref 3.6–5.5)
PROT UR QL STRIP: NEGATIVE MG/DL
PROTHROMBIN TIME: 14.8 SEC (ref 12–14.6)
RBC # BLD AUTO: 4.58 M/UL (ref 4.7–6.1)
RBC BLD AUTO: PRESENT
RBC UR QL AUTO: NEGATIVE
SMUDGE CELLS BLD QL SMEAR: NORMAL
SODIUM SERPL-SCNC: 137 MMOL/L (ref 135–145)
SP GR UR STRIP.AUTO: 1.02
UROBILINOGEN UR STRIP.AUTO-MCNC: 0.2 EU/DL
WBC # BLD AUTO: 9.2 K/UL (ref 4.8–10.8)

## 2025-07-22 PROCEDURE — 85610 PROTHROMBIN TIME: CPT

## 2025-07-22 PROCEDURE — 85027 COMPLETE CBC AUTOMATED: CPT

## 2025-07-22 PROCEDURE — 80048 BASIC METABOLIC PNL TOTAL CA: CPT

## 2025-07-22 PROCEDURE — 85730 THROMBOPLASTIN TIME PARTIAL: CPT

## 2025-07-22 PROCEDURE — 81003 URINALYSIS AUTO W/O SCOPE: CPT

## 2025-07-22 PROCEDURE — 85007 BL SMEAR W/DIFF WBC COUNT: CPT

## 2025-07-23 ENCOUNTER — PATIENT MESSAGE (OUTPATIENT)
Dept: CARDIOLOGY | Facility: MEDICAL CENTER | Age: 85
End: 2025-07-23

## 2025-07-23 ENCOUNTER — OFFICE VISIT (OUTPATIENT)
Dept: CARDIOLOGY | Facility: MEDICAL CENTER | Age: 85
End: 2025-07-23
Payer: MEDICARE

## 2025-07-23 VITALS
SYSTOLIC BLOOD PRESSURE: 122 MMHG | OXYGEN SATURATION: 98 % | RESPIRATION RATE: 18 BRPM | BODY MASS INDEX: 23.4 KG/M2 | WEIGHT: 158 LBS | HEART RATE: 60 BPM | HEIGHT: 69 IN | DIASTOLIC BLOOD PRESSURE: 76 MMHG

## 2025-07-23 DIAGNOSIS — E78.5 DYSLIPIDEMIA: Primary | ICD-10-CM

## 2025-07-23 DIAGNOSIS — I50.32 CHRONIC DIASTOLIC CONGESTIVE HEART FAILURE (HCC): ICD-10-CM

## 2025-07-23 DIAGNOSIS — Z01.810 PREOP CARDIOVASCULAR EXAM: ICD-10-CM

## 2025-07-23 DIAGNOSIS — I43 CARDIAC AMYLOIDOSIS (HCC): ICD-10-CM

## 2025-07-23 DIAGNOSIS — E85.4 CARDIAC AMYLOIDOSIS (HCC): ICD-10-CM

## 2025-07-23 DIAGNOSIS — I47.10 SVT (SUPRAVENTRICULAR TACHYCARDIA) (HCC): ICD-10-CM

## 2025-07-23 DIAGNOSIS — R93.1 AGATSTON CAC SCORE, >400: ICD-10-CM

## 2025-07-23 PROCEDURE — 99212 OFFICE O/P EST SF 10 MIN: CPT

## 2025-07-23 ASSESSMENT — ENCOUNTER SYMPTOMS
PALPITATIONS: 0
SYNCOPE: 0
PND: 0
DYSPNEA ON EXERTION: 0
SHORTNESS OF BREATH: 0
ORTHOPNEA: 0
LIGHT-HEADEDNESS: 0
NEAR-SYNCOPE: 0
DIZZINESS: 0
HEADACHES: 0
BACK PAIN: 1

## 2025-07-23 ASSESSMENT — FIBROSIS 4 INDEX: FIB4 SCORE: 4.97

## 2025-07-23 NOTE — PATIENT COMMUNICATION
Sent information to [facility] pool for clearance    CARLEEN Valera. to Me       7/23/25 11:15 AM  Yes, we can do that, I sent you a message and I think I also faxed my note at the end of my visit already. However that works.     thanks  Me to СЕРГЕЙ Valera    7/23/25 11:10 AM  - would you like this sent to the [facility] for clearance?

## 2025-07-23 NOTE — PROGRESS NOTES
Chief Complaint   Patient presents with    CHF (Diastolic)     Chronic diastolic congestive heart failure (HCC)      Supraventricular Tachycardia (SVT)          Subjective:   Andre Martinez is a 85 y.o. male who presents today for follow-up.     Patient of Dr. Heart.  Current medical problems include presumed coronary atherosclerosis related to heavily calcified coronary arteries in the past though with negative serial stress echocardiograms, and asymptomatic short runs of SVT and wide-complex tachycardia and TTTR amyloidosis. Their last clinic visit was 3/13/2025 with Dr. Heart. He was also being followed by Dr. Anne at [facility] for his amyloidosis.     Today's visit:  Patient reports that he is doing overall well from a cardiac perspective.   He has no chest pain, shortness of breath, edema, orthopnea, PND, dizziness/lightheadedness, or palpitations. His biggest concern today is he is having back pain. He had back surgery in January which he has recovered from but now is going to have a L4-L5 fusion and is needing cardiac clearance. Prior to his back starting to hurt he was walking 5-6 miles a day without any shortness of breath or exertional symptoms. He then transitioned to his stationary bike when walking was painful. He is taking all his medications as prescribed without any noted side effects.     Cardiovascular Risk Factors:  1. Smoking status: Never  2. Type II Diabetes Mellitus: No   Lab Results   Component Value Date/Time    HBA1C 6.2 (H) 07/02/2025 08:20 AM    HBA1C 5.7 (H) 07/24/2024 08:15 AM     3. Hypertension: No  4. Dyslipidemia: Yes   Cholesterol,Tot   Date Value Ref Range Status   07/02/2025 126 100 - 199 mg/dL Final     LDL   Date Value Ref Range Status   07/02/2025 55 <100 mg/dL Final     HDL   Date Value Ref Range Status   07/02/2025 63 >=40 mg/dL Final     Triglycerides   Date Value Ref Range Status   07/02/2025 41 0 - 149 mg/dL Final       Past Medical History[1]      Family  History   Problem Relation Age of Onset    Heart Attack Father 38        doctor in 1946 diagnosed him with MI at home,  before going to the hospital    Psychiatric Illness Mother         Alzheimers    Hypertension Brother     Stroke Brother     Seizures Brother     Sleep Apnea Neg Hx          Social History[2]      Allergies[3]      Current Outpatient Medications   Medication Sig    terbinafine (LAMISIL) 250 MG Tab     atorvastatin (LIPITOR) 40 MG Tab TAKE 1 TABLET BY MOUTH EVERYDAY AT BEDTIME    celecoxib (CELEBREX) 200 MG Cap TAKE 1 CAPSULE BY MOUTH 1 TIME A DAY AS NEEDED FOR MODERATE PAIN    VYNDAMAX 61 MG Cap TAKE 1 CAPSULE BY MOUTH DAILY    torsemide (DEMADEX) 5 MG Tab TAKE 1 TABLET BY MOUTH EVERY DAY    B Complex Cap Take 1 Capsule by mouth every day.    Azelastine (ASTELIN) 137 MCG/SPRAY Solution Administer 1 Spray into affected nostril(S) 2 times a day.    Vutrisiran Sodium 25 MG/0.5ML Solution Prefilled Syringe Inject 25 mg under the skin.    PREVIDENT 5000 BOOSTER PLUS 1.1 % Paste USE AS DIRECTED    vitamin D3 (CHOLECALCIFEROL) 1000 Unit (25 mcg) Tab Take 2 Tablets by mouth every day.    aspirin 81 MG EC tablet Take 81 mg by mouth.    vitamin A 3 mg (79661 units) capsule Take 10,000 Units by mouth every day.    Glucosamine-Chondroitin 750-600 MG Tab Take 1 Tab by mouth 2 Times a Day.    Omega-3 Fatty Acids (SALMON OIL PO) Take 2 Caps by mouth every day.    coenzyme Q-10 30 MG capsule Take 30 mg by mouth every day. Twice a day         Review of Systems   Constitutional: Negative for malaise/fatigue.   Cardiovascular:  Negative for chest pain, dyspnea on exertion, leg swelling, near-syncope, orthopnea, palpitations, paroxysmal nocturnal dyspnea and syncope.   Respiratory:  Negative for shortness of breath.    Musculoskeletal:  Positive for back pain.   Neurological:  Negative for dizziness, headaches and light-headedness.           Objective:   /76 (BP Location: Left arm, Patient Position: Sitting,  "BP Cuff Size: Adult)   Pulse 60   Resp 18   Ht 1.753 m (5' 9\")   Wt 71.7 kg (158 lb)   SpO2 98%  Body mass index is 23.33 kg/m².         Physical Exam  Vitals reviewed.   Constitutional:       General: He is not in acute distress.     Appearance: Normal appearance.   HENT:      Head: Normocephalic and atraumatic.   Cardiovascular:      Rate and Rhythm: Normal rate and regular rhythm.      Pulses: Normal pulses.      Heart sounds: No murmur heard.  Pulmonary:      Effort: Pulmonary effort is normal. No respiratory distress.      Breath sounds: Normal breath sounds.   Musculoskeletal:      Right lower leg: No edema.      Left lower leg: No edema.   Neurological:      Mental Status: He is alert and oriented to person, place, and time.      Gait: Gait normal.   Psychiatric:         Behavior: Behavior normal.             Lab Results   Component Value Date/Time    SODIUM 137 07/22/2025 08:05 AM    POTASSIUM 4.8 07/22/2025 08:05 AM    CHLORIDE 104 07/22/2025 08:05 AM    CO2 25 07/22/2025 08:05 AM    GLUCOSE 95 07/22/2025 08:05 AM    BUN 19 07/22/2025 08:05 AM    CREATININE 1.06 07/22/2025 08:05 AM      Lab Results   Component Value Date/Time    WBC 9.2 07/22/2025 08:05 AM    RBC 4.58 (L) 07/22/2025 08:05 AM    HEMOGLOBIN 15.4 07/22/2025 08:05 AM    HEMATOCRIT 44.1 07/22/2025 08:05 AM    MCV 96.3 07/22/2025 08:05 AM    MCH 33.6 (H) 07/22/2025 08:05 AM    MCHC 34.9 07/22/2025 08:05 AM    MPV 12.6 07/22/2025 08:05 AM    NEUTSPOLYS 39.00 (L) 07/22/2025 08:05 AM    LYMPHOCYTES 53.40 (H) 07/22/2025 08:05 AM    MONOCYTES 4.20 07/22/2025 08:05 AM    EOSINOPHILS 3.40 07/22/2025 08:05 AM    BASOPHILS 0.00 07/22/2025 08:05 AM    ANISOCYTOSIS 1+ 07/02/2025 08:20 AM      Lab Results   Component Value Date/Time    CHOLSTRLTOT 126 07/02/2025 08:20 AM    LDL 55 07/02/2025 08:20 AM    HDL 63 07/02/2025 08:20 AM    TRIGLYCERIDE 41 07/02/2025 08:20 AM       No results found for: \"TROPONINT\"  Lab Results   Component Value Date/Time    " NTPROBNP 484 (H) 02/22/2022 0840     Assessment:   1. Cardiac amyloidosis (HCC)    2. Dyslipidemia    3. Agatston CAC score, >400    4. SVT (supraventricular tachycardia) (HCC)    5. Chronic diastolic congestive heart failure (HCC)    6. Preop cardiovascular exam        Medical Decision Making:  Today's Assessment / Plan:   Cardiac amyloidosis  -patient stable and followed at [facility]   -continue with follow up per recommendations    Hyperlipidemia  CAC score > 400  -Most recent LDL 55  -Continue continue atorvastatin 40 mg every evening  -Goal of less than 70  -Check lipid panel in 12 months    SVT  -stable, denies palpitations    HFpEF, Stage B, Class II, LVEF 62%: mild left lower extremity edema, no JVD, no ascites and lungs clear on auscultation  -Diuretic continue torsemide 5 mg every day  -Reinforced s/sx of worsening heart failure with patient and weight monitoring. Pt verbalizes understanding. Pt to call office or RTC if present.     Preoperative cardiovascular evaluation  -Patient is moderate risk of cardiovascular complications for low to moderate risk surgery or procedures.  He is medically optimized based on my last in person assessment, and if he symptoms have not changed, surgery should proceed without further cardiac work-up. Patient can complete > 4 mets. RCRI score 1. Stop aspirin 7 days prior to procedure and start following day pending bleeding or surgeon recommendation.    Return in about 6 months (around 1/23/2026) for Dr. Heart.  Sooner if problems.    СЕРГЕЙ Valera          [1]   Past Medical History:  Diagnosis Date    Amyloidosis (HCC)     TTR, type pending.     BPH (benign prostatic hyperplasia)     s/p laser encleation of the prostate    Cardiac amyloidosis (HCC)     Chickenpox     Colon polyps     Coronary atherosclerosis of native coronary artery 05/15/2013    Coronary calcium score in the 3000s, negative PET scan and no symptoms so treated medically.     Coronary  heart disease     Dyslipidemia 08/23/2016    H/O urethral stricture     s/p surgical repair 1/15/2019, [place]    Kidney cysts     Meniscus tear     Pancreas cyst     Scarlet fever     Sinusitis, chronic     Spinal stenosis, lumbar     SVT (supraventricular tachycardia) ([facility]) 10/23/2018    Thrombocytopenia ([facility]) 7/24/2017   [2]   Social History  Tobacco Use    Smoking status: Never    Smokeless tobacco: Never   Vaping Use    Vaping status: Never Used   Substance Use Topics    Alcohol use: Not Currently     Comment: Social     Drug use: No   [3]   Allergies  Allergen Reactions    Avelox [Moxifloxacin Hcl] Rash     Chest, face, genitals    Demerol Unspecified     Vasovagal reaction (drop in BP & pulse)    Levofloxacin     Azithromycin Itching    Bactrim [Sulfamethoxazole W-Trimethoprim] Rash     Itchy truncal rash after 5 doses    Cefdinir Rash     Rash chest, under arm and scalp      Ceftin [Cefuroxime Axetil] Rash     Chest, under arm & scalp    Oxycodone Itching     Full body itching    Scopolamine Unspecified     disorientation

## 2025-07-23 NOTE — LETTER
[facility] [place] [facility]   [address] , [address]  [place] [address]  Phone: [phone]  Fax: [phone]              Andre Martinez  1940    Encounter Date: 7/23/2025    СЕРГЕЙ Valera          PROGRESS NOTE:  Chief Complaint   Patient presents with   • CHF (Diastolic)     Chronic diastolic congestive heart failure (HCC)     • Supraventricular Tachycardia (SVT)          Subjective:   Andre Martinez is a 85 y.o. male who presents today for follow-up.     Patient of Dr. Heart.  Current medical problems include presumed coronary atherosclerosis related to heavily calcified coronary arteries in the past though with negative serial stress echocardiograms, and asymptomatic short runs of SVT and wide-complex tachycardia and TTTR amyloidosis. Their last clinic visit was 3/13/2025 with Dr. Heart. He was also being followed by Dr. Anne at [facility] for his amyloidosis.     Today's visit:  Patient reports that he is doing overall well from a cardiac perspective.   He has no chest pain, shortness of breath, edema, orthopnea, PND, dizziness/lightheadedness, or palpitations. His biggest concern today is he is having back pain. He had back surgery in January which he has recovered from but now is going to have a L4-L5 fusion and is needing cardiac clearance. Prior to his back starting to hurt he was walking 5-6 miles a day without any shortness of breath or exertional symptoms. He then transitioned to his stationary bike when walking was painful. He is taking all his medications as prescribed without any noted side effects.     Cardiovascular Risk Factors:  1. Smoking status: Never  2. Type II Diabetes Mellitus: No   Lab Results   Component Value Date/Time    HBA1C 6.2 (H) 07/02/2025 08:20 AM    HBA1C 5.7 (H) 07/24/2024 08:15 AM     3. Hypertension: No  4. Dyslipidemia: Yes   Cholesterol,Tot   Date Value Ref Range Status   07/02/2025 126 100 - 199 mg/dL Final     LDL    Date Value Ref Range Status   2025 55 <100 mg/dL Final     HDL   Date Value Ref Range Status   2025 63 >=40 mg/dL Final     Triglycerides   Date Value Ref Range Status   2025 41 0 - 149 mg/dL Final       Past Medical History[1]      Family History   Problem Relation Age of Onset   • Heart Attack Father 38        doctor in 1946 diagnosed him with MI at home,  before going to the hospital   • Psychiatric Illness Mother         Alzheimers   • Hypertension Brother    • Stroke Brother    • Seizures Brother    • Sleep Apnea Neg Hx          Social History[2]      Allergies[3]      Current Outpatient Medications   Medication Sig   • terbinafine (LAMISIL) 250 MG Tab    • atorvastatin (LIPITOR) 40 MG Tab TAKE 1 TABLET BY MOUTH EVERYDAY AT BEDTIME   • celecoxib (CELEBREX) 200 MG Cap TAKE 1 CAPSULE BY MOUTH 1 TIME A DAY AS NEEDED FOR MODERATE PAIN   • VYNDAMAX 61 MG Cap TAKE 1 CAPSULE BY MOUTH DAILY   • torsemide (DEMADEX) 5 MG Tab TAKE 1 TABLET BY MOUTH EVERY DAY   • B Complex Cap Take 1 Capsule by mouth every day.   • Azelastine (ASTELIN) 137 MCG/SPRAY Solution Administer 1 Spray into affected nostril(S) 2 times a day.   • Vutrisiran Sodium 25 MG/0.5ML Solution Prefilled Syringe Inject 25 mg under the skin.   • PREVIDENT 5000 BOOSTER PLUS 1.1 % Paste USE AS DIRECTED   • vitamin D3 (CHOLECALCIFEROL) 1000 Unit (25 mcg) Tab Take 2 Tablets by mouth every day.   • aspirin 81 MG EC tablet Take 81 mg by mouth.   • vitamin A 3 mg (44768 units) capsule Take 10,000 Units by mouth every day.   • Glucosamine-Chondroitin 750-600 MG Tab Take 1 Tab by mouth 2 Times a Day.   • Omega-3 Fatty Acids (SALMON OIL PO) Take 2 Caps by mouth every day.   • coenzyme Q-10 30 MG capsule Take 30 mg by mouth every day. Twice a day         Review of Systems   Constitutional: Negative for malaise/fatigue.   Cardiovascular:  Negative for chest pain, dyspnea on exertion, leg swelling, near-syncope, orthopnea, palpitations, paroxysmal  "nocturnal dyspnea and syncope.   Respiratory:  Negative for shortness of breath.    Musculoskeletal:  Positive for back pain.   Neurological:  Negative for dizziness, headaches and light-headedness.           Objective:   /76 (BP Location: Left arm, Patient Position: Sitting, BP Cuff Size: Adult)   Pulse 60   Resp 18   Ht 1.753 m (5' 9\")   Wt 71.7 kg (158 lb)   SpO2 98%  Body mass index is 23.33 kg/m².         Physical Exam  Vitals reviewed.   Constitutional:       General: He is not in acute distress.     Appearance: Normal appearance.   HENT:      Head: Normocephalic and atraumatic.   Cardiovascular:      Rate and Rhythm: Normal rate and regular rhythm.      Pulses: Normal pulses.      Heart sounds: No murmur heard.  Pulmonary:      Effort: Pulmonary effort is normal. No respiratory distress.      Breath sounds: Normal breath sounds.   Musculoskeletal:      Right lower leg: No edema.      Left lower leg: No edema.   Neurological:      Mental Status: He is alert and oriented to person, place, and time.      Gait: Gait normal.   Psychiatric:         Behavior: Behavior normal.             Lab Results   Component Value Date/Time    SODIUM 137 07/22/2025 08:05 AM    POTASSIUM 4.8 07/22/2025 08:05 AM    CHLORIDE 104 07/22/2025 08:05 AM    CO2 25 07/22/2025 08:05 AM    GLUCOSE 95 07/22/2025 08:05 AM    BUN 19 07/22/2025 08:05 AM    CREATININE 1.06 07/22/2025 08:05 AM      Lab Results   Component Value Date/Time    WBC 9.2 07/22/2025 08:05 AM    RBC 4.58 (L) 07/22/2025 08:05 AM    HEMOGLOBIN 15.4 07/22/2025 08:05 AM    HEMATOCRIT 44.1 07/22/2025 08:05 AM    MCV 96.3 07/22/2025 08:05 AM    MCH 33.6 (H) 07/22/2025 08:05 AM    MCHC 34.9 07/22/2025 08:05 AM    MPV 12.6 07/22/2025 08:05 AM    NEUTSPOLYS 39.00 (L) 07/22/2025 08:05 AM    LYMPHOCYTES 53.40 (H) 07/22/2025 08:05 AM    MONOCYTES 4.20 07/22/2025 08:05 AM    EOSINOPHILS 3.40 07/22/2025 08:05 AM    BASOPHILS 0.00 07/22/2025 08:05 AM    ANISOCYTOSIS 1+ " "07/02/2025 08:20 AM      Lab Results   Component Value Date/Time    CHOLSTRLTOT 126 07/02/2025 08:20 AM    LDL 55 07/02/2025 08:20 AM    HDL 63 07/02/2025 08:20 AM    TRIGLYCERIDE 41 07/02/2025 08:20 AM       No results found for: \"TROPONINT\"  Lab Results   Component Value Date/Time    NTPROBNP 484 (H) 02/22/2022 0840     Assessment:   1. Cardiac amyloidosis (HCC)    2. Dyslipidemia    3. Agatston CAC score, >400    4. SVT (supraventricular tachycardia) (HCC)    5. Chronic diastolic congestive heart failure (HCC)    6. Preop cardiovascular exam        Medical Decision Making:  Today's Assessment / Plan:   Cardiac amyloidosis  -patient stable and followed at [facility]   -continue with follow up per recommendations    Hyperlipidemia  CAC score > 400  -Most recent LDL 55  -Continue continue atorvastatin 40 mg every evening  -Goal of less than 70  -Check lipid panel in 12 months    SVT  -stable, denies palpitations    HFpEF, Stage B, Class II, LVEF 62%: mild left lower extremity edema, no JVD, no ascites and lungs clear on auscultation  -Diuretic continue torsemide 5 mg every day  -Reinforced s/sx of worsening heart failure with patient and weight monitoring. Pt verbalizes understanding. Pt to call office or RTC if present.     Preoperative cardiovascular evaluation  -Patient is moderate risk of cardiovascular complications for low to moderate risk surgery or procedures.  He is medically optimized based on my last in person assessment, and if he symptoms have not changed, surgery should proceed without further cardiac work-up. Patient can complete > 4 mets. RCRI score 1.    Return in about 6 months (around 1/23/2026) for Dr. Heart.  Sooner if problems.    СЕРГЕЙ Valera          [1]   Past Medical History:  Diagnosis Date   • Amyloidosis (HCC)     TTR, type pending.    • BPH (benign prostatic hyperplasia)     s/p laser encleation of the prostate   • Cardiac amyloidosis (HCC)    • Chickenpox    • Colon " polyps    • Coronary atherosclerosis of native coronary artery 05/15/2013    Coronary calcium score in the 3000s, negative PET scan and no symptoms so treated medically.    • Coronary heart disease    • Dyslipidemia 08/23/2016   • H/O urethral stricture     s/p surgical repair 1/15/2019, [place]   • Kidney cysts    • Meniscus tear    • Pancreas cyst    • Scarlet fever    • Sinusitis, chronic    • Spinal stenosis, lumbar    • SVT (supraventricular tachycardia) ([facility]) 10/23/2018   • Thrombocytopenia ([facility]) 7/24/2017   [2]   Social History  Tobacco Use   • Smoking status: Never   • Smokeless tobacco: Never   Vaping Use   • Vaping status: Never Used   Substance Use Topics   • Alcohol use: Not Currently     Comment: Social    • Drug use: No   [3]   Allergies  Allergen Reactions   • Avelox [Moxifloxacin Hcl] Rash     Chest, face, genitals   • Demerol Unspecified     Vasovagal reaction (drop in BP & pulse)   • Levofloxacin    • Azithromycin Itching   • Bactrim [Sulfamethoxazole W-Trimethoprim] Rash     Itchy truncal rash after 5 doses   • Cefdinir Rash     Rash chest, under arm and scalp     • Ceftin [Cefuroxime Axetil] Rash     Chest, under arm & scalp   • Oxycodone Itching     Full body itching   • Scopolamine Unspecified     disorientation         Sebastian Vera M.D.  [address]  [facility] [address]  Via Fax: [phone]

## 2025-07-23 NOTE — Clinical Note
I think I sent it correctly but can you please ensure clearance is sent for his surgery to Dr. Sebastian Vera fax number [phone]. Corrdinator is Pat Mon, number [phone].  thanks

## 2025-07-25 ENCOUNTER — NON-PROVIDER VISIT (OUTPATIENT)
Dept: INTERNAL MEDICINE | Facility: IMAGING CENTER | Age: 85
End: 2025-07-25
Payer: MEDICARE

## 2025-07-25 DIAGNOSIS — H61.23 BILATERAL IMPACTED CERUMEN: Primary | ICD-10-CM

## 2025-07-25 NOTE — PROGRESS NOTES
Bilateral ear canals successfully lavaged with warm water & H2O2. Canals & TM's WNL.  Patient tolerated procedure without difficulty.

## 2025-08-05 ENCOUNTER — APPOINTMENT (OUTPATIENT)
Dept: URBAN - METROPOLITAN AREA CLINIC 6 | Facility: CLINIC | Age: 85
Setting detail: DERMATOLOGY
End: 2025-08-05

## 2025-08-05 DIAGNOSIS — L82.1 OTHER SEBORRHEIC KERATOSIS: ICD-10-CM

## 2025-08-05 DIAGNOSIS — L82.0 INFLAMED SEBORRHEIC KERATOSIS: ICD-10-CM

## 2025-08-05 PROBLEM — D48.5 NEOPLASM OF UNCERTAIN BEHAVIOR OF SKIN: Status: ACTIVE | Noted: 2025-08-05

## 2025-08-05 PROCEDURE — ? COUNSELING

## 2025-08-05 PROCEDURE — ? ADDITIONAL NOTES

## 2025-08-05 ASSESSMENT — LOCATION DETAILED DESCRIPTION DERM
LOCATION DETAILED: LEFT MEDIAL KNEE
LOCATION DETAILED: RIGHT MID-UPPER BACK
LOCATION DETAILED: LEFT ANTERIOR SHOULDER

## 2025-08-05 ASSESSMENT — LOCATION ZONE DERM
LOCATION ZONE: TRUNK
LOCATION ZONE: LEG
LOCATION ZONE: ARM

## 2025-08-05 ASSESSMENT — LOCATION SIMPLE DESCRIPTION DERM
LOCATION SIMPLE: LEFT KNEE
LOCATION SIMPLE: LEFT SHOULDER
LOCATION SIMPLE: RIGHT UPPER BACK

## 2025-08-12 ENCOUNTER — APPOINTMENT (OUTPATIENT)
Dept: UROLOGY | Facility: MEDICAL CENTER | Age: 85
End: 2025-08-12
Payer: MEDICARE

## 2025-08-14 RX ORDER — POVIDONE-IODINE 7.5 %
1 SOLUTION, NON-ORAL TOPICAL ONCE
Refills: 0 | Status: COMPLETED | OUTPATIENT
Start: 2025-08-18 | End: 2025-08-18

## 2025-08-15 VITALS
WEIGHT: 153.88 LBS | HEART RATE: 60 BPM | DIASTOLIC BLOOD PRESSURE: 72 MMHG | SYSTOLIC BLOOD PRESSURE: 136 MMHG | TEMPERATURE: 97 F | HEIGHT: 69 IN | RESPIRATION RATE: 16 BRPM

## 2025-08-18 ENCOUNTER — INPATIENT (INPATIENT)
Facility: HOSPITAL | Age: 85
LOS: 0 days | Discharge: HOME | End: 2025-08-19
Attending: ORTHOPAEDIC SURGERY | Admitting: ORTHOPAEDIC SURGERY
Payer: COMMERCIAL

## 2025-08-18 DIAGNOSIS — Z98.890 OTHER SPECIFIED POSTPROCEDURAL STATES: Chronic | ICD-10-CM

## 2025-08-18 DIAGNOSIS — Z96.659 PRESENCE OF UNSPECIFIED ARTIFICIAL KNEE JOINT: Chronic | ICD-10-CM

## 2025-08-18 LAB
BLD GP AB SCN SERPL QL: NEGATIVE — SIGNIFICANT CHANGE UP
RH IG SCN BLD-IMP: POSITIVE — SIGNIFICANT CHANGE UP

## 2025-08-18 PROCEDURE — 86900 BLOOD TYPING SEROLOGIC ABO: CPT

## 2025-08-18 PROCEDURE — 86901 BLOOD TYPING SEROLOGIC RH(D): CPT

## 2025-08-18 PROCEDURE — 86850 RBC ANTIBODY SCREEN: CPT

## 2025-08-18 DEVICE — SURGIFLO HEMOSTATIC MATRIX KIT: Type: IMPLANTABLE DEVICE | Status: FUNCTIONAL

## 2025-08-18 DEVICE — IMPLANTABLE DEVICE: Type: IMPLANTABLE DEVICE | Status: FUNCTIONAL

## 2025-08-18 DEVICE — GRAFT I-FACTOR PUTTY 1CC: Type: IMPLANTABLE DEVICE | Status: FUNCTIONAL

## 2025-08-18 DEVICE — SCREW LOCKG OPEN TULIP RELINE 5.5MM: Type: IMPLANTABLE DEVICE | Status: FUNCTIONAL

## 2025-08-18 DEVICE — GRAFT BONE INFUSE KIT SM: Type: IMPLANTABLE DEVICE | Status: FUNCTIONAL

## 2025-08-18 RX ORDER — TRAMADOL HYDROCHLORIDE 50 MG/1
50 TABLET, FILM COATED ORAL EVERY 4 HOURS
Refills: 0 | Status: DISCONTINUED | OUTPATIENT
Start: 2025-08-18 | End: 2025-08-19

## 2025-08-18 RX ORDER — ATORVASTATIN CALCIUM 80 MG/1
1 TABLET, FILM COATED ORAL
Refills: 0 | DISCHARGE

## 2025-08-18 RX ORDER — TRAMADOL HYDROCHLORIDE 50 MG/1
25 TABLET, FILM COATED ORAL EVERY 4 HOURS
Refills: 0 | Status: DISCONTINUED | OUTPATIENT
Start: 2025-08-18 | End: 2025-08-19

## 2025-08-18 RX ORDER — ATORVASTATIN CALCIUM 80 MG/1
40 TABLET, FILM COATED ORAL AT BEDTIME
Refills: 0 | Status: DISCONTINUED | OUTPATIENT
Start: 2025-08-18 | End: 2025-08-19

## 2025-08-18 RX ORDER — METHOCARBAMOL 500 MG/1
500 TABLET, FILM COATED ORAL EVERY 8 HOURS
Refills: 0 | Status: DISCONTINUED | OUTPATIENT
Start: 2025-08-18 | End: 2025-08-19

## 2025-08-18 RX ORDER — ACETAMINOPHEN 500 MG/5ML
1000 LIQUID (ML) ORAL ONCE
Refills: 0 | Status: COMPLETED | OUTPATIENT
Start: 2025-08-18 | End: 2025-08-18

## 2025-08-18 RX ORDER — ASPIRIN 325 MG
81 TABLET ORAL DAILY
Refills: 0 | Status: DISCONTINUED | OUTPATIENT
Start: 2025-08-18 | End: 2025-08-19

## 2025-08-18 RX ORDER — ONDANSETRON HCL/PF 4 MG/2 ML
4 VIAL (ML) INJECTION EVERY 6 HOURS
Refills: 0 | Status: DISCONTINUED | OUTPATIENT
Start: 2025-08-18 | End: 2025-08-19

## 2025-08-18 RX ORDER — VUTRISIRAN 25 MG/.5ML
25 INJECTION SUBCUTANEOUS
Refills: 0 | DISCHARGE

## 2025-08-18 RX ORDER — HYDROMORPHONE/SOD CHLOR,ISO/PF 2 MG/10 ML
0.2 SYRINGE (ML) INJECTION EVERY 4 HOURS
Refills: 0 | Status: DISCONTINUED | OUTPATIENT
Start: 2025-08-18 | End: 2025-08-19

## 2025-08-18 RX ORDER — ASPIRIN 325 MG
0 TABLET ORAL
Refills: 0 | DISCHARGE

## 2025-08-18 RX ORDER — ACETAMINOPHEN 500 MG/5ML
1000 LIQUID (ML) ORAL EVERY 8 HOURS
Refills: 0 | Status: DISCONTINUED | OUTPATIENT
Start: 2025-08-18 | End: 2025-08-19

## 2025-08-18 RX ORDER — APREPITANT 40 MG/1
40 CAPSULE ORAL ONCE
Refills: 0 | Status: COMPLETED | OUTPATIENT
Start: 2025-08-18 | End: 2025-08-18

## 2025-08-18 RX ORDER — TETRACAINE HYDROCHLORIDE 5 MG/ML
1 SOLUTION OPHTHALMIC ONCE
Refills: 0 | Status: COMPLETED | OUTPATIENT
Start: 2025-08-18 | End: 2025-08-18

## 2025-08-18 RX ORDER — TAFAMIDIS MEGLUMINE 20 MG/1
1 CAPSULE, LIQUID FILLED ORAL
Refills: 0 | DISCHARGE

## 2025-08-18 RX ORDER — TORSEMIDE 10 MG
1 TABLET ORAL
Refills: 0 | DISCHARGE

## 2025-08-18 RX ORDER — CLINDAMYCIN PHOSPHATE 150 MG/ML
900 VIAL (ML) INJECTION EVERY 8 HOURS
Refills: 0 | Status: COMPLETED | OUTPATIENT
Start: 2025-08-18 | End: 2025-08-19

## 2025-08-18 RX ADMIN — Medication 1 APPLICATION(S): at 07:08

## 2025-08-18 RX ADMIN — Medication 0.2 MILLIGRAM(S): at 13:51

## 2025-08-18 RX ADMIN — Medication 100 MILLIGRAM(S): at 16:20

## 2025-08-18 RX ADMIN — Medication 0.2 MILLIGRAM(S): at 13:31

## 2025-08-18 RX ADMIN — METHOCARBAMOL 500 MILLIGRAM(S): 500 TABLET, FILM COATED ORAL at 22:05

## 2025-08-18 RX ADMIN — TRAMADOL HYDROCHLORIDE 50 MILLIGRAM(S): 50 TABLET, FILM COATED ORAL at 14:00

## 2025-08-18 RX ADMIN — TETRACAINE HYDROCHLORIDE 1 DROP(S): 5 SOLUTION OPHTHALMIC at 14:27

## 2025-08-18 RX ADMIN — TRAMADOL HYDROCHLORIDE 50 MILLIGRAM(S): 50 TABLET, FILM COATED ORAL at 12:56

## 2025-08-18 RX ADMIN — APREPITANT 40 MILLIGRAM(S): 40 CAPSULE ORAL at 07:05

## 2025-08-18 RX ADMIN — ATORVASTATIN CALCIUM 40 MILLIGRAM(S): 80 TABLET, FILM COATED ORAL at 22:05

## 2025-08-18 RX ADMIN — METHOCARBAMOL 500 MILLIGRAM(S): 500 TABLET, FILM COATED ORAL at 13:35

## 2025-08-18 RX ADMIN — Medication 81 MILLIGRAM(S): at 22:06

## 2025-08-18 RX ADMIN — Medication 1000 MILLIGRAM(S): at 22:05

## 2025-08-18 RX ADMIN — Medication 1 APPLICATION(S): at 07:06

## 2025-08-18 RX ADMIN — Medication 1 APPLICATION(S): at 19:14

## 2025-08-18 RX ADMIN — TRAMADOL HYDROCHLORIDE 25 MILLIGRAM(S): 50 TABLET, FILM COATED ORAL at 23:00

## 2025-08-18 RX ADMIN — Medication 400 MILLIGRAM(S): at 12:45

## 2025-08-18 RX ADMIN — Medication 1000 MILLIGRAM(S): at 07:04

## 2025-08-18 RX ADMIN — TRAMADOL HYDROCHLORIDE 25 MILLIGRAM(S): 50 TABLET, FILM COATED ORAL at 22:05

## 2025-08-19 ENCOUNTER — TRANSCRIPTION ENCOUNTER (OUTPATIENT)
Age: 85
End: 2025-08-19

## 2025-08-19 VITALS — DIASTOLIC BLOOD PRESSURE: 66 MMHG | HEART RATE: 62 BPM | SYSTOLIC BLOOD PRESSURE: 148 MMHG | OXYGEN SATURATION: 95 %

## 2025-08-19 DIAGNOSIS — E78.5 HYPERLIPIDEMIA, UNSPECIFIED: ICD-10-CM

## 2025-08-19 DIAGNOSIS — E85.4 ORGAN-LIMITED AMYLOIDOSIS: ICD-10-CM

## 2025-08-19 LAB
ANION GAP SERPL CALC-SCNC: 5 MMOL/L — SIGNIFICANT CHANGE UP (ref 5–17)
ANION GAP SERPL CALC-SCNC: 7 MMOL/L — SIGNIFICANT CHANGE UP (ref 5–17)
BASOPHILS # BLD AUTO: 0.03 K/UL — SIGNIFICANT CHANGE UP (ref 0–0.2)
BASOPHILS NFR BLD AUTO: 0.3 % — SIGNIFICANT CHANGE UP (ref 0–2)
BUN SERPL-MCNC: 21 MG/DL — SIGNIFICANT CHANGE UP (ref 7–23)
BUN SERPL-MCNC: 22 MG/DL — SIGNIFICANT CHANGE UP (ref 7–23)
CALCIUM SERPL-MCNC: 9 MG/DL — SIGNIFICANT CHANGE UP (ref 8.4–10.5)
CALCIUM SERPL-MCNC: 9.1 MG/DL — SIGNIFICANT CHANGE UP (ref 8.4–10.5)
CHLORIDE SERPL-SCNC: 101 MMOL/L — SIGNIFICANT CHANGE UP (ref 96–108)
CHLORIDE SERPL-SCNC: 99 MMOL/L — SIGNIFICANT CHANGE UP (ref 96–108)
CO2 SERPL-SCNC: 27 MMOL/L — SIGNIFICANT CHANGE UP (ref 22–31)
CO2 SERPL-SCNC: 28 MMOL/L — SIGNIFICANT CHANGE UP (ref 22–31)
CREAT SERPL-MCNC: 1.05 MG/DL — SIGNIFICANT CHANGE UP (ref 0.5–1.3)
CREAT SERPL-MCNC: 1.09 MG/DL — SIGNIFICANT CHANGE UP (ref 0.5–1.3)
EGFR: 67 ML/MIN/1.73M2 — SIGNIFICANT CHANGE UP
EGFR: 67 ML/MIN/1.73M2 — SIGNIFICANT CHANGE UP
EGFR: 70 ML/MIN/1.73M2 — SIGNIFICANT CHANGE UP
EGFR: 70 ML/MIN/1.73M2 — SIGNIFICANT CHANGE UP
EOSINOPHIL # BLD AUTO: 0.11 K/UL — SIGNIFICANT CHANGE UP (ref 0–0.5)
EOSINOPHIL NFR BLD AUTO: 1 % — SIGNIFICANT CHANGE UP (ref 0–6)
GLUCOSE SERPL-MCNC: 159 MG/DL — HIGH (ref 70–99)
GLUCOSE SERPL-MCNC: 90 MG/DL — SIGNIFICANT CHANGE UP (ref 70–99)
HCT VFR BLD CALC: 38.8 % — LOW (ref 39–50)
HGB BLD-MCNC: 13.1 G/DL — SIGNIFICANT CHANGE UP (ref 13–17)
IMM GRANULOCYTES # BLD AUTO: 0.04 K/UL — SIGNIFICANT CHANGE UP (ref 0–0.07)
IMM GRANULOCYTES NFR BLD AUTO: 0.4 % — SIGNIFICANT CHANGE UP (ref 0–0.9)
IMMATURE PLATELET FRACTION #: 5.6 K/UL — SIGNIFICANT CHANGE UP (ref 3.9–12.5)
IMMATURE PLATELET FRACTION %: 6.2 % — SIGNIFICANT CHANGE UP (ref 1.6–7.1)
LYMPHOCYTES # BLD AUTO: 3.01 K/UL — SIGNIFICANT CHANGE UP (ref 1–3.3)
LYMPHOCYTES NFR BLD AUTO: 27.2 % — SIGNIFICANT CHANGE UP (ref 13–44)
MCHC RBC-ENTMCNC: 33.8 G/DL — SIGNIFICANT CHANGE UP (ref 32–36)
MCHC RBC-ENTMCNC: 34.5 PG — HIGH (ref 27–34)
MCV RBC AUTO: 102.1 FL — HIGH (ref 80–100)
MONOCYTES # BLD AUTO: 1.08 K/UL — HIGH (ref 0–0.9)
MONOCYTES NFR BLD AUTO: 9.7 % — SIGNIFICANT CHANGE UP (ref 2–14)
NEUTROPHILS # BLD AUTO: 6.81 K/UL — SIGNIFICANT CHANGE UP (ref 1.8–7.4)
NEUTROPHILS NFR BLD AUTO: 61.4 % — SIGNIFICANT CHANGE UP (ref 43–77)
NRBC # BLD AUTO: 0 K/UL — SIGNIFICANT CHANGE UP (ref 0–0)
NRBC # FLD: 0 K/UL — SIGNIFICANT CHANGE UP (ref 0–0)
NRBC BLD AUTO-RTO: 0 /100 WBCS — SIGNIFICANT CHANGE UP (ref 0–0)
PLATELET # BLD AUTO: 90 K/UL — LOW (ref 150–400)
PMV BLD: 12.9 FL — SIGNIFICANT CHANGE UP (ref 7–13)
POTASSIUM SERPL-MCNC: 4.6 MMOL/L — SIGNIFICANT CHANGE UP (ref 3.5–5.3)
POTASSIUM SERPL-MCNC: 5.4 MMOL/L — HIGH (ref 3.5–5.3)
POTASSIUM SERPL-SCNC: 4.6 MMOL/L — SIGNIFICANT CHANGE UP (ref 3.5–5.3)
POTASSIUM SERPL-SCNC: 5.4 MMOL/L — HIGH (ref 3.5–5.3)
RBC # BLD: 3.8 M/UL — LOW (ref 4.2–5.8)
RBC # FLD: 14.1 % — SIGNIFICANT CHANGE UP (ref 10.3–14.5)
SODIUM SERPL-SCNC: 133 MMOL/L — LOW (ref 135–145)
SODIUM SERPL-SCNC: 134 MMOL/L — LOW (ref 135–145)
WBC # BLD: 11.08 K/UL — HIGH (ref 3.8–10.5)
WBC # FLD AUTO: 11.08 K/UL — HIGH (ref 3.8–10.5)

## 2025-08-19 PROCEDURE — 85025 COMPLETE CBC W/AUTO DIFF WBC: CPT

## 2025-08-19 PROCEDURE — 20931 SP BONE ALGRFT STRUCT ADD-ON: CPT

## 2025-08-19 PROCEDURE — 86850 RBC ANTIBODY SCREEN: CPT

## 2025-08-19 PROCEDURE — 22853 INSJ BIOMECHANICAL DEVICE: CPT

## 2025-08-19 PROCEDURE — 97116 GAIT TRAINING THERAPY: CPT

## 2025-08-19 PROCEDURE — 86901 BLOOD TYPING SEROLOGIC RH(D): CPT

## 2025-08-19 PROCEDURE — 97161 PT EVAL LOW COMPLEX 20 MIN: CPT

## 2025-08-19 PROCEDURE — C9399: CPT

## 2025-08-19 PROCEDURE — 63052 LAM FACETC/FRMT ARTHRD LUM 1: CPT

## 2025-08-19 PROCEDURE — 86900 BLOOD TYPING SEROLOGIC ABO: CPT

## 2025-08-19 PROCEDURE — 20936 SP BONE AGRFT LOCAL ADD-ON: CPT

## 2025-08-19 PROCEDURE — 36415 COLL VENOUS BLD VENIPUNCTURE: CPT

## 2025-08-19 PROCEDURE — 80048 BASIC METABOLIC PNL TOTAL CA: CPT

## 2025-08-19 PROCEDURE — 76000 FLUOROSCOPY <1 HR PHYS/QHP: CPT

## 2025-08-19 PROCEDURE — C1831: CPT

## 2025-08-19 PROCEDURE — 22630 ARTHRD PST TQ 1NTRSPC LUM: CPT

## 2025-08-19 PROCEDURE — 99223 1ST HOSP IP/OBS HIGH 75: CPT

## 2025-08-19 PROCEDURE — C1889: CPT

## 2025-08-19 PROCEDURE — C1713: CPT

## 2025-08-19 RX ORDER — TRAMADOL HYDROCHLORIDE 50 MG/1
0 TABLET, FILM COATED ORAL
Qty: 0 | Refills: 0 | DISCHARGE
Start: 2025-08-19

## 2025-08-19 RX ORDER — SODIUM ZIRCONIUM CYCLOSILICATE 5 G/5G
10 POWDER, FOR SUSPENSION ORAL ONCE
Refills: 0 | Status: DISCONTINUED | OUTPATIENT
Start: 2025-08-19 | End: 2025-08-19

## 2025-08-19 RX ORDER — ACETAMINOPHEN 500 MG/5ML
2 LIQUID (ML) ORAL
Qty: 0 | Refills: 0 | DISCHARGE
Start: 2025-08-19

## 2025-08-19 RX ORDER — TRAMADOL HYDROCHLORIDE 50 MG/1
0.5 TABLET, FILM COATED ORAL
Qty: 15 | Refills: 0
Start: 2025-08-19 | End: 2025-08-23

## 2025-08-19 RX ADMIN — Medication 1 APPLICATION(S): at 12:05

## 2025-08-19 RX ADMIN — METHOCARBAMOL 500 MILLIGRAM(S): 500 TABLET, FILM COATED ORAL at 06:49

## 2025-08-19 RX ADMIN — METHOCARBAMOL 500 MILLIGRAM(S): 500 TABLET, FILM COATED ORAL at 13:28

## 2025-08-19 RX ADMIN — Medication 4 MILLIGRAM(S): at 06:49

## 2025-08-19 RX ADMIN — Medication 100 MILLIGRAM(S): at 00:13

## 2025-08-19 RX ADMIN — Medication 1000 MILLIGRAM(S): at 13:28

## 2025-08-19 RX ADMIN — Medication 1 APPLICATION(S): at 06:52

## 2025-08-19 RX ADMIN — Medication 1000 MILLIGRAM(S): at 06:49

## 2025-08-19 RX ADMIN — Medication 1 APPLICATION(S): at 00:13

## 2025-08-19 RX ADMIN — Medication 4 MILLIGRAM(S): at 12:05

## 2025-08-27 DIAGNOSIS — M47.26 OTHER SPONDYLOSIS WITH RADICULOPATHY, LUMBAR REGION: ICD-10-CM

## 2025-08-27 DIAGNOSIS — Z88.8 ALLERGY STATUS TO OTHER DRUGS, MEDICAMENTS AND BIOLOGICAL SUBSTANCES: ICD-10-CM

## 2025-08-27 DIAGNOSIS — M48.062 SPINAL STENOSIS, LUMBAR REGION WITH NEUROGENIC CLAUDICATION: ICD-10-CM

## 2025-08-27 DIAGNOSIS — I25.10 ATHEROSCLEROTIC HEART DISEASE OF NATIVE CORONARY ARTERY WITHOUT ANGINA PECTORIS: ICD-10-CM

## 2025-08-27 DIAGNOSIS — Z88.1 ALLERGY STATUS TO OTHER ANTIBIOTIC AGENTS: ICD-10-CM

## 2025-08-27 DIAGNOSIS — I50.32 CHRONIC DIASTOLIC (CONGESTIVE) HEART FAILURE: ICD-10-CM

## 2025-08-27 DIAGNOSIS — Z96.653 PRESENCE OF ARTIFICIAL KNEE JOINT, BILATERAL: ICD-10-CM

## 2025-08-27 DIAGNOSIS — I43 CARDIOMYOPATHY IN DISEASES CLASSIFIED ELSEWHERE: ICD-10-CM

## 2025-08-27 DIAGNOSIS — M43.16 SPONDYLOLISTHESIS, LUMBAR REGION: ICD-10-CM

## 2025-08-27 DIAGNOSIS — N40.0 BENIGN PROSTATIC HYPERPLASIA WITHOUT LOWER URINARY TRACT SYMPTOMS: ICD-10-CM

## 2025-08-27 DIAGNOSIS — E78.5 HYPERLIPIDEMIA, UNSPECIFIED: ICD-10-CM

## 2025-08-27 DIAGNOSIS — D69.59 OTHER SECONDARY THROMBOCYTOPENIA: ICD-10-CM

## 2025-08-27 DIAGNOSIS — E85.82 WILD-TYPE TRANSTHYRETIN-RELATED (ATTR) AMYLOIDOSIS: ICD-10-CM

## 2025-08-27 DIAGNOSIS — I11.0 HYPERTENSIVE HEART DISEASE WITH HEART FAILURE: ICD-10-CM

## 2025-08-27 DIAGNOSIS — E85.81 LIGHT CHAIN (AL) AMYLOIDOSIS: ICD-10-CM

## 2025-08-27 DIAGNOSIS — M25.78 OSTEOPHYTE, VERTEBRAE: ICD-10-CM

## 2025-08-27 DIAGNOSIS — Z79.82 LONG TERM (CURRENT) USE OF ASPIRIN: ICD-10-CM

## 2025-08-27 DIAGNOSIS — E87.1 HYPO-OSMOLALITY AND HYPONATREMIA: ICD-10-CM

## 2025-08-27 DIAGNOSIS — E87.5 HYPERKALEMIA: ICD-10-CM

## 2025-09-29 ENCOUNTER — APPOINTMENT (OUTPATIENT)
Dept: UROLOGY | Facility: MEDICAL CENTER | Age: 85
End: 2025-09-29
Payer: MEDICARE

## (undated) DEVICE — Device

## (undated) DEVICE — PREP ALCOHOL PAD

## (undated) DEVICE — PREP SCRUB BRUSH W CHG 4%

## (undated) DEVICE — SUT QUILL MONODERM 2-0 60CM 24MM UNDYED

## (undated) DEVICE — PACK SPINE

## (undated) DEVICE — DRAPE LIGHT HANDLE COVER (BLUE)

## (undated) DEVICE — CATH IV OPTIVA 16G X 2"

## (undated) DEVICE — SUT VICRYL 2-0 27" CT-1

## (undated) DEVICE — POSITIONER FOAM EGG CRATE ULNAR 2PCS (PINK)

## (undated) DEVICE — DRAPE MAGNETIC INSTRUMENT MEDIUM

## (undated) DEVICE — NDL SPINAL

## (undated) DEVICE — DRSG CURITY GAUZE SPONGE 4 X 4" 12-PLY

## (undated) DEVICE — NUVASIVE NVJJB / M5 I-PAS III (BEVELED TIP)

## (undated) DEVICE — DRAPE SURGICAL #1010

## (undated) DEVICE — COVER BACK TABLE STRL 80/110IN 10/CA

## (undated) DEVICE — SUT VICRYL 1 27" OS-8 UNDYED

## (undated) DEVICE — MIDAS REX MR8 MATCH HEAD FLUTED LG BORE 3MM X 14CM

## (undated) DEVICE — TAPE SILK 3"

## (undated) DEVICE — WARMING BLANKET UPPER ADULT

## (undated) DEVICE — TUBING SUCTION NONCONDUCTIVE 6MM X 12FT

## (undated) DEVICE — DRSG AQUACEL 3.5 X 6"

## (undated) DEVICE — MARKING PEN W RULER

## (undated) DEVICE — POOLE SUCTION TIP

## (undated) DEVICE — DISK TRAY ENDOSCOPIC

## (undated) DEVICE — MIDAS REX MR8 BALL FLUTED LG BORE 4MM X 14CM

## (undated) DEVICE — DRAPE IOBAN 33" X 23"

## (undated) DEVICE — PREP DURAPREP 26CC

## (undated) DEVICE — DRAPE 3/4 SHEET 52X76"

## (undated) DEVICE — PROBE BIOPOLAR QUAD MINI 32CM

## (undated) DEVICE — DRAPE INSTRUMENT POUCH 6.75" X 11"